# Patient Record
Sex: MALE | Race: WHITE | NOT HISPANIC OR LATINO | Employment: FULL TIME | URBAN - METROPOLITAN AREA
[De-identification: names, ages, dates, MRNs, and addresses within clinical notes are randomized per-mention and may not be internally consistent; named-entity substitution may affect disease eponyms.]

---

## 2017-01-10 ENCOUNTER — TELEPHONE (OUTPATIENT)
Dept: FAMILY MEDICINE | Facility: CLINIC | Age: 52
End: 2017-01-10

## 2017-01-10 DIAGNOSIS — R05.9 COUGH: ICD-10-CM

## 2017-01-10 DIAGNOSIS — R50.9 FEVER, UNSPECIFIED: Primary | ICD-10-CM

## 2017-01-10 DIAGNOSIS — R50.9 FEVER, UNSPECIFIED: ICD-10-CM

## 2017-01-10 DIAGNOSIS — R05.9 COUGH: Primary | ICD-10-CM

## 2017-01-10 LAB
FLUAV+FLUBV AG SPEC QL: ABNORMAL
FLUAV+FLUBV AG SPEC QL: ABNORMAL
SPECIMEN SOURCE: ABNORMAL

## 2017-01-10 PROCEDURE — 87804 INFLUENZA ASSAY W/OPTIC: CPT | Performed by: FAMILY MEDICINE

## 2017-01-25 ENCOUNTER — MYC REFILL (OUTPATIENT)
Dept: FAMILY MEDICINE | Facility: CLINIC | Age: 52
End: 2017-01-25

## 2017-01-25 DIAGNOSIS — E78.5 HYPERLIPIDEMIA LDL GOAL <160: ICD-10-CM

## 2017-01-25 RX ORDER — PRAVASTATIN SODIUM 20 MG
20 TABLET ORAL DAILY
Qty: 90 TABLET | Refills: 3 | Status: SHIPPED | OUTPATIENT
Start: 2017-01-25 | End: 2017-03-28

## 2017-01-25 NOTE — TELEPHONE ENCOUNTER
Prescription approved per [facility] Refill Protocol.      Pravastatin       Last Written Prescription Date: 10/24/16  Last Fill Quantity: 90, # refills: 0    Last Office Visit with [facility], [facility] or [facility] prescribing provider:  11/21/16   Future Office Visit:      CHOLESTEROL   Date Value Ref Range Status   11/18/2016 197 <200 mg/dL Final     HDL CHOLESTEROL   Date Value Ref Range Status   11/18/2016 39* >39 mg/dL Final     LDL CHOLESTEROL CALCULATED   Date Value Ref Range Status   11/18/2016 121* <100 mg/dL Final     Comment:     Above desirable:  100-129 mg/dl   Borderline High:  130-159 mg/dL   High:             160-189 mg/dL   Very high:       >189 mg/dl       TRIGLYCERIDES   Date Value Ref Range Status   11/18/2016 184* <150 mg/dL Final     Comment:     Borderline high:  150-199 mg/dl   High:             200-499 mg/dl   Very high:       >499 mg/dl       CHOLESTEROL/HDL RATIO   Date Value Ref Range Status   09/14/2015 4.9 0.0 - 5.0 Final     ALT   Date Value Ref Range Status   11/18/2016 99* 0 - 70 U/L Final

## 2017-01-25 NOTE — TELEPHONE ENCOUNTER
Message from [employer]:  Original authorizing provider: Ramona Ann Aaseby-Aguilera, PA-C Troy W Hanson would like a refill of the following medications:  pravastatin (PRAVACHOL) 20 MG tablet [Ramona Ann Aaseby-Aguilera, PA-C]    Preferred pharmacy: [facility] - [place], [place] - [address]    Comment:

## 2017-02-03 DIAGNOSIS — J01.00 ACUTE NON-RECURRENT MAXILLARY SINUSITIS: Primary | ICD-10-CM

## 2017-02-03 RX ORDER — PREDNISONE 20 MG/1
60 TABLET ORAL DAILY
Qty: 15 TABLET | Refills: 0 | Status: SHIPPED | OUTPATIENT
Start: 2017-02-03 | End: 2017-02-23

## 2017-02-23 ENCOUNTER — OFFICE VISIT (OUTPATIENT)
Dept: FAMILY MEDICINE | Facility: CLINIC | Age: 52
End: 2017-02-23
Payer: COMMERCIAL

## 2017-02-23 VITALS
SYSTOLIC BLOOD PRESSURE: 130 MMHG | HEART RATE: 74 BPM | BODY MASS INDEX: 35.21 KG/M2 | OXYGEN SATURATION: 96 % | DIASTOLIC BLOOD PRESSURE: 88 MMHG | HEIGHT: 72 IN | WEIGHT: 260 LBS

## 2017-02-23 DIAGNOSIS — E66.3 OVERWEIGHT: ICD-10-CM

## 2017-02-23 DIAGNOSIS — E29.1 HYPOGONADISM MALE: ICD-10-CM

## 2017-02-23 DIAGNOSIS — E34.9 TESTOSTERONE INSUFFICIENCY: Primary | ICD-10-CM

## 2017-02-23 PROCEDURE — 96372 THER/PROPH/DIAG INJ SC/IM: CPT | Performed by: NURSE PRACTITIONER

## 2017-02-23 PROCEDURE — 99214 OFFICE O/P EST MOD 30 MIN: CPT | Mod: 25 | Performed by: NURSE PRACTITIONER

## 2017-02-23 RX ORDER — TESTOSTERONE CYPIONATE 200 MG/ML
200 INJECTION, SOLUTION INTRAMUSCULAR
Qty: 2 ML | Refills: 5 | Status: SHIPPED | OUTPATIENT
Start: 2017-02-23 | End: 2017-07-26

## 2017-02-23 NOTE — MR AVS SNAPSHOT
"              After Visit Summary   2/23/2017    Galileo Vieira    MRN: [id number]           Patient Information     Date Of Birth          1965        Visit Information        Provider Department      2/23/2017 11:40 AM Alexa Cano NP [facility]        Today's Diagnoses     Testosterone insufficiency    -  1       Follow-ups after your visit        Who to contact     If you have questions or need follow up information about today's clinic visit or your schedule please contact [facility] directly at [phone].  Normal or non-critical lab and imaging results will be communicated to you by Pfeffermind Gameshart, letter or phone within 4 business days after the clinic has received the results. If you do not hear from us within 7 days, please contact the clinic through MoneyFarmt or phone. If you have a critical or abnormal lab result, we will notify you by phone as soon as possible.  Submit refill requests through [employer] or call your pharmacy and they will forward the refill request to us. Please allow 3 business days for your refill to be completed.          Additional Information About Your Visit        MyChart Information     [employer] gives you secure access to your electronic health record. If you see a primary care provider, you can also send messages to your care team and make appointments. If you have questions, please call your primary care clinic.  If you do not have a primary care provider, please call [phone] and they will assist you.        Care EveryWhere ID     This is your Care EveryWhere ID. This could be used by other organizations to access your [place] medical records  [id number]        Your Vitals Were     Pulse Height Pulse Oximetry BMI (Body Mass Index)          74 5' 11.75\" (1.822 m) 96% 35.51 kg/m2         Blood Pressure from Last 3 Encounters:   02/23/17 130/88   12/09/16 121/74   11/21/16 128/80    Weight from Last 3 Encounters:   02/23/17 260 lb (117.9 kg) "   12/09/16 260 lb 12.9 oz (118.3 kg)   11/21/16 261 lb (118.4 kg)              We Performed the Following     C TESTOSTERONE CYPIONATE INJECTION, 1 MG     INJECTION INTRAMUSCULAR OR SUB-Q          Today's Medication Changes          These changes are accurate as of: 2/23/17  5:32 PM.  If you have any questions, ask your nurse or doctor.               Start taking these medicines.        Dose/Directions    testosterone cypionate 200 MG/ML injection   Commonly known as:  DEPO-TESTOSTERONE   Used for:  Testosterone insufficiency   Started by:  Alexa Cano NP        Dose:  200 mg   Inject 1 mL (200 mg) into the muscle every 14 days   Quantity:  2 mL   Refills:  5            Where to get your medicines      Call your pharmacy to confirm that your medication is ready for pickup. It may take up to 24 hours for them to receive the prescription. If the prescription is not ready within 3 business days, please contact your clinic or your provider.     We will let you know when these medications are ready. If you don't hear back within 3 business days, please contact us.     testosterone cypionate 200 MG/ML injection                Primary Care Provider Office Phone # Fax #    Ramona Ann Aaseby-Aguilera, PA-C [phone]       [facility] [address] [facility] [address]        Thank you!     Thank you for choosing [facility]  for your care. Our goal is always to provide you with excellent care. Hearing back from our patients is one way we can continue to improve our services. Please take a few minutes to complete the written survey that you may receive in the mail after your visit with us. Thank you!             Your Updated Medication List - Protect others around you: Learn how to safely use, store and throw away your medicines at www.disposemymeds.org.          This list is accurate as of: 2/23/17  5:32 PM.  Always use your most recent med list.                   Brand  Name Dispense Instructions for use    esomeprazole 20 MG CR capsule    nexIUM    90 capsule    Take 1 capsule (20 mg) by mouth every morning (before breakfast) Take 30-60 minutes before eating.       FLUoxetine 20 MG capsule    PROzac    90 capsule    Take 1 capsule (20 mg) by mouth daily       ibuprofen 200 MG capsule     120    1 CAPSULE PRN       omeprazole 40 MG capsule    priLOSEC    90 capsule    Take 1 capsule (40 mg) by mouth daily       order for DME      Equipment ordered: RESMED Auto PAP Mask type: Full face  Settings: 5-15       pravastatin 20 MG tablet    PRAVACHOL    90 tablet    Take 1 tablet (20 mg) by mouth daily       testosterone cypionate 200 MG/ML injection    DEPO-TESTOSTERONE    2 mL    Inject 1 mL (200 mg) into the muscle every 14 days

## 2017-02-23 NOTE — PROGRESS NOTES
"  SUBJECTIVE:                                                    Galileo Vieira is a 51 year old male who presents to clinic today for the following health issues:    Galileo is here to discuss testosterone replacement. History of recurrent low testosterone levels. Feeling fatigued with no motivation to move, gaining weight, and lacks libido. Has been on testosterone cream but did not feel it was effective. Interested in discussing other options. Goal is to improve his energy so he is more motivated to exercise and lose weight. Has discussed use of phentermine with another provider and is interested in starting that medication today as well.          Problem list and histories reviewed & adjusted, as indicated.  Additional history: none    Problem list, Medication list, Allergies, and Medical/Social/Surgical histories reviewed in EPIC and updated as appropriate.    ROS:  Constitutional, HEENT, cardiovascular, pulmonary, gi and gu systems are negative, except as otherwise noted.    OBJECTIVE:                                                    /88 (BP Location: Right arm, Patient Position: Chair, Cuff Size: Adult Large)  Pulse 74  Ht 5' 11.75\" (1.822 m)  Wt 260 lb (117.9 kg)  SpO2 96%  BMI 35.51 kg/m2  Body mass index is 35.51 kg/(m^2).  GENERAL: healthy, alert and no distress  RESP: lungs clear to auscultation - no rales, rhonchi or wheezes  CV: regular rate and rhythm, normal S1 S2, no S3 or S4, no murmur, click or rub, no peripheral edema and peripheral pulses strong  PSYCH: mentation appears normal, affect normal/bright    none      ASSESSMENT/PLAN:                                                            1. Testosterone insufficiency  Reviewed previous two testosterone levels that were below normal. TSH is normal. Will start on testosterone injections every two weeks. Emphasis on healthy diet and regular exercise daily.   - testosterone cypionate (DEPO-TESTOSTERONE) 200 MG/ML injection; Inject 1 mL (200 mg) " into the muscle every 14 days  Dispense: 2 mL; Refill: 5  - C TESTOSTERONE CYPIONATE INJECTION, 1 MG  - INJECTION INTRAMUSCULAR OR SUB-Q    2. Overweight  Will start phentermine in one week after seeing if any difference with testosterone.   Follow up in one month.     Alexa Cano, NP  [facility]

## 2017-02-23 NOTE — NURSING NOTE
"Chief Complaint   Patient presents with     Consult       Initial /88 (BP Location: Right arm, Patient Position: Chair, Cuff Size: Adult Large)  Pulse 74  Ht 5' 11.75\" (1.822 m)  Wt 260 lb (117.9 kg)  SpO2 96%  BMI 35.51 kg/m2 Estimated body mass index is 35.51 kg/(m^2) as calculated from the following:    Height as of this encounter: 5' 11.75\" (1.822 m).    Weight as of this encounter: 260 lb (117.9 kg).  Medication Reconciliation: incomplete   CATHERINE Cruz      "

## 2017-02-24 RX ORDER — PHENTERMINE HYDROCHLORIDE 15 MG/1
15 CAPSULE ORAL EVERY MORNING
Qty: 30 CAPSULE | Refills: 0 | Status: SHIPPED | OUTPATIENT
Start: 2017-02-24 | End: 2017-06-20

## 2017-02-27 ENCOUNTER — TELEPHONE (OUTPATIENT)
Dept: FAMILY MEDICINE | Facility: CLINIC | Age: 52
End: 2017-02-27

## 2017-02-27 NOTE — TELEPHONE ENCOUNTER
PA faxed to In[employer] for Phentermine 15MG    [employer]    Phone@ [phone]  Patient ID# [id number]    Boaz RAMSEY

## 2017-03-08 PROBLEM — E29.1 HYPOGONADISM MALE: Status: ACTIVE | Noted: 2017-03-08

## 2017-03-09 ENCOUNTER — ALLIED HEALTH/NURSE VISIT (OUTPATIENT)
Dept: NURSING | Facility: CLINIC | Age: 52
End: 2017-03-09
Payer: COMMERCIAL

## 2017-03-09 DIAGNOSIS — E34.9 TESTOSTERONE INSUFFICIENCY: Primary | ICD-10-CM

## 2017-03-09 PROCEDURE — 99207 ZZC NO CHARGE NURSE ONLY: CPT

## 2017-03-09 PROCEDURE — 96372 THER/PROPH/DIAG INJ SC/IM: CPT

## 2017-03-14 ENCOUNTER — TELEPHONE (OUTPATIENT)
Dept: FAMILY MEDICINE | Facility: CLINIC | Age: 52
End: 2017-03-14

## 2017-03-14 NOTE — TELEPHONE ENCOUNTER
PA faxed and approved for testosterone cypionate (DEPO-TESTOSTERONE) 200 MG/ML injection. Effective 03/14/2017-03/14/2018.     Boaz BOWMANT

## 2017-03-23 ENCOUNTER — TELEPHONE (OUTPATIENT)
Dept: FAMILY MEDICINE | Facility: CLINIC | Age: 52
End: 2017-03-23

## 2017-03-23 ENCOUNTER — ALLIED HEALTH/NURSE VISIT (OUTPATIENT)
Dept: NURSING | Facility: CLINIC | Age: 52
End: 2017-03-23
Payer: COMMERCIAL

## 2017-03-23 DIAGNOSIS — E66.3 OVERWEIGHT: Primary | ICD-10-CM

## 2017-03-23 DIAGNOSIS — E34.9 TESTOSTERONE DEFICIENCY: Primary | ICD-10-CM

## 2017-03-23 PROCEDURE — 96372 THER/PROPH/DIAG INJ SC/IM: CPT

## 2017-03-23 PROCEDURE — 99207 ZZC NO CHARGE NURSE ONLY: CPT

## 2017-03-23 RX ORDER — PHENTERMINE HYDROCHLORIDE 37.5 MG/1
37.5 CAPSULE ORAL EVERY MORNING
Qty: 30 CAPSULE | Refills: 0 | Status: SHIPPED | OUTPATIENT
Start: 2017-03-23 | End: 2017-06-20

## 2017-03-24 ENCOUNTER — TELEPHONE (OUTPATIENT)
Dept: FAMILY MEDICINE | Facility: CLINIC | Age: 52
End: 2017-03-24

## 2017-03-24 NOTE — TELEPHONE ENCOUNTER
PA faxed for phentermine 37.5MG    [employer]    Phone@ [phone]    Patient ID# [id number]    Boaz RAMSEY

## 2017-03-26 ENCOUNTER — MYC REFILL (OUTPATIENT)
Dept: FAMILY MEDICINE | Facility: CLINIC | Age: 52
End: 2017-03-26

## 2017-03-26 DIAGNOSIS — K21.9 GERD (GASTROESOPHAGEAL REFLUX DISEASE): ICD-10-CM

## 2017-03-26 DIAGNOSIS — E78.5 HYPERLIPIDEMIA LDL GOAL <160: ICD-10-CM

## 2017-03-27 NOTE — TELEPHONE ENCOUNTER
Pending Prescriptions:                       Disp   Refills    omeprazole (PRILOSEC) 40 MG capsule       90 cap*4            Sig: Take 1 capsule (40 mg) by mouth daily          Last Written Prescription Date: 10/24/2016  Last Fill Quantity: 90,  # refills: 4   Last Office Visit with [facility], [facility] or [facility] prescribing provider: 02/23/2017    Millicent Hansen

## 2017-03-27 NOTE — TELEPHONE ENCOUNTER
LMTRC    Need to verify what PPI he is taking as he has refills on both nexium and omeprazole  Danielle Byrd RN, BSN

## 2017-03-27 NOTE — TELEPHONE ENCOUNTER
Message from [employer]:  Original authorizing provider: Ramona Ann Aaseby-Aguilera, PA-C Troy W. Hanson would like a refill of the following medications:  omeprazole (PRILOSEC) 40 MG capsule [Ramona Ann Aaseby-Aguilera, PA-C]    Preferred pharmacy: [facility] - [place], [place] - [facility] [address]    Comment:

## 2017-03-28 RX ORDER — OMEPRAZOLE 40 MG/1
40 CAPSULE, DELAYED RELEASE ORAL DAILY
Qty: 90 CAPSULE | Refills: 4 | Status: SHIPPED | OUTPATIENT
Start: 2017-03-28 | End: 2018-06-18

## 2017-03-28 RX ORDER — PRAVASTATIN SODIUM 20 MG
20 TABLET ORAL DAILY
Qty: 90 TABLET | Refills: 3 | COMMUNITY
Start: 2017-03-28 | End: 2018-05-08

## 2017-04-06 ENCOUNTER — ALLIED HEALTH/NURSE VISIT (OUTPATIENT)
Dept: NURSING | Facility: CLINIC | Age: 52
End: 2017-04-06
Payer: COMMERCIAL

## 2017-04-06 DIAGNOSIS — E34.9 TESTOSTERONE INSUFFICIENCY: Primary | ICD-10-CM

## 2017-04-06 PROCEDURE — 96372 THER/PROPH/DIAG INJ SC/IM: CPT

## 2017-04-06 NOTE — PROGRESS NOTES
MEDICATION: Depo Testosterone  200 mg  ROUTE: IM  SITE: [facility]  DOSE: 200mg  LOT #: [id number]  :  [employer]  EXPIRATION DATE:  6/2019  NDC#: 3691-7174-08  Danielle Byrd RN, BSN

## 2017-04-06 NOTE — MR AVS SNAPSHOT
After Visit Summary   4/6/2017    Galileo Vieira    MRN: [id number]           Patient Information     Date Of Birth          1965        Visit Information        Provider Department      4/6/2017 8:30 AM LV RN [facility]        Today's Diagnoses     Testosterone insufficiency    -  1       Follow-ups after your visit        Who to contact     If you have questions or need follow up information about today's clinic visit or your schedule please contact [facility] directly at [phone].  Normal or non-critical lab and imaging results will be communicated to you by MyChart, letter or phone within 4 business days after the clinic has received the results. If you do not hear from us within 7 days, please contact the clinic through Teranodehart or phone. If you have a critical or abnormal lab result, we will notify you by phone as soon as possible.  Submit refill requests through [employer] or call your pharmacy and they will forward the refill request to us. Please allow 3 business days for your refill to be completed.          Additional Information About Your Visit        MyChart Information     [employer] gives you secure access to your electronic health record. If you see a primary care provider, you can also send messages to your care team and make appointments. If you have questions, please call your primary care clinic.  If you do not have a primary care provider, please call [phone] and they will assist you.        Care EveryWhere ID     This is your Care EveryWhere ID. This could be used by other organizations to access your [place] medical records  [id number]         Blood Pressure from Last 3 Encounters:   02/23/17 130/88   12/09/16 121/74   11/21/16 128/80    Weight from Last 3 Encounters:   02/23/17 260 lb (117.9 kg)   12/09/16 260 lb 12.9 oz (118.3 kg)   11/21/16 261 lb (118.4 kg)              We Performed the Following     C TESTOSTERONE CYPIONATE  INJECTION, 1 MG     INJECTION INTRAMUSCULAR OR SUB-Q        Primary Care Provider Office Phone # Fax #    Ramona Ann Aaseby-Aguilera, PA-C [phone]       [facility] [address]  [facility] [address]        Thank you!     Thank you for choosing [facility]  for your care. Our goal is always to provide you with excellent care. Hearing back from our patients is one way we can continue to improve our services. Please take a few minutes to complete the written survey that you may receive in the mail after your visit with us. Thank you!             Your Updated Medication List - Protect others around you: Learn how to safely use, store and throw away your medicines at www.disposemymeds.org.          This list is accurate as of: 4/6/17  9:05 AM.  Always use your most recent med list.                   Brand Name Dispense Instructions for use    FLUoxetine 20 MG capsule    PROzac    90 capsule    Take 1 capsule (20 mg) by mouth daily       ibuprofen 200 MG capsule     120    1 CAPSULE PRN       omeprazole 40 MG capsule    priLOSEC    90 capsule    Take 1 capsule (40 mg) by mouth daily       order for DME      Equipment ordered: RESMED Auto PAP Mask type: Full face  Settings: 5-15       * phentermine 15 MG capsule     30 capsule    Take 1 capsule (15 mg) by mouth every morning       * phentermine 37.5 MG capsule     30 capsule    Take 1 capsule (37.5 mg) by mouth every morning       PRAVACHOL 20 MG tablet   Generic drug:  pravastatin     90 tablet    Take 1 tablet (20 mg) by mouth daily       testosterone cypionate 200 MG/ML injection    DEPO-TESTOSTERONE    2 mL    Inject 1 mL (200 mg) into the muscle every 14 days       * Notice:  This list has 2 medication(s) that are the same as other medications prescribed for you. Read the directions carefully, and ask your doctor or other care provider to review them with you.

## 2017-04-19 ENCOUNTER — MYC REFILL (OUTPATIENT)
Dept: FAMILY MEDICINE | Facility: CLINIC | Age: 52
End: 2017-04-19

## 2017-04-19 DIAGNOSIS — E78.5 HYPERLIPIDEMIA LDL GOAL <160: ICD-10-CM

## 2017-04-19 RX ORDER — PRAVASTATIN SODIUM 20 MG
20 TABLET ORAL DAILY
Qty: 90 TABLET | Refills: 3 | Status: CANCELLED | OUTPATIENT
Start: 2017-04-19

## 2017-04-20 ENCOUNTER — ALLIED HEALTH/NURSE VISIT (OUTPATIENT)
Dept: NURSING | Facility: CLINIC | Age: 52
End: 2017-04-20
Payer: COMMERCIAL

## 2017-04-20 DIAGNOSIS — E34.9 TESTOSTERONE INSUFFICIENCY: Primary | ICD-10-CM

## 2017-04-20 PROCEDURE — 96372 THER/PROPH/DIAG INJ SC/IM: CPT

## 2017-05-04 ENCOUNTER — ALLIED HEALTH/NURSE VISIT (OUTPATIENT)
Dept: NURSING | Facility: CLINIC | Age: 52
End: 2017-05-04
Payer: COMMERCIAL

## 2017-05-04 DIAGNOSIS — E34.9 TESTOSTERONE INSUFFICIENCY: Primary | ICD-10-CM

## 2017-05-04 PROCEDURE — 96372 THER/PROPH/DIAG INJ SC/IM: CPT

## 2017-05-04 NOTE — MR AVS SNAPSHOT
After Visit Summary   5/4/2017    Galileo Vieira    MRN: [id number]           Patient Information     Date Of Birth          1965        Visit Information        Provider Department      5/4/2017 1:30 PM NASH RN [facility]        Today's Diagnoses     Testosterone insufficiency    -  1       Follow-ups after your visit        Your next 10 appointments already scheduled     May 04, 2017  1:30 PM CDT   Nurse Only with LV RN   [facility] ([facility]    [address] [facility] [address] [phone]              Who to contact     If you have questions or need follow up information about today's clinic visit or your schedule please contact [facility] directly at [phone].  Normal or non-critical lab and imaging results will be communicated to you by Phunwarehart, letter or phone within 4 business days after the clinic has received the results. If you do not hear from us within 7 days, please contact the clinic through Phunwarehart or phone. If you have a critical or abnormal lab result, we will notify you by phone as soon as possible.  Submit refill requests through [employer] or call your pharmacy and they will forward the refill request to us. Please allow 3 business days for your refill to be completed.          Additional Information About Your Visit        MyChart Information     [employer] gives you secure access to your electronic health record. If you see a primary care provider, you can also send messages to your care team and make appointments. If you have questions, please call your primary care clinic.  If you do not have a primary care provider, please call [phone] and they will assist you.        Care EveryWhere ID     This is your Care EveryWhere ID. This could be used by other organizations to access your [place] medical records  [id number]         Blood Pressure from Last 3 Encounters:   02/23/17 130/88   12/09/16  121/74   11/21/16 128/80    Weight from Last 3 Encounters:   02/23/17 260 lb (117.9 kg)   12/09/16 260 lb 12.9 oz (118.3 kg)   11/21/16 261 lb (118.4 kg)              We Performed the Following     C TESTOSTERONE CYPIONATE INJECTION, 1 MG     INJECTION INTRAMUSCULAR OR SUB-Q        Primary Care Provider Office Phone # Fax #    Ramona Ann Aaseby-Aguilera, PA-C [phone]       [facility] [address]  [facility] [address]        Thank you!     Thank you for choosing [facility]  for your care. Our goal is always to provide you with excellent care. Hearing back from our patients is one way we can continue to improve our services. Please take a few minutes to complete the written survey that you may receive in the mail after your visit with us. Thank you!             Your Updated Medication List - Protect others around you: Learn how to safely use, store and throw away your medicines at www.disposemymeds.org.          This list is accurate as of: 5/4/17  8:57 AM.  Always use your most recent med list.                   Brand Name Dispense Instructions for use    FLUoxetine 20 MG capsule    PROzac    90 capsule    Take 1 capsule (20 mg) by mouth daily       ibuprofen 200 MG capsule     120    1 CAPSULE PRN       omeprazole 40 MG capsule    priLOSEC    90 capsule    Take 1 capsule (40 mg) by mouth daily       order for DME      Equipment ordered: RESMED Auto PAP Mask type: Full face  Settings: 5-15       * phentermine 15 MG capsule     30 capsule    Take 1 capsule (15 mg) by mouth every morning       * phentermine 37.5 MG capsule     30 capsule    Take 1 capsule (37.5 mg) by mouth every morning       PRAVACHOL 20 MG tablet   Generic drug:  pravastatin     90 tablet    Take 1 tablet (20 mg) by mouth daily       testosterone cypionate 200 MG/ML injection    DEPO-TESTOSTERONE    2 mL    Inject 1 mL (200 mg) into the muscle every 14 days       * Notice:  This list has 2  medication(s) that are the same as other medications prescribed for you. Read the directions carefully, and ask your doctor or other care provider to review them with you.

## 2017-05-04 NOTE — PROGRESS NOTES
The following medication was given:     MEDICATION: Depo Testosterone  200 mg  ROUTE: IM  SITE: RUQ - ventrogluteal  DOSE: 200mg  LOT #: [id number]  :  [employer] Jim  EXPIRATION DATE:  01/2019  ND: 0009-520-01  Danielle Byrd RN, BSN

## 2017-05-18 ENCOUNTER — ALLIED HEALTH/NURSE VISIT (OUTPATIENT)
Dept: NURSING | Facility: CLINIC | Age: 52
End: 2017-05-18
Payer: COMMERCIAL

## 2017-05-18 DIAGNOSIS — E34.9 TESTOSTERONE INSUFFICIENCY: Primary | ICD-10-CM

## 2017-05-18 PROCEDURE — 96372 THER/PROPH/DIAG INJ SC/IM: CPT

## 2017-05-18 NOTE — PROGRESS NOTES
The following medication was given:     MEDICATION: Depo Testosterone  200 mg  ROUTE: IM  SITE: Ventrogluteal - Left  DOSE: 200mg  LOT #: [id number]  :  [employer] Jim  EXPIRATION DATE:  6/2019  NDC#: 0009-520-01  Next injection due 6/1/17  Danielle Byrd RN, BSN

## 2017-05-18 NOTE — MR AVS SNAPSHOT
After Visit Summary   5/18/2017    Galileo Vieira    MRN: [id number]           Patient Information     Date Of Birth          1965        Visit Information        Provider Department      5/18/2017 8:45 AM NASH RN [facility]        Today's Diagnoses     Testosterone insufficiency    -  1       Follow-ups after your visit        Your next 10 appointments already scheduled     May 18, 2017  8:45 AM CDT   Nurse Only with LV RN   [facility]    [address] [facility] [address] [phone]              Who to contact     If you have questions or need follow up information about today's clinic visit or your schedule please contact [facility] directly at [phone].  Normal or non-critical lab and imaging results will be communicated to you by JLGOVhart, letter or phone within 4 business days after the clinic has received the results. If you do not hear from us within 7 days, please contact the clinic through JLGOVhart or phone. If you have a critical or abnormal lab result, we will notify you by phone as soon as possible.  Submit refill requests through [employer] or call your pharmacy and they will forward the refill request to us. Please allow 3 business days for your refill to be completed.          Additional Information About Your Visit        MyChart Information     [employer] gives you secure access to your electronic health record. If you see a primary care provider, you can also send messages to your care team and make appointments. If you have questions, please call your primary care clinic.  If you do not have a primary care provider, please call [phone] and they will assist you.        Care EveryWhere ID     This is your Care EveryWhere ID. This could be used by other organizations to access your [place] medical records  [id number]         Blood Pressure from Last 3 Encounters:   02/23/17 130/88    12/09/16 121/74   11/21/16 128/80    Weight from Last 3 Encounters:   02/23/17 260 lb (117.9 kg)   12/09/16 260 lb 12.9 oz (118.3 kg)   11/21/16 261 lb (118.4 kg)              We Performed the Following     C TESTOSTERONE CYPIONATE INJECTION, 1 MG     INJECTION INTRAMUSCULAR OR SUB-Q        Primary Care Provider Office Phone # Fax #    Ramona Ann Aaseby-Aguilera, PA-C [phone]       [facility] [address]  [facility] [address]        Thank you!     Thank you for choosing [facility]  for your care. Our goal is always to provide you with excellent care. Hearing back from our patients is one way we can continue to improve our services. Please take a few minutes to complete the written survey that you may receive in the mail after your visit with us. Thank you!             Your Updated Medication List - Protect others around you: Learn how to safely use, store and throw away your medicines at www.disposemymeds.org.          This list is accurate as of: 5/18/17  8:10 AM.  Always use your most recent med list.                   Brand Name Dispense Instructions for use    FLUoxetine 20 MG capsule    PROzac    90 capsule    Take 1 capsule (20 mg) by mouth daily       ibuprofen 200 MG capsule     120    1 CAPSULE PRN       omeprazole 40 MG capsule    priLOSEC    90 capsule    Take 1 capsule (40 mg) by mouth daily       order for DME      Equipment ordered: RESMED Auto PAP Mask type: Full face  Settings: 5-15       * phentermine 15 MG capsule     30 capsule    Take 1 capsule (15 mg) by mouth every morning       * phentermine 37.5 MG capsule     30 capsule    Take 1 capsule (37.5 mg) by mouth every morning       PRAVACHOL 20 MG tablet   Generic drug:  pravastatin     90 tablet    Take 1 tablet (20 mg) by mouth daily       testosterone cypionate 200 MG/ML injection    DEPO-TESTOSTERONE    2 mL    Inject 1 mL (200 mg) into the muscle every 14 days       * Notice:  This list  has 2 medication(s) that are the same as other medications prescribed for you. Read the directions carefully, and ask your doctor or other care provider to review them with you.

## 2017-06-02 ENCOUNTER — ALLIED HEALTH/NURSE VISIT (OUTPATIENT)
Dept: NURSING | Facility: CLINIC | Age: 52
End: 2017-06-02
Payer: COMMERCIAL

## 2017-06-02 DIAGNOSIS — E34.9 TESTOSTERONE INSUFFICIENCY: Primary | ICD-10-CM

## 2017-06-02 PROCEDURE — 96372 THER/PROPH/DIAG INJ SC/IM: CPT

## 2017-06-16 ENCOUNTER — ALLIED HEALTH/NURSE VISIT (OUTPATIENT)
Dept: NURSING | Facility: CLINIC | Age: 52
End: 2017-06-16
Payer: COMMERCIAL

## 2017-06-16 DIAGNOSIS — E34.9 TESTOSTERONE INSUFFICIENCY: Primary | ICD-10-CM

## 2017-06-16 PROCEDURE — 99207 ZZC NO CHARGE NURSE ONLY: CPT

## 2017-06-16 PROCEDURE — 96372 THER/PROPH/DIAG INJ SC/IM: CPT

## 2017-06-16 NOTE — PROGRESS NOTES
The following medication was given:     MEDICATION: Depo Testosterone  200 mg  ROUTE: IM  SITE: Ventrogluteal - Left  DOSE: 200mg  LOT #: [id number]  :  [employer] Jim  EXPIRATION DATE:  01/2019  NDC#: 1399-3759-49  Danielle Byrd RN, BSN

## 2017-06-16 NOTE — MR AVS SNAPSHOT
After Visit Summary   6/16/2017    Galileo Vieira    MRN: [id number]           Patient Information     Date Of Birth          1965        Visit Information        Provider Department      6/16/2017 2:00 PM LV RN [facility]        Today's Diagnoses     Testosterone insufficiency    -  1       Follow-ups after your visit        Who to contact     If you have questions or need follow up information about today's clinic visit or your schedule please contact [facility] directly at [phone].  Normal or non-critical lab and imaging results will be communicated to you by Aibohart, letter or phone within 4 business days after the clinic has received the results. If you do not hear from us within 7 days, please contact the clinic through Aibohart or phone. If you have a critical or abnormal lab result, we will notify you by phone as soon as possible.  Submit refill requests through Lemko or call your pharmacy and they will forward the refill request to us. Please allow 3 business days for your refill to be completed.          Additional Information About Your Visit        MyChart Information     Lemko gives you secure access to your electronic health record. If you see a primary care provider, you can also send messages to your care team and make appointments. If you have questions, please call your primary care clinic.  If you do not have a primary care provider, please call [phone] and they will assist you.        Care EveryWhere ID     This is your Care EveryWhere ID. This could be used by other organizations to access your [place] medical records  [id number]         Blood Pressure from Last 3 Encounters:   02/23/17 130/88   12/09/16 121/74   11/21/16 128/80    Weight from Last 3 Encounters:   02/23/17 260 lb (117.9 kg)   12/09/16 260 lb 12.9 oz (118.3 kg)   11/21/16 261 lb (118.4 kg)              We Performed the Following     C TESTOSTERONE CYPIONATE  INJECTION, 1 MG     INJECTION INTRAMUSCULAR OR SUB-Q        Primary Care Provider Office Phone # Fax #    Ramona Ann Aaseby-Aguilera, PA-C [phone]       [facility] [address]  [facility] [address]        Thank you!     Thank you for choosing [facility]  for your care. Our goal is always to provide you with excellent care. Hearing back from our patients is one way we can continue to improve our services. Please take a few minutes to complete the written survey that you may receive in the mail after your visit with us. Thank you!             Your Updated Medication List - Protect others around you: Learn how to safely use, store and throw away your medicines at www.disposemymeds.org.          This list is accurate as of: 6/16/17  2:29 PM.  Always use your most recent med list.                   Brand Name Dispense Instructions for use    FLUoxetine 20 MG capsule    PROzac    90 capsule    Take 1 capsule (20 mg) by mouth daily       ibuprofen 200 MG capsule     120    1 CAPSULE PRN       omeprazole 40 MG capsule    priLOSEC    90 capsule    Take 1 capsule (40 mg) by mouth daily       order for DME      Equipment ordered: RESMED Auto PAP Mask type: Full face  Settings: 5-15       * phentermine 15 MG capsule     30 capsule    Take 1 capsule (15 mg) by mouth every morning       * phentermine 37.5 MG capsule     30 capsule    Take 1 capsule (37.5 mg) by mouth every morning       PRAVACHOL 20 MG tablet   Generic drug:  pravastatin     90 tablet    Take 1 tablet (20 mg) by mouth daily       testosterone cypionate 200 MG/ML injection    DEPO-TESTOSTERONE    2 mL    Inject 1 mL (200 mg) into the muscle every 14 days       * Notice:  This list has 2 medication(s) that are the same as other medications prescribed for you. Read the directions carefully, and ask your doctor or other care provider to review them with you.

## 2017-06-20 ENCOUNTER — TELEPHONE (OUTPATIENT)
Dept: FAMILY MEDICINE | Facility: CLINIC | Age: 52
End: 2017-06-20

## 2017-06-20 DIAGNOSIS — F33.1 MODERATE EPISODE OF RECURRENT MAJOR DEPRESSIVE DISORDER (H): Primary | ICD-10-CM

## 2017-06-20 DIAGNOSIS — F41.9 ANXIETY: ICD-10-CM

## 2017-06-20 DIAGNOSIS — G47.00 INSOMNIA, UNSPECIFIED TYPE: ICD-10-CM

## 2017-06-20 RX ORDER — TRAZODONE HYDROCHLORIDE 50 MG/1
TABLET, FILM COATED ORAL
Qty: 90 TABLET | Refills: 1 | Status: SHIPPED | OUTPATIENT
Start: 2017-06-20 | End: 2020-10-16

## 2017-06-20 NOTE — TELEPHONE ENCOUNTER
"Pt c/o increased depression and anxiety symptoms.   He had stopped the fluoxetine several months ago as he was feeling much better but resumed it about 2-3 days ago.     Updated PHQ, he did score 1 in the suicide thoughts but denies any plan, in fact is looking forward to birth of grandchild.    He reports he is also working on cutting back on his alcohol consumption.   He has stopped the phentermine as well , \"it really did nothing for me\"     Advised ok to continue with fluoxetine 20 mg, new RX will be sent.     Per PCP ok to try Trazodone  mg at hs.  Pt does need to work on alcohol intake.  He should f/u with PCP in 4-6 weeks if not improving and to update PHQ.     Pt expressed understanding and acceptance of the plan.  Pt had no further questions at this time.  Advised can call back to clinic at any time with concerns.     Message handled by Nurse Triage with [employer] - provider name: Ramona Aaseby Aguilera PA Angela Page, RN     "

## 2017-06-29 ENCOUNTER — ALLIED HEALTH/NURSE VISIT (OUTPATIENT)
Dept: NURSING | Facility: CLINIC | Age: 52
End: 2017-06-29
Payer: COMMERCIAL

## 2017-06-29 DIAGNOSIS — E34.9 TESTOSTERONE INSUFFICIENCY: Primary | ICD-10-CM

## 2017-06-29 PROCEDURE — 96372 THER/PROPH/DIAG INJ SC/IM: CPT

## 2017-06-29 NOTE — PROGRESS NOTES
The following medication was given:     MEDICATION: Depo Testosterone  200 mg  ROUTE: IM  SITE: Ventrogluteal - Right  DOSE: 200mg  LOT #: [id number]  :  Leondra music Jim  EXPIRATION DATE:  3/2019  NDC#: 7742-7363-12  Danielle Byrd RN, BSN

## 2017-06-29 NOTE — MR AVS SNAPSHOT
After Visit Summary   6/29/2017    Galileo Vieira    MRN: [id number]           Patient Information     Date Of Birth          1965        Visit Information        Provider Department      6/29/2017 9:30 AM LV RN [facility]        Today's Diagnoses     Testosterone insufficiency    -  1       Follow-ups after your visit        Who to contact     If you have questions or need follow up information about today's clinic visit or your schedule please contact [facility] directly at [phone].  Normal or non-critical lab and imaging results will be communicated to you by Loglyhart, letter or phone within 4 business days after the clinic has received the results. If you do not hear from us within 7 days, please contact the clinic through Loglyhart or phone. If you have a critical or abnormal lab result, we will notify you by phone as soon as possible.  Submit refill requests through GOODWIN or call your pharmacy and they will forward the refill request to us. Please allow 3 business days for your refill to be completed.          Additional Information About Your Visit        MyChart Information     GOODWIN gives you secure access to your electronic health record. If you see a primary care provider, you can also send messages to your care team and make appointments. If you have questions, please call your primary care clinic.  If you do not have a primary care provider, please call [phone] and they will assist you.        Care EveryWhere ID     This is your Care EveryWhere ID. This could be used by other organizations to access your [place] medical records  [id number]         Blood Pressure from Last 3 Encounters:   02/23/17 130/88   12/09/16 121/74   11/21/16 128/80    Weight from Last 3 Encounters:   02/23/17 260 lb (117.9 kg)   12/09/16 260 lb 12.9 oz (118.3 kg)   11/21/16 261 lb (118.4 kg)              We Performed the Following     C TESTOSTERONE CYPIONATE  INJECTION, 1 MG     INJECTION INTRAMUSCULAR OR SUB-Q        Primary Care Provider Office Phone # Fax #    Ramona Ann Aaseby-Aguilera, PA-C [phone]       [facility] [address]  [facility] [address]        Equal Access to Services     KATIE GOODE : Hadii aad ku hadasho Soomaali, waaxda luqadaha, qaybta kaalmada adeegyada, waxay idiin hayaan adefeliciano khmadanмария lakeyon parra. So [facility] [place] [phone].    ATENCIÓN: Si habla español, tiene a mahmood disposición servicios gratuitos de asistencia lingüística. Agnieszka al [phone].    We comply with applicable federal civil rights laws and [place] laws. We do not discriminate on the basis of race, color, national origin, age, disability sex, sexual orientation or gender identity.            Thank you!     Thank you for choosing [facility]  for your care. Our goal is always to provide you with excellent care. Hearing back from our patients is one way we can continue to improve our services. Please take a few minutes to complete the written survey that you may receive in the mail after your visit with us. Thank you!             Your Updated Medication List - Protect others around you: Learn how to safely use, store and throw away your medicines at www.disposemymeds.org.          This list is accurate as of: 6/29/17  9:49 AM.  Always use your most recent med list.                   Brand Name Dispense Instructions for use Diagnosis    FLUoxetine 20 MG capsule    PROzac    90 capsule    Take 1 capsule (20 mg) by mouth daily    Anxiety, Moderate episode of recurrent major depressive disorder (H)       ibuprofen 200 MG capsule     120    1 CAPSULE PRN        omeprazole 40 MG capsule    priLOSEC    90 capsule    Take 1 capsule (40 mg) by mouth daily    GERD (gastroesophageal reflux disease)       order for DME      Equipment ordered: RESMED Auto PAP Mask type: Full face  Settings: 5-15        PRAVACHOL 20 MG tablet   Generic drug:  pravastatin      90 tablet    Take 1 tablet (20 mg) by mouth daily    Hyperlipidemia LDL goal <160       testosterone cypionate 200 MG/ML injection    DEPO-TESTOSTERONE    2 mL    Inject 1 mL (200 mg) into the muscle every 14 days    Testosterone insufficiency       traZODone 50 MG tablet    DESYREL    90 tablet    Take  mg at hs    Insomnia, unspecified type

## 2017-07-13 ENCOUNTER — OFFICE VISIT (OUTPATIENT)
Dept: NURSING | Facility: CLINIC | Age: 52
End: 2017-07-13
Payer: COMMERCIAL

## 2017-07-13 DIAGNOSIS — E34.9 TESTOSTERONE INSUFFICIENCY: Primary | ICD-10-CM

## 2017-07-13 PROCEDURE — 96372 THER/PROPH/DIAG INJ SC/IM: CPT

## 2017-07-13 NOTE — MR AVS SNAPSHOT
After Visit Summary   7/13/2017    Galileo Vieira    MRN: [id number]           Patient Information     Date Of Birth          1965        Visit Information        Provider Department      7/13/2017 10:00 AM LV RN [facility]        Today's Diagnoses     Testosterone insufficiency    -  1       Follow-ups after your visit        Your next 10 appointments already scheduled     Jul 13, 2017 10:00 AM CDT   SHORT with LV RN   [facility]    [address] [facility] [address] [phone]              Who to contact     If you have questions or need follow up information about today's clinic visit or your schedule please contact [facility] directly at [phone].  Normal or non-critical lab and imaging results will be communicated to you by Global Acquisition Partnershart, letter or phone within 4 business days after the clinic has received the results. If you do not hear from us within 7 days, please contact the clinic through Global Acquisition Partnershart or phone. If you have a critical or abnormal lab result, we will notify you by phone as soon as possible.  Submit refill requests through Sellr[employer] or call your pharmacy and they will forward the refill request to us. Please allow 3 business days for your refill to be completed.          Additional Information About Your Visit        MyChart Information     S[employer] gives you secure access to your electronic health record. If you see a primary care provider, you can also send messages to your care team and make appointments. If you have questions, please call your primary care clinic.  If you do not have a primary care provider, please call [phone] and they will assist you.        Care EveryWhere ID     This is your Care EveryWhere ID. This could be used by other organizations to access your [place] medical records  [id number]         Blood Pressure from Last 3 Encounters:   02/23/17 130/88   12/09/16  121/74   11/21/16 128/80    Weight from Last 3 Encounters:   02/23/17 260 lb (117.9 kg)   12/09/16 260 lb 12.9 oz (118.3 kg)   11/21/16 261 lb (118.4 kg)              We Performed the Following     C TESTOSTERONE CYPIONATE INJECTION, 1 MG     INJECTION INTRAMUSCULAR OR SUB-Q        Primary Care Provider Office Phone # Fax #    Ramona Ann Aaseby-Aguilera, PA-C [phone]       [facility] [address] [address] [facility] [address]        Equal Access to Services     KATIE GOODE : Hadii aad ku hadasho Soomaali, waaxda luqadaha, qaybta kaalmada adeegyapatricia, radu kilgore . So [facility] [phone].    ATENCIÓN: Si habla español, tiene a mahmood disposición servicios gratuitos de asistencia lingüística. Llame al [phone].    We comply with applicable federal civil rights laws and [place] laws. We do not discriminate on the basis of race, color, national origin, age, disability sex, sexual orientation or gender identity.            Thank you!     Thank you for choosing [facility]  for your care. Our goal is always to provide you with excellent care. Hearing back from our patients is one way we can continue to improve our services. Please take a few minutes to complete the written survey that you may receive in the mail after your visit with us. Thank you!             Your Updated Medication List - Protect others around you: Learn how to safely use, store and throw away your medicines at www.disposemymeds.org.          This list is accurate as of: 7/13/17  8:34 AM.  Always use your most recent med list.                   Brand Name Dispense Instructions for use Diagnosis    FLUoxetine 20 MG capsule    PROzac    90 capsule    Take 1 capsule (20 mg) by mouth daily    Anxiety, Moderate episode of recurrent major depressive disorder (H)       ibuprofen 200 MG capsule     120    1 CAPSULE PRN        omeprazole 40 MG capsule    priLOSEC    90 capsule    Take 1  capsule (40 mg) by mouth daily    GERD (gastroesophageal reflux disease)       order for DME      Equipment ordered: RESMED Auto PAP Mask type: Full face  Settings: 5-15        PRAVACHOL 20 MG tablet   Generic drug:  pravastatin     90 tablet    Take 1 tablet (20 mg) by mouth daily    Hyperlipidemia LDL goal <160       testosterone cypionate 200 MG/ML injection    DEPO-TESTOSTERONE    2 mL    Inject 1 mL (200 mg) into the muscle every 14 days    Testosterone insufficiency       traZODone 50 MG tablet    DESYREL    90 tablet    Take  mg at hs    Insomnia, unspecified type

## 2017-07-13 NOTE — PROGRESS NOTES
The following medication was given:     MEDICATION: Depo Testosterone  200 mg  ROUTE: IM  SITE: Ventrogluteal - Left  DOSE: 200mg  LOT #: [id number]  :  Gato Triventus Jim  EXPIRATION DATE:  6/2019  NDC#: 3269-0567-957  Danielle Byrd RN, BSN

## 2017-07-26 ENCOUNTER — TELEPHONE (OUTPATIENT)
Dept: FAMILY MEDICINE | Facility: CLINIC | Age: 52
End: 2017-07-26

## 2017-07-26 DIAGNOSIS — E34.9 TESTOSTERONE INSUFFICIENCY: ICD-10-CM

## 2017-07-26 NOTE — TELEPHONE ENCOUNTER
Pt is interested in doing testosterone injections at home due to cost.  Wife would help with injections.     Please sign RX if appropriate.       Cecilia Nathan RN

## 2017-07-28 ENCOUNTER — OFFICE VISIT (OUTPATIENT)
Dept: NURSING | Facility: CLINIC | Age: 52
End: 2017-07-28
Payer: COMMERCIAL

## 2017-07-28 DIAGNOSIS — E34.9 TESTOSTERONE INSUFFICIENCY: Primary | ICD-10-CM

## 2017-07-28 PROCEDURE — 96372 THER/PROPH/DIAG INJ SC/IM: CPT

## 2017-07-28 RX ORDER — TESTOSTERONE CYPIONATE 200 MG/ML
200 INJECTION, SOLUTION INTRAMUSCULAR
Qty: 10 ML | Refills: 1 | Status: SHIPPED | OUTPATIENT
Start: 2017-07-28 | End: 2017-10-24

## 2017-07-28 NOTE — MR AVS SNAPSHOT
After Visit Summary   7/28/2017    Galileo Vieira    MRN: [id number]           Patient Information     Date Of Birth          1965        Visit Information        Provider Department      7/28/2017 9:15 AM LV RN [facility]        Today's Diagnoses     Testosterone insufficiency    -  1       Follow-ups after your visit        Your next 10 appointments already scheduled     Jul 28, 2017  9:15 AM CDT   SHORT with LV RN   [facility]    [address] [address] [phone]              Who to contact     If you have questions or need follow up information about today's clinic visit or your schedule please contact [facility] directly at [phone].  Normal or non-critical lab and imaging results will be communicated to you by Eloxxhart, letter or phone within 4 business days after the clinic has received the results. If you do not hear from us within 7 days, please contact the clinic through Eloxxhart or phone. If you have a critical or abnormal lab result, we will notify you by phone as soon as possible.  Submit refill requests through [employer] or call your pharmacy and they will forward the refill request to us. Please allow 3 business days for your refill to be completed.          Additional Information About Your Visit        MyChart Information     [employer] gives you secure access to your electronic health record. If you see a primary care provider, you can also send messages to your care team and make appointments. If you have questions, please call your primary care clinic.  If you do not have a primary care provider, please call [phone] and they will assist you.        Care EveryWhere ID     This is your Care EveryWhere ID. This could be used by other organizations to access your [place] medical records  [id number]         Blood Pressure from Last 3 Encounters:   02/23/17 130/88   12/09/16  121/74   11/21/16 128/80    Weight from Last 3 Encounters:   02/23/17 260 lb (117.9 kg)   12/09/16 260 lb 12.9 oz (118.3 kg)   11/21/16 261 lb (118.4 kg)              We Performed the Following     C TESTOSTERONE CYPIONATE INJECTION, 1 MG     INJECTION INTRAMUSCULAR OR SUB-Q        Primary Care Provider Office Phone # Fax #    Ramona Ann Aaseby-Aguilera, PA-C [phone]       [facility] [address] [address] [address]        Equal Access to Services     KATIE GOODE : Hadii aad ku hadasho Soomaali, waaxda luqadaha, qaybta kaalmada adeegyapatricia, radu kilgore . So [facility] [phone].    ATENCIÓN: Si habla español, tiene a mahmood disposición servicios gratuitos de asistencia lingüística. Llame al [phone].    We comply with applicable federal civil rights laws and [place] laws. We do not discriminate on the basis of race, color, national origin, age, disability sex, sexual orientation or gender identity.            Thank you!     Thank you for choosing [facility]  for your care. Our goal is always to provide you with excellent care. Hearing back from our patients is one way we can continue to improve our services. Please take a few minutes to complete the written survey that you may receive in the mail after your visit with us. Thank you!             Your Updated Medication List - Protect others around you: Learn how to safely use, store and throw away your medicines at www.disposemymeds.org.          This list is accurate as of: 7/28/17  7:24 AM.  Always use your most recent med list.                   Brand Name Dispense Instructions for use Diagnosis    FLUoxetine 20 MG capsule    PROzac    90 capsule    Take 1 capsule (20 mg) by mouth daily    Anxiety, Moderate episode of recurrent major depressive disorder (H)       ibuprofen 200 MG capsule     120    1 CAPSULE PRN        omeprazole 40 MG capsule    priLOSEC    90 capsule    Take 1  capsule (40 mg) by mouth daily    GERD (gastroesophageal reflux disease)       order for DME      Equipment ordered: RESMED Auto PAP Mask type: Full face  Settings: 5-15        PRAVACHOL 20 MG tablet   Generic drug:  pravastatin     90 tablet    Take 1 tablet (20 mg) by mouth daily    Hyperlipidemia LDL goal <160       testosterone cypionate 200 MG/ML injection    DEPO-TESTOSTERONE    2 mL    Inject 1 mL (200 mg) into the muscle every 14 days    Testosterone insufficiency       traZODone 50 MG tablet    DESYREL    90 tablet    Take  mg at hs    Insomnia, unspecified type

## 2017-07-28 NOTE — PROGRESS NOTES
The following medication was given:     MEDICATION: Depo Testosterone  200 mg  ROUTE: IM  SITE: Ventrogluteal - Right  DOSE: 200mg  LOT #: [id number]  :  Gato Fernandez  EXPIRATION DATE:  7/2019  NDC#: 4320-2465-96  Danielle Byrd RN, BSN

## 2017-07-28 NOTE — TELEPHONE ENCOUNTER
Rx approved, fax to the [facility], Pharmacy will notified patient when prescription is ready to be picked up. Millicent Hansen

## 2017-08-15 DIAGNOSIS — F41.9 ANXIETY: ICD-10-CM

## 2017-08-15 DIAGNOSIS — F33.1 MODERATE EPISODE OF RECURRENT MAJOR DEPRESSIVE DISORDER (H): ICD-10-CM

## 2017-08-15 ASSESSMENT — ANXIETY QUESTIONNAIRES
2. NOT BEING ABLE TO STOP OR CONTROL WORRYING: SEVERAL DAYS
GAD7 TOTAL SCORE: 7
7. FEELING AFRAID AS IF SOMETHING AWFUL MIGHT HAPPEN: SEVERAL DAYS
IF YOU CHECKED OFF ANY PROBLEMS ON THIS QUESTIONNAIRE, HOW DIFFICULT HAVE THESE PROBLEMS MADE IT FOR YOU TO DO YOUR WORK, TAKE CARE OF THINGS AT HOME, OR GET ALONG WITH OTHER PEOPLE: SOMEWHAT DIFFICULT
3. WORRYING TOO MUCH ABOUT DIFFERENT THINGS: SEVERAL DAYS
6. BECOMING EASILY ANNOYED OR IRRITABLE: SEVERAL DAYS
1. FEELING NERVOUS, ANXIOUS, OR ON EDGE: SEVERAL DAYS
5. BEING SO RESTLESS THAT IT IS HARD TO SIT STILL: SEVERAL DAYS

## 2017-08-15 ASSESSMENT — PATIENT HEALTH QUESTIONNAIRE - PHQ9
SUM OF ALL RESPONSES TO PHQ QUESTIONS 1-9: 4
5. POOR APPETITE OR OVEREATING: SEVERAL DAYS

## 2017-08-15 NOTE — TELEPHONE ENCOUNTER
Pt would like to change rx to [facility] mail order in order to get a 90 day supply.  Remaining refills sent but pt will  a 30 day supply at local pharmacy because he will run out before mail order sends out rx

## 2017-08-16 ASSESSMENT — ANXIETY QUESTIONNAIRES: GAD7 TOTAL SCORE: 7

## 2017-09-06 ENCOUNTER — OFFICE VISIT (OUTPATIENT)
Dept: FAMILY MEDICINE | Facility: CLINIC | Age: 52
End: 2017-09-06
Payer: COMMERCIAL

## 2017-09-06 VITALS
BODY MASS INDEX: 35.51 KG/M2 | TEMPERATURE: 98.4 F | SYSTOLIC BLOOD PRESSURE: 132 MMHG | WEIGHT: 260 LBS | DIASTOLIC BLOOD PRESSURE: 76 MMHG

## 2017-09-06 DIAGNOSIS — M77.11 LATERAL EPICONDYLITIS OF RIGHT ELBOW: Primary | ICD-10-CM

## 2017-09-06 PROCEDURE — 20605 DRAIN/INJ JOINT/BURSA W/O US: CPT | Mod: RT | Performed by: FAMILY MEDICINE

## 2017-09-06 NOTE — MR AVS SNAPSHOT
After Visit Summary   9/6/2017    Galileo Vieira    MRN: [id number]           Patient Information     Date Of Birth          1965        Visit Information        Provider Department      9/6/2017 11:45 AM Garrett Varela MD [facility]        Care Instructions                 Lateral Epicondylitis (Tennis Elbow)   Rehabilitation Exercises   You may do the stretching exercises right away. You may do the strengthening exercises when stretching is nearly painless.   Stretching exercises     Wrist range of motion: Bend your wrist forward and backward as far as you can. Do 3 sets of 10.     Pronation and supination of the forearm: With your elbow bent 90 , turn your palm upward and hold for 5 seconds. Slowly turn your palm downward and hold for 5 seconds. Make sure you keep your elbow at your side and bent 90  throughout this exercise. Do 3 sets of 10.     Elbow range of motion: Gently bring your palm up toward your shoulder and bend your elbow as far as you can. Then straighten your elbow as far as you can 10 times. Do 3 sets of 10.   Strengthening exercises     Wrist flexion exercise: Hold a can or hammer handle in your hand with your palm facing up. Bend your wrist upward. Slowly lower the weight and return to the starting position. Do 3 sets of 10. Gradually increase the weight of the can or weight you are holding.     Wrist extension exercise: Hold a soup can or hammer handle in your hand with your palm facing down. Slowly bend your wrist upward. Slowly lower the weight down into the starting position. Do 3 sets of 10. Gradually increase the weight of the object you are holding.     Wrist radial deviation strengthening: Put your wrist in the sideways position with your thumb up. Hold a can of soup or a hammer handle and gently bend your wrist up, with the thumb reaching toward the ceiling. Slowly lower to the starting position. Do not move your forearm throughout this exercise.  Do 3 sets of 10.     Forearm pronation and supination strengthening: Hold a soup can or hammer handle in your hand and bend your elbow 90 . Slowly rotate your hand with your palm upward and then palm down. Do 3 sets of 10.     Wrist extension (with broom handle): Stand up and hold a broom handle in both hands. With your arms at shoulder level, elbows straight and palms down, roll the broom handle backward in your hand as if you are reeling something in using a broom handle. Do 3 sets of 10.   Written by Jael Lima, MS, PT, for [employer].   Published by [employer].   This content is reviewed periodically and is subject to change as new health information becomes available. The information is intended to inform and educate and is not a replacement for medical evaluation, advice, diagnosis or treatment by a healthcare professional.   Sports Medicine Advisor 2003.1 Index  Sports Medicine Advisor 2003.1 Credits   Copyright   2003 [employer]. All rights reserved.                      Follow-ups after your visit        Your next 10 appointments already scheduled     Nov 28, 2017  7:00 AM CST   PHYSICAL with Ramona Ann Aaseby-Aguilera, [facility]-C   [facility]    [address] [facility] [address] [phone]              Who to contact     If you have questions or need follow up information about today's clinic visit or your schedule please contact [facility] directly at [phone].  Normal or non-critical lab and imaging results will be communicated to you by MyChart, letter or phone within 4 business days after the clinic has received the results. If you do not hear from us within 7 days, please contact the clinic through [employer] or phone. If you have a critical or abnormal lab result, we will notify you by phone as soon as possible.  Submit refill requests through [employer] or call your pharmacy  and they will forward the refill request to us. Please allow 3 business days for your refill to be completed.          Additional Information About Your Visit        [employer] Information     [employer] gives you secure access to your electronic health record. If you see a primary care provider, you can also send messages to your care team and make appointments. If you have questions, please call your primary care clinic.  If you do not have a primary care provider, please call [phone] and they will assist you.        Care EveryWhere ID     This is your Care EveryWhere ID. This could be used by other organizations to access your [place] medical records  [id number]        Your Vitals Were     Temperature BMI (Body Mass Index)                98.4  F (36.9  C) 35.51 kg/m2           Blood Pressure from Last 3 Encounters:   09/06/17 132/76   02/23/17 130/88   12/09/16 121/74    Weight from Last 3 Encounters:   09/06/17 260 lb (117.9 kg)   02/23/17 260 lb (117.9 kg)   12/09/16 260 lb 12.9 oz (118.3 kg)              Today, you had the following     No orders found for display       Primary Care Provider Office Phone # Fax #    Roma Ann Aaseby-Aguilera, PA-C [phone] [address] [facility] [address]        Equal Access to Services     KATIE GOODE : Hadii aad ku hadasho Soomaali, waaxda luqadaha, qaybta kaalmada adeegyada, waxay masha kilgore . So [facility] [phone].    ATENCIÓN: Si habla español, tiene a mahmood disposición servicios gratuitos de asistencia lingüística. Llame al [phone].    We comply with applicable federal civil rights laws and [place] laws. We do not discriminate on the basis of race, color, national origin, age, disability sex, sexual orientation or gender identity.            Thank you!     Thank you for choosing [facility]  for your care. Our goal is always to provide you with excellent care. Hearing back from our patients is one  way we can continue to improve our services. Please take a few minutes to complete the written survey that you may receive in the mail after your visit with us. Thank you!             Your Updated Medication List - Protect others around you: Learn how to safely use, store and throw away your medicines at www.disposemymeds.org.          This list is accurate as of: 9/6/17  1:17 PM.  Always use your most recent med list.                   Brand Name Dispense Instructions for use Diagnosis    FLUoxetine 20 MG capsule    PROzac    90 capsule    Take 1 capsule (20 mg) by mouth daily    Anxiety, Moderate episode of recurrent major depressive disorder (H)       ibuprofen 200 MG capsule     120    1 CAPSULE PRN        omeprazole 40 MG capsule    priLOSEC    90 capsule    Take 1 capsule (40 mg) by mouth daily    GERD (gastroesophageal reflux disease)       order for DME      Equipment ordered: [employer] Auto PAP Mask type: Full face  Settings: 5-15        PRAVACHOL 20 MG tablet   Generic drug:  pravastatin     90 tablet    Take 1 tablet (20 mg) by mouth daily    Hyperlipidemia LDL goal <160       testosterone cypionate 200 MG/ML injection    DEPO-TESTOSTERONE    10 mL    Inject 1 mL (200 mg) into the muscle every 14 days    Testosterone insufficiency       traZODone 50 MG tablet    DESYREL    90 tablet    Take  mg at hs    Insomnia, unspecified type

## 2017-09-06 NOTE — NURSING NOTE
"Chief Complaint   Patient presents with     Musculoskeletal Problem       Initial /76  Temp 98.4  F (36.9  C)  Wt 260 lb (117.9 kg)  BMI 35.51 kg/m2 Estimated body mass index is 35.51 kg/(m^2) as calculated from the following:    Height as of 2/23/17: 5' 11.75\" (1.822 m).    Weight as of this encounter: 260 lb (117.9 kg).  Medication Reconciliation: complete     Ronan MCINTYRE      "

## 2017-09-06 NOTE — PATIENT INSTRUCTIONS
Lateral Epicondylitis (Tennis Elbow)   Rehabilitation Exercises   You may do the stretching exercises right away. You may do the strengthening exercises when stretching is nearly painless.   Stretching exercises     Wrist range of motion: Bend your wrist forward and backward as far as you can. Do 3 sets of 10.     Pronation and supination of the forearm: With your elbow bent 90 , turn your palm upward and hold for 5 seconds. Slowly turn your palm downward and hold for 5 seconds. Make sure you keep your elbow at your side and bent 90  throughout this exercise. Do 3 sets of 10.     Elbow range of motion: Gently bring your palm up toward your shoulder and bend your elbow as far as you can. Then straighten your elbow as far as you can 10 times. Do 3 sets of 10.   Strengthening exercises     Wrist flexion exercise: Hold a can or hammer handle in your hand with your palm facing up. Bend your wrist upward. Slowly lower the weight and return to the starting position. Do 3 sets of 10. Gradually increase the weight of the can or weight you are holding.     Wrist extension exercise: Hold a soup can or hammer handle in your hand with your palm facing down. Slowly bend your wrist upward. Slowly lower the weight down into the starting position. Do 3 sets of 10. Gradually increase the weight of the object you are holding.     Wrist radial deviation strengthening: Put your wrist in the sideways position with your thumb up. Hold a can of soup or a hammer handle and gently bend your wrist up, with the thumb reaching toward the ceiling. Slowly lower to the starting position. Do not move your forearm throughout this exercise. Do 3 sets of 10.     Forearm pronation and supination strengthening: Hold a soup can or hammer handle in your hand and bend your elbow 90 . Slowly rotate your hand with your palm upward and then palm down. Do 3 sets of 10.     Wrist extension (with broom handle): Stand up and hold a broom handle in  both hands. With your arms at shoulder level, elbows straight and palms down, roll the broom handle backward in your hand as if you are reeling something in using a broom handle. Do 3 sets of 10.   Written by Jael Lima MS, PT, for [employer].   Published by [employer].   This content is reviewed periodically and is subject to change as new health information becomes available. The information is intended to inform and educate and is not a replacement for medical evaluation, advice, diagnosis or treatment by a healthcare professional.   Sports Medicine Advisor 2003.1 Index  Sports Medicine Advisor 2003.1 Credits   Copyright   2003 [employer]. All rights reserved.

## 2017-09-13 NOTE — PROGRESS NOTES
SUBJECTIVE:                                                    Galileo Vieira is a 51 year old male who presents to clinic today for the following health issues:    Patient presents with:  Musculoskeletal Problem    Patient here for evaluation of elbow pain. Has had lateral elbow pain of the right arm worsening over the past few months especially with activities such as golf but also with lifting. Some pain at times when at work on a keyboard. No swelling or redness. No trauma. Has had this pain intermittently in the past as well and has done ice, ibuprofen, physical therapy exercises with some improvement.    ROS:  MUSCULOSKELETAL: as above    OBJECTIVE:                                                    /76  Temp 98.4  F (36.9  C)  Wt 260 lb (117.9 kg)  BMI 35.51 kg/m2Body mass index is 35.51 kg/(m^2).  GENERAL APPEARANCE: healthy, alert and no distress  MS: Right elbow tenderness over lateral epicondyle, normal range of motion, normal strength at elbow, no swelling or erythema     PROCEDURE: Injection right lateral epicondyle  Consent discussed including, but not limited to, risk of infection, fat atrophy, and bleeding.  Effected area cleaned with betadine x 3.  1 pecent plain lidocaine 2 mL with Kenalog 40 mg/mL 0.5 mL drawn and injected into above space without difficulty.  Patient tolerated procedure well.  No complications.     ASSESSMENT/PLAN:                                                    1. Lateral epicondylitis of right elbow  Discussed using tennis elbow strap for forearm, NSAIDS as needed, exercises given, avoid exacerbating activities, ice.  Injection done as above. Follow up if not improving.  - order for DME; Tennis elbow strap  Dispense: 1 Units; Refill: 0    Garrett Varela MD  [facility]

## 2017-10-24 ENCOUNTER — TELEPHONE (OUTPATIENT)
Dept: FAMILY MEDICINE | Facility: CLINIC | Age: 52
End: 2017-10-24

## 2017-10-24 DIAGNOSIS — R68.82 LOW LIBIDO: Primary | ICD-10-CM

## 2017-10-24 DIAGNOSIS — E34.9 TESTOSTERONE INSUFFICIENCY: ICD-10-CM

## 2017-10-24 RX ORDER — TESTOSTERONE CYPIONATE 200 MG/ML
200 INJECTION, SOLUTION INTRAMUSCULAR
Qty: 10 ML | Refills: 1 | Status: SHIPPED | OUTPATIENT
Start: 2017-10-24 | End: 2018-01-13

## 2017-11-02 DIAGNOSIS — R68.82 LOW LIBIDO: ICD-10-CM

## 2017-11-02 PROCEDURE — 36415 COLL VENOUS BLD VENIPUNCTURE: CPT | Performed by: NURSE PRACTITIONER

## 2017-11-02 PROCEDURE — 84403 ASSAY OF TOTAL TESTOSTERONE: CPT | Performed by: NURSE PRACTITIONER

## 2017-11-04 LAB — TESTOST SERPL-MCNC: 558 NG/DL (ref 240–950)

## 2017-11-09 DIAGNOSIS — G47.33 MILD OBSTRUCTIVE SLEEP APNEA: Primary | ICD-10-CM

## 2017-12-13 DIAGNOSIS — F41.9 ANXIETY: ICD-10-CM

## 2017-12-13 DIAGNOSIS — F33.1 MODERATE EPISODE OF RECURRENT MAJOR DEPRESSIVE DISORDER (H): ICD-10-CM

## 2017-12-13 ASSESSMENT — ANXIETY QUESTIONNAIRES
IF YOU CHECKED OFF ANY PROBLEMS ON THIS QUESTIONNAIRE, HOW DIFFICULT HAVE THESE PROBLEMS MADE IT FOR YOU TO DO YOUR WORK, TAKE CARE OF THINGS AT HOME, OR GET ALONG WITH OTHER PEOPLE: NOT DIFFICULT AT ALL
5. BEING SO RESTLESS THAT IT IS HARD TO SIT STILL: NOT AT ALL
3. WORRYING TOO MUCH ABOUT DIFFERENT THINGS: NOT AT ALL
6. BECOMING EASILY ANNOYED OR IRRITABLE: NOT AT ALL
7. FEELING AFRAID AS IF SOMETHING AWFUL MIGHT HAPPEN: NOT AT ALL
2. NOT BEING ABLE TO STOP OR CONTROL WORRYING: NOT AT ALL
1. FEELING NERVOUS, ANXIOUS, OR ON EDGE: NOT AT ALL
GAD7 TOTAL SCORE: 0

## 2017-12-13 ASSESSMENT — PATIENT HEALTH QUESTIONNAIRE - PHQ9
SUM OF ALL RESPONSES TO PHQ QUESTIONS 1-9: 0
5. POOR APPETITE OR OVEREATING: NOT AT ALL

## 2017-12-13 NOTE — TELEPHONE ENCOUNTER
PHQ-9 SCORE 10/12/2016 8/15/2017 12/13/2017   Total Score - - -   Total Score 4 4 0     TODD-7 SCORE 11/20/2015 8/15/2017 12/13/2017   Total Score - - -   Total Score 7 7 0       Pt does not want local pharmacy he wants mail order    Danielle Byrd RN, BSN

## 2017-12-14 ASSESSMENT — ANXIETY QUESTIONNAIRES: GAD7 TOTAL SCORE: 0

## 2018-01-13 DIAGNOSIS — E34.9 TESTOSTERONE INSUFFICIENCY: ICD-10-CM

## 2018-01-13 RX ORDER — TESTOSTERONE CYPIONATE 200 MG/ML
200 INJECTION, SOLUTION INTRAMUSCULAR
Qty: 10 ML | Refills: 1 | Status: SHIPPED | OUTPATIENT
Start: 2018-01-13 | End: 2018-04-25

## 2018-01-13 NOTE — TELEPHONE ENCOUNTER
Last Written Prescription Date:  10/24/17  Last Fill Quantity: 10ml,  # refills: 1   Last Office Visit with [facility], [facility] or [facility] prescribing provider:  9/6/17   Future Office Visit:       Controlled Substance Refill Request for testosteron  Problem List Complete:  No     PROVIDER TO CONSIDER COMPLETION OF PROBLEM LIST AND OVERVIEW/CONTROLLED SUBSTANCE AGREEMENT      Controlled substance agreement on file: No.     Processing:  Staff will hand deliver Rx to on-site pharmacy     checked in past 6 months?  Yes 10/24/17

## 2018-01-15 DIAGNOSIS — E78.5 HYPERLIPIDEMIA LDL GOAL <160: ICD-10-CM

## 2018-01-15 RX ORDER — PRAVASTATIN SODIUM 20 MG
TABLET ORAL
Qty: 90 TABLET | Refills: 3 | Status: SHIPPED | OUTPATIENT
Start: 2018-01-15 | End: 2018-09-28

## 2018-01-15 NOTE — TELEPHONE ENCOUNTER
"Requested Prescriptions   Pending Prescriptions Disp Refills     pravastatin (PRAVACHOL) 20 MG tablet [Pharmacy Med Name: PRAVASTATIN SODIUM 20MG TABS] 90 tablet 3    Last Written Prescription Date:  03/28/2017  Last Fill Quantity: 90 tablet,  # refills: 3   Last Office Visit with [facility], [facility] or [facility] prescribing provider:  09/06/2017   Future Office Visit:      Sig: TAKE ONE TABLET BY MOUTH EVERY DAY    Statins Protocol Failed    1/15/2018  9:23 AM       Failed - LDL on file in past 12 months    Recent Labs   Lab Test  11/18/16   0720   LDL  121*            Passed - No abnormal creatine kinase in past 12 months    No lab results found.         Passed - Recent or future visit with authorizing provider    Patient had office visit in the last year or has a visit in the next 30 days with authorizing provider.  See \"Patient Info\" tab in inbasket, or \"Choose Columns\" in Meds & Orders section of the refill encounter.              Passed - Patient is age 18 or older          Boaz RAMSEY  "

## 2018-01-15 NOTE — TELEPHONE ENCOUNTER
Routing refill request to provider for review/approval because:  Labs not current:  Lipids.    Result note states recheck in 1 year but HM is showing not due until 2021    Danielle Byrd RN, BSN

## 2018-04-11 ENCOUNTER — E-VISIT (OUTPATIENT)
Dept: FAMILY MEDICINE | Facility: CLINIC | Age: 53
End: 2018-04-11
Payer: COMMERCIAL

## 2018-04-11 DIAGNOSIS — F33.0 MILD EPISODE OF RECURRENT MAJOR DEPRESSIVE DISORDER (H): Primary | ICD-10-CM

## 2018-04-11 PROCEDURE — 99444 ZZC PHYSICIAN ONLINE EVALUATION & MANAGEMENT SERVICE: CPT | Performed by: NURSE PRACTITIONER

## 2018-04-11 ASSESSMENT — ANXIETY QUESTIONNAIRES
2. NOT BEING ABLE TO STOP OR CONTROL WORRYING: SEVERAL DAYS
1. FEELING NERVOUS, ANXIOUS, OR ON EDGE: MORE THAN HALF THE DAYS
3. WORRYING TOO MUCH ABOUT DIFFERENT THINGS: SEVERAL DAYS
GAD7 TOTAL SCORE: 9
7. FEELING AFRAID AS IF SOMETHING AWFUL MIGHT HAPPEN: SEVERAL DAYS
6. BECOMING EASILY ANNOYED OR IRRITABLE: MORE THAN HALF THE DAYS
GAD7 TOTAL SCORE: 9
GAD7 TOTAL SCORE: 9
5. BEING SO RESTLESS THAT IT IS HARD TO SIT STILL: SEVERAL DAYS
4. TROUBLE RELAXING: SEVERAL DAYS
7. FEELING AFRAID AS IF SOMETHING AWFUL MIGHT HAPPEN: SEVERAL DAYS

## 2018-04-11 ASSESSMENT — PATIENT HEALTH QUESTIONNAIRE - PHQ9
SUM OF ALL RESPONSES TO PHQ QUESTIONS 1-9: 7
SUM OF ALL RESPONSES TO PHQ QUESTIONS 1-9: 7
10. IF YOU CHECKED OFF ANY PROBLEMS, HOW DIFFICULT HAVE THESE PROBLEMS MADE IT FOR YOU TO DO YOUR WORK, TAKE CARE OF THINGS AT HOME, OR GET ALONG WITH OTHER PEOPLE: SOMEWHAT DIFFICULT

## 2018-04-11 NOTE — MR AVS SNAPSHOT
After Visit Summary   4/11/2018    Galileo Vieira    MRN: [id number]           Patient Information     Date Of Birth          1965        Visit Information        Provider Department      4/11/2018 10:50 AM Alexa Cano NP [facility]        Today's Diagnoses     Mild episode of recurrent major depressive disorder (H)    -  1       Follow-ups after your visit        Who to contact     If you have questions or need follow up information about today's clinic visit or your schedule please contact [facility] directly at [phone].  Normal or non-critical lab and imaging results will be communicated to you by indicohart, letter or phone within 4 business days after the clinic has received the results. If you do not hear from us within 7 days, please contact the clinic through [employer] or phone. If you have a critical or abnormal lab result, we will notify you by phone as soon as possible.  Submit refill requests through [employer] or call your pharmacy and they will forward the refill request to us. Please allow 3 business days for your refill to be completed.          Additional Information About Your Visit        MyChart Information     [employer] gives you secure access to your electronic health record. If you see a primary care provider, you can also send messages to your care team and make appointments. If you have questions, please call your primary care clinic.  If you do not have a primary care provider, please call [phone] and they will assist you.        Care EveryWhere ID     This is your Care EveryWhere ID. This could be used by other organizations to access your [place] medical records  [id number]         Blood Pressure from Last 3 Encounters:   09/06/17 132/76   02/23/17 130/88   12/09/16 121/74    Weight from Last 3 Encounters:   09/06/17 260 lb (117.9 kg)   02/23/17 260 lb (117.9 kg)   12/09/16 260 lb 12.9 oz (118.3 kg)              Today, you had the  following     No orders found for display         Today's Medication Changes          These changes are accurate as of 4/11/18  4:05 PM.  If you have any questions, ask your nurse or doctor.               Start taking these medicines.        Dose/Directions    sertraline 50 MG tablet   Commonly known as:  ZOLOFT   Used for:  Mild episode of recurrent major depressive disorder (H)        Dose:  50 mg   Take 1 tablet (50 mg) by mouth daily   Quantity:  30 tablet   Refills:  1            Where to get your medicines      These medications were sent to [place] [address] [place] [facility] [address] [address] [address]    Hours:  Tech issues with their phone system Phone:  [phone]     sertraline 50 MG tablet                Primary Care Provider Office Phone # Fax #    Ramona Ann Aaseby-Aguilera, PA-C [phone] [address]  [facility] [address]        Equal Access to Services     KATIE GOODE : Hadii aad juan manuel hadasho Soomaali, waaxda luqadaha, qaybta kaalmada adeegyada, waxay masha hayalex kilgore . So [facility] [phone].    ATENCIÓN: Si habla español, tiene a mahmood disposición servicios gratuitos de asistencia lingüística. Agnieszka al [phone].    We comply with applicable federal civil rights laws and [place] laws. We do not discriminate on the basis of race, color, national origin, age, disability, sex, sexual orientation, or gender identity.            Thank you!     Thank you for choosing [facility]  for your care. Our goal is always to provide you with excellent care. Hearing back from our patients is one way we can continue to improve our services. Please take a few minutes to complete the written survey that you may receive in the mail after your visit with us. Thank you!             Your Updated Medication List - Protect others around you: Learn how to safely use, store and throw away your medicines at www.disposemymeds.org.           This list is accurate as of 4/11/18  4:05 PM.  Always use your most recent med list.                   Brand Name Dispense Instructions for use Diagnosis    ibuprofen 200 MG capsule     120    1 CAPSULE PRN        omeprazole 40 MG capsule    priLOSEC    90 capsule    Take 1 capsule (40 mg) by mouth daily    GERD (gastroesophageal reflux disease)       order for DME      Equipment ordered: RESMED Auto PAP Mask type: Full face  Settings: 5-15        order for DME     1 Units    Tennis elbow strap    Lateral epicondylitis of right elbow       * PRAVACHOL 20 MG tablet   Generic drug:  pravastatin     90 tablet    Take 1 tablet (20 mg) by mouth daily    Hyperlipidemia LDL goal <160       * pravastatin 20 MG tablet    PRAVACHOL    90 tablet    TAKE ONE TABLET BY MOUTH EVERY DAY    Hyperlipidemia LDL goal <160       sertraline 50 MG tablet    ZOLOFT    30 tablet    Take 1 tablet (50 mg) by mouth daily    Mild episode of recurrent major depressive disorder (H)       testosterone cypionate 200 MG/ML injection    DEPO-TESTOSTERONE    10 mL    Inject 1 mL (200 mg) into the muscle every 14 days    Testosterone insufficiency       traZODone 50 MG tablet    DESYREL    90 tablet    Take  mg at hs    Insomnia, unspecified type       * Notice:  This list has 2 medication(s) that are the same as other medications prescribed for you. Read the directions carefully, and ask your doctor or other care provider to review them with you.

## 2018-04-12 ASSESSMENT — PATIENT HEALTH QUESTIONNAIRE - PHQ9: SUM OF ALL RESPONSES TO PHQ QUESTIONS 1-9: 7

## 2018-04-12 ASSESSMENT — ANXIETY QUESTIONNAIRES: GAD7 TOTAL SCORE: 9

## 2018-04-25 ENCOUNTER — TELEPHONE (OUTPATIENT)
Dept: FAMILY MEDICINE | Facility: CLINIC | Age: 53
End: 2018-04-25

## 2018-04-25 DIAGNOSIS — E34.9 TESTOSTERONE INSUFFICIENCY: ICD-10-CM

## 2018-04-25 RX ORDER — TESTOSTERONE CYPIONATE 200 MG/ML
200 INJECTION, SOLUTION INTRAMUSCULAR
Qty: 10 ML | Refills: 1 | Status: SHIPPED | OUTPATIENT
Start: 2018-04-25 | End: 2018-06-27

## 2018-04-25 NOTE — TELEPHONE ENCOUNTER
Controlled Substance Refill Request for Testsosterone  Problem List Complete:  No     PROVIDER TO CONSIDER COMPLETION OF PROBLEM LIST AND OVERVIEW/CONTROLLED SUBSTANCE AGREEMENT    Last Written Prescription Date:  1/13/18  Last Fill Quantity: 10ml,   # refills: 1    Last Office Visit with [facility] primary care provider: 4/11/18 evisit    Future Office visit:     Controlled substance agreement on file: No.     Processing:  Fax Rx to  [facility]   checked in past 6 months?  Yes 4/25/2018

## 2018-04-25 NOTE — TELEPHONE ENCOUNTER
Pa is needed on Testosterone.  For PA please provide reasoning / documentation and forward to PA team at [phone]  Thanks!      PA NEEDED ON: Testosterone Injection  INS IS: [place]  Bin: [id number]  PHONE # IS: [phone]  ID # IS: [id number]  Pcn: [id number]  PLEASE LET US KNOW WHEN PA IS GRANTED/DENIED.  THANK YOU!  Iris Bermudez, Pharmacy [facility]

## 2018-04-30 NOTE — TELEPHONE ENCOUNTER
Central Prior Authorization Team   Phone: [phone]      PA Initiation    Medication: Testosterone  Insurance Company: [employer] - Phone [phone] Fax [phone]  Pharmacy Filling the Rx: [place], [place] - [address]  Filling Pharmacy Phone: [phone]  Filling Pharmacy Fax: [phone]  Start Date: 4/30/2018

## 2018-05-02 NOTE — TELEPHONE ENCOUNTER
Prior Authorization Approval    Authorization Effective Date: 4/26/2018  Authorization Expiration Date: 4/26/2019  Medication: Testosterone - Approved   Approved Dose/Quantity:    Reference #: [id number]   Insurance Company: [employer] - Phone [phone] Fax [phone]  Expected CoPay:       CoPay Card Available:      Foundation Assistance Needed:    Which Pharmacy is filling the prescription (Not needed for infusion/clinic administered): [facility] [place], [place] - [address] [facility]  Pharmacy Notified: Yes  Patient Notified: Yes      Received approval over the phone.

## 2018-05-08 ENCOUNTER — OFFICE VISIT (OUTPATIENT)
Dept: FAMILY MEDICINE | Facility: CLINIC | Age: 53
End: 2018-05-08
Payer: COMMERCIAL

## 2018-05-08 VITALS
SYSTOLIC BLOOD PRESSURE: 122 MMHG | HEIGHT: 72 IN | HEART RATE: 83 BPM | WEIGHT: 265 LBS | BODY MASS INDEX: 35.89 KG/M2 | DIASTOLIC BLOOD PRESSURE: 80 MMHG

## 2018-05-08 DIAGNOSIS — M25.561 ARTHRALGIA OF RIGHT LOWER LEG: Primary | ICD-10-CM

## 2018-05-08 PROCEDURE — 20610 DRAIN/INJ JOINT/BURSA W/O US: CPT | Mod: RT | Performed by: FAMILY MEDICINE

## 2018-05-08 NOTE — PROGRESS NOTES
SUBJECTIVE:   Galileo Vieira is a 52 year old male who presents to clinic today for the following health issues:      Joint Pain    Onset: 2 weeks on and off     Description:   Location: right knee  Character: stiffness some weakness     Intensity: moderate    Progression of Symptoms: worse, intermittent    Accompanying Signs & Symptoms:  Other symptoms: pressure     History:   Previous similar pain: YES - knee surgery in 9th grade - football injury    Precipitating factors:   Trauma or overuse: no     Alleviating factors:  Improved by: stretching and Ibuprofen    Therapies Tried and outcome: none       Woke with right knee pain, cannot recall any activities or injuries.   Bending and climbing stairs painful.       Problem list and histories reviewed & adjusted, as indicated.  Additional history: none    Patient Active Problem List   Diagnosis     Hyperlipidemia LDL goal <160     GERD (gastroesophageal reflux disease)     Mild major depression (H)     Anxiety     Testosterone insufficiency     Fear of flying     Mild obstructive sleep apnea     Hypogonadism male     Moderate episode of recurrent major depressive disorder (H)     Past Surgical History:   Procedure Laterality Date     C NONSPECIFIC PROCEDURE      Left knee arthroscopy     C NONSPECIFIC PROCEDURE  1997    Bilat inguinal hernia surgery     C NONSPECIFIC PROCEDURE      Vasectomy     CHOLECYSTECTOMY       COLONOSCOPY       COLONOSCOPY  5/12/2014    Procedure: COLONOSCOPY;  Surgeon: Syed Walker MD;  Location:  GI     GI SURGERY  2008    Gall bladder     HERNIA REPAIR         Social History   Substance Use Topics     Smoking status: Former Smoker     Quit date: 10/3/1997     Smokeless tobacco: Never Used      Comment: 1997     Alcohol use 10.0 oz/week     20 Standard drinks or equivalent per week      Comment: 3-4 daily     Family History   Problem Relation Age of Onset     GASTROINTESTINAL DISEASE Father      Crohn's     Cardiovascular Father   "    Depression Father      HEART DISEASE Father 66     triple bipass     Neurologic Disorder Father      seizures     Respiratory Father      sleep apnea     C.A.D. Father      Had CABG     CEREBROVASCULAR DISEASE Father      Lipids Brother      Respiratory Brother      Family History Negative Mother      Lipids Brother      Depression Paternal Grandmother      Depression Other      Cancer - colorectal Other      Maternal Uncle  at 56     DIABETES No family hx of      Cancer - colorectal Maternal Uncle            Reviewed and updated as needed this visit by clinical staff  Tobacco  Allergies  Meds  Med Hx  Surg Hx  Fam Hx  Soc Hx      Reviewed and updated as needed this visit by Provider         ROS:  Constitutional, HEENT, cardiovascular, pulmonary, gi and gu systems are negative, except as otherwise noted.    OBJECTIVE:     /80 (BP Location: Right arm, Patient Position: Chair, Cuff Size: Adult Large)  Pulse 83  Ht 5' 11.75\" (1.822 m)  Wt 265 lb (120.2 kg)  BMI 36.19 kg/m2  Body mass index is 36.19 kg/(m^2).  GENERAL: healthy, alert and no distress  MS: right knee - mildly + Nate's for medial meniscus, medial joint line pain, otherwise all testing negative    Diagnostic Test Results:  none     PROCEDURE:  JOINT INJECTION.         After a discussion of risks, benefits and side effects of procedure, informed patient consent was obtained.       The right knee was prepped and draped in the usual clean fashion (sterile not required for this procedure).  Ethyl chloride was used for local analgesia.    ASPIRATION: Not performed.   INJECTION:  Using 4 cc of 1% lidocaine mixed                           with 40 mg of Kenalog, the right knee was successfully injected                           without complication.  Patient did experience some pain                          relief following injection.      ASSESSMENT/PLAN:     1. Arthralgia of right lower leg - injected today, will monitor - if pain " returns, should consider advanced imaging to rule out meniscus tear  - Large Joint/Bursa injection and/or drainage (Shoulder, Knee)  - Kenalog 40 MG  []    Rut Flanagan MD  [facility]

## 2018-05-08 NOTE — NURSING NOTE
"Chief Complaint   Patient presents with     Knee Pain       Initial /80 (BP Location: Right arm, Patient Position: Chair, Cuff Size: Adult Large)  Pulse 83  Ht 5' 11.75\" (1.822 m)  Wt 265 lb (120.2 kg)  BMI 36.19 kg/m2 Estimated body mass index is 36.19 kg/(m^2) as calculated from the following:    Height as of this encounter: 5' 11.75\" (1.822 m).    Weight as of this encounter: 265 lb (120.2 kg).  Medication Reconciliation: complete      Health Maintenance addressed:  NONE    N/a    CATHERINE Cruz        "

## 2018-05-08 NOTE — MR AVS SNAPSHOT
"              After Visit Summary   5/8/2018    Galileo Vieira    MRN: [id number]           Patient Information     Date Of Birth          1965        Visit Information        Provider Department      5/8/2018 2:40 PM Rut Flanagan MD [facility]        Today's Diagnoses     Arthralgia of right lower leg    -  1       Follow-ups after your visit        Who to contact     If you have questions or need follow up information about today's clinic visit or your schedule please contact [facility] directly at [phone].  Normal or non-critical lab and imaging results will be communicated to you by ProductGramhart, letter or phone within 4 business days after the clinic has received the results. If you do not hear from us within 7 days, please contact the clinic through SeeFuturet or phone. If you have a critical or abnormal lab result, we will notify you by phone as soon as possible.  Submit refill requests through [employer] or call your pharmacy and they will forward the refill request to us. Please allow 3 business days for your refill to be completed.          Additional Information About Your Visit        MyChart Information     [employer] gives you secure access to your electronic health record. If you see a primary care provider, you can also send messages to your care team and make appointments. If you have questions, please call your primary care clinic.  If you do not have a primary care provider, please call [phone] and they will assist you.        Care EveryWhere ID     This is your Care EveryWhere ID. This could be used by other organizations to access your [place] medical records  [id number]        Your Vitals Were     Pulse Height BMI (Body Mass Index)             83 5' 11.75\" (1.822 m) 36.19 kg/m2          Blood Pressure from Last 3 Encounters:   05/08/18 122/80   09/06/17 132/76   02/23/17 130/88    Weight from Last 3 Encounters:   05/08/18 265 lb (120.2 kg)   09/06/17 " 260 lb (117.9 kg)   02/23/17 260 lb (117.9 kg)              We Performed the Following     Kenalog 40 MG  []     Large Joint/Bursa injection and/or drainage (Shoulder, Knee)        Primary Care Provider Office Phone # Fax #    Roma Ann Aaseby-Aguilera, PA-C [phone] [address] [address] [facility] [address]        Equal Access to Services     KATIE GOODE : Hadii aad ku hadasho Soomaali, waaxda luqadaha, qaybta kaalmada adeegyada, waxay idiin hayaan adeeg kharash la'alex . So [facility] [phone].    ATENCIÓN: Si joseph lindsay, tiene a mahmood disposición servicios gratuitos de asistencia lingüística. Llame al [phone].    We comply with applicable federal civil rights laws and [place] laws. We do not discriminate on the basis of race, color, national origin, age, disability, sex, sexual orientation, or gender identity.            Thank you!     Thank you for choosing [facility]  for your care. Our goal is always to provide you with excellent care. Hearing back from our patients is one way we can continue to improve our services. Please take a few minutes to complete the written survey that you may receive in the mail after your visit with us. Thank you!             Your Updated Medication List - Protect others around you: Learn how to safely use, store and throw away your medicines at www.disposemymeds.org.          This list is accurate as of 5/8/18  4:22 PM.  Always use your most recent med list.                   Brand Name Dispense Instructions for use Diagnosis    ibuprofen 200 MG capsule     120    1 CAPSULE PRN        omeprazole 40 MG capsule    priLOSEC    90 capsule    Take 1 capsule (40 mg) by mouth daily    GERD (gastroesophageal reflux disease)       order for DME      Equipment ordered: RESMED Auto PAP Mask type: Full face  Settings: 5-15        order for DME     1 Units    Tennis elbow strap    Lateral epicondylitis of right elbow       pravastatin 20 MG tablet     PRAVACHOL    90 tablet    TAKE ONE TABLET BY MOUTH EVERY DAY    Hyperlipidemia LDL goal <160       sertraline 50 MG tablet    ZOLOFT    30 tablet    Take 1 tablet (50 mg) by mouth daily    Mild episode of recurrent major depressive disorder (H)       testosterone cypionate 200 MG/ML injection    DEPO-TESTOSTERONE    10 mL    Inject 1 mL (200 mg) into the muscle every 14 days    Testosterone insufficiency       traZODone 50 MG tablet    DESYREL    90 tablet    Take  mg at hs    Insomnia, unspecified type

## 2018-06-06 ENCOUNTER — TELEPHONE (OUTPATIENT)
Dept: FAMILY MEDICINE | Facility: CLINIC | Age: 53
End: 2018-06-06

## 2018-06-06 DIAGNOSIS — F33.0 MILD EPISODE OF RECURRENT MAJOR DEPRESSIVE DISORDER (H): ICD-10-CM

## 2018-06-06 NOTE — TELEPHONE ENCOUNTER
Routing refill request to provider for review/approval because:  Labs out of range:  PHQ elevated will send my chart for update    Cecilia Nathan, RN

## 2018-06-06 NOTE — TELEPHONE ENCOUNTER
"Requested Prescriptions   Pending Prescriptions Disp Refills     sertraline (ZOLOFT) 50 MG tablet [Pharmacy Med Name: SERTRALINE HCL 50 MG TABLET] 30 tablet 1    Last Written Prescription Date:  04/11/2018  Last Fill Quantity: 30 tablet,  # refills: 1   Last office visit: 5/8/2018 with prescribing provider:  05/08/2018   Future Office Visit:     Sig: TAKE 1 TABLET BY MOUTH DAILY    SSRIs Protocol Passed    6/6/2018  3:41 AM       Passed - PHQ-9 score less than 5 in past 6 months    Please review last PHQ-9 score.          Passed - Patient is age 18 or older       Passed - Recent (6 mo) or future (30 days) visit within the authorizing provider's specialty    Patient had office visit in the last 6 months or has a visit in the next 30 days with authorizing provider or within the authorizing provider's specialty.  See \"Patient Info\" tab in inbasket, or \"Choose Columns\" in Meds & Orders section of the refill encounter.              Boaz Cruz XRT  "

## 2018-06-13 ENCOUNTER — RADIANT APPOINTMENT (OUTPATIENT)
Dept: GENERAL RADIOLOGY | Facility: CLINIC | Age: 53
End: 2018-06-13
Attending: FAMILY MEDICINE
Payer: COMMERCIAL

## 2018-06-13 ENCOUNTER — OFFICE VISIT (OUTPATIENT)
Dept: FAMILY MEDICINE | Facility: CLINIC | Age: 53
End: 2018-06-13
Payer: COMMERCIAL

## 2018-06-13 VITALS
HEIGHT: 72 IN | DIASTOLIC BLOOD PRESSURE: 78 MMHG | SYSTOLIC BLOOD PRESSURE: 130 MMHG | BODY MASS INDEX: 35.89 KG/M2 | WEIGHT: 265 LBS

## 2018-06-13 DIAGNOSIS — M25.561 RIGHT KNEE PAIN: ICD-10-CM

## 2018-06-13 DIAGNOSIS — S83.411A SPRAIN OF MEDIAL COLLATERAL LIGAMENT OF RIGHT KNEE, INITIAL ENCOUNTER: Primary | ICD-10-CM

## 2018-06-13 PROCEDURE — 73562 X-RAY EXAM OF KNEE 3: CPT | Mod: RT

## 2018-06-13 PROCEDURE — 99214 OFFICE O/P EST MOD 30 MIN: CPT | Performed by: FAMILY MEDICINE

## 2018-06-13 RX ORDER — CELECOXIB 100 MG/1
100 CAPSULE ORAL 2 TIMES DAILY PRN
Qty: 60 CAPSULE | Refills: 1 | Status: SHIPPED | OUTPATIENT
Start: 2018-06-13 | End: 2018-09-28

## 2018-06-13 NOTE — MR AVS SNAPSHOT
After Visit Summary   6/13/2018    Galileo Vieira    MRN: [id number]           Patient Information     Date Of Birth          1965        Visit Information        Provider Department      6/13/2018 11:40 AM Rut Flanagan MD [facility]        Today's Diagnoses     Sprain of medial collateral ligament of right knee, initial encounter    -  1    Right knee pain          Care Instructions      Understanding Medial Collateral Ligament Sprain    The knee is a complex joint where the thighbone (femur) meets the shinbone (tibia). Strong tissues called ligaments connect these bones together. Ligaments also keep the bones aligned, so the knee only bends how it is supposed to. The medial collateral ligament (MCL) runs across the knee joint on the medial side of the leg. Injury to this ligament may be very painful. The knee may also not work the way it should.  Causes of an MCL sprain  An MCL sprain often happens when the knee joint is pushed beyond its normal range of motion. It is most common during a blow to the knee from the outside, pushing the knee inward. It may also happen if the knee is forced into a twist. These movements stretch and tear the MCL. Other parts of the knee may be damaged along with the MCL.  Symptoms of an MCL sprain  These include:    Knee pain    Knee swelling    Locking of the joint    Wobbly or unstable feeling in the joint  Treatment for an MCL sprain  Treatment will depend on the severity of the sprain and whether there is damage to other parts of the knee. Options often include:    Rest. This allows the knee to heal. Activities that stress the knee should be avoided. Crutches, a knee brace, or both may also be recommended for a short time.    Cold packs and elevation of the knee. These help reduce swelling and relieve pain.    Compression. The knee may be wrapped with a bandage to help reduce swelling.    Medicines. These help relieve pain and  swelling.    Exercises. These help improve the knee s stability, strength, and range of motion.  If the injury is severe or several parts of the joint are involved, surgery may be an option. Surgery repairs the MCL and any other damaged structures.     When to call your healthcare provider  Call your healthcare provider right away if you have any of these:    Pain, swelling, or instability that doesn t get better with treatment or gets worse    New symptoms   Date Last Reviewed: 3/10/2016    8472-7151 [employer]. [address] [place] [address]. All rights reserved. This information is not intended as a substitute for professional medical care. Always follow your healthcare professional's instructions.                Follow-ups after your visit        Who to contact     If you have questions or need follow up information about today's clinic visit or your schedule please contact [facility] directly at [phone].  Normal or non-critical lab and imaging results will be communicated to you by Spot Runnerhart, letter or phone within 4 business days after the clinic has received the results. If you do not hear from us within 7 days, please contact the clinic through [employer] Runnerhart or phone. If you have a critical or abnormal lab result, we will notify you by phone as soon as possible.  Submit refill requests through [email or web address] or call your pharmacy and they will forward the refill request to us. Please allow 3 business days for your refill to be completed.          Additional Information About Your Visit        [employer] Information     [email or web address] gives you secure access to your electronic health record. If you see a primary care provider, you can also send messages to your care team and make appointments. If you have questions, please call your primary care clinic.  If you do not have a primary care provider, please call [phone] and they will assist you.        Care EveryWhere ID     This is  "your Care EveryWhere ID. This could be used by other organizations to access your [place] medical records  [id number]        Your Vitals Were     Height BMI (Body Mass Index)                5' 11.75\" (1.822 m) 36.19 kg/m2           Blood Pressure from Last 3 Encounters:   06/13/18 130/78   05/08/18 122/80   09/06/17 132/76    Weight from Last 3 Encounters:   06/13/18 265 lb (120.2 kg)   05/08/18 265 lb (120.2 kg)   09/06/17 260 lb (117.9 kg)              Today, you had the following     No orders found for display         Today's Medication Changes          These changes are accurate as of 6/13/18 12:04 PM.  If you have any questions, ask your nurse or doctor.               Start taking these medicines.        Dose/Directions    celecoxib 100 MG capsule   Commonly known as:  celeBREX   Used for:  Sprain of medial collateral ligament of right knee, initial encounter   Started by:  Rut Flnaagan MD        Dose:  100 mg   Take 1 capsule (100 mg) by mouth 2 times daily as needed for moderate pain   Quantity:  60 capsule   Refills:  1            Where to get your medicines      These medications were sent to [id number]    Hours:  Tech issues with their phone system Phone:  [phone]     celecoxib 100 MG capsule                Primary Care Provider Office Phone # Fax #    Roma Ann Aaseby-Aguilera, PA-C [phone] [address] [address] [facility] [id number]        Equal Access to Services     KATIE GOODE : Hadii aad ku hadasho Soomaali, waaxda luqadaha, qaybta kaalmada adeegyapatricia, waxjacinda idistepan parra. So [facility] [phone].    ATENCIÓN: Si habla español, tiene a mahmood disposición servicios gratuitos de asistencia lingüística. Llame al [phone].    We comply with applicable federal civil rights laws and [place] laws. We do not discriminate on the basis of race, color, national " origin, age, disability, sex, sexual orientation, or gender identity.            Thank you!     Thank you for choosing [facility]  for your care. Our goal is always to provide you with excellent care. Hearing back from our patients is one way we can continue to improve our services. Please take a few minutes to complete the written survey that you may receive in the mail after your visit with us. Thank you!             Your Updated Medication List - Protect others around you: Learn how to safely use, store and throw away your medicines at www.disposemymeds.org.          This list is accurate as of 6/13/18 12:04 PM.  Always use your most recent med list.                   Brand Name Dispense Instructions for use Diagnosis    celecoxib 100 MG capsule    celeBREX    60 capsule    Take 1 capsule (100 mg) by mouth 2 times daily as needed for moderate pain    Sprain of medial collateral ligament of right knee, initial encounter       ibuprofen 200 MG capsule     120    1 CAPSULE PRN        omeprazole 40 MG capsule    priLOSEC    90 capsule    Take 1 capsule (40 mg) by mouth daily    GERD (gastroesophageal reflux disease)       order for DME      Equipment ordered: RESMED Auto PAP Mask type: Full face  Settings: 5-15        order for DME     1 Units    Tennis elbow strap    Lateral epicondylitis of right elbow       pravastatin 20 MG tablet    PRAVACHOL    90 tablet    TAKE ONE TABLET BY MOUTH EVERY DAY    Hyperlipidemia LDL goal <160       sertraline 50 MG tablet    ZOLOFT    30 tablet    TAKE 1 TABLET BY MOUTH DAILY    Mild episode of recurrent major depressive disorder (H)       testosterone cypionate 200 MG/ML injection    DEPO-TESTOSTERONE    10 mL    Inject 1 mL (200 mg) into the muscle every 14 days    Testosterone insufficiency       traZODone 50 MG tablet    DESYREL    90 tablet    Take  mg at hs    Insomnia, unspecified type

## 2018-06-13 NOTE — PATIENT INSTRUCTIONS
Understanding Medial Collateral Ligament Sprain    The knee is a complex joint where the thighbone (femur) meets the shinbone (tibia). Strong tissues called ligaments connect these bones together. Ligaments also keep the bones aligned, so the knee only bends how it is supposed to. The medial collateral ligament (MCL) runs across the knee joint on the medial side of the leg. Injury to this ligament may be very painful. The knee may also not work the way it should.  Causes of an MCL sprain  An MCL sprain often happens when the knee joint is pushed beyond its normal range of motion. It is most common during a blow to the knee from the outside, pushing the knee inward. It may also happen if the knee is forced into a twist. These movements stretch and tear the MCL. Other parts of the knee may be damaged along with the MCL.  Symptoms of an MCL sprain  These include:    Knee pain    Knee swelling    Locking of the joint    Wobbly or unstable feeling in the joint  Treatment for an MCL sprain  Treatment will depend on the severity of the sprain and whether there is damage to other parts of the knee. Options often include:    Rest. This allows the knee to heal. Activities that stress the knee should be avoided. Crutches, a knee brace, or both may also be recommended for a short time.    Cold packs and elevation of the knee. These help reduce swelling and relieve pain.    Compression. The knee may be wrapped with a bandage to help reduce swelling.    Medicines. These help relieve pain and swelling.    Exercises. These help improve the knee s stability, strength, and range of motion.  If the injury is severe or several parts of the joint are involved, surgery may be an option. Surgery repairs the MCL and any other damaged structures.     When to call your healthcare provider  Call your healthcare provider right away if you have any of these:    Pain, swelling, or instability that doesn t get better with treatment or gets  worse    New symptoms   Date Last Reviewed: 3/10/2016    4892-3974 [employer]. [address] [facility], [place] [address]. All rights reserved. This information is not intended as a substitute for professional medical care. Always follow your healthcare professional's instructions.

## 2018-06-13 NOTE — PROGRESS NOTES
SUBJECTIVE:   Galileo Vieira is a 52 year old male who presents to clinic today for the following health issues:      Patient is here for Follow up right knee pain.     Received a steroid injection 1 month ago, had good pain relief for about 2 weeks.     Since then, pain has returned. Pain medial knee. Bending, twisting, stiffness at times.   Ibuprofen is helpful to knock down the pain.       Problem list and histories reviewed & adjusted, as indicated.  Additional history: none    Patient Active Problem List   Diagnosis     Hyperlipidemia LDL goal <160     GERD (gastroesophageal reflux disease)     Mild major depression (H)     Anxiety     Testosterone insufficiency     Fear of flying     Mild obstructive sleep apnea     Hypogonadism male     Moderate episode of recurrent major depressive disorder (H)     Past Surgical History:   Procedure Laterality Date     C NONSPECIFIC PROCEDURE      Left knee arthroscopy     C NONSPECIFIC PROCEDURE  1997    Bilat inguinal hernia surgery     C NONSPECIFIC PROCEDURE      Vasectomy     CHOLECYSTECTOMY       COLONOSCOPY       COLONOSCOPY  5/12/2014    Procedure: COLONOSCOPY;  Surgeon: Syed Walker MD;  Location:  GI     GI SURGERY  2008    Gall bladder     HERNIA REPAIR         Social History   Substance Use Topics     Smoking status: Former Smoker     Quit date: 10/3/1997     Smokeless tobacco: Never Used      Comment: 1997     Alcohol use 10.0 oz/week     20 Standard drinks or equivalent per week      Comment: 3-4 daily     Family History   Problem Relation Age of Onset     GASTROINTESTINAL DISEASE Father      Crohn's     Cardiovascular Father      Depression Father      HEART DISEASE Father 66     triple bipass     Neurologic Disorder Father      seizures     Respiratory Father      sleep apnea     C.A.D. Father      Had CABG     CEREBROVASCULAR DISEASE Father      Lipids Brother      Respiratory Brother      Family History Negative Mother      Lipids Brother       "Depression Paternal Grandmother      Depression Other      Cancer - colorectal Other      Maternal Uncle  at 56     DIABETES No family hx of      Cancer - colorectal Maternal Uncle            Reviewed and updated as needed this visit by clinical staff       Reviewed and updated as needed this visit by Provider         ROS:  Constitutional, HEENT, cardiovascular, pulmonary, gi and gu systems are negative, except as otherwise noted.    OBJECTIVE:     /78 (BP Location: Right arm, Patient Position: Chair, Cuff Size: Adult Regular)  Ht 5' 11.75\" (1.822 m)  Wt 265 lb (120.2 kg)  BMI 36.19 kg/m2  Body mass index is 36.19 kg/(m^2).  GENERAL: healthy, alert and no distress  MS: right knee - + valgus stress, ttp over the femoral medial epicondyle, negative varus, Mcmurrays', lachman, posterior drawer, no ttp over the patellar ligament, patella ballotable    Diagnostic Test Results:  Xray - right knee - no significant DJD    ASSESSMENT/PLAN:     1. Sprain of medial collateral ligament of right knee, initial encounter - suspected based on exam today, negative XR. Discussed time to healing, NSAIDs, rest. Suggested MRI to uncover extent of sprain, but he would like to investigate insurance coverage first.   - celecoxib (CELEBREX) 100 MG capsule; Take 1 capsule (100 mg) by mouth 2 times daily as needed for moderate pain  Dispense: 60 capsule; Refill: 1    2. Right knee pain - as above    Rut Flanagan MD  [facility]    "

## 2018-06-18 DIAGNOSIS — K21.9 GERD (GASTROESOPHAGEAL REFLUX DISEASE): ICD-10-CM

## 2018-06-18 RX ORDER — OMEPRAZOLE 40 MG/1
CAPSULE, DELAYED RELEASE ORAL
Qty: 90 CAPSULE | Refills: 4 | Status: SHIPPED | OUTPATIENT
Start: 2018-06-18 | End: 2018-09-28

## 2018-06-18 NOTE — TELEPHONE ENCOUNTER
Prescription approved per [facility] Refill Protocol.  Danielle Byrd RN, BSN

## 2018-06-18 NOTE — TELEPHONE ENCOUNTER
"Requested Prescriptions   Pending Prescriptions Disp Refills     omeprazole (PRILOSEC) 40 MG capsule [Pharmacy Med Name: OMEPRAZOLE 40MG CPDR] 90 capsule 4    Last Written Prescription Date:  03/28/2017  Last Fill Quantity: 90 capsule,  # refills: 4   Last office visit: 6/13/2018 with prescribing provider:  06/13/2018   Future Office Visit:     Sig: TAKE ONE CAPSULE BY MOUTH EVERY DAY    PPI Protocol Passed    6/18/2018  1:54 PM       Passed - Not on Clopidogrel (unless Pantoprazole ordered)       Passed - No diagnosis of osteoporosis on record       Passed - Recent (12 mo) or future (30 days) visit within the authorizing provider's specialty    Patient had office visit in the last 12 months or has a visit in the next 30 days with authorizing provider or within the authorizing provider's specialty.  See \"Patient Info\" tab in inbasket, or \"Choose Columns\" in Meds & Orders section of the refill encounter.           Passed - Patient is age 18 or older          Boaz BOWMANT  "

## 2018-06-27 DIAGNOSIS — E34.9 TESTOSTERONE INSUFFICIENCY: ICD-10-CM

## 2018-06-27 NOTE — TELEPHONE ENCOUNTER
Controlled Substance Refill Request for Testosterone  Problem List Complete:  No     PROVIDER TO CONSIDER COMPLETION OF PROBLEM LIST AND OVERVIEW/CONTROLLED SUBSTANCE AGREEMENT    Last Written Prescription Date:  4/25/18  Last Fill Quantity: 10ml,   # refills: 1    Last Office Visit with [facility] primary care provider: 6/13/18    Future Office visit:     Controlled substance agreement on file: No.     Processing:  Fax Rx to  Mail order pharmacy   checked in past 3 months?  Yes 06/27/18   Danielle Byrd RN, BSN

## 2018-06-28 RX ORDER — TESTOSTERONE CYPIONATE 200 MG/ML
200 INJECTION, SOLUTION INTRAMUSCULAR
Qty: 10 ML | Refills: 1 | Status: SHIPPED | OUTPATIENT
Start: 2018-06-28 | End: 2019-02-28

## 2018-07-06 ENCOUNTER — TELEPHONE (OUTPATIENT)
Dept: FAMILY MEDICINE | Facility: CLINIC | Age: 53
End: 2018-07-06

## 2018-07-06 DIAGNOSIS — F33.0 MILD EPISODE OF RECURRENT MAJOR DEPRESSIVE DISORDER (H): ICD-10-CM

## 2018-07-06 ASSESSMENT — ANXIETY QUESTIONNAIRES
GAD7 TOTAL SCORE: 0
6. BECOMING EASILY ANNOYED OR IRRITABLE: NOT AT ALL
7. FEELING AFRAID AS IF SOMETHING AWFUL MIGHT HAPPEN: NOT AT ALL
3. WORRYING TOO MUCH ABOUT DIFFERENT THINGS: NOT AT ALL
1. FEELING NERVOUS, ANXIOUS, OR ON EDGE: NOT AT ALL
2. NOT BEING ABLE TO STOP OR CONTROL WORRYING: NOT AT ALL
5. BEING SO RESTLESS THAT IT IS HARD TO SIT STILL: NOT AT ALL
IF YOU CHECKED OFF ANY PROBLEMS ON THIS QUESTIONNAIRE, HOW DIFFICULT HAVE THESE PROBLEMS MADE IT FOR YOU TO DO YOUR WORK, TAKE CARE OF THINGS AT HOME, OR GET ALONG WITH OTHER PEOPLE: NOT DIFFICULT AT ALL

## 2018-07-06 ASSESSMENT — PATIENT HEALTH QUESTIONNAIRE - PHQ9: 5. POOR APPETITE OR OVEREATING: NOT AT ALL

## 2018-07-06 NOTE — TELEPHONE ENCOUNTER
Pt is on sertraline not prozac but needs refills to go to mail order.    PHQ-9 SCORE 12/13/2017 4/11/2018 7/6/2018   Total Score - - -   Total Score MyChart - 7 (Mild depression) -   Total Score 0 7 3     TODD-7 SCORE 12/13/2017 4/11/2018 7/6/2018   Total Score - - -   Total Score - 9 (mild anxiety) -   Total Score 0 9 0       Prescription approved per [facility] Refill Protocol.  Pt will follow up with PCP for med check    Danielle Byrd RN, BSN

## 2018-07-07 ASSESSMENT — ANXIETY QUESTIONNAIRES: GAD7 TOTAL SCORE: 0

## 2018-07-07 ASSESSMENT — PATIENT HEALTH QUESTIONNAIRE - PHQ9: SUM OF ALL RESPONSES TO PHQ QUESTIONS 1-9: 3

## 2018-07-31 ENCOUNTER — VIRTUAL VISIT (OUTPATIENT)
Dept: FAMILY MEDICINE | Facility: CLINIC | Age: 53
End: 2018-07-31
Payer: COMMERCIAL

## 2018-07-31 DIAGNOSIS — M62.830 BACK MUSCLE SPASM: Primary | ICD-10-CM

## 2018-07-31 PROCEDURE — 99441 ZZC PHYSICIAN TELEPHONE EVALUATION 5-10 MIN: CPT | Performed by: FAMILY MEDICINE

## 2018-07-31 RX ORDER — CYCLOBENZAPRINE HCL 5 MG
5-10 TABLET ORAL 3 TIMES DAILY PRN
Qty: 42 TABLET | Refills: 1 | Status: SHIPPED | OUTPATIENT
Start: 2018-07-31 | End: 2018-09-28

## 2018-07-31 NOTE — MR AVS SNAPSHOT
After Visit Summary   7/31/2018    Galileo Vieira    MRN: [id number]           Patient Information     Date Of Birth          1965        Visit Information        Provider Department      7/31/2018 11:20 AM Rut Flanagan MD [facility]        Today's Diagnoses     Back muscle spasm    -  1       Follow-ups after your visit        Follow-up notes from your care team     Return in about 1 year (around 7/31/2019) for Yearly Physical Exam.      Your next 10 appointments already scheduled     Jul 31, 2018 11:20 AM CDT   Telephone Visit with Rut Flanagan MD   [facility] ([facility])    [address] [address] [phone]           Note: this is not an onsite visit; there is no need to come to the facility.              Who to contact     If you have questions or need follow up information about today's clinic visit or your schedule please contact [facility] directly at [phone].  Normal or non-critical lab and imaging results will be communicated to you by JumpInhart, letter or phone within 4 business days after the clinic has received the results. If you do not hear from us within 7 days, please contact the clinic through AVOBt or phone. If you have a critical or abnormal lab result, we will notify you by phone as soon as possible.  Submit refill requests through [employer] or call your pharmacy and they will forward the refill request to us. Please allow 3 business days for your refill to be completed.          Additional Information About Your Visit        JumpInhart Information     [employer] gives you secure access to your electronic health record. If you see a primary care provider, you can also send messages to your care team and make appointments. If you have questions, please call your primary care clinic.  If you do not have a primary care provider, please call [phone] and they will assist  you.        Care EveryWhere ID     This is your Care EveryWhere ID. This could be used by other organizations to access your [facility] medical records  [id number]         Blood Pressure from Last 3 Encounters:   06/13/18 130/78   05/08/18 122/80   09/06/17 132/76    Weight from Last 3 Encounters:   06/13/18 265 lb (120.2 kg)   05/08/18 265 lb (120.2 kg)   09/06/17 260 lb (117.9 kg)              Today, you had the following     No orders found for display         Today's Medication Changes          These changes are accurate as of 7/31/18 11:12 AM.  If you have any questions, ask your nurse or doctor.               Start taking these medicines.        Dose/Directions    cyclobenzaprine 5 MG tablet   Commonly known as:  FLEXERIL   Used for:  Back muscle spasm   Started by:  Rut Flanagan MD        Dose:  5-10 mg   Take 1-2 tablets (5-10 mg) by mouth 3 times daily as needed for muscle spasms   Quantity:  42 tablet   Refills:  1            Where to get your medicines      These medications were sent to [facility] [place] [address]     Phone:  [phone]     cyclobenzaprine 5 MG tablet                Primary Care Provider Office Phone # Fax #    Romaona Ann Aaseby-Aguilera, PA-C [phone] [address] [facility] [address]        Equal Access to Services     MIRA GOODE AH: Rosalia blunto Sozoe, waaxda luqadaha, qaybta kaalmada josé miguelyapatricia, waxjacinda masha parra. So [facility] [phone].    ATENCIÓN: Si habla español, tiene a mahmood disposición servicios gratuitos de asistencia lingüística. Llame al [phone].    We comply with applicable federal civil rights laws and [place] laws. We do not discriminate on the basis of race, color, national origin, age, disability, sex, sexual orientation, or gender identity.            Thank you!     Thank you for choosing [facility]  for your  care. Our goal is always to provide you with excellent care. Hearing back from our patients is one way we can continue to improve our services. Please take a few minutes to complete the written survey that you may receive in the mail after your visit with us. Thank you!             Your Updated Medication List - Protect others around you: Learn how to safely use, store and throw away your medicines at www.disposemymeds.org.          This list is accurate as of 7/31/18 11:12 AM.  Always use your most recent med list.                   Brand Name Dispense Instructions for use Diagnosis    celecoxib 100 MG capsule    celeBREX    60 capsule    Take 1 capsule (100 mg) by mouth 2 times daily as needed for moderate pain    Sprain of medial collateral ligament of right knee, initial encounter       cyclobenzaprine 5 MG tablet    FLEXERIL    42 tablet    Take 1-2 tablets (5-10 mg) by mouth 3 times daily as needed for muscle spasms    Back muscle spasm       ibuprofen 200 MG capsule     120    1 CAPSULE PRN        omeprazole 40 MG capsule    priLOSEC    90 capsule    TAKE ONE CAPSULE BY MOUTH EVERY DAY    GERD (gastroesophageal reflux disease)       order for DME      Equipment ordered: RESMED Auto PAP Mask type: Full face  Settings: 5-15        order for DME     1 Units    Tennis elbow strap    Lateral epicondylitis of right elbow       pravastatin 20 MG tablet    PRAVACHOL    90 tablet    TAKE ONE TABLET BY MOUTH EVERY DAY    Hyperlipidemia LDL goal <160       sertraline 50 MG tablet    ZOLOFT    90 tablet    Take 1 tablet (50 mg) by mouth daily    Mild episode of recurrent major depressive disorder (H)       testosterone cypionate 200 MG/ML injection    DEPO-TESTOSTERONE    10 mL    Inject 1 mL (200 mg) into the muscle every 14 days    Testosterone insufficiency       traZODone 50 MG tablet    DESYREL    90 tablet    Take  mg at hs    Insomnia, unspecified type

## 2018-07-31 NOTE — PROGRESS NOTES
"Galileo Vieira is a 52 year old male who is being evaluated via a telephone visit.      The patient has been notified of following:     \"This telephone visit will be conducted via a call between you and your physician/provider. We have found that certain health care needs can be provided without the need for a physical exam.  This service lets us provide the care you need with a short phone conversation.  If a prescription is necessary we can send it directly to your pharmacy.  If lab work is needed we can place an order for that and you can then stop by our lab to have the test done at a later time.    We will bill your insurance company for this service.  Please check with your medical insurance if this type of visit is covered. You may be responsible for the cost of this type of visit if insurance coverage is denied.  The typical cost is $30 (10min), $59 (11-20min) and $85 (21-30min).  Most often these visits are shorter than 10 minutes.    If during the course of the call the physician/provider feels a telephone visit is not appropriate, you will not be charged for this service.\"       Consent has been obtained for this service by care team member: yes.   See the scanned image in the medical record.    Galileo Vieira complains of  back spasm      I have reviewed and updated the patient's Past Medical History, Social History, Family History and Medication List.    ALLERGIES  Review of patient's allergies indicates no known allergies.    db (MA signature)        SUBJECTIVE:   Galileo Vieira is a 52 year old male who presents to clinic today for the following health issues:      Back spasm started yesterday afternoon    Has been using TENS unit, ibuprofen. TENS seems to take the edge off but would like better relief. Has used mm relaxers in the past with good relief.         Problem list and histories reviewed & adjusted, as indicated.  Additional history: none    Patient Active Problem List   Diagnosis     " Hyperlipidemia LDL goal <160     GERD (gastroesophageal reflux disease)     Mild major depression (H)     Anxiety     Testosterone insufficiency     Fear of flying     Mild obstructive sleep apnea     Hypogonadism male     Moderate episode of recurrent major depressive disorder (H)     Past Surgical History:   Procedure Laterality Date     C NONSPECIFIC PROCEDURE      Left knee arthroscopy     C NONSPECIFIC PROCEDURE      Bilat inguinal hernia surgery     C NONSPECIFIC PROCEDURE      Vasectomy     CHOLECYSTECTOMY       COLONOSCOPY       COLONOSCOPY  2014    Procedure: COLONOSCOPY;  Surgeon: Syed Walker MD;  Location:  GI     GI SURGERY      Gall bladder     HERNIA REPAIR         Social History   Substance Use Topics     Smoking status: Former Smoker     Quit date: 10/3/1997     Smokeless tobacco: Never Used      Comment:      Alcohol use 10.0 oz/week     20 Standard drinks or equivalent per week      Comment: 3-4 daily     Family History   Problem Relation Age of Onset     GASTROINTESTINAL DISEASE Father      Crohn's     Cardiovascular Father      Depression Father      HEART DISEASE Father 66     triple bipass     Neurologic Disorder Father      seizures     Respiratory Father      sleep apnea     C.A.D. Father      Had CABG     Cerebrovascular Disease Father      Lipids Brother      Respiratory Brother      Family History Negative Mother      Lipids Brother      Depression Paternal Grandmother      Depression Other      Cancer - colorectal Other      Maternal Uncle  at 56     Diabetes No family hx of      Cancer - colorectal Maternal Uncle            Reviewed and updated as needed this visit by clinical staff  Tobacco  Allergies  Meds  Med Hx  Surg Hx  Fam Hx  Soc Hx      Reviewed and updated as needed this visit by Provider         ROS:  Constitutional, HEENT, cardiovascular, pulmonary, gi and gu systems are negative, except as otherwise noted.      ASSESSMENT/PLAN:     1. Back  muscle spasm - continue TENS, add mm relaxer and heat. Continue high dose NSAID for next 3 days.   - cyclobenzaprine (FLEXERIL) 5 MG tablet; Take 1-2 tablets (5-10 mg) by mouth 3 times daily as needed for muscle spasms  Dispense: 42 tablet; Refill: 1      Rut Flanagan MD  [facility]      I have reviewed the note as documented above.  This accurately captures the substance of my conversation with the patient    Total time of call between patient and provider was 7 minutes

## 2018-08-02 ENCOUNTER — TELEPHONE (OUTPATIENT)
Dept: FAMILY MEDICINE | Facility: CLINIC | Age: 53
End: 2018-08-02

## 2018-08-02 ENCOUNTER — HOSPITAL ENCOUNTER (EMERGENCY)
Facility: CLINIC | Age: 53
Discharge: HOME | End: 2018-08-02
Attending: EMERGENCY MEDICINE | Admitting: EMERGENCY MEDICINE
Payer: COMMERCIAL

## 2018-08-02 ENCOUNTER — APPOINTMENT (OUTPATIENT)
Dept: GENERAL RADIOLOGY | Facility: CLINIC | Age: 53
End: 2018-08-02
Attending: EMERGENCY MEDICINE
Payer: COMMERCIAL

## 2018-08-02 VITALS
HEART RATE: 84 BPM | RESPIRATION RATE: 20 BRPM | SYSTOLIC BLOOD PRESSURE: 138 MMHG | TEMPERATURE: 98.3 F | DIASTOLIC BLOOD PRESSURE: 78 MMHG | OXYGEN SATURATION: 93 %

## 2018-08-02 DIAGNOSIS — M54.50 ACUTE BILATERAL LOW BACK PAIN WITHOUT SCIATICA: ICD-10-CM

## 2018-08-02 PROCEDURE — 25000132 ZZH RX MED GY IP 250 OP 250 PS 637: Performed by: EMERGENCY MEDICINE

## 2018-08-02 PROCEDURE — 96372 THER/PROPH/DIAG INJ SC/IM: CPT

## 2018-08-02 PROCEDURE — 25000125 ZZHC RX 250: Performed by: EMERGENCY MEDICINE

## 2018-08-02 PROCEDURE — 99285 EMERGENCY DEPT VISIT HI MDM: CPT | Mod: 25

## 2018-08-02 PROCEDURE — 25000128 H RX IP 250 OP 636: Performed by: EMERGENCY MEDICINE

## 2018-08-02 PROCEDURE — 72100 X-RAY EXAM L-S SPINE 2/3 VWS: CPT

## 2018-08-02 RX ORDER — MORPHINE SULFATE 4 MG/ML
4 INJECTION, SOLUTION INTRAMUSCULAR; INTRAVENOUS ONCE
Status: COMPLETED | OUTPATIENT
Start: 2018-08-02 | End: 2018-08-02

## 2018-08-02 RX ORDER — LIDOCAINE 4 G/G
1 PATCH TOPICAL ONCE
Status: DISCONTINUED | OUTPATIENT
Start: 2018-08-02 | End: 2018-08-02 | Stop reason: HOSPADM

## 2018-08-02 RX ORDER — DIAZEPAM 5 MG
5 TABLET ORAL ONCE
Status: COMPLETED | OUTPATIENT
Start: 2018-08-02 | End: 2018-08-02

## 2018-08-02 RX ORDER — HYDROCODONE BITARTRATE AND ACETAMINOPHEN 5; 325 MG/1; MG/1
1-2 TABLET ORAL EVERY 6 HOURS PRN
Qty: 15 TABLET | Refills: 0 | Status: SHIPPED | OUTPATIENT
Start: 2018-08-02 | End: 2018-08-05

## 2018-08-02 RX ORDER — LIDOCAINE 50 MG/G
1 PATCH TOPICAL EVERY 24 HOURS
Qty: 10 PATCH | Refills: 0 | Status: SHIPPED | OUTPATIENT
Start: 2018-08-02 | End: 2018-08-12

## 2018-08-02 RX ORDER — ONDANSETRON 4 MG/1
4 TABLET, ORALLY DISINTEGRATING ORAL ONCE
Status: COMPLETED | OUTPATIENT
Start: 2018-08-02 | End: 2018-08-02

## 2018-08-02 RX ORDER — ONDANSETRON 4 MG/1
4 TABLET, ORALLY DISINTEGRATING ORAL EVERY 8 HOURS PRN
Qty: 10 TABLET | Refills: 0 | Status: SHIPPED | OUTPATIENT
Start: 2018-08-02 | End: 2018-09-28

## 2018-08-02 RX ORDER — DIAZEPAM 5 MG
5 TABLET ORAL EVERY 8 HOURS PRN
Qty: 10 TABLET | Refills: 0 | Status: SHIPPED | OUTPATIENT
Start: 2018-08-02 | End: 2018-08-05

## 2018-08-02 RX ORDER — HYDROCODONE BITARTRATE AND ACETAMINOPHEN 5; 325 MG/1; MG/1
1 TABLET ORAL ONCE
Status: COMPLETED | OUTPATIENT
Start: 2018-08-02 | End: 2018-08-02

## 2018-08-02 RX ADMIN — HYDROCODONE BITARTRATE AND ACETAMINOPHEN 1 TABLET: 5; 325 TABLET ORAL at 09:11

## 2018-08-02 RX ADMIN — LIDOCAINE 1 PATCH: 560 PATCH PERCUTANEOUS; TOPICAL; TRANSDERMAL at 09:08

## 2018-08-02 RX ADMIN — DIAZEPAM 5 MG: 5 TABLET ORAL at 09:11

## 2018-08-02 RX ADMIN — MORPHINE SULFATE 4 MG: 4 INJECTION, SOLUTION INTRAMUSCULAR; INTRAVENOUS at 09:13

## 2018-08-02 RX ADMIN — ONDANSETRON 4 MG: 4 TABLET, ORALLY DISINTEGRATING ORAL at 09:35

## 2018-08-02 ASSESSMENT — ENCOUNTER SYMPTOMS
BACK PAIN: 1
DIFFICULTY URINATING: 0
FEVER: 0
NUMBNESS: 0

## 2018-08-02 NOTE — DISCHARGE INSTRUCTIONS
Take Motrin and use LidoDerm and heating pads for pain. Continue TENS unit.    If your pain is severe, you can also take Flexeril (muscle relaxer) and Norco (narcotic pain pill). These will make you drowsy so you cannot drive or operate heavy machinery after taking. Use with caution as Norco is also addictive. Norco will make you constipated so consider over the counter stool softeners. Because you are not having good improvement with Flexeril, I have provided a prescription for Valium as this works as a muscle relaxer better for some people. Do not take Flexeril and Valium together - pick one or the other.    Zofran as needed for nausea and vomiting. Take medications with food to help prevent.    Follow up with primary care and/or orthopedics. Your pain will likely improve with time, but if not you may require an MRI for further diagnosis and may need other treatments such as physical therapy or steroid injections.    Return to the ER with weakness of the leg(s), new/worsening numbness or tingling, difficulty urinating or urinary incontinence, fever >100.4F, or any other concerns.

## 2018-08-02 NOTE — ED NOTES
Ambulated pt, walked slowly with pain at a 7, steady on feet, reported that pain had decreased since arrival

## 2018-08-02 NOTE — TELEPHONE ENCOUNTER
Spoke with pt and he has done all he should per his telephone visit with Dr Flanagan and nothing has helped.  He is having a hard time walking.  Recommended he go to the ER for evaluation since pain has gotten worse.  Pt agrees.    Danielle Byrd RN, BSN

## 2018-08-02 NOTE — ED AVS SNAPSHOT
[facility]    [address]    [facility] [address]    Phone:  [phone]    Fax:  [phone]                                       Galileo Vieira   MRN: [id number]    Department:  [facility] Emergency Department   Date of Visit:  8/2/2018           After Visit Summary Signature Page     I have received my discharge instructions, and my questions have been answered. I have discussed any challenges I see with this plan with the nurse or doctor.    ..........................................................................................................................................  Patient/Patient Representative Signature      ..........................................................................................................................................  Patient Representative Print Name and Relationship to Patient    ..................................................               ................................................  Date                                            Time    ..........................................................................................................................................  Reviewed by Signature/Title    ...................................................              ..............................................  Date                                                            Time

## 2018-08-02 NOTE — ED AVS SNAPSHOT
[facility] Emergency Department    [address]    [facility] [address]    Phone:  [phone]    Fax:  [phone]                                       Galileo Vieira   MRN: [id number]    Department:  [facility] Emergency Department   Date of Visit:  8/2/2018           Patient Information     Date Of Birth          1965        Your diagnoses for this visit were:     Acute bilateral low back pain without sciatica        You were seen by Anny Dooley MD.      Follow-up Information     Follow up with [facility] In 3 days.    Contact information:    [address], [facility] [place]  [facility] [address]  [phone]          Follow up with Aaseby-Aguilera, Ramona Ann, PA-C In 1 week.    Specialty:  Physician Assistant    Contact information:    [address]  [facility] [address]  [phone]          Follow up with [facility] Emergency Department.    Specialty:  EMERGENCY MEDICINE    Why:  If symptoms worsen    Contact information:    [address] [facility] [id number] [phone]        Discharge Instructions       Take Motrin and use LidoDerm and heating pads for pain. Continue TENS unit.    If your pain is severe, you can also take Flexeril (muscle relaxer) and Norco (narcotic pain pill). These will make you drowsy so you cannot drive or operate heavy machinery after taking. Use with caution as Norco is also addictive. Norco will make you constipated so consider over the counter stool softeners. Because you are not having good improvement with Flexeril, I have provided a prescription for Valium as this works as a muscle relaxer better for some people. Do not take Flexeril and Valium together - pick one or the other.    Zofran as needed for nausea and vomiting. Take medications with food to help prevent.    Follow up with primary care and/or orthopedics. Your pain will likely improve with time, but if not you  may require an MRI for further diagnosis and may need other treatments such as physical therapy or steroid injections.    Return to the ER with weakness of the leg(s), new/worsening numbness or tingling, difficulty urinating or urinary incontinence, fever >100.4F, or any other concerns.       Discharge References/Attachments     BACK PAIN (ACUTE OR CHRONIC) (ENGLISH)    BACK PAIN (LOW) OR LEG PAIN: POSSIBLE CAUSES (ENGLISH)      24 Hour Appointment Hotline       To make an appointment at any [facility] clinic, call [phone] ([phone]). If you don't have a family doctor or clinic, we will help you find one. [facility] clinics are conveniently located to serve the needs of you and your family.             Review of your medicines      START taking        Dose / Directions Last dose taken    diazepam 5 MG tablet   Commonly known as:  VALIUM   Dose:  5 mg   Quantity:  10 tablet        Take 1 tablet (5 mg) by mouth every 8 hours as needed for muscle spasms (MUSCLE SPASM)   Refills:  0        HYDROcodone-acetaminophen 5-325 MG per tablet   Commonly known as:  NORCO   Dose:  1-2 tablet   Quantity:  15 tablet        Take 1-2 tablets by mouth every 6 hours as needed for pain   Refills:  0        lidocaine 5 % Patch   Commonly known as:  LIDODERM   Dose:  1 patch   Quantity:  10 patch        Place 1 patch onto the skin every 24 hours for 10 days   Refills:  0        ondansetron 4 MG ODT tab   Commonly known as:  ZOFRAN ODT   Dose:  4 mg   Quantity:  10 tablet        Take 1 tablet (4 mg) by mouth every 8 hours as needed for nausea   Refills:  0          Our records show that you are taking the medicines listed below. If these are incorrect, please call your family doctor or clinic.        Dose / Directions Last dose taken    celecoxib 100 MG capsule   Commonly known as:  celeBREX   Dose:  100 mg   Quantity:  60 capsule        Take 1 capsule (100 mg) by mouth 2 times daily as needed for moderate pain   Refills:  1         cyclobenzaprine 5 MG tablet   Commonly known as:  FLEXERIL   Dose:  5-10 mg   Quantity:  42 tablet        Take 1-2 tablets (5-10 mg) by mouth 3 times daily as needed for muscle spasms   Refills:  1        ibuprofen 200 MG capsule   Quantity:  120        1 CAPSULE PRN   Refills:  0        omeprazole 40 MG capsule   Commonly known as:  priLOSEC   Quantity:  90 capsule        TAKE ONE CAPSULE BY MOUTH EVERY DAY   Refills:  4        order for DME        Equipment ordered: RESMED Auto PAP Mask type: Full face  Settings: 5-15   Refills:  0        order for DME   Quantity:  1 Units        Tennis elbow strap   Refills:  0        pravastatin 20 MG tablet   Commonly known as:  PRAVACHOL   Quantity:  90 tablet        TAKE ONE TABLET BY MOUTH EVERY DAY   Refills:  3        sertraline 50 MG tablet   Commonly known as:  ZOLOFT   Dose:  50 mg   Quantity:  90 tablet        Take 1 tablet (50 mg) by mouth daily   Refills:  0        testosterone cypionate 200 MG/ML injection   Commonly known as:  DEPO-TESTOSTERONE   Dose:  200 mg   Quantity:  10 mL        Inject 1 mL (200 mg) into the muscle every 14 days   Refills:  1        traZODone 50 MG tablet   Commonly known as:  DESYREL   Quantity:  90 tablet        Take  mg at hs   Refills:  1                Information about OPIOIDS     PRESCRIPTION OPIOIDS: WHAT YOU NEED TO KNOW   We gave you an opioid (narcotic) pain medicine. It is important to manage your pain, but opioids are not always the best choice. You should first try all the other options your care team gave you. Take this medicine for as short a time (and as few doses) as possible.     These medicines have risks:    DO NOT drive when on new or higher doses of pain medicine. These medicines can affect your alertness and reaction times, and you could be arrested for driving under the influence (DUI). If you need to use opioids long-term, talk to your care team about driving.    DO NOT operate heave machinery    DO NOT do  any other dangerous activities while taking these medicines.     DO NOT drink any alcohol while taking these medicines.      If the opioid prescribed includes acetaminophen, DO NOT take with any other medicines that contain acetaminophen. Read all labels carefully. Look for the word  acetaminophen  or  Tylenol.  Ask your pharmacist if you have questions or are unsure.    You can get addicted to pain medicines, especially if you have a history of addiction (chemical, alcohol or substance dependence). Talk to your care team about ways to reduce this risk.    Store your pills in a secure place, locked if possible. We will not replace any lost or stolen medicine. If you don t finish your medicine, please throw away (dispose) as directed by your pharmacist. The [place] Pollution Control Agency has more information about safe disposal: https://www.pca.Count includes the [facility].mn.us/living-green/managing-unwanted-medications.     All opioids tend to cause constipation. Drink plenty of water and eat foods that have a lot of fiber, such as fruits, vegetables, prune juice, apple juice and high-fiber cereal. Take a laxative (Miralax, milk of magnesia, Colace, Senna) if you don t move your bowels at least every other day.         Prescriptions were sent or printed at these locations (4 Prescriptions)                   Other Prescriptions                Printed at Department/Unit printer (4 of 4)         diazepam (VALIUM) 5 MG tablet               HYDROcodone-acetaminophen (NORCO) 5-325 MG per tablet               lidocaine (LIDODERM) 5 % Patch               ondansetron (ZOFRAN ODT) 4 MG ODT tab                Procedures and tests performed during your visit     Lumbar spine XR, 2-3 views      Orders Needing Specimen Collection     None      Pending Results     No orders found from 7/31/2018 to 8/3/2018.            Pending Culture Results     No orders found from 7/31/2018 to 8/3/2018.            Pending Results Instructions     If you had any  lab results that were not finalized at the time of your Discharge, you can call the ED Lab Result RN at [phone]. You will be contacted by this team for any positive Lab results or changes in treatment. The nurses are available 7 days a week from 10A to 6:30P.  You can leave a message 24 hours per day and they will return your call.        Test Results From Your Hospital Stay        8/2/2018  9:36 AM      Narrative     XR LUMBAR SPINE 2-3 VIEWS  8/2/2018 9:28 AM    HISTORY:  Lower back pain.    COMPARISON:  None.        Impression     IMPRESSION:  No evidence for acute process. Chronic pars defect at L5  with disc space narrowing, degenerative changes and grade 1 anterior  listhesis of L5 on S1. Posterior alignment otherwise satisfactory.  Mild anterior osteophyte formation at L1-2.     AUGUSTO ALCANTARA MD                Clinical Quality Measure: Blood Pressure Screening     Your blood pressure was checked while you were in the emergency department today. The last reading we obtained was  BP: 133/79 . Please read the guidelines below about what these numbers mean and what you should do about them.  If your systolic blood pressure (the top number) is less than 120 and your diastolic blood pressure (the bottom number) is less than 80, then your blood pressure is normal. There is nothing more that you need to do about it.  If your systolic blood pressure (the top number) is 120-139 or your diastolic blood pressure (the bottom number) is 80-89, your blood pressure may be higher than it should be. You should have your blood pressure rechecked within a year by a primary care provider.  If your systolic blood pressure (the top number) is 140 or greater or your diastolic blood pressure (the bottom number) is 90 or greater, you may have high blood pressure. High blood pressure is treatable, but if left untreated over time it can put you at risk for heart attack, stroke, or kidney failure. You should have your blood  pressure rechecked by a primary care provider within the next 4 weeks.  If your provider in the emergency department today gave you specific instructions to follow-up with your doctor or provider even sooner than that, you should follow that instruction and not wait for up to 4 weeks for your follow-up visit.        Thank you for choosing [facility]       Thank you for choosing [facility] for your care. Our goal is always to provide you with excellent care. Hearing back from our patients is one way we can continue to improve our services. Please take a few minutes to complete the written survey that you may receive in the mail after you visit with us. Thank you!        [facility] Information     [facility] gives you secure access to your electronic health record. If you see a primary care provider, you can also send messages to your care team and make appointments. If you have questions, please call your primary care clinic.  If you do not have a primary care provider, please call [phone] and they will assist you.        Care EveryWhere ID     This is your Care EveryWhere ID. This could be used by other organizations to access your [facility] medical records  [id number]        Equal Access to Services     KATIE GOODE : Hadii rubén Dobson, waelvira lujen, qaybhilda kaalkarol lentz, radu kilgore . So [facility] [phone].    ATENCIÓN: Si habla español, tiene a mahmood disposición servicios gratuitos de asistencia lingüística. Llame al [phone].    We comply with applicable federal civil rights laws and [place] laws. We do not discriminate on the basis of race, color, national origin, age, disability, sex, sexual orientation, or gender identity.            After Visit Summary       This is your record. Keep this with you and show to your community pharmacist(s) and doctor(s) at your next visit.

## 2018-08-02 NOTE — ED PROVIDER NOTES
"  History     Chief Complaint:  Back Pain    The history is provided by the patient.      Galileo Vieira is a 52 year old male with a self reported history of low back pain who presents for evaluation of low back pain. The patient reports a history of spondylolisthesis 14 years ago with intermittent manageable back pain since then. In the past he has had sciatic pain radiating down his legs, but today he has only low back pain. Otherwise, pain is similar but more severe.  Patient notes that his episode of sharp back pain started last week, \"slowing me down a bit\" before becoming more painful three days ago. He was started on Flexeril and Celebrex 2 days ago via virtual doctor visit. However pain is severe (9-10/10) and not alleviated by these medications or TENS unit, ice, or heat prompting his presentation. Notes last dose of medications was about 3 hours prior to arrival. Patient reports pain is diffuse across the lower back, but somewhat concentrated moreso on the right side. Notes pain is exacerbated by movement, particularly walking. Patient denies recent trauma, history of malignancy, or history of IV drug use. The patient drove himself to the emergency department. Denies numbness or tingling, difficulty urinating, incontinence, fever, or other complaint.     Allergies:  No known drug allergies     Medications:    Celebrex - Started at virtual visit   Flexeril - Started at virtual visit  Omeprazole  Pravachol  Zoloft  Testosterone  Desyrel    Past Medical History:    Moderate recurrent major depressive disorder  Hypogonadism  Mild obstructive sleep apnea  Testosterone insufficiency  Anxiety  GERD  Concussion  Hypercholesterolemia     Past Surgical History:    Left knee arthroscopy  Vasectomy  Bilateral inguinal hernia repair  Cholecystectomy  Colonoscopy  Gall bladder removal    Family History:    Crohn's disease  CVD  Depression  Triple bypass  Seizures  Sleep apnea  CAD  CVD  Lipids  Respiratory  Colorectal " cancer    Social History:  Presents alone   Works in [employer]  Tobacco use: Former smoker (quit 10/3/1997)  Alcohol use: Yes (3-4 drinks daily)  PCP: Ramona Ann Aaseby-Aguilera    Marital Status:       Review of Systems   Constitutional: Negative for fever.   Genitourinary: Negative for difficulty urinating.   Musculoskeletal: Positive for back pain.   Neurological: Negative for numbness.   All other systems reviewed and are negative.    Physical Exam     Patient Vitals for the past 24 hrs:   BP Temp Temp src Pulse Resp SpO2   08/02/18 1045 138/78 - - - - 93 %   08/02/18 1030 126/74 - - - - 92 %   08/02/18 1015 133/79 - - - - 91 %   08/02/18 1000 121/71 - - - - 92 %   08/02/18 0945 133/79 - - - - 93 %   08/02/18 0915 124/85 - - - - 96 %   08/02/18 0900 143/88 - - - - 95 %   08/02/18 0855 (!) 169/112 98.3  F (36.8  C) Oral 84 20 97 %        Physical Exam  General: Well-developed. Obese. Well appearing middle aged  man. Cooperative.  Head:  Atraumatic.  Eyes:  Conjunctivae, lids, and sclerae are normal.  ENT:    Normal nose. Moist mucous membranes.  Neck:  Supple. Normal range of motion.  CV:  Regular rate and rhythm. Normal heart sounds with no murmurs, rubs, or gallops detected. DP 2+ bilaterally.  Resp:  No respiratory distress. Clear to auscultation bilaterally without decreased breath sounds, wheezing, rales, or rhonchi.  GI:  Soft. Non-distended. Non-tender.    MS:  Normal ROM. No bilateral lower extremity edema.  No midline lumbar spine tenderness.  No step-offs or deformities.  No significant bilateral paraspinal muscular tenderness.  Skin:  Warm. Non-diaphoretic. No pallor.  Neuro:  Awake. A&Ox3. Normal strength including 5/5 strength with plantarflexion and dorsiflexion.  Sensation intact light touch throughout including perineum.  Psych: Normal mood and affect. Normal speech.  Vitals reviewed.    Emergency Department Course     Imaging:  Radiographic findings were communicated with  the patient who voiced understanding of the findings.    XR Lumbar spine, 2-3 views:  IMPRESSION:  No evidence for acute process. Chronic pars defect at L5 with disc space narrowing, degenerative changes and grade 1 anterior listhesis of L5 on S1. Posterior alignment otherwise satisfactory. Mild anterior osteophyte formation at L1-2.     Imaging independently reviewed and agree with radiologist interpretation.      Interventions:  0908: Lidoderm 4% patch transdermal   0911: Norco 5/325 1 tablet PO  0911: Valium 5 mg PO  0913: Morphine 4 mg IM  0935: Zofran-ODT 4 mg PO    The patient's symptoms were improved with parenteral narcotics.    Emergency Department Course:  Past medical records, nursing notes, and vitals reviewed.  0900: I performed an exam of the patient and obtained history, as documented above.    Above interventions provided.  The patient was sent for a XR while in the emergency department, findings above.     1000: I rechecked the patient. He states that his pain is 7/10 in severity with movement, improved. Has ambulated in department.    1034: I rechecked the patient.  Findings and plan explained to the patient. Patient discharged home with instructions regarding supportive care, medications, and reasons to return. The importance of close follow-up was reviewed.      Impression & Plan      Medical Decision Making:  Galileo Vieira is a 52 year old male who presents with lower back pain. He states he has struggled with this for quite some time and has typically been able to treat at home. However, the present episode started a couple days ago and he has been unable to control his pain at home using Motrin, Flexeril, TENS unit, ice, and heat. He denies any recent injury. He has no red flag symptoms including fever, history of malignancy, or history of IV drug use. His exam is benign with normal strength and sensation.  No evidence of cauda equina syndrome.  I cannot reliably reproduce patient's pain and  he reports that it is somewhat midline.  Given he has not had imaging of his back for quite some time, I did elect to pursue x-ray of the lumbar spine.  This reveals chronic pars defect at L5 with disc space narrowing as well as degenerative changes and grade 1 anterolisthesis of L5 on S1.  I do not believe that these with findings and no evidence of cauda equina syndrome that advanced imaging such as MRI is indicated.  Patient was given IM morphine in addition to Norco, Valium, and Lidoderm to mitigate his pain.  He had improvement from 10/10 to 7/10 and was able to ambulate in the emergency department without difficulty, though slowly.  I discussed with the patient the importance of following up with orthopedics.  He would likely benefit from MRI and discussion regarding physical therapy or steroid injections.  However, he understands in the interim the goal is pain control with continued use of the Motrin he has at home as well as his TENS unit and heating pads.  I have also provided a prescription for Lidoderm and Norco.  He did not have improvement with Flexeril at home so I will provide him with Valium instead.  I instructed him not to take these together.  He did have nausea when given all of these medications here so I also gave him Zofran here, as well as continued prescription as needed at home.  I provided strict return precautions including those for cauda equina syndrome and answered all the patient's questions.  He verbalized understanding is amenable to discharge with close orthopedic follow-up.    Diagnosis:    ICD-10-CM   1. Acute bilateral low back pain without sciatica M54.5       Disposition:  Discharged home with plan as outlined.     Discharge Medications:  Discharge Medication List as of 8/2/2018 10:55 AM      START taking these medications    Details   diazepam (VALIUM) 5 MG tablet Take 1 tablet (5 mg) by mouth every 8 hours as needed for muscle spasms (MUSCLE SPASM), Disp-10 tablet, R-0,  Local Print      HYDROcodone-acetaminophen (NORCO) 5-325 MG per tablet Take 1-2 tablets by mouth every 6 hours as needed for pain, Disp-15 tablet, R-0, Local Print      lidocaine (LIDODERM) 5 % Patch Place 1 patch onto the skin every 24 hours for 10 daysDisp-10 patch, R-0Local Print      ondansetron (ZOFRAN ODT) 4 MG ODT tab Take 1 tablet (4 mg) by mouth every 8 hours as needed for nausea, Disp-10 tablet, R-0, Local Print               Scribe Disclosure:  TATO, Laz Chavez, am serving as a scribe at 8:52 AM on 8/2/2018 to document services personally performed by Anny Dooley MD based on my observations and the provider's statements to me.   8/2/2018   [facility] EMERGENCY DEPARTMENT     Anny Dooley MD  08/02/18 1952

## 2018-08-02 NOTE — ED TRIAGE NOTES
Low back pain worsening since Monday. He has been using Ibuprofen, TENS unit, ice and heat but the pain persists.  No numbness or tingling, no radiation of pain, no weakness, no bowel or bladder problems.  ABCs intact.  Patient is alert and oriented x3.

## 2018-08-03 ENCOUNTER — OFFICE VISIT (OUTPATIENT)
Dept: ORTHOPEDICS | Facility: CLINIC | Age: 53
End: 2018-08-03
Payer: COMMERCIAL

## 2018-08-03 ENCOUNTER — HOSPITAL ENCOUNTER (OUTPATIENT)
Dept: MRI IMAGING | Facility: CLINIC | Age: 53
Discharge: HOME | End: 2018-08-03
Attending: FAMILY MEDICINE | Admitting: FAMILY MEDICINE
Payer: COMMERCIAL

## 2018-08-03 VITALS
BODY MASS INDEX: 35.89 KG/M2 | WEIGHT: 265 LBS | HEIGHT: 72 IN | DIASTOLIC BLOOD PRESSURE: 80 MMHG | SYSTOLIC BLOOD PRESSURE: 122 MMHG

## 2018-08-03 DIAGNOSIS — M54.50 ACUTE MIDLINE LOW BACK PAIN WITHOUT SCIATICA: ICD-10-CM

## 2018-08-03 DIAGNOSIS — M54.50 ACUTE MIDLINE LOW BACK PAIN WITHOUT SCIATICA: Primary | ICD-10-CM

## 2018-08-03 PROCEDURE — 99204 OFFICE O/P NEW MOD 45 MIN: CPT | Performed by: FAMILY MEDICINE

## 2018-08-03 PROCEDURE — 72148 MRI LUMBAR SPINE W/O DYE: CPT

## 2018-08-03 NOTE — PATIENT INSTRUCTIONS
1. Acute midline low back pain without sciatica      Given degree of pain - concern for disc herniation  MRI scheduled for 415 today at the [facility] center in [address].  If MRI is done today I will call you tomorrow / send results over [facility]    Consulted with Myesha Santana PT about positioning for pain relief/comfort.  Follow-up with Myesha Santana on Monday for formal exam and further recommendations.    Follow up will be determined based on MRI results.

## 2018-08-03 NOTE — LETTER
8/3/2018         RE: Galileo Vieira  [address] [place] [address]        Dear Colleague,    Thank you for referring your patient, Galileo Vieira, to the [facility]. Please see a copy of my visit note below.    ASSESSMENT & PLAN    1. Acute midline low back pain without sciatica      Given degree of pain - concern for disc herniation  MRI scheduled for 415 today at the [facility] in [address].  If MRI is done today I will call you tomorrow / send results over MyChart  Will hold off on oral steroids as this will delay ability to proceed with LEONIDAS    Consulted with Myesha Santana PT about positioning for pain relief/comfort.  Follow-up with Myesha Santana on Monday for formal exam and further recommendations.    Follow up will be determined based on MRI results.     -----    SUBJECTIVE  Galileo Vieira is a/an 52 year old male who is seen in consultation at the request of  Ramona Ann Aaseby-Aguilera PA-C for evaluation of acute low back pain. The patient is seen by themselves.    Onset: 10 day(s) ago. Reports insidious onset without acute precipitating event.  Location of Pain: bilateral lower back pain without radiation  Rating of Pain at worst: 10/10  Rating of Pain Currently: 7/10  Worsened by: getting up from sitting, twisting  Better with: nothing  Treatments tried: ice, heat, ibuprofen and other medications: Hydrocodone/Acetaminophen (Vicodin/Norco), Cyclobenzaprine (Flexeril)  and Valium  Quality: sharp, stabbing  Red flags: Weakness: No, bowel/bladder loss: No, foot drop: No  Associated symptoms: no distal numbness or tingling; denies swelling or warmth  Orthopedic history: acute on chronic lower back pain (this is the first flare up in the past few years)  Relevant surgical history: NO  Patient Social History: works as a medical assistant at [employer]    Patient's past medical, surgical, social, and family histories were reviewed today and no changes are  "noted.    REVIEW OF SYSTEMS:  10 point ROS is negative other than symptoms noted above in HPI, Past Medical History or as stated below  Constitutional: NEGATIVE for fever, chills, change in weight  Skin: NEGATIVE for worrisome rashes, moles or lesions  GI/: NEGATIVE for bowel or bladder changes  Neuro: NEGATIVE for weakness, dizziness or paresthesias    OBJECTIVE:  /80  Ht 5' 11.75\" (1.822 m)  Wt 265 lb (120.2 kg)  BMI 36.19 kg/m2   General: healthy, alert and in moderate distress 2/2 pain  HEENT: no scleral icterus or conjunctival erythema  Skin: no suspicious lesions or rash. No jaundice.  CV: no pedal edema  Resp: normal respiratory effort without conversational dyspnea   Psych: normal mood and affect  Gait: very slow to rise, fair coordination and balance  Neuro: normal light touch sensory exam of the bilateral lower extremities.  DTR's 2+ patella and achilles bilaterally.  MSK:  THORACIC/LUMBAR SPINE  Inspection:    No gross deformity/asymmetry  Palpation:    Tender about the lumbar facet joints. Otherwise remainder of landmarks are nontender.  Range of Motion:     Lumbar flexion limited by pain    Lumbar extension full  Strength:    able to heel walk, able to toe walk, quadriceps 4+/5, hamstrings 5-/5, gastrocsoleus 5/5, tibialis anterior 5/5, extensor hallicus longus 5/5  Special Tests:    Positive: slump test (increased axial back pain)    Independent visualization of the below image:  Recent Results (from the past 744 hour(s))   Lumbar spine XR, 2-3 views    Narrative    XR LUMBAR SPINE 2-3 VIEWS  8/2/2018 9:28 AM    HISTORY:  Lower back pain.    COMPARISON:  None.      Impression    IMPRESSION:  No evidence for acute process. Chronic pars defect at L5  with disc space narrowing, degenerative changes and grade 1 anterior  listhesis of L5 on S1. Posterior alignment otherwise satisfactory.  Mild anterior osteophyte formation at L1-2.     AUGUSTO ALCANTARA MD     Patient's conditions were thoroughly " discussed during today's visit with greater than 50% of the visit spent counseling the patient with total time spent face-to-face with the patient being 20 minutes.    Sweta Pete DO [facility]      Again, thank you for allowing me to participate in the care of your patient.        Sincerely,        Sweta Pete, DO

## 2018-08-03 NOTE — PROGRESS NOTES
ASSESSMENT & PLAN    1. Acute midline low back pain without sciatica      Given degree of pain - concern for disc herniation  MRI scheduled for 415 today at the [facility] center in [address].  If MRI is done today I will call you tomorrow / send results over MyChart  Will hold off on oral steroids as this will delay ability to proceed with LEONIDAS    Consulted with Myesha Santana PT about positioning for pain relief/comfort.  Follow-up with Myesha Santana on Monday for formal exam and further recommendations.    Follow up will be determined based on MRI results.     -----    SUBJECTIVE  Galileo Vieira is a/an 52 year old male who is seen in consultation at the request of  Ramona Ann Aaseby-Aguilera PA-C for evaluation of acute low back pain. The patient is seen by themselves.    Onset: 10 day(s) ago. Reports insidious onset without acute precipitating event.  Location of Pain: bilateral lower back pain without radiation  Rating of Pain at worst: 10/10  Rating of Pain Currently: 7/10  Worsened by: getting up from sitting, twisting  Better with: nothing  Treatments tried: ice, heat, ibuprofen and other medications: Hydrocodone/Acetaminophen (Vicodin/Norco), Cyclobenzaprine (Flexeril)  and Valium  Quality: sharp, stabbing  Red flags: Weakness: No, bowel/bladder loss: No, foot drop: No  Associated symptoms: no distal numbness or tingling; denies swelling or warmth  Orthopedic history: acute on chronic lower back pain (this is the first flare up in the past few years)  Relevant surgical history: NO  Patient Social History: works as a medical assistant at [employer]    Patient's past medical, surgical, social, and family histories were reviewed today and no changes are noted.    REVIEW OF SYSTEMS:  10 point ROS is negative other than symptoms noted above in HPI, Past Medical History or as stated below  Constitutional: NEGATIVE for fever, chills, change in weight  Skin: NEGATIVE for worrisome rashes, moles or lesions  GI/:  "NEGATIVE for bowel or bladder changes  Neuro: NEGATIVE for weakness, dizziness or paresthesias    OBJECTIVE:  /80  Ht 5' 11.75\" (1.822 m)  Wt 265 lb (120.2 kg)  BMI 36.19 kg/m2   General: healthy, alert and in moderate distress 2/2 pain  HEENT: no scleral icterus or conjunctival erythema  Skin: no suspicious lesions or rash. No jaundice.  CV: no pedal edema  Resp: normal respiratory effort without conversational dyspnea   Psych: normal mood and affect  Gait: very slow to rise, fair coordination and balance  Neuro: normal light touch sensory exam of the bilateral lower extremities.  DTR's 2+ patella and achilles bilaterally.  MSK:  THORACIC/LUMBAR SPINE  Inspection:    No gross deformity/asymmetry  Palpation:    Tender about the lumbar facet joints. Otherwise remainder of landmarks are nontender.  Range of Motion:     Lumbar flexion limited by pain    Lumbar extension full  Strength:    able to heel walk, able to toe walk, quadriceps 4+/5, hamstrings 5-/5, gastrocsoleus 5/5, tibialis anterior 5/5, extensor hallicus longus 5/5  Special Tests:    Positive: slump test (increased axial back pain)    Independent visualization of the below image:  Recent Results (from the past 744 hour(s))   Lumbar spine XR, 2-3 views    Narrative    XR LUMBAR SPINE 2-3 VIEWS  8/2/2018 9:28 AM    HISTORY:  Lower back pain.    COMPARISON:  None.      Impression    IMPRESSION:  No evidence for acute process. Chronic pars defect at L5  with disc space narrowing, degenerative changes and grade 1 anterior  listhesis of L5 on S1. Posterior alignment otherwise satisfactory.  Mild anterior osteophyte formation at L1-2.     AUGUSTO ALCANTARA MD     Patient's conditions were thoroughly discussed during today's visit with greater than 50% of the visit spent counseling the patient with total time spent face-to-face with the patient being 20 minutes.    Sweta Pete, DO [facility]    "

## 2018-08-03 NOTE — MR AVS SNAPSHOT
After Visit Summary   8/3/2018    Galileo Vieira    MRN: [id number]           Patient Information     Date Of Birth          1965        Visit Information        Provider Department      8/3/2018 2:00 PM Sweta Pete,  [facility]        Today's Diagnoses     Acute midline low back pain without sciatica    -  1      Care Instructions    1. Acute midline low back pain without sciatica      Given degree of pain - concern for disc herniation  MRI scheduled for 415 today at the [facility] care center in [address].  If MRI is done today I will call you tomorrow / send results over Playdemic    Consulted with Myesha Santana PT about positioning for pain relief/comfort.  Follow-up with Myesha Santana on Monday for formal exam and further recommendations.    Follow up will be determined based on MRI results.           Follow-ups after your visit        Future tests that were ordered for you today     Open Future Orders        Priority Expected Expires Ordered    MR Lumbar Spine w/o Contrast Routine  8/4/2019 8/3/2018            Who to contact     If you have questions or need follow up information about today's clinic visit or your schedule please contact [facility] directly at [phone].  Normal or non-critical lab and imaging results will be communicated to you by ARKeXhart, letter or phone within 4 business days after the clinic has received the results. If you do not hear from us within 7 days, please contact the clinic through Offermaticat or phone. If you have a critical or abnormal lab result, we will notify you by phone as soon as possible.  Submit refill requests through Playdemic or call your pharmacy and they will forward the refill request to us. Please allow 3 business days for your refill to be completed.          Additional Information About Your Visit        MyChart Information     Playdemic gives you secure access to your electronic health record. If you  "see a primary care provider, you can also send messages to your care team and make appointments. If you have questions, please call your primary care clinic.  If you do not have a primary care provider, please call [phone] and they will assist you.        Care EveryWhere ID     This is your Care EveryWhere ID. This could be used by other organizations to access your [facility] medical records  [id number]        Your Vitals Were     Height BMI (Body Mass Index)                5' 11.75\" (1.822 m) 36.19 kg/m2           Blood Pressure from Last 3 Encounters:   08/03/18 122/80   08/02/18 138/78   06/13/18 130/78    Weight from Last 3 Encounters:   08/03/18 265 lb (120.2 kg)   06/13/18 265 lb (120.2 kg)   05/08/18 265 lb (120.2 kg)               Primary Care Provider Office Phone # Fax #    Roma Ann Aaseby-Aguilera, PA-C [phone] [id number] [address] [facility] [address]        Equal Access to Services     [facility]: Hadii aad ku hadasho Soomaali, waaxda luqadaha, qaybta kaalmada adeegyada, radu kilgore . So [place] -[phone].    ATENCIÓN: Si habla español, tiene a mahmood disposición servicios gratuitos de asistencia lingüística. Agnieszka al [phone].    We comply with applicable federal civil rights laws and [place] laws. We do not discriminate on the basis of race, color, national origin, age, disability, sex, sexual orientation, or gender identity.            Thank you!     Thank you for choosing [facility]  for your care. Our goal is always to provide you with excellent care. Hearing back from our patients is one way we can continue to improve our services. Please take a few minutes to complete the written survey that you may receive in the mail after your visit with us. Thank you!             Your Updated Medication List - Protect others around you: Learn how to safely use, store and throw away your medicines at www.disposemymeds.org.        "   This list is accurate as of 8/3/18  3:16 PM.  Always use your most recent med list.                   Brand Name Dispense Instructions for use Diagnosis    celecoxib 100 MG capsule    celeBREX    60 capsule    Take 1 capsule (100 mg) by mouth 2 times daily as needed for moderate pain    Sprain of medial collateral ligament of right knee, initial encounter       cyclobenzaprine 5 MG tablet    FLEXERIL    42 tablet    Take 1-2 tablets (5-10 mg) by mouth 3 times daily as needed for muscle spasms    Back muscle spasm       diazepam 5 MG tablet    VALIUM    10 tablet    Take 1 tablet (5 mg) by mouth every 8 hours as needed for muscle spasms (MUSCLE SPASM)        HYDROcodone-acetaminophen 5-325 MG per tablet    NORCO    15 tablet    Take 1-2 tablets by mouth every 6 hours as needed for pain        ibuprofen 200 MG capsule     120    1 CAPSULE PRN        lidocaine 5 % Patch    LIDODERM    10 patch    Place 1 patch onto the skin every 24 hours for 10 days        omeprazole 40 MG capsule    priLOSEC    90 capsule    TAKE ONE CAPSULE BY MOUTH EVERY DAY    GERD (gastroesophageal reflux disease)       ondansetron 4 MG ODT tab    ZOFRAN ODT    10 tablet    Take 1 tablet (4 mg) by mouth every 8 hours as needed for nausea        order for DME      Equipment ordered: RESMED Auto PAP Mask type: Full face  Settings: 5-15        order for DME     1 Units    Tennis elbow strap    Lateral epicondylitis of right elbow       pravastatin 20 MG tablet    PRAVACHOL    90 tablet    TAKE ONE TABLET BY MOUTH EVERY DAY    Hyperlipidemia LDL goal <160       sertraline 50 MG tablet    ZOLOFT    90 tablet    Take 1 tablet (50 mg) by mouth daily    Mild episode of recurrent major depressive disorder (H)       testosterone cypionate 200 MG/ML injection    DEPO-TESTOSTERONE    10 mL    Inject 1 mL (200 mg) into the muscle every 14 days    Testosterone insufficiency       traZODone 50 MG tablet    DESYREL    90 tablet    Take  mg at hs     Insomnia, unspecified type

## 2018-08-04 ENCOUNTER — TELEPHONE (OUTPATIENT)
Dept: ORTHOPEDICS | Facility: CLINIC | Age: 53
End: 2018-08-04

## 2018-08-04 DIAGNOSIS — M51.369 DEGENERATIVE DISC DISEASE, LUMBAR: Primary | ICD-10-CM

## 2018-08-04 DIAGNOSIS — M43.17 SPONDYLOLISTHESIS AT L5-S1 LEVEL: ICD-10-CM

## 2018-08-04 DIAGNOSIS — M48.061 FORAMINAL STENOSIS OF LUMBAR REGION: ICD-10-CM

## 2018-08-04 NOTE — TELEPHONE ENCOUNTER
Notified of MRI results through my chart.  Recommend LEONIDAS which was ordered through pain management.  Also called and discussed results.    Will ask office to write a letter to be off work next week. Will need to coordinate how best to get letter to him.    Sweta Pete DO, CAQSM  [facility]

## 2018-08-04 NOTE — LETTER
August 6, 2018      RE:Galileo Vieira      To whom it may concern,      Patient is currently under my care.  Please excuse patient for work until August 13, 2018.          Sincerely,     Sweta Pete DO CAZURI/beltran

## 2018-08-06 ENCOUNTER — TELEPHONE (OUTPATIENT)
Dept: PALLIATIVE MEDICINE | Facility: CLINIC | Age: 53
End: 2018-08-06

## 2018-08-06 ENCOUNTER — THERAPY VISIT (OUTPATIENT)
Dept: PHYSICAL THERAPY | Facility: CLINIC | Age: 53
End: 2018-08-06
Payer: COMMERCIAL

## 2018-08-06 DIAGNOSIS — M54.59 MECHANICAL LOW BACK PAIN: ICD-10-CM

## 2018-08-06 PROCEDURE — 97110 THERAPEUTIC EXERCISES: CPT | Mod: GP | Performed by: PHYSICAL THERAPIST

## 2018-08-06 PROCEDURE — 97162 PT EVAL MOD COMPLEX 30 MIN: CPT | Mod: GP | Performed by: PHYSICAL THERAPIST

## 2018-08-06 NOTE — TELEPHONE ENCOUNTER
Called patient to schedule Lumbar LEONIDAS. He stated that he was on his way to a physical therapy appointment and would see what they had to say and then call us back if he chooses to schedule. Advised phone number.      Karen Hicks    [facility]

## 2018-08-06 NOTE — PROGRESS NOTES
"[facility] Initial Evaluation -- Lumbar    Date: August 6, 2018  Galileo Vieira is a 52 year old male with a lumbar condition.   Referral: Dr. 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     Symptoms (improving/unchanging/worsening):  improving.     Symptoms commenced as a result of: insidious onset   Condition occurred in the following environment:   n/a     Symptoms at onset (back/thigh/leg): back, R hip  Constant symptoms (back/thigh/leg): back  Intermittent symptoms (back/thigh/leg):     Symptoms are made worse with the following: Always Bending, Always Rising, Sometimes Walking and Time of day - Always AM   Symptoms are made better with the following: Sometimes Walking, Time of day - Always as the day progresses, Always On the move and Other - ice    Disturbed sleep (yes/no):  Yes, some Sleeping postures (prone/sup/side R/L): Both sides    Previous episodes (0/1-5/6-10/11+): 10+ Year of first episode: 1990    Previous history: history of chronic back pain  Previous treatments: PT, chiro      Specific Questions:  Cough/Sneeze/Strain (pos/neg): neg  Bowel/Bladder (normal/abnormal): normal  Gait (normal/abnormal): abnormal, slow gait  Medications (nil/NSAIDS/analg/steroids/anticoag/other):  NSAIDS, Muscle relaxants and Other - Anti-depressants  Medical allergies:  none  General health (excellent/good/fair/poor):  good  Pertinent medical history:  Depression, Overweight and Sleep disorder/apnea  Imaging (None/Xray/MRI/Other):  Level by level:   L1-L2: There is " no spinal canal or neural foraminal stenosis at this  level.    L2-L3: There is no spinal canal or neural foraminal stenosis at this  level.    L3-L4: There is no spinal canal or neural foraminal stenosis at this  level.    L4-L5: There is mild facet arthropathy bilaterally. There is no spinal  canal or neural foraminal stenosis at this level.    L5-S1: There is circumferential disc bulging and moderate facet  arthropathy bilaterally. These findings result in moderate-severe  right foraminal stenosis and mild left foraminal narrowing but no  spinal canal stenosis.  Recent or major surgery (yes/no):  no  Night pain (yes/no): no  Accidents (yes/no): no  Unexplained weight loss (yes/no): no  Barriers at home: split level home  Other red flags: no    EXAMINATION    Posture:   Sitting (good/fair/poor): fair   Standing (good/fair/poor):fair  Lordosis (red/acc/normal): red  Correction of posture (better/worse/no effect): worse    Lateral Shift (right/left/nil): nil  Relevant (yes/no):  no  Other Observations: none    Neurological:    Motor deficit:    Reflexes:    Sensory deficit:    Dural signs:      Movement Loss:   Nile Mod Min Nil Pain   Flexion  x x  Poor curve reversal, hands to knees   Extension  x x     Side Gliding R   x     Side Gliding L   x  pdm     Test Movements:   During: produces, abolishes, increases, decreases, no effect, centralizing, peripheralizing   After: better, worse, no better, no worse, no effect, centralized, peripheralized    Pretest symptoms standing: not tested   Symptoms During Symptoms After ROM increased ROM decreased No Effect   FIS        Rep FIS        EIS        Rep EIS        Pretest symptoms lying: R LBP    Symptoms During Symptoms After ROM increased ROM decreased No Effect   DEREK Increase    No worse         Rep DEREK Increase    No Worse      x   EIL Increases No Worse         Rep EIL Increase    No Worse      x   If required, pretest symptoms: not tested   Symptoms During Symptoms  After ROM increased ROM decreased No Effect   SGIS - R        Rep SGIS - R        SGIS - L        Rep SGIS - L          Static Tests:  Sitting slouched:    Sitting erect:    Standing slouched   Standing erect:    Lying prone in extension:   Long sitting:      Other Tests: Slouch/overcorrect: NE on concordant symptoms    Provisional Classification:  Inconclusive/Other - Mechanically Inconclusive    Principle of Management:  Education:  Lack of DP, safety of activity/staying active   Equipment provided:  none  Mechanical therapy (Y/N):  ?   Extension principle:    Lateral Principle:    Flexion principle:    Other:  Slouch/overcorrect x 10 /few hours; bridging x 10/2 x day    ASSESSMENT/PLAN:    Patient is a 52 year old male with lumbar complaints.    Patient has the following significant findings with corresponding treatment plan.                Diagnosis 1:  Mechanical low back pain  Pain -  manual therapy, self management, education, directional preference exercise and home program  Decreased ROM/flexibility - manual therapy and therapeutic exercise  Decreased function - therapeutic activities    Therapy Evaluation Codes:   1) History comprised of:   Personal factors that impact the plan of care:      None.    Comorbidity factors that impact the plan of care are:      Depression, Overweight and Sleep disorder/apnea.     Medications impacting care: Anti-depressant, Anti-inflammatory and Muscle relaxant.  2) Examination of Body Systems comprised of:   Body structures and functions that impact the plan of care:      Lumbar spine.   Activity limitations that impact the plan of care are:      Bending, Lifting, Standing and Working.  3) Clinical presentation characteristics are:   Evolving/Changing.  4) Decision-Making    Moderate complexity using standardized patient assessment instrument and/or measureable assessment of functional outcome.  Cumulative Therapy Evaluation is: Moderate complexity.    Previous and current  functional limitations:  (See Goal Flow Sheet for this information)    Short term and Long term goals: (See Goal Flow Sheet for this information)     Communication ability:  Patient appears to be able to clearly communicate and understand verbal and written communication and follow directions correctly.  Treatment Explanation - The following has been discussed with the patient:   RX ordered/plan of care  Anticipated outcomes  Possible risks and side effects  This patient would benefit from PT intervention to resume normal activities.   Rehab potential is good.    Frequency:  1 X week, once daily  Duration:  for 6 weeks  Discharge Plan:  Achieve all LTG.  Independent in home treatment program.  Reach maximal therapeutic benefit.    Please refer to the daily flowsheet for treatment today, total treatment time and time spent performing 1:1 timed codes.

## 2018-08-06 NOTE — TELEPHONE ENCOUNTER
Called and spoke with patient.  Would like letter faxed to work   Attn; Rika Garcia  [phone]    Letter saved to iva Vázquez MS [employer]

## 2018-08-06 NOTE — TELEPHONE ENCOUNTER
Pre-screening Questions for Radiology Injections:    Injection to be done at which interventional clinic site? [facility]    Instruct patient to arrive as directed prior to the scheduled appointment time:    [place] minutes before      [place]: 1 hour before     Procedure ordered by Yanci    Procedure ordered? Lumbar Epidural Steroid Injection    What insurance would patient like us to bill for this procedure? Pref 1      Worker's comp or MVA (motor vehicle accident) -Any injection DO NOT SCHEDULE and route to Claudette Contreras.      [employer] - For SI joint injections, DO NOT SCHEDULE and route Rita Lr. [employer] FREEDOM NO PA REQUIRED EFFECTIVE 2017      [employer]- MBB's must be scheduled at LEAST two weeks apart      [employer] - Any injection besides hip/shoulder/knee joint DO NOT SCHEDULE and route to Rita Lr. She will obtain PA and call pt back to schedule procedure or notify pt of denial.       [employer]-Route to Rita for review    Any chance of pregnancy? Not Applicable   If YES, do NOT schedule and route to RN pool    Is an  needed? No     Patient has a drive home? (mandatory) YES: informed    Is patient taking any blood thinners (plavix, coumadin, jantoven, warfarin, heparin, pradaxa or dabigatran )? No   If hold needed, do NOT schedule, route to RN pool     Is patient taking any aspirin products? No     If more than 325mg/day do NOT schedule; route to RN pool     For CERVICAL procedures, hold all aspirin products for 6 days.      Does the patient have a bleeding or clotting disorder? No     If YES, okay to schedule AND route to RN nurse pool    **For any patients with platelet count <100, must be forwarded to provider**    Is patient diabetic?  No  If YES, have them bring their glucometer.    Does patient have an active infection or treated for one within the past week? No     Is patient currently taking any antibiotics?  No     For patients on  chronic, preventative, or prophylactic antibiotics, procedures may be scheduled.     For patients on antibiotics for active or recent infection:    Katherine Yousif Burton, [place]-antibiotic course must have been completed for 4 days    Is patient currently taking any steroid medications? (i.e. Prednisone, Medrol)  No     For patients on steroid medications:    Katherine Yousif Burton, [place]itzer-steroid course must have been completed for 4 days    Reviewed with patient:  If you are started on any steroids or antibiotics between now and your appointment, you must contact us because it may affect our ability to perform your procedure.  Yes    Is patient actively being treated for cancer or immunocompromised? No  If YES, do NOT schedule and route to RN pool     Are you able to get on and off an exam table with minimal or no assistance? Yes  If NO, do NOT schedule and route to RN pool    Are you able to roll over and lay on your stomach with minimal or no assistance? Yes  If NO, do NOT schedule and route to RN pool     Any allergies to contrast dye, iodine, shellfish, or numbing and steroid medications? No  If YES, route to RN pool AND add allergy information to appointment notes    Allergies: Review of patient's allergies indicates no known allergies.      Has the patient had a flu shot or any other vaccinations within 7 days before or after the procedure.  No     Does patient have an MRI/CT?  YES: MRI 8/3  (SI joint, hip injections, lumbar sympathetic blocks, and stellate ganglion blocks do not require an MRI)    Was the MRI done w/in the last 3 years?  Yes    Was MRI done at [facility]? Yes      If not, where was it done? N/A       If MRI was not done at [facility], [facility] or [facility] do NOT schedule and route to nursing.  If pt has an imaging disc, the injection may be scheduled but pt has to bring disc to appt. If they show up w/out disc the injection cannot be done    Reminders (please tell  patient if applicable):       Instructed pt to arrive 30 minutes early for IV start if this is for a cervical procedure, ALL sympathetic (stellate ganglion, hypogastric, or lumbar sympathetic block) and all sedation procedures (RFA, spinal cord stimulation trials).  Not Applicable   -IVs are not routinely placed for Dr. Dillon cervical cases   -Dr. Cardona: IVs for cervical ESIs and cervical TBDs (not CMBBs/facet inj)      If NPO for sedation, informed patient that it is okay to take medications with sips of water (except if they are to hold blood thinners).  Not Applicable   *DO take blood pressure medication if it is prescribed*      If this is for a cervical LEONIDAS, informed patient that aspirin needs to be held for 6 days.   Not Applicable      For all patients not having spinal cord stimulator (SCS) trials or radiofrequency ablations (RFAs), informed patient:    IV sedation is not provided for this procedure.  If you feel that an oral anti-anxiety medication is needed, you can discuss this further with your referring provider or primary care provider.  The Pain Clinic provider will discuss specifics of what the procedure includes at your appointment.  Most procedures last 10-20 minutes.  We use numbing medications to help with any discomfort during the procedure.  Not Applicable      Do not schedule procedures requiring IV placement in the first appointment of the day or first appointment after lunch. Do NOT schedule at 0745, 0815 or 1245.       For patients 85 or older we recommend having an adult stay w/ them for the remainder of the day.   N/a    Does the patient have any questions?  Not Applicable  Zaida Alejandro  [facility]

## 2018-08-07 ENCOUNTER — MYC MEDICAL ADVICE (OUTPATIENT)
Dept: ORTHOPEDICS | Facility: CLINIC | Age: 53
End: 2018-08-07

## 2018-08-07 NOTE — PROGRESS NOTES
[facility] - Procedure Note    Date of Visit: 8/08/2018    Procedure performed: Lumbar L5-S1 interlaminar epidural steroid injection  Diagnosis: Lumbar spondylosis; Lumbar radiculitis/radiculopathy  : Yolanda Dillon MD & Berkley Armendariz MD (pain fellow)   Anesthesia: none    Indications: Galileo Vieira is a 52 year old male who is seen at the request of Dr. Sweta Pete DO  for a lumbar epidural steroid injection. The patient describes low back pain radiating to buttock. The patient has been exhibiting symptoms consistent with lumbar intraspinal inflammation and radiculopathy. Symptoms have been persistent, disabling, and intermittently severe. The patient reports minimal improvement with conservative treatment, including medication and physical therapy.    Lumbar MRI was done on 8/03/2018 which showed   FINDINGS: There are five lumbar-type vertebrae for the purposes of  this dictation.      There are chronic appearing bilateral L5 pars interarticularis  defects. There is spondylitic grade 1 anterolisthesis of L5 upon S1.  Alignment of the lumbar vertebrae is otherwise normal. Vertebral body  heights of the lumbar spine are normal. Marrow signal throughout the  lumbar vertebrae is normal. There is no evidence for fracture or  pathologic bony lesion of the lumbar spine. There is a Schmorl's node  deformity in the superior endplate of L1.     There is uncovering and circumferential bulging of the L5-S1 disc. The  remaining lumbar intervertebral disks are within normal limits in  height, contour and signal intensity.     The tip of the conus medullaris is at the mid L2 level which is within  normal limits. There is no evidence for intrathecal abnormality.      Level by level:   L1-L2: There is no spinal canal or neural foraminal stenosis at this  level.     L2-L3: There is no spinal canal or neural foraminal stenosis at this  level.     L3-L4: There is no spinal canal or neural foraminal  stenosis at this  level.     L4-L5: There is mild facet arthropathy bilaterally. There is no spinal  canal or neural foraminal stenosis at this level.     L5-S1: There is circumferential disc bulging and moderate facet  arthropathy bilaterally. These findings result in moderate-severe  right foraminal stenosis and mild left foraminal narrowing but no  spinal canal stenosis.         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    Allergies:    No Known Allergies     Vitals:  BP (!) 140/95  Pulse 79  SpO2 95%    Review of Systems: The patient denies recent fever, chills, illness, use of antibiotics or anticoagulants. All other 10-point review of systems negative.     Procedure: The procedure and risks were explained, and informed written consent was obtained from the patient. Risks include but are not limited to: infection, bleeding, increased pain, and damage to soft tissue, nerve, muscle, and vasculature structures. After getting informed consent, patient was brought into the procedure suite and was placed in a prone position on the procedure table. A Pause for the Cause was performed. Patient was prepped and draped in sterile fashion.     The L5-S1 interspace was identified with use of fluoroscopy in AP view. A 25-gauge, 1.5 inch needle was used to anesthetize the skin and subcutaneous tissue entry site with a total of 2 ml of 1% lidocaine. Under fluoroscopic visualization, a 22-gauge, 4.5 inch Tuohy epidural needle was slowly advanced towards the epidural space a few millimeters left-sided of midline. The latter part of the needle advancement was guided with fluoroscopy in the lateral view. The epidural space was identified using loss of resistance technique. After negative aspiration for heme and cerebrospinal fluid, a total of 1 mL of non-ionic contrast was injected to confirm needle placement with 9 mL of contrast wasted. Epidurogram confirmed spread within the  posterior epidural space. 2 ml of 40mg/ml of triamcinolone, 2 ml of 1% lidocaine, and 1 ml of preservative free saline was injected. The needle was removed.  Images were saved to PACS.    The patient tolerated the procedure well, and there was no evidence of procedural complications. No new sensory or motor deficits were noted following the procedure. The patient was stable and able to ambulate on discharge home. Post-procedure instructions were provided.     Pre-procedure pain score: 6/10 in the back, 0/10 in the leg  Post-procedure pain score: 4/10 in the back, 0/10 in the leg    Assessment/Plan: Galileo Vieira is a 52 year old male s/p lumbar interlaminar epidural steroid injection today for lumbar spondylosis and radiculitis/radiculopathy.     1. Following today's procedure, the patient was advised to contact the [facility] for any of the following:   Fever, chills, or night sweats   New onset of pain, numbness, or weakness   Any questions/concerns regarding the procedure  If unable to contact the Pain Center, the patient was instructed to go to a local Emergency Room for any complications.   2. The patient will receive a follow-up call in 1 week.   3. Follow-up with the referring provider in 2 weeks for post-procedure evaluation.      Yolanda Dillon MD   [facility]

## 2018-08-07 NOTE — TELEPHONE ENCOUNTER
Ok for revised letter to be off next week and will fill out paperwork when I return next week.    Routing to the office so they can send the letter.    Sweta Pete DO, CAQSM  [facility]

## 2018-08-08 ENCOUNTER — RADIANT APPOINTMENT (OUTPATIENT)
Dept: GENERAL RADIOLOGY | Facility: CLINIC | Age: 53
End: 2018-08-08
Attending: ANESTHESIOLOGY
Payer: COMMERCIAL

## 2018-08-08 ENCOUNTER — RADIOLOGY INJECTION OFFICE VISIT (OUTPATIENT)
Dept: PALLIATIVE MEDICINE | Facility: CLINIC | Age: 53
End: 2018-08-08
Payer: COMMERCIAL

## 2018-08-08 VITALS — SYSTOLIC BLOOD PRESSURE: 140 MMHG | OXYGEN SATURATION: 95 % | DIASTOLIC BLOOD PRESSURE: 95 MMHG | HEART RATE: 79 BPM

## 2018-08-08 DIAGNOSIS — M54.16 LUMBAR RADICULOPATHY: Primary | ICD-10-CM

## 2018-08-08 DIAGNOSIS — M51.369 DEGENERATIVE DISC DISEASE, LUMBAR: ICD-10-CM

## 2018-08-08 PROCEDURE — 62323 NJX INTERLAMINAR LMBR/SAC: CPT | Performed by: ANESTHESIOLOGY

## 2018-08-08 NOTE — TELEPHONE ENCOUNTER
Created updated letter- saved in chart/ printed and stamped. Will send to Pt via my chart and fax to supervisor Rika Garcia and faxed to   As noted by Pt.

## 2018-08-08 NOTE — MR AVS SNAPSHOT
After Visit Summary   8/8/2018    Galileo Vieira    MRN: [id number]           Patient Information     Date Of Birth          1965        Visit Information        Provider Department      8/8/2018 1:15 PM Yolanda Dillon MD [facility]        Care Instructions    [facility] Procedure Discharge Instructions    Today you saw:   Dr. Yolanda Dillon           Your procedure: Lumbar Epidural steroid injection     Medications used:  Lidocaine (anesthetic)    Kenalog (steroid)  Omnipaque (contrast)             Be cautious when walking as numbness and/or weakness in the legs may occur up to 6-8 hours after the procedure due to effect of the local anesthetic    Do not drive for 6 hours. The effect of the local anesthetic could slow your reflexes.     Avoid strenuous activity for the first 24 hours. You may resume your regular activities after that.     You may shower, however avoid swimming, tub baths or hot tubs for 24 hours following your procedure    You may have a mild to moderate increase in pain for several days following the injection.      You may use ice packs for 10-15 minutes, 3 to 4 times a day at the injection site for comfort    Do not use heat to painful areas for 6 to 8 hours. This will give the local anesthetic time to wear off and prevent you from accidentally burning your skin.    You may use anti-inflammatory medications (such as Ibuprofen/Advil or Aleve) or Tylenol for pain control if necessary    With diabetes, check your blood sugar more frequently than usual as your blood sugar may be higher than normal for 10-14 days following a steroid injection. Contact your doctor who manages your diabetes if your blood sugar is higher than usual    It may take up to 14 days for the steroid medication to start working although you may feel the effect as early as a few days after the procedure.     Follow up with your referring provider in 2-3 weeks      If you experience  any of the following, call the pain center line during work hours at [phone] or on-call physician after hours at [phone]:  -Fever over 100 degree F  -Swelling, bleeding, redness, drainage, warmth at the injection site  -Progressive weakness or numbness in your legs or arms  -Loss of bowel or bladder function  -Unusual headache that is not relieved by Tylenol or your regular headache medication  -Unusual new onset of pain that is not improving    Phone #s:  Nurse triage line for general questions: [phone]          Follow-ups after your visit        Your next 10 appointments already scheduled     Aug 15, 2018 10:40 AM CDT   [facility] with Myesha Santana, PT   [facility] RIZO PT (ANNEMARIE Rizo  )    [address] [facility]  [address] [place] [address]   [phone]              Who to contact     If you have questions or need follow up information about today's clinic visit or your schedule please contact [facility] directly at [phone].  Normal or non-critical lab and imaging results will be communicated to you by [employer], letter or phone within 4 business days after the clinic has received the results. If you do not hear from us within 7 days, please contact the clinic through [employer] or phone. If you have a critical or abnormal lab result, we will notify you by phone as soon as possible.  Submit refill requests through [employer] or call your pharmacy and they will forward the refill request to us. Please allow 3 business days for your refill to be completed.          Additional Information About Your Visit        [employer] Information     [employer] gives you secure access to your electronic health record. If you see a primary care provider, you can also send messages to your care team and make appointments. If you have questions, please call your primary care clinic.  If you do not have a primary care provider, please call [phone] and they will assist you.        Care EveryWhere ID      This is your Care EveryWhere ID. This could be used by other organizations to access your [facility] medical records  [id number]        Your Vitals Were     Pulse Pulse Oximetry                92 97%           Blood Pressure from Last 3 Encounters:   08/08/18 (!) 137/94   08/03/18 122/80   08/02/18 138/78    Weight from Last 3 Encounters:   08/03/18 120.2 kg (265 lb)   06/13/18 120.2 kg (265 lb)   05/08/18 120.2 kg (265 lb)              Today, you had the following     No orders found for display       Primary Care Provider Office Phone # Fax #    Roma Ann Aaseby-Aguilera, PA-C [phone] [address]  [facility] [address]        Equal Access to Services     KATIE GOODE : Hadii aad ku hadasho Sozoe, waaxda luqadaha, qaybta kaalmada adeegyada, radu kilgore . So [facility] [phone].    ATENCIÓN: Si habla español, tiene a mahmood disposición servicios gratuitos de asistencia lingüística. Llame al [phone].    We comply with applicable federal civil rights laws and [place] laws. We do not discriminate on the basis of race, color, national origin, age, disability, sex, sexual orientation, or gender identity.            Thank you!     Thank you for choosing [facility]  for your care. Our goal is always to provide you with excellent care. Hearing back from our patients is one way we can continue to improve our services. Please take a few minutes to complete the written survey that you may receive in the mail after your visit with us. Thank you!             Your Updated Medication List - Protect others around you: Learn how to safely use, store and throw away your medicines at www.disposemymeds.org.          This list is accurate as of 8/8/18  1:38 PM.  Always use your most recent med list.                   Brand Name Dispense Instructions for use Diagnosis    celecoxib 100 MG capsule    celeBREX    60 capsule    Take 1 capsule (100 mg) by mouth 2  times daily as needed for moderate pain    Sprain of medial collateral ligament of right knee, initial encounter       cyclobenzaprine 5 MG tablet    FLEXERIL    42 tablet    Take 1-2 tablets (5-10 mg) by mouth 3 times daily as needed for muscle spasms    Back muscle spasm       ibuprofen 200 MG capsule     120    1 CAPSULE PRN        lidocaine 5 % Patch    LIDODERM    10 patch    Place 1 patch onto the skin every 24 hours for 10 days        omeprazole 40 MG capsule    priLOSEC    90 capsule    TAKE ONE CAPSULE BY MOUTH EVERY DAY    GERD (gastroesophageal reflux disease)       ondansetron 4 MG ODT tab    ZOFRAN ODT    10 tablet    Take 1 tablet (4 mg) by mouth every 8 hours as needed for nausea        order for DME      Equipment ordered: RESMED Auto PAP Mask type: Full face  Settings: 5-15        order for DME     1 Units    Tennis elbow strap    Lateral epicondylitis of right elbow       pravastatin 20 MG tablet    PRAVACHOL    90 tablet    TAKE ONE TABLET BY MOUTH EVERY DAY    Hyperlipidemia LDL goal <160       sertraline 50 MG tablet    ZOLOFT    90 tablet    Take 1 tablet (50 mg) by mouth daily    Mild episode of recurrent major depressive disorder (H)       testosterone cypionate 200 MG/ML injection    DEPO-TESTOSTERONE    10 mL    Inject 1 mL (200 mg) into the muscle every 14 days    Testosterone insufficiency       traZODone 50 MG tablet    DESYREL    90 tablet    Take  mg at hs    Insomnia, unspecified type

## 2018-08-08 NOTE — PATIENT INSTRUCTIONS
[facility] Procedure Discharge Instructions    Today you saw:   Dr. Yolanda Dillon           Your procedure: Lumbar Epidural steroid injection     Medications used:  Lidocaine (anesthetic)    Kenalog (steroid)  Omnipaque (contrast)             Be cautious when walking as numbness and/or weakness in the legs may occur up to 6-8 hours after the procedure due to effect of the local anesthetic    Do not drive for 6 hours. The effect of the local anesthetic could slow your reflexes.     Avoid strenuous activity for the first 24 hours. You may resume your regular activities after that.     You may shower, however avoid swimming, tub baths or hot tubs for 24 hours following your procedure    You may have a mild to moderate increase in pain for several days following the injection.      You may use ice packs for 10-15 minutes, 3 to 4 times a day at the injection site for comfort    Do not use heat to painful areas for 6 to 8 hours. This will give the local anesthetic time to wear off and prevent you from accidentally burning your skin.    You may use anti-inflammatory medications (such as Ibuprofen/Advil or Aleve) or Tylenol for pain control if necessary    With diabetes, check your blood sugar more frequently than usual as your blood sugar may be higher than normal for 10-14 days following a steroid injection. Contact your doctor who manages your diabetes if your blood sugar is higher than usual    It may take up to 14 days for the steroid medication to start working although you may feel the effect as early as a few days after the procedure.     Follow up with your referring provider in 2-3 weeks      If you experience any of the following, call the pain center line during work hours at [phone] or on-call physician after hours at [phone]:  -Fever over 100 degree F  -Swelling, bleeding, redness, drainage, warmth at the injection site  -Progressive weakness or numbness in your legs or arms  -Loss of bowel  or bladder function  -Unusual headache that is not relieved by Tylenol or your regular headache medication  -Unusual new onset of pain that is not improving    Phone #s:  Nurse triage line for general questions: [phone]

## 2018-08-08 NOTE — NURSING NOTE
Pre-procedure Intake    Have you been fasting? No     If yes, for how long?     Are you taking a prescribed blood thinner such as coumadin, Plavix, Xarelto?    No    If yes, when did you take your last dose?     Do you take aspirin?  No    If cervical procedure, have you held aspirin for 6 days?   No     Do you have any allergies to contrast dye, iodine, steroid and/or numbing medications?  NO    Are you currently taking antibiotics or have an active infection?  NO    Have you had a fever/elevated temperature within the past week? NO    Are you currently taking oral steroids? NO    Do you have a ? Yes       Are you pregnant or breastfeeding?  NO    Are the vital signs normal?  Yes

## 2018-08-08 NOTE — NURSING NOTE
Discharge Information    IV Discontiued Time:  NA    Amount of Fluid Infused:  NA    Discharge Criteria = When patient returns to baseline or as per MD order    Consciousness:  Pt is fully awake    Circulation:  BP +/- 20% of pre-procedure level    Respiration:  Patient is able to breathe deeply    O2 Sat:  Patient is able to maintain O2 Sat >92% on room air    Activity:  Moves 4 extremities on command    Ambulation:  Patient is able to stand and walk or stand and pivot into wheelchair    Dressing:  Clean/dry or No Dressing    Notes:   Discharge instructions and AVS given to patient    Patient meets criteria for discharge?  YES    Admitted to PCU?  No    Responsible adult present to accompany patient home?  Yes    Signature/Title:    Luna Del Rio RN Care Coordinator  [facility]

## 2018-08-13 DIAGNOSIS — F33.0 MILD EPISODE OF RECURRENT MAJOR DEPRESSIVE DISORDER (H): ICD-10-CM

## 2018-08-13 NOTE — TELEPHONE ENCOUNTER
Spoke with pt and he did not request this since it was sent to mail order  Danielle Byrd RN, BSN

## 2018-08-13 NOTE — TELEPHONE ENCOUNTER
"Requested Prescriptions   Pending Prescriptions Disp Refills     sertraline (ZOLOFT) 50 MG tablet [Pharmacy Med Name: SERTRALINE HCL 50 MG TABLET] 30 tablet 1    Last Written Prescription Date:  07/06/2018  Last Fill Quantity: 90 tablet,  # refills: 0   Last office visit: 7/31/2018 with prescribing provider:  07/31/2018   Future Office Visit:     Sig: TAKE 1 TABLET BY MOUTH EVERY DAY    SSRIs Protocol Passed    8/13/2018  1:51 AM       Passed - PHQ-9 score less than 5 in past 6 months    Please review last PHQ-9 score.          Passed - Patient is age 18 or older       Passed - Recent (6 mo) or future (30 days) visit within the authorizing provider's specialty    Patient had office visit in the last 6 months or has a visit in the next 30 days with authorizing provider or within the authorizing provider's specialty.  See \"Patient Info\" tab in inbasket, or \"Choose Columns\" in Meds & Orders section of the refill encounter.              Boaz Cruz XRT  "

## 2018-08-14 NOTE — TELEPHONE ENCOUNTER
Reason for Call:  Form, our goal is to have forms completed with 7 days, however, some forms may require a visit or additional information.    Type of letter, form or note: Short-term disability    Who is the form from?:  [employer]    Where did the form come from: form was faxed in    Phone number of person requesting form: [phone]  Can we leave a detailed message on this number:  YES    Desired completion date of form: 7-10 business days    How will form be returned?:  fax to [phone]    Has the patient signed a consent form for release of information (may be included with form)? YES    Additional comments:     Form was started and place in Provider Basket for provider review/ completion at [facility].    Jorge Vieira ATC

## 2018-08-15 ENCOUNTER — TELEPHONE (OUTPATIENT)
Dept: PALLIATIVE MEDICINE | Facility: CLINIC | Age: 53
End: 2018-08-15

## 2018-08-15 ENCOUNTER — THERAPY VISIT (OUTPATIENT)
Dept: PHYSICAL THERAPY | Facility: CLINIC | Age: 53
End: 2018-08-15
Payer: COMMERCIAL

## 2018-08-15 DIAGNOSIS — M54.59 MECHANICAL LOW BACK PAIN: Primary | ICD-10-CM

## 2018-08-15 PROCEDURE — 97112 NEUROMUSCULAR REEDUCATION: CPT | Mod: GP | Performed by: PHYSICAL THERAPIST

## 2018-08-15 PROCEDURE — 97110 THERAPEUTIC EXERCISES: CPT | Mod: GP | Performed by: PHYSICAL THERAPIST

## 2018-08-15 NOTE — TELEPHONE ENCOUNTER
Patient had a Lumbar L5-S1 interlaminar epidural steroid injection on 8/7/18. Called patient for an update.      Left message that we were calling for an update about how he was doing after the injection.  LM that if he has any problems or questions to call the clinic at [phone].

## 2018-08-16 ENCOUNTER — TELEPHONE (OUTPATIENT)
Dept: ORTHOPEDICS | Facility: CLINIC | Age: 53
End: 2018-08-16

## 2018-08-16 NOTE — TELEPHONE ENCOUNTER
Short-term disability paperwork completed and faxed to number listed below.  A copy was sent to lawrence Vieira ATC

## 2018-08-17 ENCOUNTER — THERAPY VISIT (OUTPATIENT)
Dept: PHYSICAL THERAPY | Facility: CLINIC | Age: 53
End: 2018-08-17
Payer: COMMERCIAL

## 2018-08-17 DIAGNOSIS — M54.59 MECHANICAL LOW BACK PAIN: Primary | ICD-10-CM

## 2018-08-17 PROCEDURE — 97110 THERAPEUTIC EXERCISES: CPT | Mod: GP | Performed by: PHYSICAL THERAPIST

## 2018-08-20 ENCOUNTER — OFFICE VISIT (OUTPATIENT)
Dept: ORTHOPEDICS | Facility: CLINIC | Age: 53
End: 2018-08-20
Payer: COMMERCIAL

## 2018-08-20 VITALS
WEIGHT: 265 LBS | SYSTOLIC BLOOD PRESSURE: 134 MMHG | BODY MASS INDEX: 35.89 KG/M2 | HEIGHT: 72 IN | DIASTOLIC BLOOD PRESSURE: 86 MMHG

## 2018-08-20 DIAGNOSIS — M54.50 ACUTE MIDLINE LOW BACK PAIN WITHOUT SCIATICA: ICD-10-CM

## 2018-08-20 DIAGNOSIS — M51.369 DEGENERATIVE DISC DISEASE, LUMBAR: ICD-10-CM

## 2018-08-20 DIAGNOSIS — M48.061 FORAMINAL STENOSIS OF LUMBAR REGION: ICD-10-CM

## 2018-08-20 DIAGNOSIS — M43.17 SPONDYLOLISTHESIS AT L5-S1 LEVEL: Primary | ICD-10-CM

## 2018-08-20 PROCEDURE — 99213 OFFICE O/P EST LOW 20 MIN: CPT | Performed by: FAMILY MEDICINE

## 2018-08-20 NOTE — LETTER
"    8/20/2018         RE: Galileo Vieira  [address] [place] [address] [address] [place] [address]        Dear Colleague,    Thank you for referring your patient, Galileo Vieira, to the [facility]. Please see a copy of my visit note below.    ASSESSMENT & PLAN    1. Spondylolisthesis at L5-S1 level    2. Degenerative disc disease, lumbar    3. Foraminal stenosis of lumbar region    4. Acute midline low back pain without sciatica      Good improvement after injection and therapy  Continue with therapy (8/23 with Myesha) and home exercise thereafter  Letter for work: ok to return to work, recommend no more than 5 hour shifts this week.  During this no lifting greater than 15 pounds or patient transfers. Thereafter full duty without any restrictions.    Follow up as needed.       -----    SUBJECTIVE:  Galileo Vieira is a 52 year old male who is seen in follow-up for acute low back pain.They were last seen 8/3/18.  Since that time patient has completed a lumbar MRI (results communicated over my chart), 3 sessions of physical therapy and lumbar L5-S1 interlaminar epidural steroid injection 8/8/18.     Since their last visit reports 90% improvement. They indicate that their current pain level is 2/10. Notes that he does have some caution with forward bending and getting out of the car. He has been off work since the last visit.  They have tried rest/activity avoidance, ibuprofen, physical therapy and corticosteroid injection.     The patient is seen by themselves.    Patient's past medical, surgical, social, and family histories were reviewed today and no changes are noted.    REVIEW OF SYSTEMS:  Constitutional: NEGATIVE for fever, chills, change in weight  Skin: NEGATIVE for worrisome rashes, moles or lesions  GI/: NEGATIVE for bowel or bladder changes  Neuro: NEGATIVE for weakness, dizziness or paresthesias    OBJECTIVE:  /86  Ht 5' 11.75\" (1.822 m)  Wt 265 lb (120.2 kg)  BMI 36.19 kg/m2   General: " healthy, alert and in no distress  HEENT: no scleral icterus or conjunctival erythema  Skin: no suspicious lesions or rash. No jaundice.  CV: no pedal edema  Resp: normal respiratory effort without conversational dyspnea   Psych: normal mood and affect  Gait: slow to rise but much improved from previous. Able to slowly forward flex at the hips. Appropriate coordination and balance  MSK:  Deferred    Independent visualization of the below image:  MRI OF THE LUMBAR SPINE WITHOUT CONTRAST 8/3/2018 4:33 PM      COMPARISON: Lumbar spine x-ray series 8/2/2018.     HISTORY: Acute/chronic low back, axial pain, pars defect, access for  disc herniation/central stenosis. Acute midline low back pain without  sciatica.     TECHNIQUE: Multiplanar, multisequence MRI images of the lumbar spine  were acquired without IV contrast.     FINDINGS: There are five lumbar-type vertebrae for the purposes of  this dictation.      There are chronic appearing bilateral L5 pars interarticularis  defects. There is spondylitic grade 1 anterolisthesis of L5 upon S1.  Alignment of the lumbar vertebrae is otherwise normal. Vertebral body  heights of the lumbar spine are normal. Marrow signal throughout the  lumbar vertebrae is normal. There is no evidence for fracture or  pathologic bony lesion of the lumbar spine. There is a Schmorl's node  deformity in the superior endplate of L1.     There is uncovering and circumferential bulging of the L5-S1 disc. The  remaining lumbar intervertebral disks are within normal limits in  height, contour and signal intensity.     The tip of the conus medullaris is at the mid L2 level which is within  normal limits. There is no evidence for intrathecal abnormality.      Level by level:   L1-L2: There is no spinal canal or neural foraminal stenosis at this  level.     L2-L3: There is no spinal canal or neural foraminal stenosis at this  level.     L3-L4: There is no spinal canal or neural foraminal stenosis at  this  level.     L4-L5: There is mild facet arthropathy bilaterally. There is no spinal  canal or neural foraminal stenosis at this level.     L5-S1: There is circumferential disc bulging and moderate facet  arthropathy bilaterally. These findings result in moderate-severe  right foraminal stenosis and mild left foraminal narrowing but no  spinal canal stenosis.         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     KALPANA BECK MD    Patient's conditions were thoroughly discussed during today's visit with greater than 50% of the visit spent counseling the patient with total time spent face-to-face with the patient being 20 minutes.    Sweta Pete, DO [facility]        Again, thank you for allowing me to participate in the care of your patient.        Sincerely,        Sweta Pete DO

## 2018-08-20 NOTE — PROGRESS NOTES
"ASSESSMENT & PLAN    1. Spondylolisthesis at L5-S1 level    2. Degenerative disc disease, lumbar    3. Foraminal stenosis of lumbar region    4. Acute midline low back pain without sciatica      Good improvement after injection and therapy  Continue with therapy (8/23 with Myesha) and home exercise thereafter  Letter for work: ok to return to work, recommend no more than 5 hour shifts this week.  During this no lifting greater than 15 pounds or patient transfers. Thereafter full duty without any restrictions.    Follow up as needed.       -----    SUBJECTIVE:  Galileo Vieira is a 52 year old male who is seen in follow-up for acute low back pain.They were last seen 8/3/18.  Since that time patient has completed a lumbar MRI (results communicated over my chart), 3 sessions of physical therapy and lumbar L5-S1 interlaminar epidural steroid injection 8/8/18.     Since their last visit reports 90% improvement. They indicate that their current pain level is 2/10. Notes that he does have some caution with forward bending and getting out of the car. He has been off work since the last visit.  They have tried rest/activity avoidance, ibuprofen, physical therapy and corticosteroid injection.     The patient is seen by themselves.    Patient's past medical, surgical, social, and family histories were reviewed today and no changes are noted.    REVIEW OF SYSTEMS:  Constitutional: NEGATIVE for fever, chills, change in weight  Skin: NEGATIVE for worrisome rashes, moles or lesions  GI/: NEGATIVE for bowel or bladder changes  Neuro: NEGATIVE for weakness, dizziness or paresthesias    OBJECTIVE:  /86  Ht 5' 11.75\" (1.822 m)  Wt 265 lb (120.2 kg)  BMI 36.19 kg/m2   General: healthy, alert and in no distress  HEENT: no scleral icterus or conjunctival erythema  Skin: no suspicious lesions or rash. No jaundice.  CV: no pedal edema  Resp: normal respiratory effort without conversational dyspnea   Psych: normal mood and " affect  Gait: slow to rise but much improved from previous. Able to slowly forward flex at the hips. Appropriate coordination and balance  MSK:  Deferred    Independent visualization of the below image:  MRI OF THE LUMBAR SPINE WITHOUT CONTRAST 8/3/2018 4:33 PM      COMPARISON: Lumbar spine x-ray series 8/2/2018.     HISTORY: Acute/chronic low back, axial pain, pars defect, access for  disc herniation/central stenosis. Acute midline low back pain without  sciatica.     TECHNIQUE: Multiplanar, multisequence MRI images of the lumbar spine  were acquired without IV contrast.     FINDINGS: There are five lumbar-type vertebrae for the purposes of  this dictation.      There are chronic appearing bilateral L5 pars interarticularis  defects. There is spondylitic grade 1 anterolisthesis of L5 upon S1.  Alignment of the lumbar vertebrae is otherwise normal. Vertebral body  heights of the lumbar spine are normal. Marrow signal throughout the  lumbar vertebrae is normal. There is no evidence for fracture or  pathologic bony lesion of the lumbar spine. There is a Schmorl's node  deformity in the superior endplate of L1.     There is uncovering and circumferential bulging of the L5-S1 disc. The  remaining lumbar intervertebral disks are within normal limits in  height, contour and signal intensity.     The tip of the conus medullaris is at the mid L2 level which is within  normal limits. There is no evidence for intrathecal abnormality.      Level by level:   L1-L2: There is no spinal canal or neural foraminal stenosis at this  level.     L2-L3: There is no spinal canal or neural foraminal stenosis at this  level.     L3-L4: There is no spinal canal or neural foraminal stenosis at this  level.     L4-L5: There is mild facet arthropathy bilaterally. There is no spinal  canal or neural foraminal stenosis at this level.     L5-S1: There is circumferential disc bulging and moderate facet  arthropathy bilaterally. These findings  result in moderate-severe  right foraminal stenosis and mild left foraminal narrowing but no  spinal canal stenosis.         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     KALPANA BECK MD    Patient's conditions were thoroughly discussed during today's visit with greater than 50% of the visit spent counseling the patient with total time spent face-to-face with the patient being 20 minutes.    Sweta Pete DO [facility]

## 2018-08-20 NOTE — LETTER
August 20, 2018      Galileo Vieira  [address] [place] [address] [address] [facility] [address]        To Whom It May Concern:    Galileo Vieira was seen in our clinic. He may return to work with the following: limited to light duty -okay to return to work, recommend no more than 5 hour shifts for the remainder of this week.  During this time no lifting greater than 15 pounds and no patient transfers.  Able to resume full work duty starting 8-.    Please contact my office with any questions or concerns      Sincerely,        Sweta Pete, DO

## 2018-08-20 NOTE — MR AVS SNAPSHOT
After Visit Summary   8/20/2018    Galileo Vieira    MRN: [id number]           Patient Information     Date Of Birth          1965        Visit Information        Provider Department      8/20/2018 1:20 PM Sweta Pete,  [facility]        Today's Diagnoses     Spondylolisthesis at L5-S1 level    -  1    Degenerative disc disease, lumbar        Foraminal stenosis of lumbar region        Acute midline low back pain without sciatica          Care Instructions    1. Spondylolisthesis at L5-S1 level    2. Degenerative disc disease, lumbar    3. Foraminal stenosis of lumbar region    4. Acute midline low back pain without sciatica      Good improvement after injection and therapy  Continue with therapy (8/23 with Myesha) and home exercise thereafter  Letter for work: ok to return to work, recommend no more than 5 hour shifts this week.  During this no lifting greater than 15 pounds or patient transfers. Thereafter full duty without any restrictions.    Follow up as needed.       Official Chapter of Walk with a Doc  Come walk with me the first Saturday of every month at 1pm. Meet in the lobby.              Follow-ups after your visit        Your next 10 appointments already scheduled     Aug 21, 2018 10:20 AM CDT   ANNEMARIE Spine with Ascencion RIZO PT ([facility]  )    [address] [facility]  [address] [place] [address]   [phone]            Aug 23, 2018  2:10 PM CDT   ANNEMARIE Spine with DIANE Alicea PT ([facility]  )    [address] [facility]  [address] [place] [address]   [phone]              Who to contact     If you have questions or need follow up information about today's clinic visit or your schedule please contact [facility] directly at [phone].  Normal or non-critical lab and imaging results will be communicated to you by MyChart, letter or phone within 4 business days after the clinic has  "received the results. If you do not hear from us within 7 days, please contact the clinic through [employer] or phone. If you have a critical or abnormal lab result, we will notify you by phone as soon as possible.  Submit refill requests through [employer] or call your pharmacy and they will forward the refill request to us. Please allow 3 business days for your refill to be completed.          Additional Information About Your Visit        [employer] Information     [employer] gives you secure access to your electronic health record. If you see a primary care provider, you can also send messages to your care team and make appointments. If you have questions, please call your primary care clinic.  If you do not have a primary care provider, please call [phone] and they will assist you.        Care EveryWhere ID     This is your Care EveryWhere ID. This could be used by other organizations to access your [place] medical records  [id number]        Your Vitals Were     Height BMI (Body Mass Index)                5' 11.75\" (1.822 m) 36.19 kg/m2           Blood Pressure from Last 3 Encounters:   08/20/18 134/86   08/08/18 (!) 140/95   08/03/18 122/80    Weight from Last 3 Encounters:   08/20/18 265 lb (120.2 kg)   08/03/18 265 lb (120.2 kg)   06/13/18 265 lb (120.2 kg)              Today, you had the following     No orders found for display       Primary Care Provider Office Phone # Fax #    Ramona Ann Aaseby-Aguilera, PA-C [phone] [address]  [facility] [address]        Equal Access to Services     [facility]: Hadii aad ku hadasho Soomaali, waaxda luqadaha, qaybta kaalmada adeegyada, radu kilgore . So [facility] [phone].    ATENCIÓN: Si habla español, tiene a mahmood disposición servicios gratuitos de asistencia lingüística. Llame al [phone].    We comply with applicable federal civil rights laws and [place] laws. We do not discriminate on the basis of race, " color, national origin, age, disability, sex, sexual orientation, or gender identity.            Thank you!     Thank you for choosing [facility]  for your care. Our goal is always to provide you with excellent care. Hearing back from our patients is one way we can continue to improve our services. Please take a few minutes to complete the written survey that you may receive in the mail after your visit with us. Thank you!             Your Updated Medication List - Protect others around you: Learn how to safely use, store and throw away your medicines at www.disposemymeds.org.          This list is accurate as of 8/20/18  1:48 PM.  Always use your most recent med list.                   Brand Name Dispense Instructions for use Diagnosis    celecoxib 100 MG capsule    celeBREX    60 capsule    Take 1 capsule (100 mg) by mouth 2 times daily as needed for moderate pain    Sprain of medial collateral ligament of right knee, initial encounter       cyclobenzaprine 5 MG tablet    FLEXERIL    42 tablet    Take 1-2 tablets (5-10 mg) by mouth 3 times daily as needed for muscle spasms    Back muscle spasm       ibuprofen 200 MG capsule     120    1 CAPSULE PRN        omeprazole 40 MG capsule    priLOSEC    90 capsule    TAKE ONE CAPSULE BY MOUTH EVERY DAY    GERD (gastroesophageal reflux disease)       ondansetron 4 MG ODT tab    ZOFRAN ODT    10 tablet    Take 1 tablet (4 mg) by mouth every 8 hours as needed for nausea        order for DME      Equipment ordered: RESMED Auto PAP Mask type: Full face  Settings: 5-15        order for DME     1 Units    Tennis elbow strap    Lateral epicondylitis of right elbow       pravastatin 20 MG tablet    PRAVACHOL    90 tablet    TAKE ONE TABLET BY MOUTH EVERY DAY    Hyperlipidemia LDL goal <160       sertraline 50 MG tablet    ZOLOFT    90 tablet    Take 1 tablet (50 mg) by mouth daily    Mild episode of recurrent major depressive disorder (H)       testosterone  cypionate 200 MG/ML injection    DEPO-TESTOSTERONE    10 mL    Inject 1 mL (200 mg) into the muscle every 14 days    Testosterone insufficiency       traZODone 50 MG tablet    DESYREL    90 tablet    Take  mg at hs    Insomnia, unspecified type

## 2018-08-20 NOTE — TELEPHONE ENCOUNTER
Received [facility] paperwork.  Forms completed and faxed to [facility] [phone]    Form submitted for scanning    Jorge Vieira ATC

## 2018-08-20 NOTE — PATIENT INSTRUCTIONS
1. Spondylolisthesis at L5-S1 level    2. Degenerative disc disease, lumbar    3. Foraminal stenosis of lumbar region    4. Acute midline low back pain without sciatica      Good improvement after injection and therapy  Continue with therapy (8/23 with Myesha) and home exercise thereafter  Letter for work: ok to return to work, recommend no more than 5 hour shifts this week.  During this no lifting greater than 15 pounds or patient transfers. Thereafter full duty without any restrictions.    Follow up as needed.       Official Chapter of Walk with a Doc  Come walk with me the first Saturday of every month at 1pm. Meet in the lobby.

## 2018-08-23 ENCOUNTER — THERAPY VISIT (OUTPATIENT)
Dept: PHYSICAL THERAPY | Facility: CLINIC | Age: 53
End: 2018-08-23
Payer: COMMERCIAL

## 2018-08-23 DIAGNOSIS — M54.59 MECHANICAL LOW BACK PAIN: ICD-10-CM

## 2018-08-23 PROCEDURE — 97110 THERAPEUTIC EXERCISES: CPT | Mod: GP | Performed by: PHYSICAL THERAPIST

## 2018-08-23 PROCEDURE — 97112 NEUROMUSCULAR REEDUCATION: CPT | Mod: GP | Performed by: PHYSICAL THERAPIST

## 2018-09-04 DIAGNOSIS — F33.0 MILD EPISODE OF RECURRENT MAJOR DEPRESSIVE DISORDER (H): ICD-10-CM

## 2018-09-04 NOTE — TELEPHONE ENCOUNTER
PHQ-9 SCORE 12/13/2017 4/11/2018 7/6/2018   Total Score - - -   Total Score MyChart - 7 (Mild depression) -   Total Score 0 7 3     Sertraline  Last Written Prescription Date:  7/6/18  Last Fill Quantity: 90,  # refills: 0   Last office visit: 7/31/2018 with prescribing provider:  Panchito   Future Office Visit:        Routing refill request to provider for review/approval because:  Drug interaction warning    Danielle Byrd RN, BSN

## 2018-09-28 ENCOUNTER — OFFICE VISIT (OUTPATIENT)
Dept: FAMILY MEDICINE | Facility: CLINIC | Age: 53
End: 2018-09-28
Payer: COMMERCIAL

## 2018-09-28 VITALS
HEART RATE: 89 BPM | SYSTOLIC BLOOD PRESSURE: 138 MMHG | OXYGEN SATURATION: 99 % | BODY MASS INDEX: 35.76 KG/M2 | HEIGHT: 72 IN | TEMPERATURE: 98 F | DIASTOLIC BLOOD PRESSURE: 80 MMHG | WEIGHT: 264 LBS

## 2018-09-28 DIAGNOSIS — H90.8 MIXED CONDUCTIVE AND SENSORINEURAL HEARING LOSS, UNSPECIFIED LATERALITY: ICD-10-CM

## 2018-09-28 DIAGNOSIS — E78.5 HYPERLIPIDEMIA LDL GOAL <160: ICD-10-CM

## 2018-09-28 DIAGNOSIS — R73.09 ELEVATED GLUCOSE: ICD-10-CM

## 2018-09-28 DIAGNOSIS — E34.9 TESTOSTERONE INSUFFICIENCY: Primary | ICD-10-CM

## 2018-09-28 DIAGNOSIS — E66.01 MORBID OBESITY (H): ICD-10-CM

## 2018-09-28 DIAGNOSIS — K21.9 GASTROESOPHAGEAL REFLUX DISEASE WITHOUT ESOPHAGITIS: ICD-10-CM

## 2018-09-28 DIAGNOSIS — F33.0 MILD EPISODE OF RECURRENT MAJOR DEPRESSIVE DISORDER (H): ICD-10-CM

## 2018-09-28 DIAGNOSIS — Z13.0 SCREENING FOR DISORDER OF BLOOD AND BLOOD-FORMING ORGANS: ICD-10-CM

## 2018-09-28 DIAGNOSIS — Z00.00 ROUTINE GENERAL MEDICAL EXAMINATION AT A HEALTH CARE FACILITY: ICD-10-CM

## 2018-09-28 DIAGNOSIS — Z13.29 SCREENING FOR THYROID DISORDER: ICD-10-CM

## 2018-09-28 PROCEDURE — 99396 PREV VISIT EST AGE 40-64: CPT | Performed by: PHYSICIAN ASSISTANT

## 2018-09-28 RX ORDER — PRAVASTATIN SODIUM 20 MG
20 TABLET ORAL DAILY
Qty: 90 TABLET | Refills: 3 | Status: SHIPPED | OUTPATIENT
Start: 2018-09-28 | End: 2019-10-11

## 2018-09-28 RX ORDER — OMEPRAZOLE 40 MG/1
40 CAPSULE, DELAYED RELEASE ORAL DAILY
Qty: 90 CAPSULE | Refills: 4 | Status: SHIPPED | OUTPATIENT
Start: 2018-09-28 | End: 2020-01-13

## 2018-09-28 NOTE — MR AVS SNAPSHOT
After Visit Summary   9/28/2018    Galileo Vieira    MRN: [id number]           Patient Information     Date Of Birth          1965        Visit Information        Provider Department      9/28/2018 2:00 PM Aaseby-Aguilera, Ramona Ann, PA-C [facility]        Today's Diagnoses     Testosterone insufficiency    -  1    Routine general medical examination at a health care facility        Hyperlipidemia LDL goal <160        Mild episode of recurrent major depressive disorder (H)        Morbid obesity (H)        Elevated glucose        Screening for disorder of blood and blood-forming organs        Screening for thyroid disorder        Gastroesophageal reflux disease without esophagitis        Mixed conductive and sensorineural hearing loss, unspecified laterality          Care Instructions      Preventive Health Recommendations  Male Ages 50 - 64    Yearly exam:             See your health care provider every year in order to  o   Review health changes.   o   Discuss preventive care.    o   Review your medicines if your doctor has prescribed any.     Have a cholesterol test every 5 years, or more frequently if you are at risk for high cholesterol/heart disease.     Have a diabetes test (fasting glucose) every three years. If you are at risk for diabetes, you should have this test more often.     Have a colonoscopy at age 50, or have a yearly FIT test (stool test). These exams will check for colon cancer.      Talk with your health care provider about whether or not a prostate cancer screening test (PSA) is right for you.    You should be tested each year for STDs (sexually transmitted diseases), if you re at risk.     Shots: Get a flu shot each year. Get a tetanus shot every 10 years.     Nutrition:    Eat at least 5 servings of fruits and vegetables daily.     Eat whole-grain bread, whole-wheat pasta and brown rice instead of white grains and rice.     Get adequate Calcium and Vitamin  D.     Lifestyle    Exercise for at least 150 minutes a week (30 minutes a day, 5 days a week). This will help you control your weight and prevent disease.     Limit alcohol to one drink per day.     No smoking.     Wear sunscreen to prevent skin cancer.     See your dentist every six months for an exam and cleaning.     See your eye doctor every 1 to 2 years.            Follow-ups after your visit        Additional Services     OTOLARYNGOLOGY REFERRAL       Your provider has referred you to: [facility]: [facility] [phone]   [email or web address]    Please be aware that coverage of these services is subject to the terms and limitations of your health insurance plan.  Call member services at your health plan with any benefit or coverage questions.      Please bring the following with you to your appointment:    (1) Any X-Rays, CTs or MRIs which have been performed.  Contact the facility where they were done to arrange for  prior to your scheduled appointment.   (2) List of current medications  (3) This referral request   (4) Any documents/labs given to you for this referral                  Follow-up notes from your care team     Return in about 6 months (around 3/28/2019) for Routine Visit.      Future tests that were ordered for you today     Open Future Orders        Priority Expected Expires Ordered    CBC with platelets Routine  12/28/2018 9/28/2018    TSH with free T4 reflex Routine  12/28/2018 9/28/2018    Comprehensive metabolic panel Routine  12/28/2018 9/28/2018    Lipid panel reflex to direct LDL Fasting Routine  12/28/2018 9/28/2018    Testosterone Free and Total Routine  12/28/2018 9/28/2018            Who to contact     If you have questions or need follow up information about today's clinic visit or your schedule please contact [facility] directly at [phone].  Normal or non-critical lab and  "imaging results will be communicated to you by MyChart, letter or phone within 4 business days after the clinic has received the results. If you do not hear from us within 7 days, please contact the clinic through [employer] or phone. If you have a critical or abnormal lab result, we will notify you by phone as soon as possible.  Submit refill requests through [employer] or call your pharmacy and they will forward the refill request to us. Please allow 3 business days for your refill to be completed.          Additional Information About Your Visit        [employer] Information     [employer] gives you secure access to your electronic health record. If you see a primary care provider, you can also send messages to your care team and make appointments. If you have questions, please call your primary care clinic.  If you do not have a primary care provider, please call [phone] and they will assist you.        Care EveryWhere ID     This is your Care EveryWhere ID. This could be used by other organizations to access your [place] medical records  [id number]        Your Vitals Were     Pulse Temperature Height Pulse Oximetry BMI (Body Mass Index)       89 98  F (36.7  C) (Oral) 5' 11.5\" (1.816 m) 99% 36.31 kg/m2        Blood Pressure from Last 3 Encounters:   09/28/18 138/80   08/20/18 134/86   08/08/18 (!) 140/95    Weight from Last 3 Encounters:   09/28/18 264 lb (119.7 kg)   08/20/18 265 lb (120.2 kg)   08/03/18 265 lb (120.2 kg)              We Performed the Following     OTOLARYNGOLOGY REFERRAL          Today's Medication Changes          These changes are accurate as of 9/28/18  2:45 PM.  If you have any questions, ask your nurse or doctor.               These medicines have changed or have updated prescriptions.        Dose/Directions    omeprazole 40 MG capsule   Commonly known as:  priLOSEC   This may have changed:  See the new instructions.   Used for:  Gastroesophageal reflux disease without esophagitis   Changed " by:  Aaseby-Aguilera, Ramona Ann, PA-C        Dose:  40 mg   Take 1 capsule (40 mg) by mouth daily   Quantity:  90 capsule   Refills:  4       pravastatin 20 MG tablet   Commonly known as:  PRAVACHOL   This may have changed:  See the new instructions.   Used for:  Hyperlipidemia LDL goal <160   Changed by:  Aaseby-Aguilera, Ramona Ann, PA-C        Dose:  20 mg   Take 1 tablet (20 mg) by mouth daily   Quantity:  90 tablet   Refills:  3         Stop taking these medicines if you haven't already. Please contact your care team if you have questions.     celecoxib 100 MG capsule   Commonly known as:  celeBREX   Stopped by:  Aaseby-Aguilera, Ramona Ann, PA-C           cyclobenzaprine 5 MG tablet   Commonly known as:  FLEXERIL   Stopped by:  Aaseby-Aguilera, Ramona Ann, PA-C           ondansetron 4 MG ODT tab   Commonly known as:  ZOFRAN ODT   Stopped by:  Aaseby-Aguilera, Ramona Ann, PA-C           order for DME   Stopped by:  Aaseby-Aguilera, Ramona Ann, PA-C           order for DME   Stopped by:  Aaseby-Aguilera, Ramona Ann, PA-C                Where to get your medicines      These medications were sent to [facility] [id number] [address] [address]    Hours:  Tech issues with their phone system Phone:  [phone]     omeprazole 40 MG capsule    pravastatin 20 MG tablet    sertraline 50 MG tablet                Primary Care Provider Office Phone # Fax #    Ramona Ann Aaseby-Aguilera, PA-C [phone] [address] [facility] [address]        Equal Access to Services     MIRA GOODE AH: Hadii rubén blunto Sozoe, waaxda luqadaha, qaybta kaalmada adeegyada, radu parra. So [facility] [phone].    ATENCIÓN: Si habla español, tiene a mahmood disposición servicios gratuitos de asistencia lingüística. Llame al [phone].    We comply with applicable federal civil rights laws and [place] laws. We do not discriminate  on the basis of race, color, national origin, age, disability, sex, sexual orientation, or gender identity.            Thank you!     Thank you for choosing [facility]  for your care. Our goal is always to provide you with excellent care. Hearing back from our patients is one way we can continue to improve our services. Please take a few minutes to complete the written survey that you may receive in the mail after your visit with us. Thank you!             Your Updated Medication List - Protect others around you: Learn how to safely use, store and throw away your medicines at www.disposemymeds.org.          This list is accurate as of 9/28/18  2:45 PM.  Always use your most recent med list.                   Brand Name Dispense Instructions for use Diagnosis    ibuprofen 200 MG capsule     120    1 CAPSULE PRN        omeprazole 40 MG capsule    priLOSEC    90 capsule    Take 1 capsule (40 mg) by mouth daily    Gastroesophageal reflux disease without esophagitis       pravastatin 20 MG tablet    PRAVACHOL    90 tablet    Take 1 tablet (20 mg) by mouth daily    Hyperlipidemia LDL goal <160       sertraline 50 MG tablet    ZOLOFT    90 tablet    Take 1 tablet (50 mg) by mouth daily    Mild episode of recurrent major depressive disorder (H)       testosterone cypionate 200 MG/ML injection    DEPO-TESTOSTERONE    10 mL    Inject 1 mL (200 mg) into the muscle every 14 days    Testosterone insufficiency       traZODone 50 MG tablet    DESYREL    90 tablet    Take  mg at hs    Insomnia, unspecified type
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
## 2018-10-29 ENCOUNTER — TRANSFERRED RECORDS (OUTPATIENT)
Dept: HEALTH INFORMATION MANAGEMENT | Facility: CLINIC | Age: 53
End: 2018-10-29

## 2018-12-04 DIAGNOSIS — G47.33 MILD OBSTRUCTIVE SLEEP APNEA: Primary | ICD-10-CM

## 2018-12-26 ENCOUNTER — TELEPHONE (OUTPATIENT)
Dept: FAMILY MEDICINE | Facility: CLINIC | Age: 53
End: 2018-12-26

## 2018-12-26 DIAGNOSIS — K21.9 GASTROESOPHAGEAL REFLUX DISEASE, ESOPHAGITIS PRESENCE NOT SPECIFIED: Primary | ICD-10-CM

## 2018-12-26 NOTE — TELEPHONE ENCOUNTER
Pt states that the omeprazole he has been on for years is not really helping any longer.  He has tried OTC Zantac which does help some but wondering if he can get a script for the zantac 300 mg    Please advise    Danielle Byrd RN, BSN

## 2019-02-28 DIAGNOSIS — E34.9 TESTOSTERONE INSUFFICIENCY: ICD-10-CM

## 2019-02-28 RX ORDER — TESTOSTERONE CYPIONATE 200 MG/ML
200 INJECTION, SOLUTION INTRAMUSCULAR
Qty: 10 ML | Refills: 1 | Status: SHIPPED | OUTPATIENT
Start: 2019-02-28 | End: 2019-12-20

## 2019-02-28 NOTE — TELEPHONE ENCOUNTER
RX [place] ([place]) reviewed:  reviewed- no concerns    [place] profile:  [email or web address]

## 2019-04-26 ENCOUNTER — OFFICE VISIT (OUTPATIENT)
Dept: FAMILY MEDICINE | Facility: CLINIC | Age: 54
End: 2019-04-26
Payer: COMMERCIAL

## 2019-04-26 VITALS
BODY MASS INDEX: 35.89 KG/M2 | HEART RATE: 92 BPM | DIASTOLIC BLOOD PRESSURE: 88 MMHG | SYSTOLIC BLOOD PRESSURE: 138 MMHG | TEMPERATURE: 98.6 F | OXYGEN SATURATION: 97 % | HEIGHT: 72 IN | WEIGHT: 265 LBS

## 2019-04-26 DIAGNOSIS — M25.561 RIGHT KNEE PAIN, UNSPECIFIED CHRONICITY: Primary | ICD-10-CM

## 2019-04-26 PROCEDURE — 20610 DRAIN/INJ JOINT/BURSA W/O US: CPT | Mod: RT | Performed by: FAMILY MEDICINE

## 2019-04-26 RX ORDER — TRIAMCINOLONE ACETONIDE 40 MG/ML
40 INJECTION, SUSPENSION INTRA-ARTICULAR; INTRAMUSCULAR ONCE
Status: DISCONTINUED | OUTPATIENT
Start: 2019-04-26 | End: 2019-11-08

## 2019-04-26 RX ORDER — LIDOCAINE HYDROCHLORIDE 10 MG/ML
4 INJECTION, SOLUTION INFILTRATION; PERINEURAL ONCE
Status: DISCONTINUED | OUTPATIENT
Start: 2019-04-26 | End: 2021-03-08

## 2019-04-26 ASSESSMENT — MIFFLIN-ST. JEOR: SCORE: 2082.09

## 2019-04-26 NOTE — PROGRESS NOTES
PROCEDURE:  JOINT ASPIRATION/INJECTION.         After a discussion of risks, benefits and side effects of procedure, informed patient consent was obtained.       The right knee was prepped and draped in the usual clean fashion (sterile not required for this procedure).  Ethyl chloride was used for local analgesia.    ASPIRATION: Not performed.     INJECTION:  Using 4 cc of 1% lidocaine mixed                           with 40 mg of kenalog, the right knee was successfully injected                           without complication.  Patient did experience some pain                          relief following injection.    Rut Flanagan MD  [facility]

## 2019-04-26 NOTE — PROGRESS NOTES
SUBJECTIVE:   Galileo Vieira is a 53 year old male who presents to clinic today for the following   health issues:      Right knee injection    Would like repeat steroid injection. Last was 11 months ago, worked well.     Would like to hold off on advanced imaging at this point, maybe pursue in the future when he becomes more active.       Additional history: as documented    Reviewed  and updated as needed this visit by clinical staff  Tobacco  Allergies  Meds  Med Hx  Surg Hx  Fam Hx  Soc Hx        Reviewed and updated as needed this visit by Provider         Patient Active Problem List   Diagnosis     Hyperlipidemia LDL goal <160     GERD (gastroesophageal reflux disease)     Mild major depression (H)     Anxiety     Testosterone insufficiency     Fear of flying     Mild obstructive sleep apnea     Hypogonadism male     Moderate episode of recurrent major depressive disorder (H)     Mechanical low back pain     Obesity (BMI 35.0-39.9) with comorbidity (H)     Past Surgical History:   Procedure Laterality Date     C NONSPECIFIC PROCEDURE      Left knee arthroscopy     C NONSPECIFIC PROCEDURE      Bilat inguinal hernia surgery     C NONSPECIFIC PROCEDURE      Vasectomy     CHOLECYSTECTOMY       COLONOSCOPY       COLONOSCOPY  2014    Procedure: COLONOSCOPY;  Surgeon: Syed Walker MD;  Location:  GI     GI SURGERY      Gall bladder     HERNIA REPAIR         Social History     Tobacco Use     Smoking status: Former Smoker     Last attempt to quit: 10/3/1997     Years since quittin.5     Smokeless tobacco: Never Used     Tobacco comment:    Substance Use Topics     Alcohol use: Yes     Alcohol/week: 10.0 oz     Types: 20 Standard drinks or equivalent per week     Comment: 3-4 daily     Family History   Problem Relation Age of Onset     Gastrointestinal Disease Father         Crohn's     Cardiovascular Father      Depression Father      Heart Disease Father 66        triple bipass  "    Neurologic Disorder Father         seizures     Respiratory Father         sleep apnea     C.A.D. Father         Had CABG     Cerebrovascular Disease Father      Lipids Brother      Respiratory Brother      Family History Negative Mother      Lipids Brother      Depression Paternal Grandmother      Depression Other      Cancer - colorectal Other         Maternal Uncle  at 56     Diabetes No family hx of      Cancer - colorectal Maternal Uncle            ROS:  Constitutional, HEENT, cardiovascular, pulmonary, gi and gu systems are negative, except as otherwise noted.    OBJECTIVE:     /88 (BP Location: Right arm, Patient Position: Chair, Cuff Size: Adult Large)   Pulse 92   Temp 98.6  F (37  C) (Oral)   Ht 1.816 m (5' 11.5\")   Wt 120.2 kg (265 lb)   SpO2 97%   BMI 36.44 kg/m    Body mass index is 36.44 kg/m .  GENERAL: healthy, alert and no distress  MS: no gross musculoskeletal defects noted, no edema    Diagnostic Test Results:  none     ASSESSMENT/PLAN:     1. Right knee pain, unspecified chronicity  - Large Joint/Bursa injection and/or drainage (Shoulder, Knee)  - triamcinolone (KENALOG-40) injection 40 mg  - lidocaine 1 % injection 4 mL      Rut Flanagan MD  [facility]        "

## 2019-05-07 ENCOUNTER — TELEPHONE (OUTPATIENT)
Dept: FAMILY MEDICINE | Facility: CLINIC | Age: 54
End: 2019-05-07

## 2019-05-07 DIAGNOSIS — F33.0 MILD EPISODE OF RECURRENT MAJOR DEPRESSIVE DISORDER (H): ICD-10-CM

## 2019-05-07 ASSESSMENT — ANXIETY QUESTIONNAIRES
1. FEELING NERVOUS, ANXIOUS, OR ON EDGE: NEARLY EVERY DAY
IF YOU CHECKED OFF ANY PROBLEMS ON THIS QUESTIONNAIRE, HOW DIFFICULT HAVE THESE PROBLEMS MADE IT FOR YOU TO DO YOUR WORK, TAKE CARE OF THINGS AT HOME, OR GET ALONG WITH OTHER PEOPLE: VERY DIFFICULT
2. NOT BEING ABLE TO STOP OR CONTROL WORRYING: NEARLY EVERY DAY
5. BEING SO RESTLESS THAT IT IS HARD TO SIT STILL: MORE THAN HALF THE DAYS
6. BECOMING EASILY ANNOYED OR IRRITABLE: NEARLY EVERY DAY
7. FEELING AFRAID AS IF SOMETHING AWFUL MIGHT HAPPEN: NEARLY EVERY DAY
GAD7 TOTAL SCORE: 20
3. WORRYING TOO MUCH ABOUT DIFFERENT THINGS: NEARLY EVERY DAY

## 2019-05-07 ASSESSMENT — PATIENT HEALTH QUESTIONNAIRE - PHQ9
SUM OF ALL RESPONSES TO PHQ QUESTIONS 1-9: 13
5. POOR APPETITE OR OVEREATING: NEARLY EVERY DAY

## 2019-05-07 NOTE — TELEPHONE ENCOUNTER
"Pt had stopped sertraline about 1-2 months ago \"I weaned myself off because of weight gain and I was doing well\"     He is now having increased stressors and feeling more anxiety   \"I feel like I could just snap at any moment\"     He would like to resume medication and his PCP is out for the week.       OK to resume sertraline at 50 mg?      PHQ and TODD updated - denies having plan for suicide and states he has good family support     Per provider below ok to resume at previous dose and f/u with PCP in 4-6 weeks - Evisit or phone visit is ok     RX sent     Cecilia Nathan RN    Message handled by Nurse Triage with [facility] - provider name: Garrett Varela MD.  "

## 2019-05-08 ASSESSMENT — ANXIETY QUESTIONNAIRES: GAD7 TOTAL SCORE: 20

## 2019-07-02 ENCOUNTER — TELEPHONE (OUTPATIENT)
Dept: FAMILY MEDICINE | Facility: CLINIC | Age: 54
End: 2019-07-02

## 2019-07-02 NOTE — TELEPHONE ENCOUNTER
Prior Authorization Retail Medication Request    Medication/Dose: Testosterone 200mg/ml   ICD code (if different than what is on RX):    Previously Tried and Failed:    Rationale:      Insurance Name:  [facility]  Insurance ID: [id number]      Pharmacy Information (if different than what is on RX)  Name:  [facility]  Phone:  [phone]

## 2019-07-06 DIAGNOSIS — K21.9 GASTROESOPHAGEAL REFLUX DISEASE, ESOPHAGITIS PRESENCE NOT SPECIFIED: ICD-10-CM

## 2019-07-06 NOTE — TELEPHONE ENCOUNTER
"Requested Prescriptions   Pending Prescriptions Disp Refills     ranitidine (ZANTAC) 300 MG tablet [Pharmacy Med Name: RANITIDINE 300 MG TABLET]  Last Written Prescription Date:  12/27/2018  Last Fill Quantity: 90 tablet,  # refills: 1   Last office visit: 4/26/2019 with prescribing provider:  Panchito   Future Office Visit:     30 tablet 5     Sig: TAKE 1 TABLET BY MOUTH AT BEDTIME       H2 Blockers Protocol Passed - 7/6/2019 12:21 AM        Passed - Patient is age 12 or older        Passed - Recent (12 mo) or future (30 days) visit within the authorizing provider's specialty     Patient had office visit in the last 12 months or has a visit in the next 30 days with authorizing provider or within the authorizing provider's specialty.  See \"Patient Info\" tab in inbasket, or \"Choose Columns\" in Meds & Orders section of the refill encounter.              Passed - Medication is active on med list          "

## 2019-07-08 NOTE — TELEPHONE ENCOUNTER
Prescription approved per [facility] Refill Protocol.  Danielle Byrd RN, BSN

## 2019-07-08 NOTE — TELEPHONE ENCOUNTER
Central Prior Authorization Team  Phone: [phone]    PA Initiation    Medication: Testosterone 200mg/ml   Insurance Company: [employer] - Phone [phone] Fax [phone]  Pharmacy Filling the Rx: [place], [place] - [address]  Filling Pharmacy Phone: [phone]  Filling Pharmacy Fax:    Start Date: 7/8/2019

## 2019-07-12 NOTE — TELEPHONE ENCOUNTER
PRIOR AUTHORIZATION DENIED    Medication: Testosterone 200mg/ml- DENIED    Denial Date: 7/12/2019    Denial Rational: Patient did not meet criteria for approval. Patient must have a follow- up total serum testosterone level that is within or below the normal limits (Lab within the past 12 months).         Appeal Information: If provider would like to appeal, please provide a letter of medical necessity.

## 2019-07-17 DIAGNOSIS — R73.09 ELEVATED GLUCOSE: ICD-10-CM

## 2019-07-17 DIAGNOSIS — E78.5 HYPERLIPIDEMIA LDL GOAL <160: ICD-10-CM

## 2019-07-17 DIAGNOSIS — E34.9 TESTOSTERONE INSUFFICIENCY: ICD-10-CM

## 2019-07-17 DIAGNOSIS — Z13.29 SCREENING FOR THYROID DISORDER: ICD-10-CM

## 2019-07-17 DIAGNOSIS — Z13.0 SCREENING FOR DISORDER OF BLOOD AND BLOOD-FORMING ORGANS: ICD-10-CM

## 2019-07-17 LAB
ALBUMIN SERPL-MCNC: 3.7 G/DL (ref 3.4–5)
ALP SERPL-CCNC: 88 U/L (ref 40–150)
ALT SERPL W P-5'-P-CCNC: 65 U/L (ref 0–70)
ANION GAP SERPL CALCULATED.3IONS-SCNC: 6 MMOL/L (ref 3–14)
AST SERPL W P-5'-P-CCNC: 26 U/L (ref 0–45)
BILIRUB SERPL-MCNC: 0.5 MG/DL (ref 0.2–1.3)
BUN SERPL-MCNC: 15 MG/DL (ref 7–30)
CALCIUM SERPL-MCNC: 8.8 MG/DL (ref 8.5–10.1)
CHLORIDE SERPL-SCNC: 106 MMOL/L (ref 94–109)
CHOLEST SERPL-MCNC: 209 MG/DL
CO2 SERPL-SCNC: 26 MMOL/L (ref 20–32)
CREAT SERPL-MCNC: 1.03 MG/DL (ref 0.66–1.25)
ERYTHROCYTE [DISTWIDTH] IN BLOOD BY AUTOMATED COUNT: 15.4 % (ref 10–15)
GFR SERPL CREATININE-BSD FRML MDRD: 83 ML/MIN/{1.73_M2}
GLUCOSE SERPL-MCNC: 110 MG/DL (ref 70–99)
HCT VFR BLD AUTO: 41.1 % (ref 40–53)
HDLC SERPL-MCNC: 43 MG/DL
HGB BLD-MCNC: 13.6 G/DL (ref 13.3–17.7)
LDLC SERPL CALC-MCNC: 132 MG/DL
MCH RBC QN AUTO: 26.3 PG (ref 26.5–33)
MCHC RBC AUTO-ENTMCNC: 33.1 G/DL (ref 31.5–36.5)
MCV RBC AUTO: 80 FL (ref 78–100)
NONHDLC SERPL-MCNC: 166 MG/DL
PLATELET # BLD AUTO: 240 10E9/L (ref 150–450)
POTASSIUM SERPL-SCNC: 4 MMOL/L (ref 3.4–5.3)
PROT SERPL-MCNC: 7 G/DL (ref 6.8–8.8)
RBC # BLD AUTO: 5.17 10E12/L (ref 4.4–5.9)
SODIUM SERPL-SCNC: 138 MMOL/L (ref 133–144)
TRIGL SERPL-MCNC: 171 MG/DL
TSH SERPL DL<=0.005 MIU/L-ACNC: 3.07 MU/L (ref 0.4–4)
WBC # BLD AUTO: 5.7 10E9/L (ref 4–11)

## 2019-07-17 PROCEDURE — 36415 COLL VENOUS BLD VENIPUNCTURE: CPT | Performed by: PHYSICIAN ASSISTANT

## 2019-07-17 PROCEDURE — 80061 LIPID PANEL: CPT | Performed by: PHYSICIAN ASSISTANT

## 2019-07-17 PROCEDURE — 84443 ASSAY THYROID STIM HORMONE: CPT | Performed by: PHYSICIAN ASSISTANT

## 2019-07-17 PROCEDURE — 84403 ASSAY OF TOTAL TESTOSTERONE: CPT | Performed by: PHYSICIAN ASSISTANT

## 2019-07-17 PROCEDURE — 80053 COMPREHEN METABOLIC PANEL: CPT | Performed by: PHYSICIAN ASSISTANT

## 2019-07-17 PROCEDURE — 85027 COMPLETE CBC AUTOMATED: CPT | Performed by: PHYSICIAN ASSISTANT

## 2019-07-17 PROCEDURE — 84270 ASSAY OF SEX HORMONE GLOBUL: CPT | Performed by: PHYSICIAN ASSISTANT

## 2019-07-19 LAB
SHBG SERPL-SCNC: 15 NMOL/L (ref 11–80)
TESTOST FREE SERPL-MCNC: 1.94 NG/DL (ref 4.7–24.4)
TESTOST SERPL-MCNC: 73 NG/DL (ref 240–950)

## 2019-07-30 NOTE — TELEPHONE ENCOUNTER
Can you resubmit this PA with this information ? Patient did not have a serum testosterone level done within the last 12 months.     Exam Information     Exam Date Exam Time Accession # Results    7/17/19  7:26 AM [id number]    Component Value Flag Ref Range Units Status Collected Lab   Testosterone Total 73  Low   240 - 950 ng/dL Final 07/17/2019  7:26 AM 51   Comment:   This test was developed and its performance characteristics determined by the   [facility],  Special Chemistry Laboratory. It has   not been cleared or approved by the FDA. The laboratory is regulated under   CLIA as qualified to perform high-complexity testing. This test is used for   clinical purposes. It should not be regarded as investigational or for   research.    Sex Hormone Binding Globulin 15   11 - 80 nmol/L Final 07/17/2019  7:26 AM 51   Free Testosterone Calculated 1.94  Low   4.7 - 24.4 ng/dL Final 07/17/2019  7:26 AM 51   Lab and Collection     Testosterone Free and Total (Order: [id number]) - 7/17/2019   Result History     Testosterone Free and Total (Order #[id number]) on 7/19/2019 - Order Result History Report   Testosterone Free and Total [[id number]]     Electronically signed by: Aaseby-Aguilera, Ramona Ann, PA-C on 07/18/19 0752 Status: Completed   Mode: Ordering in Order Correction mode Communicated by: Didi Valencia   Ordering user: Didi Valencia 07/17/19 9045 Ordering provider: Aaseby-Aguilera, Ramona Ann,

## 2019-07-30 NOTE — TELEPHONE ENCOUNTER
Medication Appeal Initiation    We have initiated an appeal for the requested medication:  Medication: Testosterone 200mg/ml APPEAL  Appeal Start Date:  7/30/2019  Insurance Company: [employer] - Phone [phone] Fax [phone]  Comments:  FAXED APPEAL INFO TO [phone]

## 2019-07-31 NOTE — TELEPHONE ENCOUNTER
MEDICATION APPEAL APPROVED    Medication: Testosterone 200mg/ml APPEAL- APPROVED   Authorization Effective Date: 7/31/2019  Authorization Expiration Date: 7/30/2020  Approved Dose/Quantity:  Reference #:     Insurance Company: [employer] - Phone [phone] Fax [phone]  Expected CoPay:       CoPay Card Available:      Foundation Assistance Needed:    Which Pharmacy is filling the prescription (Not needed for infusion/clinic administered): [facility] [place], [place] - [address]

## 2019-09-17 ENCOUNTER — OFFICE VISIT (OUTPATIENT)
Dept: FAMILY MEDICINE | Facility: CLINIC | Age: 54
End: 2019-09-17
Payer: COMMERCIAL

## 2019-09-17 DIAGNOSIS — M25.561 ARTHRALGIA OF RIGHT LOWER LEG: Primary | ICD-10-CM

## 2019-09-17 PROCEDURE — 20610 DRAIN/INJ JOINT/BURSA W/O US: CPT | Mod: RT | Performed by: FAMILY MEDICINE

## 2019-09-17 RX ORDER — LIDOCAINE HYDROCHLORIDE 10 MG/ML
4 INJECTION, SOLUTION INFILTRATION; PERINEURAL ONCE
Status: DISCONTINUED | OUTPATIENT
Start: 2019-09-17 | End: 2019-11-08

## 2019-09-17 RX ORDER — TRIAMCINOLONE ACETONIDE 40 MG/ML
40 INJECTION, SUSPENSION INTRA-ARTICULAR; INTRAMUSCULAR ONCE
Status: DISCONTINUED | OUTPATIENT
Start: 2019-09-17 | End: 2021-03-08

## 2019-09-17 NOTE — PROGRESS NOTES
Over 1 year of right knee pain, worse after heavy physical activity. Has had temporary relief with Kenalog injections previously, his last was April 2019.  X-rays showed some minor arthritis of the right knee.      PROCEDURE:  JOINT ASPIRATION/INJECTION.         After a discussion of risks, benefits and side effects of procedure, informed patient consent was obtained.       The right knee was prepped and draped in the usual clean fashion (sterile not required for this procedure).  Ethyl chloride was used for local analgesia.    ASPIRATION: Not performed.       INJECTION:  Using 4 cc of 1% lidocaine mixed                           with 40 mg of kenlog, the right knee was successfully injected                           without complication.  Patient did experience some pain                          relief following injection.    A/P:  1. Arthralgia of right lower leg - steroid injection today, advised MRI with ongoing pain - ordered today  - triamcinolone (KENALOG-40) injection 40 mg  - lidocaine 1 % injection 4 mL  - MR Knee Right w/o Contrast; Future  - Large Joint/Bursa injection and/or drainage (Shoulder, Knee)    Rut Flanagan MD  [facility]

## 2019-10-11 DIAGNOSIS — E78.5 HYPERLIPIDEMIA LDL GOAL <160: ICD-10-CM

## 2019-10-11 RX ORDER — PRAVASTATIN SODIUM 20 MG
TABLET ORAL
Qty: 90 TABLET | Refills: 3 | Status: SHIPPED | OUTPATIENT
Start: 2019-10-11 | End: 2020-01-13

## 2019-10-11 NOTE — TELEPHONE ENCOUNTER
Prescription approved per [facility] Refill Protocol.  Pt is due for PX    Cecilia Nathan RN

## 2019-10-11 NOTE — TELEPHONE ENCOUNTER
"Requested Prescriptions   Pending Prescriptions Disp Refills     pravastatin (PRAVACHOL) 20 MG tablet [Pharmacy Med Name: PRAVASTATIN SODIUM 20 MG TAB] 30 tablet 11     Sig: TAKE 1 TABLET BY MOUTH EVERY DAY     Last Written Prescription Date:  09/28/2018  Last Fill Quantity: 90 tablet,  # refills: 3   Last office visit: 9/17/2019 with prescribing provider:  09/17/2019   Future Office Visit:          Statins Protocol Passed - 10/11/2019  1:40 AM        Passed - LDL on file in past 12 months     Recent Labs   Lab Test 07/17/19  0726   *             Passed - No abnormal creatine kinase in past 12 months     No lab results found.             Passed - Recent (12 mo) or future (30 days) visit within the authorizing provider's specialty     Patient has had an office visit with the authorizing provider or a provider within the authorizing providers department within the previous 12 mos or has a future within next 30 days. See \"Patient Info\" tab in inbasket, or \"Choose Columns\" in Meds & Orders section of the refill encounter.              Passed - Medication is active on med list        Passed - Patient is age 18 or older          Boaz RAMSEY  "

## 2019-10-12 DIAGNOSIS — F33.0 MILD EPISODE OF RECURRENT MAJOR DEPRESSIVE DISORDER (H): ICD-10-CM

## 2019-10-12 NOTE — TELEPHONE ENCOUNTER
"Requested Prescriptions   Pending Prescriptions Disp Refills     sertraline (ZOLOFT) 50 MG tablet [Pharmacy Med Name: SERTRALINE HCL 50 MG TABLET]  Last Written Prescription Date:  5/7/2019  Last Fill Quantity: 30 tablet,  # refills: 1   Last office visit: 9/17/2019 with prescribing provider:  Panchito   Future Office Visit:     30 tablet 11     Sig: TAKE 1 TABLET BY MOUTH EVERY DAY       SSRIs Protocol Failed - 10/12/2019 12:24 AM  PHQ-9 SCORE 4/11/2018 7/6/2018 5/7/2019   PHQ-9 Total Score - - -   PHQ-9 Total Score MyChart 7 (Mild depression) - -   PHQ-9 Total Score 7 3 13     TODD-7 SCORE 4/11/2018 7/6/2018 5/7/2019   Total Score - - -   Total Score 9 (mild anxiety) - -   Total Score 9 0 20               Failed - PHQ-9 score less than 5 in past 6 months     PHQ-9 SCORE 4/11/2018 7/6/2018 5/7/2019   PHQ-9 Total Score - - -   PHQ-9 Total Score MyChart 7 (Mild depression) - -   PHQ-9 Total Score 7 3 13                Passed - Medication is active on med list        Passed - Patient is age 18 or older        Passed - Recent (6 mo) or future (30 days) visit within the authorizing provider's specialty     Patient had office visit in the last 6 months or has a visit in the next 30 days with authorizing provider or within the authorizing provider's specialty.  See \"Patient Info\" tab in inbasket, or \"Choose Columns\" in Meds & Orders section of the refill encounter.              "

## 2019-10-14 ENCOUNTER — HOSPITAL ENCOUNTER (OUTPATIENT)
Dept: MRI IMAGING | Facility: CLINIC | Age: 54
Discharge: HOME | End: 2019-10-14
Attending: FAMILY MEDICINE | Admitting: FAMILY MEDICINE
Payer: COMMERCIAL

## 2019-10-14 DIAGNOSIS — M25.561 ARTHRALGIA OF RIGHT LOWER LEG: ICD-10-CM

## 2019-10-14 PROCEDURE — 73721 MRI JNT OF LWR EXTRE W/O DYE: CPT | Mod: RT

## 2019-10-15 DIAGNOSIS — S83.219A: Primary | ICD-10-CM

## 2019-10-15 ASSESSMENT — PATIENT HEALTH QUESTIONNAIRE - PHQ9: SUM OF ALL RESPONSES TO PHQ QUESTIONS 1-9: 2

## 2019-10-15 NOTE — TELEPHONE ENCOUNTER
Prescription approved per [facility] Refill Protocol.  As pt updated PHQ and will scheduled OV     Pt wants to [facility] - writer called and LM to cancel RX at the [facility] pharmacy     Cecilia Nathan RN

## 2019-10-21 ENCOUNTER — TRANSFERRED RECORDS (OUTPATIENT)
Dept: HEALTH INFORMATION MANAGEMENT | Facility: CLINIC | Age: 54
End: 2019-10-21

## 2019-11-03 ENCOUNTER — HEALTH MAINTENANCE LETTER (OUTPATIENT)
Age: 54
End: 2019-11-03

## 2019-11-04 ENCOUNTER — OFFICE VISIT (OUTPATIENT)
Dept: FAMILY MEDICINE | Facility: CLINIC | Age: 54
End: 2019-11-04
Payer: COMMERCIAL

## 2019-11-04 VITALS
WEIGHT: 257.2 LBS | HEIGHT: 72 IN | TEMPERATURE: 98.7 F | SYSTOLIC BLOOD PRESSURE: 130 MMHG | OXYGEN SATURATION: 97 % | BODY MASS INDEX: 34.84 KG/M2 | DIASTOLIC BLOOD PRESSURE: 78 MMHG | HEART RATE: 87 BPM

## 2019-11-04 DIAGNOSIS — Z01.818 PRE-OP EXAM: Primary | ICD-10-CM

## 2019-11-04 DIAGNOSIS — M25.561 RIGHT KNEE PAIN, UNSPECIFIED CHRONICITY: ICD-10-CM

## 2019-11-04 LAB
ERYTHROCYTE [DISTWIDTH] IN BLOOD BY AUTOMATED COUNT: 14.9 % (ref 10–15)
HCT VFR BLD AUTO: 43.3 % (ref 40–53)
HGB BLD-MCNC: 14.1 G/DL (ref 13.3–17.7)
MCH RBC QN AUTO: 26.3 PG (ref 26.5–33)
MCHC RBC AUTO-ENTMCNC: 32.6 G/DL (ref 31.5–36.5)
MCV RBC AUTO: 81 FL (ref 78–100)
PLATELET # BLD AUTO: 256 10E9/L (ref 150–450)
RBC # BLD AUTO: 5.36 10E12/L (ref 4.4–5.9)
WBC # BLD AUTO: 7.6 10E9/L (ref 4–11)

## 2019-11-04 PROCEDURE — 99215 OFFICE O/P EST HI 40 MIN: CPT | Performed by: PHYSICIAN ASSISTANT

## 2019-11-04 PROCEDURE — 80048 BASIC METABOLIC PNL TOTAL CA: CPT | Performed by: PHYSICIAN ASSISTANT

## 2019-11-04 PROCEDURE — 36415 COLL VENOUS BLD VENIPUNCTURE: CPT | Performed by: PHYSICIAN ASSISTANT

## 2019-11-04 PROCEDURE — 85027 COMPLETE CBC AUTOMATED: CPT | Performed by: PHYSICIAN ASSISTANT

## 2019-11-04 PROCEDURE — 93000 ELECTROCARDIOGRAM COMPLETE: CPT | Performed by: PHYSICIAN ASSISTANT

## 2019-11-04 ASSESSMENT — MIFFLIN-ST. JEOR: SCORE: 2046.71

## 2019-11-04 NOTE — PROGRESS NOTES
[facility]  [id number] [place] [id number] [phone]  Dept: [phone]    PRE-OP EVALUATION:  Today's date: 2019    Galileo Vieira (: 1965) presents for pre-operative evaluation assessment as requested by Dr. Juan.  He requires evaluation and anesthesia risk assessment prior to undergoing surgery/procedure for treatment of partial right knee replacement .    Fax number for surgical facility: [phone]  Primary Physician: Aaseby-Aguilera, Ramona Ann  Type of Anesthesia Anticipated: General    Patient has a Health Care Directive or Living Will:  YES     Preop Questions 2019   Who is doing your surgery? [facility] dr juan   What are you having done? partial right knee replacement   Date of Surgery/Procedure: 2019   Facility or Hospital where procedure/surgery will be performed: [facility]   1.  Do you have a history of Heart attack, stroke, stent, coronary bypass surgery, or other heart surgery? No   2.  Do you ever have any pain or discomfort in your chest? YES - thinks its related to gerd   3.  Do you have a history of  Heart Failure? No   4.   Are you troubled by shortness of breath when:  walking on a level surface, or up a slight hill, or at night? No   5.  Do you currently have a cold, bronchitis or other respiratory infection? No   6.  Do you have a cough, shortness of breath, or wheezing? No   7.  Do you sometimes get pains in the calves of your legs when you walk? YES - tight muscles    8. Do you or anyone in your family have previous history of blood clots? No   9.  Do you or does anyone in your family have a serious bleeding problem such as prolonged bleeding following surgeries or cuts? No   10. Have you ever had problems with anemia or been told to take iron pills? No   11. Have you had any abnormal blood loss such as black, tarry or bloody stools? No   12. Have you ever had a blood transfusion? No   13. Have you or any of your relatives  ever had problems with anesthesia? YES - nausea   14. Do you have sleep apnea, excessive snoring or daytime drowsiness? YES - mild     15. Do you have any prosthetic heart valves? No   16. Do you have prosthetic joints? No         HPI:     HPI related to upcoming procedure: right knee pain for about a year      SLEEP PROBLEM - Patient has a longstanding history of snoring.. Patient has tried OTC medications with limited success.       MEDICAL HISTORY:     Patient Active Problem List    Diagnosis Date Noted     Obesity (BMI 35.0-39.9) with comorbidity (H) 09/28/2018     Priority: Medium     Mechanical low back pain 08/06/2018     Priority: Medium     Moderate episode of recurrent major depressive disorder (H) 06/20/2017     Priority: Medium     Hypogonadism male 03/08/2017     Priority: Medium     Mild obstructive sleep apnea 09/08/2016     Priority: Medium     Fear of flying 06/05/2014     Priority: Medium     Testosterone insufficiency 03/28/2013     Priority: Medium     Mild major depression (H) 01/05/2012     Priority: Medium     Anxiety 01/05/2012     Priority: Medium     GERD (gastroesophageal reflux disease) 06/10/2009     Priority: Medium     Hyperlipidemia LDL goal <160 11/14/2008     Priority: Medium      Past Medical History:   Diagnosis Date     Concussion, unspecified     Hospitalized overnight as a child     Esophageal reflux      Hypercholesterolemia      Past Surgical History:   Procedure Laterality Date     C NONSPECIFIC PROCEDURE      Left knee arthroscopy     C NONSPECIFIC PROCEDURE  1997    Bilat inguinal hernia surgery     C NONSPECIFIC PROCEDURE      Vasectomy     CHOLECYSTECTOMY       COLONOSCOPY       COLONOSCOPY  5/12/2014    Procedure: COLONOSCOPY;  Surgeon: Syed Walker MD;  Location:  GI     GI SURGERY  2008    Gall bladder     HERNIA REPAIR       Current Outpatient Medications   Medication Sig Dispense Refill     IBUPROFEN 200 MG OR CAPS 1 CAPSULE  0     omeprazole  (PRILOSEC) 40 MG capsule Take 1 capsule (40 mg) by mouth daily 90 capsule 4     pravastatin (PRAVACHOL) 20 MG tablet TAKE 1 TABLET BY MOUTH EVERY DAY 90 tablet 3     ranitidine (ZANTAC) 300 MG tablet TAKE 1 TABLET BY MOUTH AT BEDTIME 90 tablet 2     sertraline (ZOLOFT) 50 MG tablet Take 1 tablet (50 mg) by mouth daily 90 tablet 0     sertraline (ZOLOFT) 50 MG tablet Take 1 tablet (50 mg) by mouth daily 30 tablet 1     testosterone cypionate (DEPO-TESTOSTERONE) 200 MG/ML injection Inject 1 mL (200 mg) into the muscle every 14 days 10 mL 1     traZODone (DESYREL) 50 MG tablet Take  mg at hs 90 tablet 1     OTC products: no recent use of OTC ASA, NSAIDS or Steroids    No Known Allergies   Latex Allergy: NO    Social History     Tobacco Use     Smoking status: Former Smoker     Last attempt to quit: 10/3/1997     Years since quittin.1     Smokeless tobacco: Never Used     Tobacco comment:    Substance Use Topics     Alcohol use: Yes     Alcohol/week: 16.7 standard drinks     Types: 20 Standard drinks or equivalent per week     Comment: 3-4 daily     History   Drug Use No       REVIEW OF SYSTEMS:   Constitutional, neuro, ENT, endocrine, pulmonary, cardiac, gastrointestinal, genitourinary, musculoskeletal, integument and psychiatric systems are negative, except as otherwise noted.    EXAM:   There were no vitals taken for this visit.    GENERAL APPEARANCE: healthy, alert and no distress     EYES: EOMI,  PERRL     HENT: ear canals and TM's normal and nose and mouth without ulcers or lesions     NECK: no adenopathy, no asymmetry, masses, or scars and thyroid normal to palpation     RESP: lungs clear to auscultation - no rales, rhonchi or wheezes     CV: regular rates and rhythm, normal S1 S2, no S3 or S4 and no murmur, click or rub     ABDOMEN:  soft, nontender, no HSM or masses and bowel sounds normal     MS: extremities normal- no gross deformities noted, no evidence of inflammation in joints, FROM in all  extremities.     SKIN: no suspicious lesions or rashes     NEURO: Normal strength and tone, sensory exam grossly normal, mentation intact and speech normal     PSYCH: mentation appears normal. and affect normal/bright     LYMPHATICS: No cervical adenopathy    DIAGNOSTICS:     EKG: appears normal, NSR, normal axis, normal intervals, no acute ST/T changes c/w ischemia, no LVH by voltage criteria, unchanged from previous tracings  Labs Resulted Today:   Results for orders placed or performed in visit on 11/04/19   CBC with platelets     Status: Abnormal   Result Value Ref Range    WBC 7.6 4.0 - 11.0 10e9/L    RBC Count 5.36 4.4 - 5.9 10e12/L    Hemoglobin 14.1 13.3 - 17.7 g/dL    Hematocrit 43.3 40.0 - 53.0 %    MCV 81 78 - 100 fl    MCH 26.3 (L) 26.5 - 33.0 pg    MCHC 32.6 31.5 - 36.5 g/dL    RDW 14.9 10.0 - 15.0 %    Platelet Count 256 150 - 450 10e9/L   Basic metabolic panel     Status: Abnormal   Result Value Ref Range    Sodium 139 133 - 144 mmol/L    Potassium 4.2 3.4 - 5.3 mmol/L    Chloride 108 94 - 109 mmol/L    Carbon Dioxide 24 20 - 32 mmol/L    Anion Gap 7 3 - 14 mmol/L    Glucose 113 (H) 70 - 99 mg/dL    Urea Nitrogen 12 7 - 30 mg/dL    Creatinine 0.99 0.66 - 1.25 mg/dL    GFR Estimate 87 >60 mL/min/[1.73_m2]    GFR Estimate If Black >90 >60 mL/min/[1.73_m2]    Calcium 8.9 8.5 - 10.1 mg/dL       Recent Labs   Lab Test 07/17/19  0726 11/18/16  0720   HGB 13.6 14.5    260    140   POTASSIUM 4.0 4.0   CR 1.03 1.01   A1C  --  5.9        IMPRESSION:   Reason for surgery/procedure: right partial knee replacement   Diagnosis/reason for consult:  preoperative evaluation for assessment of cardiovascular and respiratory disease as well as overall risk assessment and perioperative medical management.      The proposed surgical procedure is considered INTERMEDIATE risk.    REVISED CARDIAC RISK INDEX  The patient has the following serious cardiovascular risks for perioperative complications such as (MI,  PE, VFib and 3  AV Block):  No serious cardiac risks  INTERPRETATION: 1 risks: Class II (low risk - 0.9% complication rate)    The patient has the following additional risks for perioperative complications:  No identified additional risks    (Z01.818) Pre-op exam  (primary encounter diagnosis)  Comment:   Plan: EKG 12-lead complete w/read - Clinics, CBC with        platelets, Basic metabolic panel            (M25.561) Right knee pain, unspecified chronicity  Comment:   Plan:         RECOMMENDATIONS:     --Consult hospital rounder / IM to assist post-op medical management    Obstructive Sleep Apnea (or suspected sleep apnea)  Patient is to bring their home CPAP with them on the day of surgery  Patient is clearly advised to use their home CPAP when released from surgery      --Patient is to take all scheduled medications on the day of surgery EXCEPT for modifications listed below.    APPROVAL GIVEN to proceed with proposed procedure, without further diagnostic evaluation       Signed Electronically by: Ramona Ann Aaseby-Aguilera, PA-C    Copy of this evaluation report is provided to requesting physician.    [facility] Preop Guidelines    Revised Cardiac Risk Index

## 2019-11-05 LAB
ANION GAP SERPL CALCULATED.3IONS-SCNC: 7 MMOL/L (ref 3–14)
BUN SERPL-MCNC: 12 MG/DL (ref 7–30)
CALCIUM SERPL-MCNC: 8.9 MG/DL (ref 8.5–10.1)
CHLORIDE SERPL-SCNC: 108 MMOL/L (ref 94–109)
CO2 SERPL-SCNC: 24 MMOL/L (ref 20–32)
CREAT SERPL-MCNC: 0.99 MG/DL (ref 0.66–1.25)
GFR SERPL CREATININE-BSD FRML MDRD: 87 ML/MIN/{1.73_M2}
GLUCOSE SERPL-MCNC: 113 MG/DL (ref 70–99)
POTASSIUM SERPL-SCNC: 4.2 MMOL/L (ref 3.4–5.3)
SODIUM SERPL-SCNC: 139 MMOL/L (ref 133–144)

## 2019-11-08 ENCOUNTER — ANCILLARY PROCEDURE (OUTPATIENT)
Dept: GENERAL RADIOLOGY | Facility: CLINIC | Age: 54
End: 2019-11-08
Attending: FAMILY MEDICINE
Payer: COMMERCIAL

## 2019-11-08 ENCOUNTER — OFFICE VISIT (OUTPATIENT)
Dept: FAMILY MEDICINE | Facility: CLINIC | Age: 54
End: 2019-11-08
Payer: COMMERCIAL

## 2019-11-08 VITALS
WEIGHT: 257 LBS | HEART RATE: 72 BPM | TEMPERATURE: 98.7 F | DIASTOLIC BLOOD PRESSURE: 86 MMHG | OXYGEN SATURATION: 98 % | HEIGHT: 72 IN | BODY MASS INDEX: 34.81 KG/M2 | RESPIRATION RATE: 18 BRPM | SYSTOLIC BLOOD PRESSURE: 128 MMHG

## 2019-11-08 DIAGNOSIS — M79.671 RIGHT FOOT PAIN: ICD-10-CM

## 2019-11-08 DIAGNOSIS — M76.71 PERONEAL TENDONITIS, RIGHT: Primary | ICD-10-CM

## 2019-11-08 PROCEDURE — 73630 X-RAY EXAM OF FOOT: CPT | Mod: RT

## 2019-11-08 PROCEDURE — 99214 OFFICE O/P EST MOD 30 MIN: CPT | Performed by: FAMILY MEDICINE

## 2019-11-08 RX ORDER — CELECOXIB 200 MG/1
200 CAPSULE ORAL 2 TIMES DAILY PRN
Qty: 60 CAPSULE | Refills: 1 | Status: SHIPPED | OUTPATIENT
Start: 2019-11-08 | End: 2021-03-08

## 2019-11-08 ASSESSMENT — MIFFLIN-ST. JEOR: SCORE: 2045.8

## 2019-11-08 NOTE — PROGRESS NOTES
Subjective     Galileo Vieira is a 53 year old male who presents to clinic today for the following health issues:    HPI   Right Foot Pain    Onset: Ongoing One Week    Description:   Location: Right Foot Pain-Lateral Foot radiating up lateral lower leg  Character: Combination Dull/Sharp    Intensity: 6/10    Progression of Symptoms: worse    Accompanying Signs & Symptoms:  Other symptoms: Numbness and Tingling    History:   Previous similar pain: no       Precipitating factors:   Trauma or overuse: YES- Running on Treadmill    Alleviating factors:  Improved by: See Below    Therapies Tried and outcome: Celebrex did not help      Was walking on the treadmill a week ago and felt lateral foot pain the day following. Has been bothersome for him ever since. Pain when he plants foot down to walk.     Pain radiates up the lateral calf.     Has a plantar fasciitis insert in his tennis shoe always, has an arch. His current pain feels different than his PF pain.     No swelling or bruising.         Patient Active Problem List   Diagnosis     Hyperlipidemia LDL goal <160     GERD (gastroesophageal reflux disease)     Mild major depression (H)     Anxiety     Testosterone insufficiency     Fear of flying     Mild obstructive sleep apnea     Hypogonadism male     Moderate episode of recurrent major depressive disorder (H)     Mechanical low back pain     Obesity (BMI 35.0-39.9) with comorbidity (H)     Past Surgical History:   Procedure Laterality Date     C NONSPECIFIC PROCEDURE      Left knee arthroscopy     C NONSPECIFIC PROCEDURE  1997    Bilat inguinal hernia surgery     C NONSPECIFIC PROCEDURE      Vasectomy     CHOLECYSTECTOMY       COLONOSCOPY       COLONOSCOPY  5/12/2014    Procedure: COLONOSCOPY;  Surgeon: Syed Walker MD;  Location:  GI     GI SURGERY  2008    Gall bladder     HERNIA REPAIR         Social History     Tobacco Use     Smoking status: Former Smoker     Packs/day: 0.00     Last attempt to quit:  "10/3/1997     Years since quittin.1     Smokeless tobacco: Never Used     Tobacco comment:    Substance Use Topics     Alcohol use: Yes     Alcohol/week: 16.7 standard drinks     Types: 20 Standard drinks or equivalent per week     Comment: 3-4 daily     Family History   Problem Relation Age of Onset     Gastrointestinal Disease Father         Crohn's     Cardiovascular Father      Depression Father      Heart Disease Father 66        triple bipass     Neurologic Disorder Father         seizures     Respiratory Father         sleep apnea     C.A.D. Father         Had CABG     Cerebrovascular Disease Father      Family History Negative Mother      Depression Other      Cancer - colorectal Other         Maternal Uncle  at 56     Lipids Brother      Respiratory Brother      Lipids Brother      Depression Paternal Grandmother      Cancer - colorectal Maternal Uncle      Diabetes No family hx of            Reviewed and updated as needed this visit by Provider  Tobacco  Allergies  Meds  Problems  Med Hx  Surg Hx  Fam Hx         Review of Systems   ROS COMP: Constitutional, HEENT, cardiovascular, pulmonary, gi and gu systems are negative, except as otherwise noted.      Objective    /86 (BP Location: Right arm, Patient Position: Chair, Cuff Size: Adult Large)   Pulse 72   Temp 98.7  F (37.1  C) (Oral)   Resp 18   Ht 1.816 m (5' 11.5\")   Wt 116.6 kg (257 lb)   SpO2 98%   BMI 35.34 kg/m    Body mass index is 35.34 kg/m .  Physical Exam   GENERAL: healthy, alert and no distress  MS: ttp over the base of the 5th metatarsal, pain with eversion and inversion, stretching with dorsiflexion, plantarflexion    Diagnostic Test Results:  Right foot XR - negative for fracture        Assessment & Plan     1. Peroneal tendonitis, right - discussed diagnosis based on exam, and negative imaging. Advised walking boot for next 2 weeks, gentle ROM daily, NSAID for 5-7 days. He has right knee replacement " upcoming in 2 weeks. Advised he inform surgeon's team about diagnosis.   - celecoxib (CELEBREX) 200 MG capsule; Take 1 capsule (200 mg) by mouth 2 times daily as needed for pain  Dispense: 60 capsule; Refill: 1  - order for DME; Equipment being ordered: walking boot  Dispense: 1 Units; Refill: 0    2. Right foot pain  - XR Foot Right G/E 3 Views; Future     Return in about 2 weeks (around 11/22/2019) for if symptoms fail to improve or worsen.    Rut Flanagan MD  [facility]

## 2019-11-14 ENCOUNTER — TELEPHONE (OUTPATIENT)
Dept: FAMILY MEDICINE | Facility: CLINIC | Age: 54
End: 2019-11-14

## 2019-11-14 NOTE — LETTER
November 14, 2019      Galileo Vieira  [address] [facility] [address]        To Whom It May Concern,      Please put membership dues on hold between 11/21/2019-01/01/2020 due to medical reasons. If you have any questions please contact my office at [phone].          Sincerely,          Ramona Ann Aaseby-Aguilera, PA-C

## 2019-11-14 NOTE — TELEPHONE ENCOUNTER
Can you write a letter to put his gym membership on hold due to surgery dates 11/21/2019-01/01/2020.  If you agree to this letter. Letter is T up and please review and print.    CATHERINE rCuz

## 2019-12-05 ENCOUNTER — TRANSFERRED RECORDS (OUTPATIENT)
Dept: HEALTH INFORMATION MANAGEMENT | Facility: CLINIC | Age: 54
End: 2019-12-05

## 2019-12-20 DIAGNOSIS — E34.9 TESTOSTERONE INSUFFICIENCY: ICD-10-CM

## 2019-12-20 RX ORDER — TESTOSTERONE CYPIONATE 200 MG/ML
200 INJECTION, SOLUTION INTRAMUSCULAR
Qty: 10 ML | Refills: 1 | Status: SHIPPED | OUTPATIENT
Start: 2019-12-20 | End: 2020-01-13

## 2019-12-20 NOTE — TELEPHONE ENCOUNTER
Routing refill request to provider for review/approval because:  Drug not on the [facility] refill protocol and pt due for labs    Cecilia Nathan RN

## 2019-12-30 ENCOUNTER — TRANSFERRED RECORDS (OUTPATIENT)
Dept: HEALTH INFORMATION MANAGEMENT | Facility: CLINIC | Age: 54
End: 2019-12-30

## 2020-01-10 ASSESSMENT — ENCOUNTER SYMPTOMS
DYSURIA: 0
MYALGIAS: 1
ABDOMINAL PAIN: 0
HEMATURIA: 0
HEARTBURN: 1
HEMATOCHEZIA: 0
NERVOUS/ANXIOUS: 0
EYE PAIN: 0
NAUSEA: 0
COUGH: 0
SHORTNESS OF BREATH: 1
FREQUENCY: 0
PARESTHESIAS: 0
CONSTIPATION: 0
WEAKNESS: 0
PALPITATIONS: 0
ARTHRALGIAS: 1
FEVER: 0
HEADACHES: 0
DIARRHEA: 0
CHILLS: 0
SORE THROAT: 0
DIZZINESS: 0
JOINT SWELLING: 0

## 2020-01-13 ENCOUNTER — OFFICE VISIT (OUTPATIENT)
Dept: FAMILY MEDICINE | Facility: CLINIC | Age: 55
End: 2020-01-13
Payer: COMMERCIAL

## 2020-01-13 VITALS
WEIGHT: 267.7 LBS | OXYGEN SATURATION: 97 % | HEART RATE: 82 BPM | SYSTOLIC BLOOD PRESSURE: 133 MMHG | TEMPERATURE: 98.3 F | HEIGHT: 72 IN | DIASTOLIC BLOOD PRESSURE: 83 MMHG | BODY MASS INDEX: 36.26 KG/M2

## 2020-01-13 DIAGNOSIS — R73.01 ELEVATED FASTING GLUCOSE: ICD-10-CM

## 2020-01-13 DIAGNOSIS — Z00.00 ROUTINE GENERAL MEDICAL EXAMINATION AT A HEALTH CARE FACILITY: Primary | ICD-10-CM

## 2020-01-13 DIAGNOSIS — K21.9 GASTROESOPHAGEAL REFLUX DISEASE WITHOUT ESOPHAGITIS: ICD-10-CM

## 2020-01-13 DIAGNOSIS — E34.9 TESTOSTERONE INSUFFICIENCY: ICD-10-CM

## 2020-01-13 DIAGNOSIS — E78.5 HYPERLIPIDEMIA LDL GOAL <160: ICD-10-CM

## 2020-01-13 DIAGNOSIS — F33.0 MILD EPISODE OF RECURRENT MAJOR DEPRESSIVE DISORDER (H): ICD-10-CM

## 2020-01-13 LAB
ALBUMIN SERPL-MCNC: 3.6 G/DL (ref 3.4–5)
ALP SERPL-CCNC: 86 U/L (ref 40–150)
ALT SERPL W P-5'-P-CCNC: 54 U/L (ref 0–70)
ANION GAP SERPL CALCULATED.3IONS-SCNC: 7 MMOL/L (ref 3–14)
AST SERPL W P-5'-P-CCNC: 18 U/L (ref 0–45)
BILIRUB SERPL-MCNC: 0.5 MG/DL (ref 0.2–1.3)
BUN SERPL-MCNC: 15 MG/DL (ref 7–30)
CALCIUM SERPL-MCNC: 8.7 MG/DL (ref 8.5–10.1)
CHLORIDE SERPL-SCNC: 105 MMOL/L (ref 94–109)
CHOLEST SERPL-MCNC: 190 MG/DL
CO2 SERPL-SCNC: 25 MMOL/L (ref 20–32)
CREAT SERPL-MCNC: 1.01 MG/DL (ref 0.66–1.25)
GFR SERPL CREATININE-BSD FRML MDRD: 84 ML/MIN/{1.73_M2}
GLUCOSE SERPL-MCNC: 104 MG/DL (ref 70–99)
HBA1C MFR BLD: 5.7 % (ref 0–5.6)
HDLC SERPL-MCNC: 41 MG/DL
LDLC SERPL CALC-MCNC: 121 MG/DL
NONHDLC SERPL-MCNC: 149 MG/DL
POTASSIUM SERPL-SCNC: 3.9 MMOL/L (ref 3.4–5.3)
PROT SERPL-MCNC: 7.1 G/DL (ref 6.8–8.8)
SODIUM SERPL-SCNC: 137 MMOL/L (ref 133–144)
TRIGL SERPL-MCNC: 140 MG/DL
TSH SERPL DL<=0.005 MIU/L-ACNC: 2.9 MU/L (ref 0.4–4)

## 2020-01-13 PROCEDURE — 36415 COLL VENOUS BLD VENIPUNCTURE: CPT | Performed by: PHYSICIAN ASSISTANT

## 2020-01-13 PROCEDURE — 84403 ASSAY OF TOTAL TESTOSTERONE: CPT | Performed by: PHYSICIAN ASSISTANT

## 2020-01-13 PROCEDURE — 83036 HEMOGLOBIN GLYCOSYLATED A1C: CPT | Performed by: PHYSICIAN ASSISTANT

## 2020-01-13 PROCEDURE — 80053 COMPREHEN METABOLIC PANEL: CPT | Performed by: PHYSICIAN ASSISTANT

## 2020-01-13 PROCEDURE — 84443 ASSAY THYROID STIM HORMONE: CPT | Performed by: PHYSICIAN ASSISTANT

## 2020-01-13 PROCEDURE — 99396 PREV VISIT EST AGE 40-64: CPT | Performed by: PHYSICIAN ASSISTANT

## 2020-01-13 PROCEDURE — 80061 LIPID PANEL: CPT | Performed by: PHYSICIAN ASSISTANT

## 2020-01-13 PROCEDURE — 84270 ASSAY OF SEX HORMONE GLOBUL: CPT | Performed by: PHYSICIAN ASSISTANT

## 2020-01-13 RX ORDER — TESTOSTERONE CYPIONATE 200 MG/ML
200 INJECTION, SOLUTION INTRAMUSCULAR
Qty: 10 ML | Refills: 1 | Status: SHIPPED | OUTPATIENT
Start: 2020-01-13 | End: 2020-03-20

## 2020-01-13 RX ORDER — OMEPRAZOLE 40 MG/1
40 CAPSULE, DELAYED RELEASE ORAL DAILY
Qty: 90 CAPSULE | Refills: 4 | Status: SHIPPED | OUTPATIENT
Start: 2020-01-13 | End: 2020-02-17

## 2020-01-13 RX ORDER — PRAVASTATIN SODIUM 20 MG
20 TABLET ORAL DAILY
Qty: 90 TABLET | Refills: 3 | Status: SHIPPED | OUTPATIENT
Start: 2020-01-13 | End: 2020-02-17

## 2020-01-13 ASSESSMENT — MIFFLIN-ST. JEOR: SCORE: 2089.34

## 2020-01-13 NOTE — PROGRESS NOTES
SUBJECTIVE:   CC: Galileo Vieira is an 54 year old male who presents for preventative health visit.     Healthy Habits:     Getting at least 3 servings of Calcium per day:  NO    Bi-annual eye exam:  Yes    Dental care twice a year:  Yes    Sleep apnea or symptoms of sleep apnea:  Sleep apnea    Diet:  Regular (no restrictions)    Frequency of exercise:  2-3 days/week    Duration of exercise:  15-30 minutes    Taking medications regularly:  Yes    Medication side effects:  Not applicable    PHQ-2 Total Score: 2    Additional concerns today:  No              Today's PHQ-2 Score:   PHQ-2 (  [employer]) 1/10/2020   Q1: Little interest or pleasure in doing things 1   Q2: Feeling down, depressed or hopeless 1   PHQ-2 Score 2   Q1: Little interest or pleasure in doing things Several days   Q2: Feeling down, depressed or hopeless Several days   PHQ-2 Score 2       Abuse: Current or Past(Physical, Sexual or Emotional)- No  Do you feel safe in your environment? Yes        Social History     Tobacco Use     Smoking status: Former Smoker     Packs/day: 0.00     Last attempt to quit: 10/3/1997     Years since quittin.2     Smokeless tobacco: Never Used     Tobacco comment:    Substance Use Topics     Alcohol use: Yes     Alcohol/week: 16.7 standard drinks     Types: 20 Standard drinks or equivalent per week     Comment: 3-4 daily         Alcohol Use 1/10/2020   Prescreen: >3 drinks/day or >7 drinks/week? Yes   Prescreen: >3 drinks/day or >7 drinks/week? -   AUDIT SCORE  14       Last PSA:   PSA   Date Value Ref Range Status   2016 0.81 0 - 4 ug/L Final     Comment:     Assay Method:  Chemiluminescence using Siemens Vista analyzer       Reviewed orders with patient. Reviewed health maintenance and updated orders accordingly - Yes  BP Readings from Last 3 Encounters:   20 133/83   19 128/86   19 130/78    Wt Readings from Last 3 Encounters:   20 121.4 kg (267 lb 11.2 oz)   19 116.6 kg  (257 lb)   11/04/19 116.7 kg (257 lb 3.2 oz)                  No Known Allergies  Recent Labs   Lab Test 01/13/20  0850 11/04/19  1129 07/17/19  0726 11/18/16  0720 09/14/15  1045   A1C 5.7*  --   --  5.9  --    LDL  --   --  132* 121* 137*   HDL  --   --  43 39* 44   TRIG  --   --  171* 184* 181*   ALT PENDING  --  65 99* 97*   CR PENDING 0.99 1.03 1.01 0.96   GFRESTIMATED PENDING 87 83 78 83   GFRESTBLACK PENDING >90 >90 >90   GFR Calc   >90   GFR Calc     POTASSIUM 3.9 4.2 4.0 4.0 3.9   TSH  --   --  3.07 3.00 2.28        Reviewed and updated as needed this visit by clinical staff         Reviewed and updated as needed this visit by Provider            Review of Systems  CONSTITUTIONAL: NEGATIVE for fever, chills, change in weight  INTEGUMENTARY/SKIN: NEGATIVE for worrisome rashes, moles or lesions  EYES: NEGATIVE for vision changes or irritation  ENT: NEGATIVE for ear, mouth and throat problems  RESP: NEGATIVE for significant cough or SOB  CV: NEGATIVE for chest pain, palpitations or peripheral edema  GI: NEGATIVE for nausea, abdominal pain, heartburn, or change in bowel habits   male: negative for dysuria, hematuria, decreased urinary stream, erectile dysfunction, urethral discharge  MUSCULOSKELETAL: NEGATIVE for significant arthralgias or myalgia  NEURO: NEGATIVE for weakness, dizziness or paresthesias  PSYCHIATRIC: NEGATIVE for changes in mood or affect    OBJECTIVE:   There were no vitals taken for this visit.    Physical Exam  GENERAL: healthy, alert and no distress  EYES: Eyes grossly normal to inspection, PERRL and conjunctivae and sclerae normal  HENT: ear canals and TM's normal, nose and mouth without ulcers or lesions  NECK: no adenopathy, no asymmetry, masses, or scars and thyroid normal to palpation  RESP: lungs clear to auscultation - no rales, rhonchi or wheezes  CV: regular rate and rhythm, normal S1 S2, no S3 or S4, no murmur, click or rub, no peripheral edema  "and peripheral pulses strong  ABDOMEN: soft, nontender, no hepatosplenomegaly, no masses and bowel sounds normal  MS: no gross musculoskeletal defects noted, no edema  SKIN: no suspicious lesions or rashes  NEURO: Normal strength and tone, mentation intact and speech normal  PSYCH: mentation appears normal, affect normal/bright  LYMPH: no cervical, supraclavicular, axillary, or inguinal adenopathy    Diagnostic Test Results:  Labs reviewed in Epic    ASSESSMENT/PLAN:   1. Routine general medical examination at a health care facility      2. Hyperlipidemia LDL goal <160    - Comprehensive metabolic panel  - Lipid panel reflex to direct LDL Fasting  - pravastatin (PRAVACHOL) 20 MG tablet; Take 1 tablet (20 mg) by mouth daily  Dispense: 90 tablet; Refill: 3    3. Testosterone insufficiency    - Testosterone Free and Total  - testosterone cypionate (DEPO-TESTOSTERONE) 200 MG/ML injection; Inject 1 mL (200 mg) into the muscle every 14 days  Dispense: 10 mL; Refill: 1    4. Mild episode of recurrent major depressive disorder (H)    - TSH with free T4 reflex  - sertraline (ZOLOFT) 50 MG tablet; Take 1 tablet (50 mg) by mouth daily  Dispense: 90 tablet; Refill: 3    5. Elevated fasting glucose    - Comprehensive metabolic panel  - Hemoglobin A1c    6. Gastroesophageal reflux disease without esophagitis    - omeprazole (PRILOSEC) 40 MG DR capsule; Take 1 capsule (40 mg) by mouth daily  Dispense: 90 capsule; Refill: 4    COUNSELING:   Reviewed preventive health counseling, as reflected in patient instructions       Regular exercise       Healthy diet/nutrition       Vision screening       Hearing screening       Immunizations                 Colon cancer screening    Estimated body mass index is 35.34 kg/m  as calculated from the following:    Height as of 11/8/19: 1.816 m (5' 11.5\").    Weight as of 11/8/19: 116.6 kg (257 lb).     Weight management plan: Discussed healthy diet and exercise guidelines     reports that he quit " smoking about 22 years ago. He smoked 0.00 packs per day. He has never used smokeless tobacco.      Counseling Resources:  ATP IV Guidelines  Pooled Cohorts Equation Calculator  FRAX Risk Assessment  ICSI Preventive Guidelines  Dietary Guidelines for Americans, 2010  USDA's MyPlate  ASA Prophylaxis  Lung CA Screening    Ramona Ann Aaseby-Aguilera, PA-C  [facility]

## 2020-01-15 LAB
SHBG SERPL-SCNC: 17 NMOL/L (ref 11–80)
TESTOST FREE SERPL-MCNC: 10.04 NG/DL (ref 4.7–24.4)
TESTOST SERPL-MCNC: 362 NG/DL (ref 240–950)

## 2020-02-17 ENCOUNTER — TELEPHONE (OUTPATIENT)
Dept: FAMILY MEDICINE | Facility: CLINIC | Age: 55
End: 2020-02-17

## 2020-02-17 DIAGNOSIS — E78.5 HYPERLIPIDEMIA LDL GOAL <160: ICD-10-CM

## 2020-02-17 DIAGNOSIS — F33.0 MILD EPISODE OF RECURRENT MAJOR DEPRESSIVE DISORDER (H): ICD-10-CM

## 2020-02-17 DIAGNOSIS — K21.9 GASTROESOPHAGEAL REFLUX DISEASE WITHOUT ESOPHAGITIS: ICD-10-CM

## 2020-02-17 DIAGNOSIS — M76.71 PERONEAL TENDONITIS, RIGHT: ICD-10-CM

## 2020-02-17 RX ORDER — OMEPRAZOLE 40 MG/1
40 CAPSULE, DELAYED RELEASE ORAL DAILY
Qty: 90 CAPSULE | Refills: 4 | Status: SHIPPED | OUTPATIENT
Start: 2020-02-17 | End: 2020-09-01

## 2020-02-17 RX ORDER — PRAVASTATIN SODIUM 20 MG
20 TABLET ORAL DAILY
Qty: 90 TABLET | Refills: 3 | Status: SHIPPED | OUTPATIENT
Start: 2020-02-17 | End: 2020-09-01

## 2020-02-17 NOTE — TELEPHONE ENCOUNTER
Pt would like to use [facility] for prescription savings.     Hard copy RX given     Cecilia Nathan RN

## 2020-03-20 DIAGNOSIS — E34.9 TESTOSTERONE INSUFFICIENCY: ICD-10-CM

## 2020-03-26 RX ORDER — TESTOSTERONE CYPIONATE 200 MG/ML
200 INJECTION, SOLUTION INTRAMUSCULAR
Qty: 10 ML | Refills: 1 | Status: SHIPPED | OUTPATIENT
Start: 2020-03-26 | End: 2020-09-01

## 2020-08-12 ENCOUNTER — VIRTUAL VISIT (OUTPATIENT)
Dept: FAMILY MEDICINE | Facility: CLINIC | Age: 55
End: 2020-08-12
Payer: COMMERCIAL

## 2020-08-12 DIAGNOSIS — R06.02 SOB (SHORTNESS OF BREATH): Primary | ICD-10-CM

## 2020-08-12 DIAGNOSIS — R07.89 CHEST DISCOMFORT: ICD-10-CM

## 2020-08-12 DIAGNOSIS — Z82.49 FAMILY HISTORY OF ISCHEMIC HEART DISEASE: ICD-10-CM

## 2020-08-12 PROCEDURE — 99213 OFFICE O/P EST LOW 20 MIN: CPT | Mod: TEL | Performed by: PHYSICIAN ASSISTANT

## 2020-08-12 NOTE — PROGRESS NOTES
"Galileo Vieira is a 54 year old male who is being evaluated via a billable telephone visit.      The patient has been notified of following:     \"This telephone visit will be conducted via a call between you and your physician/provider. We have found that certain health care needs can be provided without the need for a physical exam.  This service lets us provide the care you need with a short phone conversation.  If a prescription is necessary we can send it directly to your pharmacy.  If lab work is needed we can place an order for that and you can then stop by our lab to have the test done at a later time.    Telephone visits are billed at different rates depending on your insurance coverage. During this emergency period, for some insurers they may be billed the same as an in-person visit.  Please reach out to your insurance provider with any questions.    If during the course of the call the physician/provider feels a telephone visit is not appropriate, you will not be charged for this service.\"    Patient has given verbal consent for Telephone visit?  Yes    What phone number would you like to be contacted at? [phone]    How would you like to obtain your AVS? MyChart    Subjective     Galileo Vieira is a 54 year old male who presents via phone visit today for the following health issues:    ANIL Gurrola is complaining about dyspnea on exertion and some left sided chset pain on occasion.  Not necessarily when active. SOB has increased.  Is on a statin and cholesterol is within normal limits.  Doesn't smoke but does drink ETOH daily.     2 brothers have had MI now in the last few years   One older and one younger. Both smoked.  Dad with significant CAD        Recent Labs   Lab Test 01/13/20  0850 11/04/19  1129 07/17/19  0726 11/18/16  0720   A1C 5.7*  --   --  5.9   *  --  132* 121*   HDL 41  --  43 39*   TRIG 140  --  171* 184*   ALT 54  --  65 99*   CR 1.01 0.99 1.03 1.01   GFRESTIMATED 84 87 83 78 " "  GFRESTBLACK >90 >90 >90 >90  African American GFR Calc     POTASSIUM 3.9 4.2 4.0 4.0   TSH 2.90  --  3.07 3.00      BP Readings from Last 3 Encounters:   04/29/20 130/70   01/13/20 133/83   11/08/19 128/86    Wt Readings from Last 3 Encounters:   01/13/20 121.4 kg (267 lb 11.2 oz)   11/08/19 116.6 kg (257 lb)   11/04/19 116.7 kg (257 lb 3.2 oz)                    Reviewed and updated as needed this visit by Provider         Review of Systems   Constitutional, HEENT, cardiovascular, pulmonary, gi and gu systems are negative, except as otherwise noted.       Objective          Vitals:  No vitals were obtained today due to virtual visit.    healthy, alert and no distress  PSYCH: Alert and oriented times 3; coherent speech, normal   rate and volume, able to articulate logical thoughts, able   to abstract reason, no tangential thoughts, no hallucinations   or delusions  His affect is normal  RESP: No cough, no audible wheezing, able to talk in full sentences  Remainder of exam unable to be completed due to telephone visits    Diagnostic Test Results:  Labs reviewed in Epic        Assessment & Plan     Advised to get stress echo.  Will contact patient with results when known and determine plan      1. SOB (shortness of breath)    - Echocardiogram Exercise Stress; Future    2. Chest discomfort    - Echocardiogram Exercise Stress; Future    3. Family history of ischemic heart disease    - Echocardiogram Exercise Stress; Future     BMI:   Estimated body mass index is 36.82 kg/m  as calculated from the following:    Height as of 1/13/20: 1.816 m (5' 11.5\").    Weight as of 1/13/20: 121.4 kg (267 lb 11.2 oz).   Weight management plan: Discussed healthy diet and exercise guidelines        Work on weight loss  Regular exercise      Ramona Ann Aaseby-Aguilera, PA-C  [facility]                  "

## 2020-08-17 DIAGNOSIS — G47.33 OBSTRUCTIVE SLEEP APNEA (ADULT) (PEDIATRIC): Primary | ICD-10-CM

## 2020-08-24 ENCOUNTER — HOSPITAL ENCOUNTER (OUTPATIENT)
Dept: CARDIOLOGY | Facility: CLINIC | Age: 55
Discharge: HOME | End: 2020-08-24
Attending: PHYSICIAN ASSISTANT | Admitting: PHYSICIAN ASSISTANT
Payer: COMMERCIAL

## 2020-08-24 DIAGNOSIS — Z82.49 FAMILY HISTORY OF ISCHEMIC HEART DISEASE: ICD-10-CM

## 2020-08-24 DIAGNOSIS — R07.89 CHEST DISCOMFORT: ICD-10-CM

## 2020-08-24 DIAGNOSIS — R06.02 SOB (SHORTNESS OF BREATH): ICD-10-CM

## 2020-08-24 PROCEDURE — 93325 DOPPLER ECHO COLOR FLOW MAPG: CPT | Mod: 26 | Performed by: INTERNAL MEDICINE

## 2020-08-24 PROCEDURE — 25500064 ZZH RX 255 OP 636: Performed by: PHYSICIAN ASSISTANT

## 2020-08-24 PROCEDURE — 93321 DOPPLER ECHO F-UP/LMTD STD: CPT | Mod: 26 | Performed by: INTERNAL MEDICINE

## 2020-08-24 PROCEDURE — 93350 STRESS TTE ONLY: CPT | Mod: TC

## 2020-08-24 PROCEDURE — 93016 CV STRESS TEST SUPVJ ONLY: CPT | Performed by: INTERNAL MEDICINE

## 2020-08-24 PROCEDURE — 93018 CV STRESS TEST I&R ONLY: CPT | Performed by: INTERNAL MEDICINE

## 2020-08-24 PROCEDURE — 93350 STRESS TTE ONLY: CPT | Mod: 26 | Performed by: INTERNAL MEDICINE

## 2020-08-24 RX ADMIN — HUMAN ALBUMIN MICROSPHERES AND PERFLUTREN 3 ML: 10; .22 INJECTION, SOLUTION INTRAVENOUS at 10:26

## 2020-09-01 DIAGNOSIS — E78.5 HYPERLIPIDEMIA LDL GOAL <160: ICD-10-CM

## 2020-09-01 DIAGNOSIS — K21.9 GASTROESOPHAGEAL REFLUX DISEASE WITHOUT ESOPHAGITIS: ICD-10-CM

## 2020-09-01 DIAGNOSIS — E34.9 TESTOSTERONE INSUFFICIENCY: ICD-10-CM

## 2020-09-01 RX ORDER — PRAVASTATIN SODIUM 20 MG
20 TABLET ORAL DAILY
Qty: 90 TABLET | Refills: 0 | Status: SHIPPED | OUTPATIENT
Start: 2020-09-01 | End: 2020-11-24

## 2020-09-01 RX ORDER — OMEPRAZOLE 40 MG/1
40 CAPSULE, DELAYED RELEASE ORAL DAILY
Qty: 90 CAPSULE | Refills: 0 | Status: SHIPPED | OUTPATIENT
Start: 2020-09-01 | End: 2020-11-24

## 2020-09-01 RX ORDER — TESTOSTERONE CYPIONATE 200 MG/ML
200 INJECTION, SOLUTION INTRAMUSCULAR
Qty: 10 ML | Refills: 1 | Status: SHIPPED | OUTPATIENT
Start: 2020-09-01 | End: 2021-02-12

## 2020-09-01 NOTE — TELEPHONE ENCOUNTER
Pt needs local print scripts for 3 months as he uses [employer]    Script pended    Danielle Byrd RN, BSN

## 2020-10-14 ENCOUNTER — VIRTUAL VISIT (OUTPATIENT)
Dept: FAMILY MEDICINE | Facility: CLINIC | Age: 55
End: 2020-10-14
Payer: COMMERCIAL

## 2020-10-14 VITALS
WEIGHT: 263 LBS | OXYGEN SATURATION: 97 % | HEIGHT: 72 IN | SYSTOLIC BLOOD PRESSURE: 138 MMHG | DIASTOLIC BLOOD PRESSURE: 76 MMHG | HEART RATE: 104 BPM | BODY MASS INDEX: 35.62 KG/M2

## 2020-10-14 DIAGNOSIS — F41.9 ANXIETY: Primary | ICD-10-CM

## 2020-10-14 DIAGNOSIS — F41.0 PANIC ATTACK: ICD-10-CM

## 2020-10-14 DIAGNOSIS — F32.0 MILD MAJOR DEPRESSION (H): ICD-10-CM

## 2020-10-14 PROCEDURE — 96127 BRIEF EMOTIONAL/BEHAV ASSMT: CPT | Mod: TEL | Performed by: PHYSICIAN ASSISTANT

## 2020-10-14 PROCEDURE — 99213 OFFICE O/P EST LOW 20 MIN: CPT | Mod: TEL | Performed by: PHYSICIAN ASSISTANT

## 2020-10-14 ASSESSMENT — ANXIETY QUESTIONNAIRES
IF YOU CHECKED OFF ANY PROBLEMS ON THIS QUESTIONNAIRE, HOW DIFFICULT HAVE THESE PROBLEMS MADE IT FOR YOU TO DO YOUR WORK, TAKE CARE OF THINGS AT HOME, OR GET ALONG WITH OTHER PEOPLE: VERY DIFFICULT
3. WORRYING TOO MUCH ABOUT DIFFERENT THINGS: NEARLY EVERY DAY
5. BEING SO RESTLESS THAT IT IS HARD TO SIT STILL: MORE THAN HALF THE DAYS
2. NOT BEING ABLE TO STOP OR CONTROL WORRYING: NEARLY EVERY DAY
GAD7 TOTAL SCORE: 20
7. FEELING AFRAID AS IF SOMETHING AWFUL MIGHT HAPPEN: NEARLY EVERY DAY
6. BECOMING EASILY ANNOYED OR IRRITABLE: NEARLY EVERY DAY
1. FEELING NERVOUS, ANXIOUS, OR ON EDGE: NEARLY EVERY DAY

## 2020-10-14 ASSESSMENT — PATIENT HEALTH QUESTIONNAIRE - PHQ9
5. POOR APPETITE OR OVEREATING: NEARLY EVERY DAY
SUM OF ALL RESPONSES TO PHQ QUESTIONS 1-9: 13

## 2020-10-14 ASSESSMENT — MIFFLIN-ST. JEOR: SCORE: 2075.96

## 2020-10-14 NOTE — PROGRESS NOTES
"Galileo Vieira is a 54 year old male who is being evaluated via a billable telephone visit.      The patient has been notified of following:     \"This telephone visit will be conducted via a call between you and your physician/provider. We have found that certain health care needs can be provided without the need for a physical exam.  This service lets us provide the care you need with a short phone conversation.  If a prescription is necessary we can send it directly to your pharmacy.  If lab work is needed we can place an order for that and you can then stop by our lab to have the test done at a later time.    Telephone visits are billed at different rates depending on your insurance coverage. During this emergency period, for some insurers they may be billed the same as an in-person visit.  Please reach out to your insurance provider with any questions.    If during the course of the call the physician/provider feels a telephone visit is not appropriate, you will not be charged for this service.\"    Patient has given verbal consent for Telephone visit?  Yes    What phone number would you like to be contacted at? [phone]    How would you like to obtain your AVS? MyChart    Subjective     Galileo Vieira is a 54 year old male who presents via phone visit today for the following health issues:    HPI     Discuss FMLA>  Has had increased stress and anxiety related to marital problems. Feels like panic attacks and gets nauseated and then cant focus at work. Was taking zoloft for a while for depression and anxiety and stopped because he was doing better.  He started back on it and doing slightly better.   He has missed a few times       Review of Systems   Constitutional, HEENT, cardiovascular, pulmonary, gi and gu systems are negative, except as otherwise noted.       Objective          Vitals:  No vitals were obtained today due to virtual visit.    healthy, alert and no distress  PSYCH: Alert and oriented times 3; " coherent speech, normal   rate and volume, able to articulate logical thoughts, able   to abstract reason, no tangential thoughts, no hallucinations   or delusions  His affect is normal  RESP: No cough, no audible wheezing, able to talk in full sentences  Remainder of exam unable to be completed due to telephone visits      Assessment/Plan:    Assessment & Plan     Anxiety  On zoloft 50 mg.  Galileo is reluctant to increase his Zoloft.  Agreed to reevaluate in 1 to 2 months.  [place] if he needs this    Mild major depression (H)      Panic attack          Depression Screening Follow Up    PHQ 10/14/2020   PHQ-9 Total Score 13   Q9: Thoughts of better off dead/self-harm past 2 weeks Several days   F/U: Thoughts of suicide or self-harm -   F/U: Self harm-plan -   F/U: Self-harm action -   F/U: Safety concerns -     Last PHQ-9 10/14/2020   1.  Little interest or pleasure in doing things 1   2.  Feeling down, depressed, or hopeless 2   3.  Trouble falling or staying asleep, or sleeping too much 1   4.  Feeling tired or having little energy 1   5.  Poor appetite or overeating 2   6.  Feeling bad about yourself 1   7.  Trouble concentrating 2   8.  Moving slowly or restless 2   Q9: Thoughts of better off dead/self-harm past 2 weeks 1   PHQ-9 Total Score 13   Difficulty at work, home, or with people Very difficult   In the past two weeks have you had thoughts of suicide or self harm? -   Do you have concerns about your personal safety or the safety of others? -   In the past 2 weeks have you thought about a plan or had intention to harm yourself? -   In the past 2 weeks have you acted on these thoughts in any way? -                Follow Up    Follow Up Actions Taken  Crisis resource information provided in the After Visit Summary    Discussed the following ways the patient can remain in a safe environment:  remove alcohol, dispose of old medications  and be around others        No  follow-ups on file.    Ramona Ann Aaseby-Aguilera, PA-C  [facility]    Phone call duration: 10 minutes

## 2020-10-15 ASSESSMENT — ANXIETY QUESTIONNAIRES: GAD7 TOTAL SCORE: 20

## 2020-11-24 DIAGNOSIS — K21.9 GASTROESOPHAGEAL REFLUX DISEASE WITHOUT ESOPHAGITIS: ICD-10-CM

## 2020-11-24 DIAGNOSIS — E78.5 HYPERLIPIDEMIA LDL GOAL <160: ICD-10-CM

## 2020-11-24 RX ORDER — PRAVASTATIN SODIUM 20 MG
20 TABLET ORAL DAILY
Qty: 90 TABLET | Refills: 0 | Status: SHIPPED | OUTPATIENT
Start: 2020-11-24 | End: 2021-01-15

## 2020-11-24 RX ORDER — OMEPRAZOLE 40 MG/1
40 CAPSULE, DELAYED RELEASE ORAL DAILY
Qty: 90 CAPSULE | Refills: 0 | Status: SHIPPED | OUTPATIENT
Start: 2020-11-24 | End: 2021-01-15

## 2020-11-24 NOTE — TELEPHONE ENCOUNTER
Prescription approved per [facility] Refill Protocol.  Danielle Byrd RN, BSN

## 2020-12-01 NOTE — TELEPHONE ENCOUNTER
No answer. Unable to leave a message.  Please notify patient that order has been placed.     Okay for increased dose. Tolerating well.

## 2021-01-13 ENCOUNTER — HOSPITAL ENCOUNTER (EMERGENCY)
Facility: CLINIC | Age: 56
Discharge: HOME | End: 2021-01-13
Attending: EMERGENCY MEDICINE | Admitting: EMERGENCY MEDICINE
Payer: OTHER MISCELLANEOUS

## 2021-01-13 ENCOUNTER — APPOINTMENT (OUTPATIENT)
Dept: CT IMAGING | Facility: CLINIC | Age: 56
End: 2021-01-13
Attending: EMERGENCY MEDICINE
Payer: OTHER MISCELLANEOUS

## 2021-01-13 ENCOUNTER — APPOINTMENT (OUTPATIENT)
Dept: GENERAL RADIOLOGY | Facility: CLINIC | Age: 56
End: 2021-01-13
Attending: EMERGENCY MEDICINE
Payer: OTHER MISCELLANEOUS

## 2021-01-13 VITALS
OXYGEN SATURATION: 98 % | DIASTOLIC BLOOD PRESSURE: 97 MMHG | RESPIRATION RATE: 20 BRPM | SYSTOLIC BLOOD PRESSURE: 157 MMHG | TEMPERATURE: 98.4 F | HEART RATE: 99 BPM

## 2021-01-13 DIAGNOSIS — R07.89 ATYPICAL CHEST PAIN: ICD-10-CM

## 2021-01-13 DIAGNOSIS — S16.1XXA STRAIN OF NECK MUSCLE, INITIAL ENCOUNTER: ICD-10-CM

## 2021-01-13 DIAGNOSIS — S09.90XA CLOSED HEAD INJURY, INITIAL ENCOUNTER: ICD-10-CM

## 2021-01-13 LAB
ANION GAP SERPL CALCULATED.3IONS-SCNC: 3 MMOL/L (ref 3–14)
BASOPHILS # BLD AUTO: 0.1 10E9/L (ref 0–0.2)
BASOPHILS NFR BLD AUTO: 0.8 %
BUN SERPL-MCNC: 9 MG/DL (ref 7–30)
CALCIUM SERPL-MCNC: 8.9 MG/DL (ref 8.5–10.1)
CHLORIDE SERPL-SCNC: 105 MMOL/L (ref 94–109)
CO2 SERPL-SCNC: 29 MMOL/L (ref 20–32)
CREAT SERPL-MCNC: 1.04 MG/DL (ref 0.66–1.25)
DIFFERENTIAL METHOD BLD: NORMAL
EOSINOPHIL # BLD AUTO: 0.3 10E9/L (ref 0–0.7)
EOSINOPHIL NFR BLD AUTO: 5.1 %
ERYTHROCYTE [DISTWIDTH] IN BLOOD BY AUTOMATED COUNT: 13.7 % (ref 10–15)
GFR SERPL CREATININE-BSD FRML MDRD: 81 ML/MIN/{1.73_M2}
GLUCOSE SERPL-MCNC: 122 MG/DL (ref 70–99)
HCT VFR BLD AUTO: 44.9 % (ref 40–53)
HGB BLD-MCNC: 15.4 G/DL (ref 13.3–17.7)
IMM GRANULOCYTES # BLD: 0.1 10E9/L (ref 0–0.4)
IMM GRANULOCYTES NFR BLD: 0.8 %
INTERPRETATION ECG - MUSE: NORMAL
LYMPHOCYTES # BLD AUTO: 1.9 10E9/L (ref 0.8–5.3)
LYMPHOCYTES NFR BLD AUTO: 30.3 %
MCH RBC QN AUTO: 29.8 PG (ref 26.5–33)
MCHC RBC AUTO-ENTMCNC: 34.3 G/DL (ref 31.5–36.5)
MCV RBC AUTO: 87 FL (ref 78–100)
MONOCYTES # BLD AUTO: 0.4 10E9/L (ref 0–1.3)
MONOCYTES NFR BLD AUTO: 6.5 %
NEUTROPHILS # BLD AUTO: 3.6 10E9/L (ref 1.6–8.3)
NEUTROPHILS NFR BLD AUTO: 56.5 %
NRBC # BLD AUTO: 0 10*3/UL
NRBC BLD AUTO-RTO: 0 /100
PLATELET # BLD AUTO: 220 10E9/L (ref 150–450)
POTASSIUM SERPL-SCNC: 3.6 MMOL/L (ref 3.4–5.3)
RBC # BLD AUTO: 5.17 10E12/L (ref 4.4–5.9)
SODIUM SERPL-SCNC: 137 MMOL/L (ref 133–144)
TROPONIN I SERPL-MCNC: 0.03 UG/L (ref 0–0.04)
TROPONIN I SERPL-MCNC: 0.03 UG/L (ref 0–0.04)
WBC # BLD AUTO: 6.3 10E9/L (ref 4–11)

## 2021-01-13 PROCEDURE — 84484 ASSAY OF TROPONIN QUANT: CPT | Performed by: EMERGENCY MEDICINE

## 2021-01-13 PROCEDURE — 70450 CT HEAD/BRAIN W/O DYE: CPT

## 2021-01-13 PROCEDURE — 250N000011 HC RX IP 250 OP 636: Performed by: EMERGENCY MEDICINE

## 2021-01-13 PROCEDURE — 71045 X-RAY EXAM CHEST 1 VIEW: CPT

## 2021-01-13 PROCEDURE — 93005 ELECTROCARDIOGRAM TRACING: CPT

## 2021-01-13 PROCEDURE — 85025 COMPLETE CBC W/AUTO DIFF WBC: CPT | Performed by: EMERGENCY MEDICINE

## 2021-01-13 PROCEDURE — 99285 EMERGENCY DEPT VISIT HI MDM: CPT | Mod: 25

## 2021-01-13 PROCEDURE — 80048 BASIC METABOLIC PNL TOTAL CA: CPT | Performed by: EMERGENCY MEDICINE

## 2021-01-13 PROCEDURE — 96374 THER/PROPH/DIAG INJ IV PUSH: CPT

## 2021-01-13 PROCEDURE — 72125 CT NECK SPINE W/O DYE: CPT

## 2021-01-13 RX ORDER — KETOROLAC TROMETHAMINE 15 MG/ML
15 INJECTION, SOLUTION INTRAMUSCULAR; INTRAVENOUS ONCE
Status: COMPLETED | OUTPATIENT
Start: 2021-01-13 | End: 2021-01-13

## 2021-01-13 RX ADMIN — KETOROLAC TROMETHAMINE 15 MG: 15 INJECTION, SOLUTION INTRAMUSCULAR; INTRAVENOUS at 07:19

## 2021-01-13 ASSESSMENT — ENCOUNTER SYMPTOMS
NECK PAIN: 1
BRUISES/BLEEDS EASILY: 0
NUMBNESS: 0
ARTHRALGIAS: 0

## 2021-01-13 NOTE — ED TRIAGE NOTES
Pt aox4, ABCs intact. Pt fell while salting the sidewalk. Slipped on the steps and hit his head on the last step. No open area. Pt c/o mild neck pain, dizziness, and nausea. Given 4 ODT zofran via EMS

## 2021-01-13 NOTE — DISCHARGE INSTRUCTIONS
We have done head and neck imaging and these are normal.  We have also done serial test for the heart and they look good.  Please follow-up with a cardiologist due to your 2 brothers having significant coronary artery disease consider CT angiography and further assessment as were indicated.  Return to the emergency room with severe constant chest pain increase shortness of breath or shortness of breath with exertion.  We think some of your dizziness is related to concussion return with double vision or concern for other symptomatology.

## 2021-01-13 NOTE — ED AVS SNAPSHOT
[facility] Emergency [facility]  [address]  [facility] [address]  Phone: [phone]  Fax: [phone]                                    Galileo Vieira   MRN: [id number]    Department: [facility] Emergency [facility]   Date of Visit: 1/13/2021           After Visit Summary Signature Page    I have received my discharge instructions, and my questions have been answered. I have discussed any challenges I see with this plan with the nurse or doctor.    ..........................................................................................................................................  Patient/Patient Representative Signature      ..........................................................................................................................................  Patient Representative Print Name and Relationship to Patient    ..................................................               ................................................  Date                                   Time    ..........................................................................................................................................  Reviewed by Signature/Title    ...................................................              ..............................................  Date                                               Time          22EPIC Rev 08/18

## 2021-01-15 ENCOUNTER — OFFICE VISIT (OUTPATIENT)
Dept: FAMILY MEDICINE | Facility: CLINIC | Age: 56
End: 2021-01-15
Payer: OTHER MISCELLANEOUS

## 2021-01-15 VITALS
WEIGHT: 270 LBS | BODY MASS INDEX: 36.57 KG/M2 | HEART RATE: 99 BPM | DIASTOLIC BLOOD PRESSURE: 90 MMHG | TEMPERATURE: 98.3 F | HEIGHT: 72 IN | SYSTOLIC BLOOD PRESSURE: 150 MMHG | OXYGEN SATURATION: 97 %

## 2021-01-15 DIAGNOSIS — S06.0X1A CONCUSSION WITH LOSS OF CONSCIOUSNESS OF 30 MINUTES OR LESS, INITIAL ENCOUNTER: Primary | ICD-10-CM

## 2021-01-15 DIAGNOSIS — E78.5 HYPERLIPIDEMIA LDL GOAL <160: ICD-10-CM

## 2021-01-15 DIAGNOSIS — R42 VERTIGO: ICD-10-CM

## 2021-01-15 DIAGNOSIS — K21.9 GASTROESOPHAGEAL REFLUX DISEASE WITHOUT ESOPHAGITIS: ICD-10-CM

## 2021-01-15 DIAGNOSIS — M62.838 NECK MUSCLE SPASM: ICD-10-CM

## 2021-01-15 PROCEDURE — 99214 OFFICE O/P EST MOD 30 MIN: CPT | Performed by: FAMILY MEDICINE

## 2021-01-15 RX ORDER — MECLIZINE HYDROCHLORIDE 25 MG/1
25 TABLET ORAL 3 TIMES DAILY PRN
Qty: 90 TABLET | Refills: 0 | Status: SHIPPED | OUTPATIENT
Start: 2021-01-15 | End: 2021-01-25

## 2021-01-15 RX ORDER — METHOCARBAMOL 500 MG/1
500 TABLET, FILM COATED ORAL 3 TIMES DAILY PRN
Qty: 90 TABLET | Refills: 1 | Status: SHIPPED | OUTPATIENT
Start: 2021-01-15 | End: 2021-01-25

## 2021-01-15 RX ORDER — OMEPRAZOLE 40 MG/1
40 CAPSULE, DELAYED RELEASE ORAL DAILY
Qty: 90 CAPSULE | Refills: 0 | Status: SHIPPED | OUTPATIENT
Start: 2021-01-15 | End: 2021-04-30

## 2021-01-15 RX ORDER — PRAVASTATIN SODIUM 20 MG
20 TABLET ORAL DAILY
Qty: 90 TABLET | Refills: 0 | Status: SHIPPED | OUTPATIENT
Start: 2021-01-15 | End: 2021-02-12

## 2021-01-15 ASSESSMENT — MIFFLIN-ST. JEOR: SCORE: 2102.71

## 2021-01-15 NOTE — PROGRESS NOTES
Assessment & Plan     Concussion with loss of consciousness of 30 minutes or less, initial encounter - discussed benefits of comprehensive approach to concussion management. He agrees.   - CONCUSSION  REFERRAL    Neck muscle spasm - will treat with alternating heat/ice, NSAID and mm relaxer for now. Cautioned on potential side effects. Feels like he is sleeping well, so no stronger pain medications indicated.    - methocarbamol (ROBAXIN) 500 MG tablet; Take 1 tablet (500 mg) by mouth 3 times daily as needed for muscle spasms    Vertigo - will trial meclizine to help with symptoms, particularly overnight.   - meclizine (ANTIVERT) 25 MG tablet; Take 1 tablet (25 mg) by mouth 3 times daily as needed for dizziness    Review of external notes as documented above        No follow-ups on file.    Rut Flanagan MD  [facility] is a 55 year old who presents to clinic today for the following health issues     HPI     This is a workmen's compensation visit.     Date of injury 1/13/2021    Mechanism: fell backward down an icy step while attempting to spread ice on the walkway into the office. 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 Believes he fell onto his neck and head primarily. Believe he lost consciousness but unsure how long.     Current symptoms: experiencing ringing in both ears, right > left, blurry vision in the left eye, bilateral jaw pain, bilateral neck pain, numbness on the left side of the face, occipital pain, low back pain, headache, vertigo with head position changes.     He is trying to manage pain symptoms with ibuprofen.   He feels unsteady on his feet at times due to vertigo.   No symptoms have improved since his fall.     Imaging in the ER showed no acute injuries to the neck, chest, or head.       Review of Systems   Constitutional, HEENT, cardiovascular, pulmonary, gi and gu systems are negative, except as otherwise  noted.      Objective    BP (!) 150/90 (BP Location: Right arm, Patient Position: Chair, Cuff Size: Adult Large)   Pulse 99   Temp 98.3  F (36.8  C) (Oral)   Ht 1.829 m (6')   Wt 122.5 kg (270 lb)   SpO2 97%   BMI 36.62 kg/m    Body mass index is 36.62 kg/m .  Physical Exam   GENERAL: healthy, alert and no distress  EYES: Eyes grossly normal to inspection, EOMI, PERRL and conjunctivae and sclerae normal  HENT: facial muscles all responsive, ear canals and TM's normal  NECK: trapezius muscles in spasm bilaterally, right > left  RESP: lungs clear to auscultation - no rales, rhonchi or wheezes  CV: regular rate and rhythm, normal S1 S2, no S3 or S4, no murmur, click or rub  MS: no interscapular muscle spasm  SKIN: 2 inch abrasion on the right posterior occiput

## 2021-01-15 NOTE — LETTER
[facility]  [address] [address]  Phone: [phone]    01/15/21    Em Jamarcus   Fax [phone]    To whom it may concern:     Galileo Vieira saw me in clinic today.     He sustained an injury on 1/13/2021 and is currently unable to work.   His date condition commenced was 1/13/2021.     It has yet to be determined when he can safely return back to work.     This is not COVID-19 related.       Sincerely,      Rut Flanagan MD

## 2021-01-15 NOTE — LETTER
[facility]  [address] [address]  Phone: [phone]    01/15/21    Em Ricketts   Fax [phone]    To whom it may concern:     Galileo Vieira saw me in clinic today. He is currently unable to work due to a health condition. It has yet to be determined when he can safely return back to work.     This is not COVID-19 related.       Sincerely,      Rut Flanagan MD

## 2021-01-15 NOTE — PATIENT INSTRUCTIONS
1. Meclizine - three times daily for the next 2 days, then stop and see how vertigo behaves. If needed, resume for another two days, then try off again.     2. For neck and jaw - try methocarbamol up to three times daily as needed. Can rsume take celebrex twice daily OR ibuprofen 400 mg every 4 hours as needed.     Concussion clinic will contact you.    Check blood pressure maybe twice weekly.

## 2021-01-25 ENCOUNTER — OFFICE VISIT (OUTPATIENT)
Dept: FAMILY MEDICINE | Facility: CLINIC | Age: 56
End: 2021-01-25
Payer: OTHER MISCELLANEOUS

## 2021-01-25 VITALS
SYSTOLIC BLOOD PRESSURE: 154 MMHG | WEIGHT: 270 LBS | DIASTOLIC BLOOD PRESSURE: 90 MMHG | HEIGHT: 72 IN | RESPIRATION RATE: 16 BRPM | HEART RATE: 88 BPM | TEMPERATURE: 99.7 F | BODY MASS INDEX: 36.57 KG/M2 | OXYGEN SATURATION: 97 %

## 2021-01-25 DIAGNOSIS — H93.11 TINNITUS, RIGHT: ICD-10-CM

## 2021-01-25 DIAGNOSIS — F32.0 MILD MAJOR DEPRESSION (H): ICD-10-CM

## 2021-01-25 DIAGNOSIS — R42 VERTIGO: ICD-10-CM

## 2021-01-25 DIAGNOSIS — S46.812D STRAIN OF LEFT TRAPEZIUS MUSCLE, SUBSEQUENT ENCOUNTER: ICD-10-CM

## 2021-01-25 DIAGNOSIS — S06.0X9D CONCUSSION WITH LOSS OF CONSCIOUSNESS, SUBSEQUENT ENCOUNTER: Primary | ICD-10-CM

## 2021-01-25 DIAGNOSIS — R68.84 JAW PAIN: ICD-10-CM

## 2021-01-25 PROCEDURE — 99214 OFFICE O/P EST MOD 30 MIN: CPT | Performed by: FAMILY MEDICINE

## 2021-01-25 ASSESSMENT — MIFFLIN-ST. JEOR: SCORE: 2102.71

## 2021-01-25 ASSESSMENT — ANXIETY QUESTIONNAIRES
6. BECOMING EASILY ANNOYED OR IRRITABLE: MORE THAN HALF THE DAYS
GAD7 TOTAL SCORE: 10
IF YOU CHECKED OFF ANY PROBLEMS ON THIS QUESTIONNAIRE, HOW DIFFICULT HAVE THESE PROBLEMS MADE IT FOR YOU TO DO YOUR WORK, TAKE CARE OF THINGS AT HOME, OR GET ALONG WITH OTHER PEOPLE: SOMEWHAT DIFFICULT
7. FEELING AFRAID AS IF SOMETHING AWFUL MIGHT HAPPEN: MORE THAN HALF THE DAYS
5. BEING SO RESTLESS THAT IT IS HARD TO SIT STILL: SEVERAL DAYS
1. FEELING NERVOUS, ANXIOUS, OR ON EDGE: SEVERAL DAYS
3. WORRYING TOO MUCH ABOUT DIFFERENT THINGS: MORE THAN HALF THE DAYS
2. NOT BEING ABLE TO STOP OR CONTROL WORRYING: SEVERAL DAYS

## 2021-01-25 ASSESSMENT — PATIENT HEALTH QUESTIONNAIRE - PHQ9
5. POOR APPETITE OR OVEREATING: SEVERAL DAYS
SUM OF ALL RESPONSES TO PHQ QUESTIONS 1-9: 12

## 2021-01-25 NOTE — PROGRESS NOTES
Future Appointments   Date Time Provider Department Center   1/25/2021  1:00 PM Rut Flanagan MD [facility]     Appointment Notes for this encounter:   Declined Tablet-f/u w/c jaw and neck issues pain    Health Maintenance Due   Topic Date Due     HEPATITIS C SCREENING  11/23/1984     ZOSTER IMMUNIZATION (1 of 2) 11/23/2016     PREVENTIVE CARE VISIT  01/13/2021     Health Maintenance addressed:  NONE        MyChart Status:  Active and Using       Assessment & Plan     Concussion with loss of consciousness, subsequent encounter- ongoing symptoms. Concussion clinic next week    Vertigo - did not get any significant help from the meclizine, will discontinue. Will continue to steady himself with position changes for now.     Jaw pain - improved by ibuprofen, continue 2-3 times daily.     Tinnitus, right - resolved.     Strain of left trapezius muscle, subsequent encounter - improved by ibuprofen, continue 2-3 times daily, can add heat.    Mild major depression (H) - will continue sertraline at 50 mg daily for now      Return in about 6 months (around 7/25/2021) for Medication Recheck.    Rut Flanagan MD  [facility] TETO Gurrola is a 55 year old who presents to clinic today for the following health issues     HPI     This is a workmen's compensation visit.      Date of injury 1/13/2021     Mechanism: fell backward down an icy step while attempting to spread ice on the walkway into the office. 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 Believes he fell onto his neck and head primarily. Believe he lost consciousness but unsure how long.      Current symptoms: experiencing ringing in both ears, right > left, blurry vision in the left eye, bilateral jaw pain, bilateral neck pain, numbness on the left side of the face, occipital pain, low back pain, headache, vertigo with head position changes.      He is trying to manage pain symptoms with ibuprofen.   He feels  unsteady on his feet at times due to vertigo.   No symptoms have improved since his fall.      Imaging in the ER showed no acute injuries to the neck, chest, or head. This is a workmen's compensation visit.      Date of injury 1/13/2021     Mechanism: fell backward down an icy step while attempting to spread ice on the walkway into the office. 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 Believes he fell onto his neck and head primarily. Believe he lost consciousness but unsure how long.     Imaging in the ER showed no acute injuries to the neck, chest, or head.      Initial symptoms: experiencing ringing in both ears, right > left, blurry vision in the left eye, bilateral jaw pain, bilateral neck pain, numbness on the left side of the face, occipital pain, low back pain, headache, vertigo with head position changes.      Current symptoms: reports frontal headaches persist, left eye blurry vision improved but still present, right sided tinnitus resolved, bilateral jaw pain still present - able to eat and speak without pain. Neck and low back pain improved but still present. Vertigo still present, not improved. No nausea.    Vertigo is his most bothersome symptom. He notes when making head position changes. Symptoms typically last less than one minutes and then resolve.       He takes ibuprofen 800 mg up to twice daily, which helps with his headache, jaw pain, and neck pain.     Notes an increase in depression symptoms since his injury. Has history of depression but hadn't been treating as he felt like he was in a good place. Recently restarted his sertraline 50 mg daily to help with depression symptoms since his injury mid January.     He meets with the concussion clinic next week to determine how they can best help him.       Review of Systems   Constitutional, HEENT, cardiovascular, pulmonary, gi and gu systems are negative, except as otherwise noted.      Objective    BP (!) 154/90 (BP Location: Right  arm, Patient Position: Chair, Cuff Size: Adult Large)   Pulse 88   Temp 99.7  F (37.6  C) (Oral)   Resp 16   Ht 1.829 m (6')   Wt 122.5 kg (270 lb)   SpO2 97%   BMI 36.62 kg/m    Body mass index is 36.62 kg/m .  Physical Exam   GENERAL: healthy, alert and no distress  EYES: Eyes grossly normal to inspection, PERRL and conjunctivae and sclerae normal  HENT: ear canals and TM's normal, nose and mouth without ulcers or lesions  NECK: no adenopathy, no asymmetry, masses, or scars and thyroid normal to palpation  PSYCH: mentation appears normal, affect normal/bright

## 2021-01-26 ASSESSMENT — ANXIETY QUESTIONNAIRES: GAD7 TOTAL SCORE: 10

## 2021-02-03 ENCOUNTER — TRANSFERRED RECORDS (OUTPATIENT)
Dept: HEALTH INFORMATION MANAGEMENT | Facility: CLINIC | Age: 56
End: 2021-02-03

## 2021-02-03 ENCOUNTER — HOSPITAL ENCOUNTER (OUTPATIENT)
Dept: NEUROLOGY | Facility: CLINIC | Age: 56
Setting detail: THERAPIES SERIES
Discharge: STILL ADMITTED OR ADMINISTRATIVE | End: 2021-02-03
Attending: NURSE PRACTITIONER

## 2021-02-03 DIAGNOSIS — F07.81 POST CONCUSSION SYNDROME: ICD-10-CM

## 2021-02-03 DIAGNOSIS — R68.89 SENSITIVITY TO LIGHT: ICD-10-CM

## 2021-02-03 DIAGNOSIS — G44.309 POST-CONCUSSION HEADACHE: ICD-10-CM

## 2021-02-03 ASSESSMENT — MIFFLIN-ST. JEOR: SCORE: 2102.71

## 2021-02-08 ENCOUNTER — ALLIED HEALTH/NURSE VISIT (OUTPATIENT)
Dept: FAMILY MEDICINE | Facility: CLINIC | Age: 56
End: 2021-02-08
Payer: COMMERCIAL

## 2021-02-08 VITALS — DIASTOLIC BLOOD PRESSURE: 78 MMHG | SYSTOLIC BLOOD PRESSURE: 132 MMHG

## 2021-02-08 DIAGNOSIS — S06.0X9D CONCUSSION WITH LOSS OF CONSCIOUSNESS, SUBSEQUENT ENCOUNTER: Primary | ICD-10-CM

## 2021-02-08 PROCEDURE — 99207 PR NO CHARGE NURSE ONLY: CPT

## 2021-02-10 ENCOUNTER — ALLIED HEALTH/NURSE VISIT (OUTPATIENT)
Dept: FAMILY MEDICINE | Facility: CLINIC | Age: 56
End: 2021-02-10
Payer: COMMERCIAL

## 2021-02-10 VITALS — SYSTOLIC BLOOD PRESSURE: 146 MMHG | DIASTOLIC BLOOD PRESSURE: 90 MMHG

## 2021-02-10 DIAGNOSIS — S06.0X9D CONCUSSION WITH LOSS OF CONSCIOUSNESS, SUBSEQUENT ENCOUNTER: Primary | ICD-10-CM

## 2021-02-10 PROCEDURE — 99207 PR NO CHARGE NURSE ONLY: CPT

## 2021-02-11 ENCOUNTER — COMMUNICATION - HEALTHEAST (OUTPATIENT)
Dept: NEUROLOGY | Facility: CLINIC | Age: 56
End: 2021-02-11

## 2021-02-11 DIAGNOSIS — G44.309 POST-CONCUSSION HEADACHE: ICD-10-CM

## 2021-02-11 DIAGNOSIS — F07.81 POST CONCUSSION SYNDROME: ICD-10-CM

## 2021-02-11 DIAGNOSIS — R68.84 JAW PAIN: ICD-10-CM

## 2021-02-11 DIAGNOSIS — M54.2 NECK PAIN: ICD-10-CM

## 2021-02-12 ENCOUNTER — ALLIED HEALTH/NURSE VISIT (OUTPATIENT)
Dept: FAMILY MEDICINE | Facility: CLINIC | Age: 56
End: 2021-02-12
Payer: COMMERCIAL

## 2021-02-12 VITALS — OXYGEN SATURATION: 98 % | SYSTOLIC BLOOD PRESSURE: 138 MMHG | DIASTOLIC BLOOD PRESSURE: 82 MMHG | HEART RATE: 52 BPM

## 2021-02-12 DIAGNOSIS — E34.9 TESTOSTERONE INSUFFICIENCY: ICD-10-CM

## 2021-02-12 DIAGNOSIS — E78.5 HYPERLIPIDEMIA LDL GOAL <160: ICD-10-CM

## 2021-02-12 DIAGNOSIS — Z01.30 BP CHECK: Primary | ICD-10-CM

## 2021-02-12 PROCEDURE — 99207 PR NO CHARGE NURSE ONLY: CPT

## 2021-02-12 RX ORDER — TESTOSTERONE CYPIONATE 200 MG/ML
200 INJECTION, SOLUTION INTRAMUSCULAR
Qty: 10 ML | Refills: 1 | Status: SHIPPED | OUTPATIENT
Start: 2021-02-12 | End: 2021-10-19

## 2021-02-12 RX ORDER — PRAVASTATIN SODIUM 20 MG
20 TABLET ORAL DAILY
Qty: 90 TABLET | Refills: 3 | Status: SHIPPED | OUTPATIENT
Start: 2021-02-12 | End: 2021-08-30

## 2021-02-23 ENCOUNTER — ALLIED HEALTH/NURSE VISIT (OUTPATIENT)
Dept: FAMILY MEDICINE | Facility: CLINIC | Age: 56
End: 2021-02-23
Payer: COMMERCIAL

## 2021-02-23 VITALS — DIASTOLIC BLOOD PRESSURE: 80 MMHG | SYSTOLIC BLOOD PRESSURE: 142 MMHG

## 2021-02-23 DIAGNOSIS — Z01.30 BP CHECK: Primary | ICD-10-CM

## 2021-02-23 PROCEDURE — 99207 PR NO CHARGE NURSE ONLY: CPT

## 2021-02-26 ENCOUNTER — COMMUNICATION - HEALTHEAST (OUTPATIENT)
Dept: NEUROLOGY | Facility: CLINIC | Age: 56
End: 2021-02-26

## 2021-02-26 DIAGNOSIS — M54.9 BACK PAIN: ICD-10-CM

## 2021-02-26 DIAGNOSIS — F07.81 POST CONCUSSION SYNDROME: ICD-10-CM

## 2021-02-26 DIAGNOSIS — M54.2 NECK PAIN: ICD-10-CM

## 2021-02-26 DIAGNOSIS — R68.84 JAW PAIN: ICD-10-CM

## 2021-02-26 DIAGNOSIS — G44.309 POST-CONCUSSION HEADACHE: ICD-10-CM

## 2021-03-03 ENCOUNTER — HOSPITAL ENCOUNTER (OUTPATIENT)
Dept: NEUROLOGY | Facility: CLINIC | Age: 56
Setting detail: THERAPIES SERIES
Discharge: STILL ADMITTED OR ADMINISTRATIVE | End: 2021-03-03
Attending: NURSE PRACTITIONER

## 2021-03-03 DIAGNOSIS — G47.00 INSOMNIA, UNSPECIFIED TYPE: ICD-10-CM

## 2021-03-03 DIAGNOSIS — F06.30 MOOD DISORDER AS LATE EFFECT OF TRAUMATIC BRAIN INJURY (H): ICD-10-CM

## 2021-03-03 DIAGNOSIS — H53.9 VISION ABNORMALITIES: ICD-10-CM

## 2021-03-03 DIAGNOSIS — R41.840 ATTENTION AND CONCENTRATION DEFICIT: ICD-10-CM

## 2021-03-03 DIAGNOSIS — S06.9XAS MOOD DISORDER AS LATE EFFECT OF TRAUMATIC BRAIN INJURY (H): ICD-10-CM

## 2021-03-08 ENCOUNTER — OFFICE VISIT (OUTPATIENT)
Dept: FAMILY MEDICINE | Facility: CLINIC | Age: 56
End: 2021-03-08
Payer: OTHER MISCELLANEOUS

## 2021-03-08 VITALS
DIASTOLIC BLOOD PRESSURE: 90 MMHG | BODY MASS INDEX: 38.74 KG/M2 | WEIGHT: 286 LBS | HEART RATE: 90 BPM | HEIGHT: 72 IN | TEMPERATURE: 98.9 F | SYSTOLIC BLOOD PRESSURE: 144 MMHG

## 2021-03-08 DIAGNOSIS — I10 BENIGN ESSENTIAL HYPERTENSION: Primary | ICD-10-CM

## 2021-03-08 DIAGNOSIS — S06.0X9D CONCUSSION WITH LOSS OF CONSCIOUSNESS, SUBSEQUENT ENCOUNTER: ICD-10-CM

## 2021-03-08 DIAGNOSIS — G44.309 POST-CONCUSSION HEADACHE: ICD-10-CM

## 2021-03-08 PROCEDURE — 99214 OFFICE O/P EST MOD 30 MIN: CPT | Performed by: PHYSICIAN ASSISTANT

## 2021-03-08 RX ORDER — BUPROPION HYDROCHLORIDE 150 MG/1
150 TABLET ORAL
COMMUNITY
Start: 2021-03-03 | End: 2021-07-27

## 2021-03-08 RX ORDER — LOSARTAN POTASSIUM 50 MG/1
50 TABLET ORAL DAILY
Qty: 30 TABLET | Refills: 1 | Status: SHIPPED | OUTPATIENT
Start: 2021-03-08 | End: 2021-03-26

## 2021-03-08 ASSESSMENT — MIFFLIN-ST. JEOR: SCORE: 2175.29

## 2021-03-08 ASSESSMENT — PAIN SCALES - GENERAL: PAINLEVEL: MILD PAIN (3)

## 2021-03-08 NOTE — LETTER
March 8, 2021      Galileo Vieira  [address] [facility] [address]      Fax attn Nelli: [phone]      To Whom It May Concern:          Galileo Vieira was seen in our clinic. He may return to work without restrictions on Wednesday 3/10/21 for 5 hour shift.      Sincerely,        Wendi Renteria PA-C

## 2021-03-08 NOTE — PROGRESS NOTES
Assessment & Plan     Benign essential hypertension  Has had no problems with htn prior to concussion.  Readings were 180 yesterday.  First reading was 166 systolic today.  Will start on moderate dose of losartan.  Discussed with C.  - losartan (COZAAR) 50 MG tablet; Take 1 tablet (50 mg) by mouth daily      Concussion with loss of consciousness, subsequent encounter      Post-concussion headache  Will be starting amitriptyline, just picked up.       BMI:   Estimated body mass index is 38.79 kg/m  as calculated from the following:    Height as of this encounter: 1.829 m (6').    Weight as of this encounter: 129.7 kg (286 lb).         Return in about 2 weeks (around 3/22/2021) for Nurse Only BP check.    Wendi Renteria PA-C  [facility]    Jannie Gurrola is a 55 year old who presents for the following health issues     History of Present Illness       Hypertension: He presents for follow up of hypertension.  He does not check blood pressure  regularly outside of the clinic. Outside blood pressures have been over 140/90. He does not follow a low salt diet.     He eats 0-1 servings of fruits and vegetables daily.He consumes 1 sweetened beverage(s) daily.He exercises with enough effort to increase his heart rate 9 or less minutes per day.  He exercises with enough effort to increase his heart rate 3 or less days per week.   He is taking medications regularly.     Additional complaints:   HPI additional notes: Galileo presents today with   Chief Complaint   Patient presents with     Work Comp     concussion-- ongoing headaches/high bp   Called C, will fax copy of letter for today to Nelli.  May only need to be on this until his concussion symptoms have resolved.       BP Readings from Last 6 Encounters:   03/08/21 (!) 144/90   02/23/21 (!) 142/80   02/12/21 138/82   02/10/21 (!) 146/90   02/08/21 132/78   01/25/21 (!) 154/90     Blood pressure yesterday was 180/120, hr was 108,  blood pressure has been high slightly in the past but has been worse since the concussion.      Review of Systems   Constitutional, HEENT, cardiovascular, pulmonary, gi and gu systems are negative, except as otherwise noted.      Objective    BP (!) 144/90   Pulse 90   Temp 98.9  F (37.2  C) (Oral)   Ht 1.829 m (6')   Wt 129.7 kg (286 lb)   BMI 38.79 kg/m    Body mass index is 38.79 kg/m .  Physical Exam     Physical Exam   GENERAL: healthy, alert, in no acute distress  EYES: Grossly normal to inspection, EOMI, PERRL  HENT: Mucous mebranes moist.  NECK: Non-tender, no adenopathy.  RESP: lungs clear to auscultation - no rales, no rhonchi, no wheezes  CV: regular rate and rhythm, normal S1 S2. No peripheral edema.  SKIN: no suspicious lesions, no rashes  PSYCH: Alert and oriented times 3;  Able to articulate logical thoughts. Affect is normal.

## 2021-03-08 NOTE — LETTER
March 8, 2021      Galileo Vieira  [address] [address] [facility] [address]      Fax attn Nelli: [phone]      To Whom It May Concern:          Galileo Vieira was seen in our clinic. He may return to work with currently placed  restrictions on Wednesday 3/10/21 for 5 hour shift.      Sincerely,        Wendi Renteria PA-C

## 2021-03-10 ENCOUNTER — ALLIED HEALTH/NURSE VISIT (OUTPATIENT)
Dept: FAMILY MEDICINE | Facility: CLINIC | Age: 56
End: 2021-03-10
Payer: OTHER MISCELLANEOUS

## 2021-03-10 ENCOUNTER — COMMUNICATION - HEALTHEAST (OUTPATIENT)
Dept: NEUROLOGY | Facility: CLINIC | Age: 56
End: 2021-03-10

## 2021-03-10 VITALS — SYSTOLIC BLOOD PRESSURE: 126 MMHG | DIASTOLIC BLOOD PRESSURE: 84 MMHG

## 2021-03-10 DIAGNOSIS — I10 BENIGN ESSENTIAL HYPERTENSION: ICD-10-CM

## 2021-03-10 DIAGNOSIS — S06.0X9D CONCUSSION WITH LOSS OF CONSCIOUSNESS, SUBSEQUENT ENCOUNTER: Primary | ICD-10-CM

## 2021-03-10 PROCEDURE — 99207 PR NO CHARGE NURSE ONLY: CPT

## 2021-03-12 ENCOUNTER — ALLIED HEALTH/NURSE VISIT (OUTPATIENT)
Dept: FAMILY MEDICINE | Facility: CLINIC | Age: 56
End: 2021-03-12
Payer: COMMERCIAL

## 2021-03-12 VITALS — HEART RATE: 88 BPM | SYSTOLIC BLOOD PRESSURE: 122 MMHG | DIASTOLIC BLOOD PRESSURE: 86 MMHG

## 2021-03-12 DIAGNOSIS — I10 BENIGN ESSENTIAL HYPERTENSION: Primary | ICD-10-CM

## 2021-03-12 PROCEDURE — 99207 PR NO CHARGE NURSE ONLY: CPT

## 2021-03-16 ENCOUNTER — OFFICE VISIT (OUTPATIENT)
Dept: URGENT CARE | Facility: URGENT CARE | Age: 56
End: 2021-03-16
Payer: COMMERCIAL

## 2021-03-16 VITALS
TEMPERATURE: 97.9 F | SYSTOLIC BLOOD PRESSURE: 163 MMHG | OXYGEN SATURATION: 96 % | DIASTOLIC BLOOD PRESSURE: 95 MMHG | RESPIRATION RATE: 18 BRPM | HEART RATE: 94 BPM

## 2021-03-16 DIAGNOSIS — R43.2 LOSS OF TASTE: Primary | ICD-10-CM

## 2021-03-16 DIAGNOSIS — J00 ACUTE RHINITIS: ICD-10-CM

## 2021-03-16 PROCEDURE — U0003 INFECTIOUS AGENT DETECTION BY NUCLEIC ACID (DNA OR RNA); SEVERE ACUTE RESPIRATORY SYNDROME CORONAVIRUS 2 (SARS-COV-2) (CORONAVIRUS DISEASE [COVID-19]), AMPLIFIED PROBE TECHNIQUE, MAKING USE OF HIGH THROUGHPUT TECHNOLOGIES AS DESCRIBED BY CMS-2020-01-R: HCPCS | Performed by: PHYSICIAN ASSISTANT

## 2021-03-16 PROCEDURE — U0005 INFEC AGEN DETEC AMPLI PROBE: HCPCS | Performed by: PHYSICIAN ASSISTANT

## 2021-03-16 PROCEDURE — 99213 OFFICE O/P EST LOW 20 MIN: CPT | Performed by: PHYSICIAN ASSISTANT

## 2021-03-16 NOTE — LETTER
March 16, 2021      Galileo Vieira  [id number] [address] [facility] [address]        To Whom It May Concern:    Galileo Vieira  was seen on 3/16/2021  Please excuse his absence from work tomorrow 3/17/2021 due to acute medical illness.  Return to work Friday 3/19/2021 if symptoms are much improved.    Sincerely,        Fabiana Bowling PA-C

## 2021-03-17 ENCOUNTER — TELEPHONE (OUTPATIENT)
Dept: URGENT CARE | Facility: URGENT CARE | Age: 56
End: 2021-03-17

## 2021-03-17 LAB
LABORATORY COMMENT REPORT: ABNORMAL
SARS-COV-2 RNA RESP QL NAA+PROBE: NORMAL
SARS-COV-2 RNA RESP QL NAA+PROBE: POSITIVE
SPECIMEN SOURCE: ABNORMAL
SPECIMEN SOURCE: NORMAL

## 2021-03-17 NOTE — PATIENT INSTRUCTIONS
"  Patient Education   After Your COVID-19 (Coronavirus) Test  You have been tested for COVID-19 (coronavirus).   If you'll have surgery in the next few days, we'll let you know ahead of time if you have the virus. Please call your surgeon's office with any questions.  For all other patients: Results are usually available in [employer] within 2 to 3 days.   If you do not have a [employer] account, you'll get a letter in the mail in about 7 to 10 days.   [employer] is often the fastest way to get test results. Please sign up if you do not already have a [employer] account. See the handout Getting COVID-19 Test Results in [employer] for help.  What if my test result is positive?  If your test is positive and you have not viewed your result in [employer], you'll get a phone call with your result. (A positive test means that you have the virus.)     Follow the tips under \"How do I self-isolate?\" below for 10 days (20 days if you have a weak immune system).    You don't need to be retested for COVID-19 before going back to school or work. As long as you're fever-free and feeling better, you can go back to school, work and other activities after waiting the 10 or 20 days.  What if I have questions after I get my results?  If you have questions about your results, please visit our testing website at [email or web address].   After 7 to 10 days, if you have not gotten your results:     Call [phone] ([phone]) and ask to speak with our COVID-19 results team.    If you're being treated at an infusion center: Call your infusion center directly.  What are the symptoms of COVID-19?  Cough, fever and trouble breathing are the most common signs of COVID-19.  Other symptoms can include new headaches, new muscle or body aches, new and unexplained fatigue (feeling very tired), chills, sore throat, congestion (stuffy or runny nose), diarrhea (loose poop), loss of taste or smell, belly pain, and nausea or vomiting " "(feeling sick to your stomach or throwing up).  You may already have symptoms of COVID-19, or they may show up later.  What should I do if I have symptoms?  If you're having surgery: Call your surgeon's office.  For all other patients: Stay home and away from others (self-isolate) until ...    You've had no fever--and no medicine that reduces fever--for 1 full day (24 hours), AND    Other symptoms have gotten better. For example, your cough or breathing has improved, AND    At least 10 days have passed since your symptoms first started.  How do I self-isolate?    Stay in your own room, even for meals. Use your own bathroom if you can.    Stay away from others in your home. No hugging, kissing or shaking hands. No visitors.    Don't go to work, school or anywhere else.    Clean \"high touch\" surfaces often (doorknobs, counters, handles). Use household cleaning spray or wipes. You'll find a full list of  on the EPA website: www.epa.gov/pesticide-registration/list-n-disinfectants-use-against-sars-cov-2.    Cover your mouth and nose with a mask or other face covering to avoid spreading germs.    Wash your hands and face often. Use soap and water.    Caregivers in these groups are at risk for severe illness due to COVID-19:  ? People 65 years and older  ? People who live in a nursing home or long-term care facility  ? People with chronic disease (lung, heart, cancer, diabetes, kidney, liver, immunologic)  ? People who have a weakened immune system, including those who:    Are in cancer treatment    Take medicine that weakens the immune system, such as corticosteroids    Had a bone marrow or organ transplant    Have an immune deficiency    Have poorly controlled HIV or AIDS    Are obese (body mass index of 40 or higher)    Smoke regularly    Caregivers should wear gloves while washing dishes, handling laundry and cleaning bedrooms and bathrooms.    Use caution when washing and drying laundry: Don't shake dirty " laundry and use the warmest water setting that you can.    For more tips on managing your health at home, go to www.cdc.gov/coronavirus/2019-ncov/downloads/10Things.pdf.  How can I take care of myself at home?  1. Get lots of rest. Drink extra fluids (unless a doctor has told you not to).  2. Take Tylenol (acetaminophen) for fever or pain. If you have liver or kidney problems, ask your family doctor if it's OK to take Tylenol.   Adults can take either:  ? 650 mg (two 325 mg pills) every 4 to 6 hours, or   ? 1,000 mg (two 500 mg pills) every 8 hours as needed.  ? Note: Don't take more than 3,000 mg in one day. Acetaminophen is found in many medicines (both prescribed and over-the-counter medicines). Read all labels to be sure you don't take too much.   For children, check the Tylenol bottle for the right dose. The dose is based on the child's age or weight.  3. If you have other health problems (like cancer, heart failure, an organ transplant or severe kidney disease): Call your specialty clinic if you don't feel better in the next 2 days.  4. Know when to call 911. Emergency warning signs include:  ? Trouble breathing or shortness of breath  ? Chest pain or pressure that doesn't go away  ? Feeling confused like you haven't felt before, or not being able to wake up  ? Bluish-colored lips or face  5. If your doctor prescribed a blood thinner medicine: Follow their instructions.  Where can I get more information?    [facility] - About COVID-19:   [email or web address]    CDC - If You're Sick: cdc.gov/coronavirus/2019-ncov/about/steps-when-sick.html    CDC - Ending Home Isolation: www.cdc.gov/coronavirus/2019-ncov/hcp/disposition-in-home-patients.html    CDC - Caring for Someone: www.cdc.gov/coronavirus/2019-ncov/if-you-are-sick/care-for-someone.html    [facility] - Interim Guidance for Hospital Discharge to Home: www.health.[facility].[place].us/diseases/coronavirus/hcp/hospdischarge.pdf    [facility]  clinical trials (COVID-19 research studies): [email or web address]    Below are the COVID-19 hotlines at the [place] Department of Health ([facility]). Interpreters are available.  ? For health questions: Call 463-366-7142 or 1-354.879.7393 (7 a.m. to 7 p.m.)  ? For questions about schools and childcare: Call 321-473-1598 or 1-255.770.6857 (7 a.m. to 7 p.m.)    For informational purposes only. Not to replace the advice of your health care provider. Clinically reviewed by Infection Prevention and the [facility] COVID-19 Clinical Team. Copyright   2020 [employer]. All rights reserved. [employer] 570902 - Rev 11/11/20.       Patient Education     Viral Upper Respiratory Illness (Adult)    You have a viral upper respiratory illness (URI), which is another term for the common cold. This illness is contagious during the first few days. It is spread through the air by coughing and sneezing. It may also be spread by direct contact (touching the sick person and then touching your own eyes, nose, or mouth). Frequent handwashing will decrease risk of spread. Most viral illnesses go away within 7 to 10 days with rest and simple home remedies. Sometimes the illness may last for several weeks. Antibiotics will not kill a virus, and they are generally not prescribed for this condition.  Home care    If symptoms are severe, rest at home for the first 2 to 3 days. When you resume activity, don't let yourself get too tired.    Don't smoke. If you need help stopping, talk with your healthcare provider.    Avoid being exposed to cigarette smoke (yours or others ).    You may use acetaminophen or ibuprofen to control pain and fever, unless another medicine was prescribed. If you have chronic liver or kidney disease, have ever had a stomach ulcer or gastrointestinal bleeding, or are taking blood-thinning medicines, talk with your healthcare provider before using these medicines. Aspirin should never  be given to anyone under 18 years of age who is ill with a viral infection or fever. It may cause severe liver or brain damage.    Your appetite may be poor, so a light diet is fine. Stay well hydrated by drinking 6 to 8 glasses of fluids per day (water, soft drinks, juices, tea, or soup). Extra fluids will help loosen secretions in the nose and lungs.    Over-the-counter cold medicines will not shorten the length of time you re sick, but they may be helpful for the following symptoms: cough, sore throat, and nasal and sinus congestion. If you take prescription medicines, ask your healthcare provider or pharmacist which over-the-counter medicines are safe to use. (Note: Don't use decongestants if you have high blood pressure.)  Follow-up care  Follow up with your healthcare provider, or as advised.  When to seek medical advice  Call your healthcare provider right away if any of these occur:    Cough with lots of colored sputum (mucus)    Severe headache; face, neck, or ear pain    Difficulty swallowing due to throat pain    Fever of 100.4 F (38 C) or higher, or as directed by your healthcare provider  Call 911  Call 911 if any of these occur:    Chest pain, shortness of breath, wheezing, or difficulty breathing    Coughing up blood    Very severe pain with swallowing, especially if it goes along with a muffled voice   Jose A last reviewed this educational content on 6/1/2018 2000-2020 [employer]. All rights reserved. This information is not intended as a substitute for professional medical care. Always follow your healthcare professional's instructions.

## 2021-03-17 NOTE — TELEPHONE ENCOUNTER
"-Coronavirus (COVID-19) Notification    Caller Name (Patient, parent, daughter/son, grandparent, etc)  Patient     Reason for call  Notify of Positive Coronavirus (COVID-19) lab results, assess symptoms,  review  [facility] recommendations    Lab Result    Lab test:  2019-nCoV rRt-PCR or SARS-CoV-2 PCR    Oropharyngeal AND/OR nasopharyngeal swabs is POSITIVE for 2019-nCoV RNA/SARS-COV-2 PCR (COVID-19 virus)    RN Recommendations/Instructions per [facility] Coronavirus COVID-19 recommendations    Brief introduction script  Introduce self then review script:  \"I am calling on behalf of [email or web address].  We were notified that your Coronavirus test (COVID-19) for was POSITIVE for the virus.  I have some information to relay to you but first I wanted to mention that the [place] Dept of Health will be contacting you shortly [it's possible MD already called Patient] to talk to you more about how you are feeling and other people you have had contact with who might now also have the virus.  Also,  [facility] is Partnering with the [facility] for Covid-19 research, you may be contacted directly by research staff.\"    Assessment (Inquire about Patient's current symptoms)   Assessment   Current Symptoms at time of phone call: (if no symptoms, document No symptoms] Nasal stuffiness, slight cough, sweating and loss of taste   Symptoms onset (if applicable) 3 days ago     If at time of call, Patients symptoms hare worsened, the Patient should contact 911 or have someone drive them to Emergency Dept promptly:      If Patient calling 911, inform 911 personal that you have tested positive for the Coronavirus (COVID-19).  Place mask on and await 911 to arrive.    If Emergency Dept, If possible, please have another adult drive you to the Emergency Dept but you need to wear mask when in contact with other people.      Monoclonal Antibody Administration    You may be eligible to receive a new treatment with a " "monoclonal antibody for preventing hospitalization in patients at high risk for complications from COVID-19.   This medication is still experimental and available on a limited basis; it is given through an IV and must be given at an infusion center. Please note that not all people who are eligible will receive the medication since it is in limited supply.     Are you interested in being considered for this medication?  No.   Does the patient fit the criteria: Patient declined    If patient qualifies based on above criteria:  \"We will contact you if you are selected to receive the medication in the next 1-2 days.   This is time sensitive and if you are not selected in the next 1-2 days, you will not receive the medication.  If you do not receive a call to schedule, you have not been selected.\"    Review information with Patient    Your result was positive. This means you have COVID-19 (coronavirus).  We have sent you a letter that reviews the information that I'll be reviewing with you now.    How can I protect others?    If you have symptoms: stay home and away from others (self-isolate) until:    You've had no fever--and no medicine that reduces fever--for 1 full day (24 hours). And       Your other symptoms have gotten better. For example, your cough or breathing has improved. And     At least 10 days have passed since your symptoms started. (If you've been told by a doctor that you have a weak immune system, wait 20 days.)     If you don't have symptoms: Stay home and away from others (self-isolate) until at least 10 days have passed since your first positive COVID-19 test. (Date test collected)    During this time:    Stay in your own room, including for meals. Use your own bathroom if you can.    Stay away from others in your home. No hugging, kissing or shaking hands. No visitors.     Don't go to work, school or anywhere else.     Clean  high touch  surfaces often (doorknobs, counters, handles, etc.). Use a " household cleaning spray or wipes. You'll find a full list on the EPA website at www.epa.gov/pesticide-registration/list-n-disinfectants-use-against-sars-cov-2.     Cover your mouth and nose with a mask, tissue or other face covering to avoid spreading germs.    Wash your hands and face often with soap and water.    Make a list of people you have been in close contact with recently, even if either of you wore a face covering.   ; Start your list from 2 days before you became ill or had a positive test.  ; Include anyone that was within 6 feet of you for a cumulative total of 15 minutes or more in 24 hours. (Example: if you sat next to Apolinar for 5 minutes in the morning and 10 minutes in the afternoon, then you were in close contact for 15 minutes total that day. Apolinar would be added to your list.)    A public health worker will call or text you. It is important that you answer. They will ask you questions about possible exposures to COVID-19, such as people you have been in direct contact with and places you have visited.    Tell the people on your list that you have COVID-19; they should stay away from others for 14 days starting from the last time they were in contact with you (unless you are told something different from a public health worker).     Caregivers in these groups are at risk for severe illness due to COVID-19:  o People 65 years and older  o People who live in a nursing home or long-term care facility  o People with chronic disease (lung, heart, cancer, diabetes, kidney, liver, immunologic)  o People who have a weakened immune system, including those who:  - Are in cancer treatment  - Take medicine that weakens the immune system, such as corticosteroids  - Had a bone marrow or organ transplant  - Have an immune deficiency  - Have poorly controlled HIV or AIDS  - Are obese (body mass index of 40 or higher)  - Smoke regularly    Caregivers should wear gloves while washing dishes, handling laundry and  cleaning bedrooms and bathrooms.    Wash and dry laundry with special caution. Don't shake dirty laundry, and use the warmest water setting you can.    If you have a weakened immune system, ask your doctor about other actions you should take.    For more tips, go to www.cdc.gov/coronavirus/2019-ncov/downloads/10Things.pdf.    You should not go back to work until you meet the guidelines above for ending your home isolation. You don't need to be retested for COVID-19 before going back to work--studies show that you won't spread the virus if it's been at least 10 days since your symptoms started (or 20 days, if you have a weak immune system).    Employers: This document serves as formal notice of your employee's medical guidelines for going back to work. They must meet the above guidelines before going back to work in person.    How can I take care of myself?    1. Get lots of rest. Drink extra fluids (unless a doctor has told you not to).    2. Take Tylenol (acetaminophen) for fever or pain. If you have liver or kidney problems, ask your family doctor if it's okay to take Tylenol.     Take either:     650 mg (two 325 mg pills) every 4 to 6 hours, or     1,000 mg (two 500 mg pills) every 8 hours as needed.     Note: Don't take more than 3,000 mg in one day. Acetaminophen is found in many medicines (both prescribed and over-the-counter medicines). Read all labels to be sure you don't take too much.    For children, check the Tylenol bottle for the right dose (based on their age or weight).    3. If you have other health problems (like cancer, heart failure, an organ transplant or severe kidney disease): Call your specialty clinic if you don't feel better in the next 2 days.    4. Know when to call 911: Emergency warning signs include:    Trouble breathing or shortness of breath    Pain or pressure in the chest that doesn't go away    Feeling confused like you haven't felt before, or not being able to wake  up    Bluish-colored lips or face    5. Sign up for [employer]. We know it's scary to hear that you have COVID-19. We want to track your symptoms to make sure you're okay over the next 2 weeks. Please look for an email from [employer]--this is a free, online program that we'll use to keep in touch. To sign up, follow the link in the email. Learn more at [email or web address].    Where can I get more information?    [facility]: [email or web address]    Coronavirus Basics: www.health.[facility].[place]./diseases/coronavirus/basics.html    What to Do If You're Sick: www.cdc.gov/coronavirus/2019-ncov/about/steps-when-sick.html    Ending Home Isolation: www.cdc.gov/coronavirus/2019-ncov/hcp/disposition-in-home-patients.html     Caring for Someone with COVID-19: www.cdc.gov/coronavirus/2019-ncov/if-you-are-sick/care-for-someone.html     [facility] clinical trials (COVID-19 research studies): [email or web address].[facility]-clinical-trials     A Positive COVID-19 letter will be sent via [employer] or the mail. (Exception, no letters sent to Presurgerical/Preprocedure Patients)    Katherine Leyva LPN

## 2021-03-17 NOTE — PROGRESS NOTES
Assessment & Plan     Loss of taste  Symptomatic Covid PCR swab done in clinic today.  Awaiting COVID-19 PCR result.   If result return positive will need to self quarantine for 10 days or until 24 hrs after symptoms resolve (whichever comes first). Supportive cares measures.  Follow-up if any worsening symptoms.  Patient agrees.    - Symptomatic COVID-19 Virus (Coronavirus) by PCR    Acute rhinitis  Lungs are clear on exam.  He is in no acute distress.  Keep monitoring symptoms.  Supportive care measures advised.  Follow-up if any worsening symptoms.  He agrees.         Return in about 1 week (around 3/23/2021) for Symptoms failing to improve.    Fabiana Bowling PA-C  [facility] TETO Gurrola is a 55 year old male who presents to clinic today for the following health issues:  Chief Complaint   Patient presents with     URI     HPI      URI Adult    Onset of symptoms was a few days ago.  Course of illness is worsening.    Severity moderate  Current and Associated symptoms: loss of taste (today), runny nose, stuffy nose, slight cough and fatigue. Nausea yesterday.  Denies: fever, chills, wheezing, shortness of breath, chest pain headache, body aches, sore throat, vomiting and diarrhea. Denies loss of smell.  Treatment measures tried include Mucinex, nasal saline rinses.  Predisposing factors include None.  No known sick contacts.  Of note, received only one dose of the Pfizer Covid vaccine 12/30/2020.      Review of Systems  Constitutional, HEENT, cardiovascular, pulmonary, gi and gu systems are negative, except as otherwise noted.      Objective    BP (!) 163/95 (BP Location: Right arm, Patient Position: Chair, Cuff Size: Adult Large)   Pulse 94   Temp 97.9  F (36.6  C) (Oral)   Resp 18   SpO2 96%   Physical Exam   GENERAL: healthy, alert and no distress  HENT: ear canals and TM's normal,  mouth without ulcers or lesions  NECK: Submandibular glands are slightly  swollen  RESP: lungs clear to auscultation - no rales, rhonchi or wheezes  CV: regular rate and rhythm, normal S1 S2      No results found for this or any previous visit (from the past 24 hour(s)).

## 2021-03-26 ENCOUNTER — ALLIED HEALTH/NURSE VISIT (OUTPATIENT)
Dept: FAMILY MEDICINE | Facility: CLINIC | Age: 56
End: 2021-03-26
Payer: COMMERCIAL

## 2021-03-26 ENCOUNTER — TELEPHONE (OUTPATIENT)
Dept: FAMILY MEDICINE | Facility: CLINIC | Age: 56
End: 2021-03-26

## 2021-03-26 VITALS
OXYGEN SATURATION: 97 % | RESPIRATION RATE: 16 BRPM | SYSTOLIC BLOOD PRESSURE: 144 MMHG | DIASTOLIC BLOOD PRESSURE: 84 MMHG | HEART RATE: 78 BPM

## 2021-03-26 DIAGNOSIS — I10 BENIGN ESSENTIAL HYPERTENSION: Primary | ICD-10-CM

## 2021-03-26 DIAGNOSIS — Z01.30 BP CHECK: Primary | ICD-10-CM

## 2021-03-26 DIAGNOSIS — I10 BENIGN ESSENTIAL HYPERTENSION: ICD-10-CM

## 2021-03-26 PROCEDURE — 99207 PR NO CHARGE NURSE ONLY: CPT

## 2021-03-26 PROCEDURE — 36415 COLL VENOUS BLD VENIPUNCTURE: CPT | Performed by: PHYSICIAN ASSISTANT

## 2021-03-26 PROCEDURE — 80053 COMPREHEN METABOLIC PANEL: CPT | Performed by: PHYSICIAN ASSISTANT

## 2021-03-26 RX ORDER — LOSARTAN POTASSIUM 50 MG/1
75 TABLET ORAL DAILY
Qty: 45 TABLET | Refills: 1 | Status: SHIPPED | OUTPATIENT
Start: 2021-03-26 | End: 2021-05-27

## 2021-03-26 NOTE — TELEPHONE ENCOUNTER
"Vital Signs 3/8/2021 3/8/2021 3/10/2021 3/12/2021 3/16/2021   Systolic 164 144 126 122 163   Diastolic 88 90 84 86 95   Pulse 90   88 94   Temperature 98.9    97.9   Respirations     18   Weight (LB) 286 lb       Height 6' 0\"       BMI (Calculated) 38.79       Pain        O2     96     Vital Signs 3/26/2021   Systolic 144   Diastolic 84   Pulse 78   Temperature    Respirations 16   Weight (LB)    Height    BMI (Calculated)    Pain    O2 97     Pt sat for  6 minutes, at 350pm  On 3/26/2021.Nico Dumont CMA    "

## 2021-03-27 LAB
ALBUMIN SERPL-MCNC: 3.9 G/DL (ref 3.4–5)
ALP SERPL-CCNC: 111 U/L (ref 40–150)
ALT SERPL W P-5'-P-CCNC: 166 U/L (ref 0–70)
ANION GAP SERPL CALCULATED.3IONS-SCNC: 6 MMOL/L (ref 3–14)
AST SERPL W P-5'-P-CCNC: 57 U/L (ref 0–45)
BILIRUB SERPL-MCNC: 0.4 MG/DL (ref 0.2–1.3)
BUN SERPL-MCNC: 15 MG/DL (ref 7–30)
CALCIUM SERPL-MCNC: 9.5 MG/DL (ref 8.5–10.1)
CHLORIDE SERPL-SCNC: 106 MMOL/L (ref 94–109)
CO2 SERPL-SCNC: 26 MMOL/L (ref 20–32)
CREAT SERPL-MCNC: 0.95 MG/DL (ref 0.66–1.25)
GFR SERPL CREATININE-BSD FRML MDRD: >90 ML/MIN/{1.73_M2}
GLUCOSE SERPL-MCNC: 125 MG/DL (ref 70–99)
POTASSIUM SERPL-SCNC: 3.9 MMOL/L (ref 3.4–5.3)
PROT SERPL-MCNC: 7.5 G/DL (ref 6.8–8.8)
SODIUM SERPL-SCNC: 138 MMOL/L (ref 133–144)

## 2021-04-03 ENCOUNTER — HEALTH MAINTENANCE LETTER (OUTPATIENT)
Age: 56
End: 2021-04-03

## 2021-04-06 ENCOUNTER — HOSPITAL ENCOUNTER (OUTPATIENT)
Dept: NEUROLOGY | Facility: CLINIC | Age: 56
Setting detail: THERAPIES SERIES
Discharge: STILL ADMITTED OR ADMINISTRATIVE | End: 2021-04-06
Attending: NURSE PRACTITIONER

## 2021-04-06 ENCOUNTER — TRANSFERRED RECORDS (OUTPATIENT)
Dept: HEALTH INFORMATION MANAGEMENT | Facility: CLINIC | Age: 56
End: 2021-04-06

## 2021-04-06 DIAGNOSIS — S06.9XAS MOOD DISORDER AS LATE EFFECT OF TRAUMATIC BRAIN INJURY (H): ICD-10-CM

## 2021-04-06 DIAGNOSIS — R41.840 ATTENTION AND CONCENTRATION DEFICIT: ICD-10-CM

## 2021-04-06 DIAGNOSIS — F06.30 MOOD DISORDER AS LATE EFFECT OF TRAUMATIC BRAIN INJURY (H): ICD-10-CM

## 2021-04-19 ENCOUNTER — TELEPHONE (OUTPATIENT)
Dept: FAMILY MEDICINE | Facility: CLINIC | Age: 56
End: 2021-04-19

## 2021-04-19 DIAGNOSIS — F51.04 PSYCHOPHYSIOLOGICAL INSOMNIA: Primary | ICD-10-CM

## 2021-04-19 RX ORDER — ZOLPIDEM TARTRATE 5 MG/1
5 TABLET ORAL
Qty: 30 TABLET | Refills: 5 | Status: SHIPPED | OUTPATIENT
Start: 2021-04-19 | End: 2021-08-30

## 2021-04-19 NOTE — TELEPHONE ENCOUNTER
Pt has not drank any alcohol for 4 weeks and has not been sleeping.   He has used zolpidem in the past with good results.     Can RX be sent for him?    Cecilia Nathan RN

## 2021-04-22 ENCOUNTER — OFFICE VISIT (OUTPATIENT)
Dept: OPTOMETRY | Facility: CLINIC | Age: 56
End: 2021-04-22
Attending: NURSE PRACTITIONER
Payer: OTHER MISCELLANEOUS

## 2021-04-22 DIAGNOSIS — H53.143 PHOTOPHOBIA OF BOTH EYES: ICD-10-CM

## 2021-04-22 DIAGNOSIS — H52.223 REGULAR ASTIGMATISM OF BOTH EYES: ICD-10-CM

## 2021-04-22 DIAGNOSIS — H52.4 ACCOMMODATIVE INSUFFICIENCY: ICD-10-CM

## 2021-04-22 DIAGNOSIS — F07.81 POSTCONCUSSION SYNDROME: Primary | ICD-10-CM

## 2021-04-22 ASSESSMENT — REFRACTION_MANIFEST
OS_CYLINDER: +1.25
OD_CYLINDER: +0.25
OS_AXIS: 160
OS_ADD: +2.25
OD_AXIS: 180
OD_ADD: +2.25
OD_SPHERE: PLANO
OS_SPHERE: +0.50

## 2021-04-22 ASSESSMENT — SLIT LAMP EXAM - LIDS
COMMENTS: NORMAL
COMMENTS: NORMAL

## 2021-04-22 ASSESSMENT — VISUAL ACUITY
METHOD: SNELLEN - LINEAR
OS_CC: 20/25
OD_CC: 20/20
OS_CC+: -1

## 2021-04-22 ASSESSMENT — CUP TO DISC RATIO
OD_RATIO: 0.25
OS_RATIO: 0.25

## 2021-04-22 ASSESSMENT — EXTERNAL EXAM - LEFT EYE: OS_EXAM: NORMAL

## 2021-04-22 ASSESSMENT — EXTERNAL EXAM - RIGHT EYE: OD_EXAM: NORMAL

## 2021-04-30 DIAGNOSIS — K21.9 GASTROESOPHAGEAL REFLUX DISEASE WITHOUT ESOPHAGITIS: ICD-10-CM

## 2021-04-30 RX ORDER — OMEPRAZOLE 40 MG/1
40 CAPSULE, DELAYED RELEASE ORAL DAILY
Qty: 90 CAPSULE | Refills: 3 | Status: SHIPPED | OUTPATIENT
Start: 2021-04-30 | End: 2021-08-30

## 2021-04-30 NOTE — TELEPHONE ENCOUNTER
Prescription approved per [facility] Refill Protocol.  Danielle Byrd RN, BSN

## 2021-05-06 ENCOUNTER — VIRTUAL VISIT (OUTPATIENT)
Dept: SLEEP MEDICINE | Facility: CLINIC | Age: 56
End: 2021-05-06
Payer: COMMERCIAL

## 2021-05-06 VITALS — BODY MASS INDEX: 37.25 KG/M2 | WEIGHT: 275 LBS | HEIGHT: 72 IN

## 2021-05-06 DIAGNOSIS — G47.33 OSA (OBSTRUCTIVE SLEEP APNEA): Primary | ICD-10-CM

## 2021-05-06 DIAGNOSIS — I10 ESSENTIAL HYPERTENSION: ICD-10-CM

## 2021-05-06 DIAGNOSIS — E66.01 MORBID OBESITY (H): ICD-10-CM

## 2021-05-06 DIAGNOSIS — G47.00 INSOMNIA, UNSPECIFIED TYPE: ICD-10-CM

## 2021-05-06 PROCEDURE — 99204 OFFICE O/P NEW MOD 45 MIN: CPT | Mod: 95 | Performed by: PEDIATRICS

## 2021-05-06 ASSESSMENT — MIFFLIN-ST. JEOR: SCORE: 2125.39

## 2021-05-06 NOTE — PATIENT INSTRUCTIONS
Your BMI is Body mass index is 37.3 kg/m .  Weight management is a personal decision.  If you are interested in exploring weight loss strategies, the following discussion covers the approaches that may be successful. Body mass index (BMI) is one way to tell whether you are at a healthy weight, overweight, or obese. It measures your weight in relation to your height.  A BMI of 18.5 to 24.9 is in the healthy range. A person with a BMI of 25 to 29.9 is considered overweight, and someone with a BMI of 30 or greater is considered obese. More than two-thirds of American adults are considered overweight or obese.  Being overweight or obese increases the risk for further weight gain. Excess weight may lead to heart disease and diabetes.  Creating and following plans for healthy eating and physical activity may help you improve your health.  Weight control is part of healthy lifestyle and includes exercise, emotional health, and healthy eating habits. Careful eating habits lifelong are the mainstay of weight control. Though there are significant health benefits from weight loss, long-term weight loss with diet alone may be very difficult to achieve- studies show long-term success with dietary management in less than 10% of people. Attaining a healthy weight may be especially difficult to achieve in those with severe obesity. In some cases, medications, devices and surgical management might be considered.  What can you do?  If you are overweight or obese and are interested in methods for weight loss, you should discuss this with your provider.     Consider reducing daily calorie intake by 500 calories.     Keep a food journal.     Avoiding skipping meals, consider cutting portions instead.    Diet combined with exercise helps maintain muscle while optimizing fat loss. Strength training is particularly important for building and maintaining muscle mass. Exercise helps reduce stress, increase energy, and improves fitness.  Increasing exercise without diet control, however, may not burn enough calories to loose weight.       Start walking three days a week 10-20 minutes at a time    Work towards walking thirty minutes five days a week     Eventually, increase the speed of your walking for 1-2 minutes at time    In addition, we recommend that you review healthy lifestyles and methods for weight loss available through the National Institutes of Health patient information sites:  http://win.niddk.nih.gov/publications/index.htm    And look into health and wellness programs that may be available through your health insurance provider, employer, local community center, or panfilo club.    Weight management plan: Patient was referred to their PCP to discuss a diet and exercise plan.

## 2021-05-06 NOTE — PROGRESS NOTES
Gallieo is a 55 year old who is being evaluated via a billable video visit.      How would you like to obtain your AVS? MyChart  If the video visit is dropped, the invitation should be resent by: Text to cell phone: [phone]   Will anyone else be joining your video visit? No      Italia Dumont CMA on 5/6/2021 at 9:43 AM    Video Start Time: 11:00 AM  Video-Visit Details    Type of service:  Video Visit    Video End Time:11:20 AM    Originating Location (pt. Location): Home    Distant Location (provider location):  [facility]     Platform used for Video Visit: [facility]  Outpatient Sleep Medicine Consultation, New Patient      Name: Galileo Vieira MRN# [id number]   Age: 55 year old YOB: 1965     Date of Consultation: May 6, 2021  Consultation is requested by: No referring provider defined for this encounter.  Primary care provider: Rut Flanagan      Assessment and Plan:     1. GIUSEPPE (obstructive sleep apnea)  Recommended continued use of CPAP therapy nightly and throughout the entire sleep period.  His compliance is excellent and the machine reports a very low number of residual events consistent with his previous downloaded data.  In the absence of clinical symptoms, I would not necessarily recommend any testing at this point.  However, I advised him that if he were developing sleep symptoms in the future I would be happy to see him again.  Today I placed a prescription to renew his consumable CPAP supplies.  Follow-up yearly or sooner with sleep symptoms.    2. Insomnia, unspecified type  Discussed the importance of high-quality sleep in the healing period following concussion.  Recommended that he maintain a regular sleep schedule with consistent bedtime and wake up time.  Dim light in the evening and bright light in the morning help to entrain the circadian rhythm.  Advised him to allow for adequate  amounts of sleep and to engage in a relaxing bedtime routine.  I recommended that he reduce his zolpidem dose to 5 mg nightly for the next 2 weeks and then take it every other night or further reduce the dose to 2.5 mg nightly before discontinuing.  Patient is agreeable and believes he will have no issues with the taper.    3. Essential hypertension  GIUSEPPE is an independent risk factor for hypertension; in this case I do not see any evidence of untreated or undertreated GIUSEPPE despite his recent diagnosis of systemic hypertension.    4. Obesity (BMI 35.0-39.9) with comorbidity (H)  Patient's current weight is 275 pounds, approximately 20 pounds heavier than his sleep study in 2016.  However, no adjustments to CPAP pressure range are necessary given the evidence of therapeutic effect.  Patients with obesity are at higher risk for sleep apnea due to fat deposition in the posterior airway and tongue.  Sleep disruption associated with untreated sleep disorders (including, but not limited to, sleep apnea) can cause hormonal disruption that predisposes individuals to gain weight.  Weight loss can lead to improvement in sleep apnea severity and sleep quality.      History:     Galileo Vieira is a 55 year old male whose visit was conducted via video today.  I reviewed his medical records.  A home sleep apnea test was performed on September 2, 2016.  His weight at that time was 253 pounds.  The test showed mild obstructive sleep apnea with an AHI of 14 events per hour of recording time.  His AHI while supine was notably higher at 55/h, with 21% of the night spent in the supine position.  The oxyhemoglobin saturation shane was 83% and there were 12 minutes with saturations at or below 88%.  CPAP was prescribed for obstructive sleep apnea and sleep associated hypoxemia related to the cumulative effect of episodic desaturations.    Patient is prescribed auto CPAP program to deliver pressures between 6 and 15 cm water.  Compliance is  excellent.  The machine reports a residual AHI of less than 1.  The 95th percentile pressure is 11.9 cm water.  This is consistent with his prior CPAP data downloads.  Patient uses his CPAP well and has no complaints about its effectiveness or the function of the machine.  No snoring observed with CPAP in place.  No daytime sleepiness.  He does not have drowsy driving.  No parasomnias or issues with frequent nighttime awakenings.    He sustained a concussion in January and is still dealing with intermittent headaches and visual changes in his left eye.  He also has newly diagnosed hypertension for which losartan was prescribed.  I noted that his weight has increased to 275 pounds; he would like to lose weight.  BMI is in the range of class II obesity at 37.3 kg/m .  Recent laboratory studies show a CO2 level of 26 mmol/L; patient denies morning headaches and confusion.  Zolpidem was prescribed to promote sleep initiation after his concussion.  He is  generally taking 10 mg per night although there are some nights when he only takes 5 mg because he feels that he does not need it.  He has no difficulty with sleep maintenance.  The CPAP pressure and mask are comfortable for him.  He is due for new supplies.    [employer]   Auto-PAP 6.0 - 15.0 cmH2O 30 day usage data:    96% of days with > 4 hours of use. 0/30 days with no use.   Average use 576 minutes per day.   95%ile Leak 8.16 L/min.   CPAP 95% pressure 11.9 cm.   AHI 0.33 events per hour.       Medications:     Current Outpatient Medications   Medication Sig     buPROPion (WELLBUTRIN XL) 150 MG 24 hr tablet Take 150 mg by mouth     losartan (COZAAR) 50 MG tablet Take 1.5 tablets (75 mg) by mouth daily     omeprazole (PRILOSEC) 40 MG DR capsule TAKE 1 CAPSULE (40 MG) BY MOUTH DAILY     pravastatin (PRAVACHOL) 20 MG tablet Take 1 tablet (20 mg) by mouth daily     testosterone cypionate (DEPO-TESTOSTERONE) 200 MG/ML injection Inject 1 mL (200 mg) into the muscle every 14  days     zolpidem (AMBIEN) 5 MG tablet Take 1 tablet (5 mg) by mouth nightly as needed for sleep     sertraline (ZOLOFT) 50 MG tablet Take 1 tablet (50 mg) by mouth daily     No current facility-administered medications for this visit.         No Known Allergies    Past Medical History:     Past Medical History:   Diagnosis Date     Concussion, unspecified     Hospitalized overnight as a child     Esophageal reflux      Hypercholesterolemia         Past Surgical History:      Past Surgical History:   Procedure Laterality Date     CHOLECYSTECTOMY       COLONOSCOPY       COLONOSCOPY  5/12/2014    Procedure: COLONOSCOPY;  Surgeon: Syed Walker MD;  Location:  GI     GI SURGERY  2008    Gall bladder     HERNIA REPAIR       [facility] NONSPECIFIC PROCEDURE      Left knee arthroscopy     [facility] NONSPECIFIC PROCEDURE  1997    Bilat inguinal hernia surgery     [facility] NONSPECIFIC PROCEDURE      Vasectomy       Physical Examination:   Ht 1.829 m (6')   Wt 124.7 kg (275 lb)   BMI 37.30 kg/m             Data: All pertinent previous laboratory data reviewed     No results found for: PH, PHARTERIAL, PO2, [id number], SAT, PCO2, HCO3, BASEEXCESS, OSMAR, BEB  Lab Results   Component Value Date    TSH 2.90 01/13/2020    TSH 3.07 07/17/2019     Lab Results   Component Value Date     (H) 03/26/2021     (H) 01/13/2021     Lab Results   Component Value Date    HGB 15.4 01/13/2021    HGB 14.1 11/04/2019     Lab Results   Component Value Date    BUN 15 03/26/2021    BUN 9 01/13/2021    CR 0.95 03/26/2021    CR 1.04 01/13/2021     Lab Results   Component Value Date    AST 57 (H) 03/26/2021    AST 18 01/13/2020     (H) 03/26/2021    ALT 54 01/13/2020    ALKPHOS 111 03/26/2021    ALKPHOS 86 01/13/2020    BILITOTAL 0.4 03/26/2021    BILITOTAL 0.5 01/13/2020    BILICONJ 0.0 12/29/2008     No results found for: UAMP, UBARB, BENZODIAZEUR, UCANN, UCOC, OPIT, UPCP    This note has been dictated using voice recognition software. Any  grammatical, typographical, or context distortions are unintentional and inherent to the software.      Cathie Arreguin MD   5/6/2021   [address], [address] [address] [phone]    Copy to: Rut Flanagan

## 2021-05-10 ENCOUNTER — COMMUNICATION - HEALTHEAST (OUTPATIENT)
Dept: NEUROLOGY | Facility: CLINIC | Age: 56
End: 2021-05-10

## 2021-05-10 ENCOUNTER — OFFICE VISIT - HEALTHEAST (OUTPATIENT)
Dept: NEUROLOGY | Facility: CLINIC | Age: 56
End: 2021-05-10

## 2021-05-10 ENCOUNTER — TRANSFERRED RECORDS (OUTPATIENT)
Dept: HEALTH INFORMATION MANAGEMENT | Facility: CLINIC | Age: 56
End: 2021-05-10

## 2021-05-10 DIAGNOSIS — R41.840 ATTENTION AND CONCENTRATION DEFICIT: ICD-10-CM

## 2021-05-26 DIAGNOSIS — I10 BENIGN ESSENTIAL HYPERTENSION: ICD-10-CM

## 2021-05-26 NOTE — TELEPHONE ENCOUNTER
Routing refill request to provider for review/approval because:  BP Readings from Last 3 Encounters:   03/26/21 (!) 144/84   03/16/21 (!) 163/95   03/12/21 122/86     Cecilia Nathan RN

## 2021-05-27 RX ORDER — LOSARTAN POTASSIUM 50 MG/1
TABLET ORAL
Qty: 45 TABLET | Refills: 0 | Status: SHIPPED | OUTPATIENT
Start: 2021-05-27 | End: 2021-06-29

## 2021-06-07 ENCOUNTER — TRANSFERRED RECORDS (OUTPATIENT)
Dept: HEALTH INFORMATION MANAGEMENT | Facility: CLINIC | Age: 56
End: 2021-06-07

## 2021-06-07 ENCOUNTER — COMMUNICATION - HEALTHEAST (OUTPATIENT)
Dept: NEUROLOGY | Facility: CLINIC | Age: 56
End: 2021-06-07

## 2021-06-07 ENCOUNTER — OFFICE VISIT - HEALTHEAST (OUTPATIENT)
Dept: NEUROLOGY | Facility: CLINIC | Age: 56
End: 2021-06-07

## 2021-06-09 ENCOUNTER — OFFICE VISIT (OUTPATIENT)
Dept: FAMILY MEDICINE | Facility: CLINIC | Age: 56
End: 2021-06-09
Payer: COMMERCIAL

## 2021-06-09 ENCOUNTER — ANCILLARY PROCEDURE (OUTPATIENT)
Dept: GENERAL RADIOLOGY | Facility: CLINIC | Age: 56
End: 2021-06-09
Attending: PHYSICIAN ASSISTANT
Payer: COMMERCIAL

## 2021-06-09 VITALS
SYSTOLIC BLOOD PRESSURE: 144 MMHG | HEIGHT: 72 IN | BODY MASS INDEX: 37.79 KG/M2 | RESPIRATION RATE: 16 BRPM | OXYGEN SATURATION: 96 % | DIASTOLIC BLOOD PRESSURE: 74 MMHG | HEART RATE: 95 BPM | TEMPERATURE: 99.5 F | WEIGHT: 279 LBS

## 2021-06-09 DIAGNOSIS — R06.02 SOB (SHORTNESS OF BREATH): Primary | ICD-10-CM

## 2021-06-09 DIAGNOSIS — R06.02 SOB (SHORTNESS OF BREATH): ICD-10-CM

## 2021-06-09 DIAGNOSIS — R05.9 COUGH: ICD-10-CM

## 2021-06-09 DIAGNOSIS — J20.9 ACUTE BRONCHITIS, UNSPECIFIED ORGANISM: ICD-10-CM

## 2021-06-09 PROCEDURE — 71046 X-RAY EXAM CHEST 2 VIEWS: CPT | Performed by: RADIOLOGY

## 2021-06-09 PROCEDURE — 99213 OFFICE O/P EST LOW 20 MIN: CPT | Performed by: PHYSICIAN ASSISTANT

## 2021-06-09 RX ORDER — CODEINE PHOSPHATE AND GUAIFENESIN 10; 100 MG/5ML; MG/5ML
1-2 SOLUTION ORAL EVERY 4 HOURS PRN
Qty: 120 ML | Refills: 0 | Status: SHIPPED | OUTPATIENT
Start: 2021-06-09 | End: 2021-07-02

## 2021-06-09 RX ORDER — AZITHROMYCIN 250 MG/1
TABLET, FILM COATED ORAL
Qty: 6 TABLET | Refills: 0 | Status: SHIPPED | OUTPATIENT
Start: 2021-06-09 | End: 2021-06-14

## 2021-06-09 ASSESSMENT — MIFFLIN-ST. JEOR: SCORE: 2143.54

## 2021-06-09 NOTE — PROGRESS NOTES
Assessment & Plan     SOB (shortness of breath)    - XR Chest 2 Views; Future    Cough    - XR Chest 2 Views; Future    Acute bronchitis, unspecified organism  The patient is advised to push fluids, rest, gargle warm salt water, use vaporizer or mist needed , use acetaminophen, ibuprofen as needed and Return office visit if symptoms persist or worsen.    - azithromycin (ZITHROMAX) 250 MG tablet; Take 2 tablets (500 mg) by mouth daily for 1 day, THEN 1 tablet (250 mg) daily for 4 days.  - guaiFENesin-codeine (ROBITUSSIN AC) 100-10 MG/5ML solution; Take 5-10 mLs by mouth every 4 hours as needed for cough  - guaiFENesin-codeine (ROBITUSSIN AC) 100-10 MG/5ML solution; Take 5-10 mLs by mouth every 4 hours as needed for cough             See Patient Instructions    No follow-ups on file.    Ramona Ann Aaseby-Aguilera, PA-C  [facility]a is a 55 year old who presents for the following health issues     URI    History of Present Illness       He eats 2-3 servings of fruits and vegetables daily.He consumes 1 sweetened beverage(s) daily.He exercises with enough effort to increase his heart rate 20 to 29 minutes per day.  He exercises with enough effort to increase his heart rate 3 or less days per week.   He is taking medications regularly.       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      Objective    BP (!) 144/74 (BP Location: Right arm, Patient Position: Chair, Cuff Size: Adult Large)   Pulse 95   Temp 99.5  F (37.5  C) (Oral)   Resp 16   Ht 1.829 m (6')   Wt 126.6 kg (279 lb)   SpO2 96%   BMI 37.84 kg/m    Body mass index is 37.84 kg/m .  Physical Exam   GENERAL: healthy, alert and no distress  HENT: ear canals and TM's normal, nose and mouth without ulcers or lesions  RESP: lungs clear to auscultation - no rales, rhonchi or wheezes  CV: regular rate and rhythm, normal S1 S2, no S3 or S4, no murmur, click or rub, no peripheral edema and peripheral pulses strong

## 2021-06-09 NOTE — PATIENT INSTRUCTIONS
(R06.02) SOB (shortness of breath)  (primary encounter diagnosis)  Comment:   Plan: XR Chest 2 Views            (R05) Cough  Comment:   Plan: XR Chest 2 Views

## 2021-06-26 NOTE — PROGRESS NOTES
"Video Visit  Galileo Vieira is a 55 y.o. male who is being evaluated via a billable video visit in light of the ongoing global health crisis (COVID-19) that requires us to abide by social distancing mandates in order to reduce the risk of COVID-19 exposure.       The patient has been notified of following:     \"This video visit will be conducted via a video call between you and your physician/provider. We have found that certain health care needs can be provided without the need for a physical exam.  This service lets us provide the care you need with a short phone/video conversation.  If a prescription is necessary we can send it directly to your pharmacy.  If lab work is needed we can place an order for that and you can then stop by our lab to have the test done at a later time.    If during the course of the call the physician/provider feels a telephone visit is not appropriate, you will not be charged for this service.\"     Patient has given verbal consent to a video visit? Yes    Galileo Vieira chief complaint is Post Concussion Syndrome     ALLERGIES  Patient has no known allergies.    Date of accident : 1/13/21  Neuropsychological assessment completed    No   Currently doing PT  No   Completed Yes   Currently doing OT  Yes   Completed No    Currently doing ST   No   Completed No   Psychology  No      Need a note for work accommodations  Yes    Need a note for school accommodations  No      Any new medication (other provider):   Yes ambien  Meds started at last appointment  Yes Ritalin  Is patient still on med:  Yes  Results: going well  Meds increased at last appointment    No     Currently on medication to help with sleep    Yes    Ambien, amitriptyline     Currently on any mental health medications     Yes   Wellbutrin      Currently on medication for attention, ADD/ADHD    Yes   Ritalin    Is patient on a controlled substance   Yes   Last urine test: n/a                                                     "   Workman's Comp   Yes   QRC   Yes   Present: Yes    Start Time: 2:20pm    End Time:  2:25pm    Total time of phone call: 5 minutes    Patient would like the video invitation sent by: [email or web address]   Number/e-mail address:[phone] Please Add QRC to the video call Nelli Mcconnell [phone]    Shannen Faustin CMA     Is patient on a controlled substance prescribed by me?  Yes    checked    Yes   Number of prescribers in last 6 months    3  Urine test preformed today  No   Follow up appointment   Yes     Outpatient Follow up Mild TBI (Concussion)  Evaluation     Pertinent History:   Patient states he slipped on work putting down salt on the icy salt on the sidewalk and hit his head on the concrete sidewalk.    Date of accident :  1/13/21    Subjective:          HPI    The patient returns to the concussion clinic for a follow up visit, He was last seen by me on 5/10/2021, where started the patient on Ritalin. the patient reports that he had 2 weeks of vacation, symptoms greatly improved.  Patient has been back at work reports he still has less frequent headaches.  He reports his headaches are still there, but not as bad.  Overall patient is reporting improvement in most physical and cognitive symptoms, but no change in emotional symptoms.    We discussed some treatment options and have elected to continue with current therapies.  Physical Symptoms:  Headache-Yes       Since last visit  Improved     Nausea-Yes       Since last visit  Improved       Balance problems - No     Dizziness - Yes          Since last visit  Same     Visual problems - Yes    Since last visit  Improved     Fatigue - Yes             Since last visit  Improved     Sensitivity to light - Yes       Since last visit  Improved     Sensitivity to sound - Yes         Since last visit  Improved     Numbness/tingling - No           Cognitive Symptoms  Feeling mentally foggy -No       Feeling slowed down -Yes       Since last visit  Improved     Difficulty  Concentrating- Yes     Since last visit  Improved     Difficulty remembering - Yes        Since last visit  Improved       Emotional Symptoms  Irritability - Yes         Since last visit  Same     Sadness-  Yes      Since last visit  Same     More emotional - Yes      Since last visit  Same     Nervousness/anxiety -Yes       Since last visit  Same       Sleep History:  Drowsiness- Yes    Since last visit  Improved     Sleep less than usual - No  Sleep more than usual - No  Trouble falling asleep - No        Does the patient wake feeling rested - Yes             Migraine Headaches      Patient history of migraines.    No      Exertion:         Do the above stated symptoms worsen with physical activity? Yes       Since last visit  Improved           Do the above stated symptoms worsen with cognitive activity? Yes      Since last visit  Improved            Work/School        Do the above stated symptoms worsen with school/work?        Yes        Have your returned to work/school? Yes          There are no active problems to display for this patient.    Past Medical History:   Diagnosis Date     Concussion      Head injury      Hypertension      Past Surgical History:   Procedure Laterality Date     HERNIA REPAIR       REPLACEMENT TOTAL KNEE       Family History   Problem Relation Age of Onset     Seizures Father      Current Outpatient Medications   Medication Sig Dispense Refill     amitriptyline (ELAVIL) 25 MG tablet Take 1-3 tablets (25-75 mg total) by mouth at bedtime. 90 tablet 2     buPROPion (WELLBUTRIN XL) 150 MG 24 hr tablet Take 2 tablets (300 mg total) by mouth every morning. 60 tablet 3     losartan (COZAAR) 50 MG tablet 75 mg.        methylphenidate HCl (RITALIN) 5 MG tablet Take 1 tablet (5 mg total) by mouth 2 (two) times a day. 60 tablet 0     omeprazole (PRILOSEC) 40 MG capsule Take 40 mg by mouth.       pravastatin (PRAVACHOL) 20 MG tablet Take 20 mg by mouth.       sertraline (ZOLOFT) 50 MG tablet  Take 50 mg by mouth.       testosterone cypionate (DEPOTESTOTERONE CYPIONATE) 200 mg/mL injection INJECT 1ML INTRAMUSCULARLY EVERY 14 DAYS       triamcinolone acetonide 40 mg/mL Kit Inject 40 mg into the joint.       zolpidem (AMBIEN) 5 MG tablet Take 1 tablet (5 mg) by mouth nightly as needed for sleep       No current facility-administered medications for this visit.      Social History     Socioeconomic History     Marital status:      Spouse name: Not on file     Number of children: Not on file     Years of education: Not on file     Highest education level: Not on file   Occupational History     Not on file   Social Needs     Financial resource strain: Not on file     Food insecurity     Worry: Not on file     Inability: Not on file     Transportation needs     Medical: Not on file     Non-medical: Not on file   Tobacco Use     Smoking status: Never Smoker     Smokeless tobacco: Never Used   Substance and Sexual Activity     Alcohol use: Yes     Drug use: Never     Sexual activity: Not on file   Lifestyle     Physical activity     Days per week: Not on file     Minutes per session: Not on file     Stress: Not on file   Relationships     Social connections     Talks on phone: Not on file     Gets together: Not on file     Attends [employer] service: Not on file     Active member of club or organization: Not on file     Attends meetings of clubs or organizations: Not on file     Relationship status: Not on file     Intimate partner violence     Fear of current or ex partner: Not on file     Emotionally abused: Not on file     Physically abused: Not on file     Forced sexual activity: Not on file   Other Topics Concern     Not on file   Social History Narrative     Not on file       The following portions of the patient's history were reviewed and updated as appropriate: allergies, current medications, past family history, past medical history, past social history, past surgical history and problem  list.    Review of Systems  A comprehensive review of systems was negative except for: What is noted above    Objective:       Discussion was held with the patient today regarding concussion in general including types of injury, symptoms that are common, treatment and variability in time to recover. Education about concussion symptoms and length of time it would take the patient to recover was also given to the patient.  I have reassured the patient his symptoms are very common when a concussion is present and will improve with time. We discussed the risks and benefits of possible medication including risk of worsening depression with medication adjustments and even the possibility of emergence of suicidal ideations.       Total time spent with the patient today was 30 minutes with greater than 50% of the time spent in counseling and care coordination. The patient agrees to call before then with any questions, concerns or problems. We will assess for the appropriateness of possible psychotropic medication trials/changes. The patient will seek out appropriate emergency services should that become necessary.    Diagnosis managed and treated at today's visit :  Post concussion syndrome  Post concussion headache  Nausea  Dizziness  Fatigue  Insomnia  Sensitivity to light  Sound sensitivity  Concentration and Attention deficit  Memory difficulties  Anxiety d/t a medical condition  Irritability  Return to work  Encounter related to a worker's comp claim     Plan:  Medication Adjustment:  No medication changes    Other:   Patient will return to clinic in 4 weeks. They agree to call or return sooner with any questions or concerns.  Risks and benefits were discussed.  Continue with individual therapist.     Continue with the support of the clinic, reassurance, and redirection. Staff monitoring and ongoing assessments per team plan. This team will utilize appropriate emergency services if necessary. I will make myself  available if concerns or problems arise.     Mental Status Examination  He is cooperative with questioning. He is fully engaged in conversation today. Speech is normal. Thought processes normal with normal prehension and expression. Thoughts are organized and linear. Content is pertinent to the conversation and without evidence of auditory or visual hallucinations. No delusional ideation. Gen. fund of knowledge, insight and memory are normal       Video Visit Details    Type of service: Video Visit    Video Start Time: 1430    Video End Time:  1450    Total time of video visit: 20 minutes    Originating Location: Patient's home    Distant Location:  [facility] [place]    Mode of Communication: Video Conference via [employer]      10 minutes spent on the date of the encounter doing chart review, review of outside records, documentation and return to work letter     General Information:  Today you had your appointment with Veronica Matta CNP     If lab work was done today as part of your evaluation you will generally be contacted via My Chart, mail, or phone with the results within 1-5 days. If there is an alarming result we will contact you by phone. Lab results come back at varying times, I generally wait until all labs are resulted before making comments on results. Please note labs are automatically released to My Chart once available.     If you need refills please contact your pharmacist. They will send a refill request to me to review. Please allow 3 business days for us to process all refill requests.     Please call or send a medical message through My Chart, with any questions or concerns    If you need any paperwork completed please fax forms to [phone]. Please state if you would like a copy of the completed paperwork, mailed or faxed back to the patient and a fax number to fax the paperwork to. Please allow up to 10 days for paperwork to be completed.    Veronica Matta CNP

## 2021-06-29 DIAGNOSIS — I10 BENIGN ESSENTIAL HYPERTENSION: ICD-10-CM

## 2021-06-29 RX ORDER — LOSARTAN POTASSIUM 50 MG/1
TABLET ORAL
Qty: 135 TABLET | Refills: 0 | Status: SHIPPED | OUTPATIENT
Start: 2021-06-29 | End: 2021-07-02

## 2021-06-29 NOTE — TELEPHONE ENCOUNTER
Suggest taking losartan 75 mg daily for the next 3 days - on Friday, BP recheck. If still high, should increase to 100 mg daily.

## 2021-06-29 NOTE — TELEPHONE ENCOUNTER
Request to refill losartan.     Today's BP in clinic 140/86 with only 1 tab as he was out of medication.   Can check again this week when back on full dose.     BP Readings from Last 3 Encounters:   06/09/21 (!) 144/74   03/26/21 (!) 144/84   03/16/21 (!) 163/95     Cecilia Nathan RN

## 2021-07-02 ENCOUNTER — TELEPHONE (OUTPATIENT)
Dept: FAMILY MEDICINE | Facility: CLINIC | Age: 56
End: 2021-07-02

## 2021-07-02 ENCOUNTER — ALLIED HEALTH/NURSE VISIT (OUTPATIENT)
Dept: FAMILY MEDICINE | Facility: CLINIC | Age: 56
End: 2021-07-02
Payer: COMMERCIAL

## 2021-07-02 VITALS — DIASTOLIC BLOOD PRESSURE: 84 MMHG | SYSTOLIC BLOOD PRESSURE: 130 MMHG

## 2021-07-02 DIAGNOSIS — I10 BENIGN ESSENTIAL HYPERTENSION: ICD-10-CM

## 2021-07-02 DIAGNOSIS — Z01.30 BP CHECK: Primary | ICD-10-CM

## 2021-07-02 PROCEDURE — 99207 PR NO CHARGE NURSE ONLY: CPT

## 2021-07-02 RX ORDER — LOSARTAN POTASSIUM 50 MG/1
TABLET ORAL
Qty: 135 TABLET | Refills: 1 | Status: SHIPPED | OUTPATIENT
Start: 2021-07-02 | End: 2021-08-30

## 2021-07-02 ASSESSMENT — PATIENT HEALTH QUESTIONNAIRE - PHQ9: SUM OF ALL RESPONSES TO PHQ QUESTIONS 1-9: 8

## 2021-07-02 NOTE — TELEPHONE ENCOUNTER
continue current dosing, no need for recheck. Refills sent.     Looks like he is due for refills on meds Feb 2022.     TRACE

## 2021-07-02 NOTE — TELEPHONE ENCOUNTER
Pt with clinic BP check after getting back to taking losartan 50 mg 1.5 tab daily.     BP Readings from Last 3 Encounters:   07/02/21 130/84   06/09/21 (!) 144/74   03/26/21 (!) 144/84     Continue with current dosing?   Do you want a f/u BP check?    Cecilia Nathan RN

## 2021-07-04 NOTE — ADDENDUM NOTE
Addendum Note by Veronica Ellis FNP at 5/10/2021  2:30 PM     Author: Veronica Elils FNP Service: -- Author Type: Nurse Practitioner    Filed: 5/10/2021  9:07 PM Encounter Date: 5/10/2021 Status: Signed    : Veronica Ellis FNP (Nurse Practitioner)    Addended by: VERONICA ELLIS on: 5/10/2021 09:07 PM        Modules accepted: Orders

## 2021-07-04 NOTE — LETTER
Letter by Veronica Matta FNP at      Author: Veronica Matta FNP Service: -- Author Type: --    Filed:  Encounter Date: 6/7/2021 Status: (Other)         June 7, 2021     Patient: Galileo Vieira   YOB: 1965   Date of Visit: 6/7/2021       To Whom It May Concern:    It is my medical opinion that Galileo Vieira may return to work on 6/7/2021.  Employees recovering from concussions often exhibit cognitive symptoms that make attending work and learning difficult. They may not be able to attend work or only partial days. Some symptoms that could affect performance in the work environment  include: sensitivity to light and noise, headache, trouble focusing, concentrating, or remembering, and difficulty looking at a screen. The accommodations below often help reduce the symptoms and allow them to return to work quicker. Compliance with these accommodations allows the brain to recover more quickly even if it appears the employee is symptom free.     Attendance Restrictions  Patient will work 4 days a week (Monday, Tuesday, Thursday and Friday) The patient will work 8 hours a day if able to tolerate (tolerate is no return of severe symptoms). Patient will return in 4 weeks and I will reassess patient's ability to return to work.  Other Restrictions:  1. Please put a screen filter on the patient's computer   2. Allow patient to take 10 minute breaks every 60 minutes. Patient should be allowed to take another break if he starts to have symptoms, If symptoms continue or worsen please allow patient to return home.  3. Allow employee to wear sunglasses and hat to help patient's sensitivity to lights. Remove any overhead lighting and replace with lamps if possible. Move patient to a office if available.  4. If patient wakes with severe headache please allow patient to start work later, it symptoms worsen patient should not attempt to work.        If you have any questions or concerns, please don't hesitate to  call.    Sincerely,        Electronically signed by BRODERICK Curiel

## 2021-07-07 ENCOUNTER — COMMUNICATION - HEALTHEAST (OUTPATIENT)
Dept: NEUROLOGY | Facility: CLINIC | Age: 56
End: 2021-07-07

## 2021-07-07 ENCOUNTER — TRANSFERRED RECORDS (OUTPATIENT)
Dept: HEALTH INFORMATION MANAGEMENT | Facility: CLINIC | Age: 56
End: 2021-07-07

## 2021-07-07 ENCOUNTER — OFFICE VISIT - HEALTHEAST (OUTPATIENT)
Dept: NEUROLOGY | Facility: CLINIC | Age: 56
End: 2021-07-07

## 2021-07-07 ENCOUNTER — HOSPITAL ENCOUNTER (OUTPATIENT)
Dept: PHYSICAL THERAPY | Facility: CLINIC | Age: 56
Setting detail: THERAPIES SERIES
End: 2021-07-07
Attending: NURSE PRACTITIONER
Payer: OTHER MISCELLANEOUS

## 2021-07-07 ENCOUNTER — HOSPITAL ENCOUNTER (OUTPATIENT)
Dept: OCCUPATIONAL THERAPY | Facility: CLINIC | Age: 56
Setting detail: THERAPIES SERIES
End: 2021-07-07
Attending: NURSE PRACTITIONER
Payer: OTHER MISCELLANEOUS

## 2021-07-07 PROCEDURE — 97535 SELF CARE MNGMENT TRAINING: CPT | Mod: GO | Performed by: OCCUPATIONAL THERAPIST

## 2021-07-07 PROCEDURE — 97165 OT EVAL LOW COMPLEX 30 MIN: CPT | Mod: GO | Performed by: OCCUPATIONAL THERAPIST

## 2021-07-07 PROCEDURE — 97162 PT EVAL MOD COMPLEX 30 MIN: CPT | Mod: GP | Performed by: PHYSICAL THERAPIST

## 2021-07-07 ASSESSMENT — ACTIVITIES OF DAILY LIVING (ADL): IADL_QUICK_ADDS: MEAL PLANNING/PREPARATION;HOME/FINANCIAL/MANAGEMENT;COMMUNICATION/COMPUTER USE;COMMUNITY MOBILITY

## 2021-07-07 NOTE — PROGRESS NOTES
07/07/21 1700   Quick Adds   Quick Adds Concussion   Type of Visit Initial Outpatient Occupational Therapy Evaluation   General Information   Start Of Care Date 07/07/21   Referring Physician Veronica Matta APRN CNP   Orders Evaluate and treat as indicated   Other Orders Address post concussion related sensory sensitivity   Orders Date 05/11/21   Medical Diagnosis Post concussion syndrome F07.81   Surgical/Medical History Reviewed Yes   Additional Occupational Profile Info/Pertinent History of Current Problem Galileo Vieira is a 55 y.o. male who is being referred to occupational therapy to address ongoing sensory sensitivities and postconcussion sequelae after suffering concussion with loss of consciousness on 1/13/2021.  He fell backward while salting the icy walks of his workplace.  He has been treated for neck pain low back pain and vestibular issues with [facility] orthopedics February through June, and is now referred to occupational therapy and physical therapy here/[facility] to address these issues further.   Prior medical history includes anxiety, depression, hearing loss (wears hearing aid during meetings and some patient visits) motion sickness and prior history of concussions x3.  Takes medications to assist with sleep including Ambien and amitriptyline and Wellbutrin for his mood.  Has also started Ritalin.  Patient was on vacation for 2 weeks and many of his symptoms improved with this.  He is having less frequent headaches on his days off but on his work days 80% of the time will have a headache by 3 PM and 20% of the time by 11 AM.  It helps to take Advil to prevent that the progression of the headache.  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  The movement between rooms with lighting changes is still bothersome and for this he wears a visored hat.  He is also adjusted the contrast on his computer.   Role/Living  "Environment   Current Community Support Family/friend caregiver  (wife Valery and grown children x 2, 2 grand children)   Patient role/Employment history Employed  ([employer]   Community/Avocational Activities Attends Sikhism, family events.  Walks 3 to 4 days a week for 30 minutes.  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  He is feeling slightly slower with his ability to move between topics but it is not impacting his work performance.  He does have increased symptoms through the workday, and recovers 2-3 hours after his shift.     Patient/family Goals Statement \" I want to get back to whoever was before.\"   Vision Interview   Technology Used PC, tablet, phone   Technology Use Increases Symptoms Yes   Technology Use Increases Symptoms Comments Patient has difficulty with rapid eye shifting movements when scrolling between screens on PC, sensitive to flickering and bright lights   Do Glasses Help? Yes   Do Glasses Help Comments Patient is in the process of having his prism glasses adjusted for a third time with [facility].  He was finding the prisms to be helpful for small print but not distant and then vice versa.   Type of Glasses Progressive   Type of Glasses Comments With prisms   Light Sensitivity/Glare  Indoor;Outdoor   Light Sensitivity/Glare Comments Sensitive to lighting changes between rooms, inside/outside.   Impaired Vision Impaired accommodation  (Can get dizzy when he is walking, decreased VOR)   Impaired Vision Comments Patient states " "his headaches are fairly spontaneous and not always attached to ocular motor/visual sensitivity   Brings Print Close to Read Unable to bring in closer due to convergence insufficiency   Reported Reading Speed Slowed   Reading Endurance/Fatigue   Complaints of Visual Fatigue Comments Tolerates 10 to 15-minute intervals with PC  for patient care.  Tries to alternate tasks during his workday to avoid long periods of entry.   Convergence Abnormal   Convergence Comments First attempt NPC 12 inches and recovery 16 inches second NPC 16 recovers at 20.  Third NPC 18 inches and recovers at 22 inches.   Pursuits Abnormal   Pursuits Comments More effort in the right lower quadrant.  Saccades--horizontal WNL.  Vertical: Increased effort with looking upward, undershooting.   Pupillary Function Abnormal   Additional Comments Patient with history of having difficulty tolerating VOR, NPC and gaze fixation exercises with TCO.  Would have increased symptoms every PT session.  \"  Doing PT is harder than a day and a half of my workload at the clinic.\"   Difficulties with IADL Performance: Increase in Symptoms with the Following   Difficulty Concentrating at School, Work or Home Patient notes it takes more energy to drive together the visual and auditory input while patient's are speaking with him and it is taking longer to make a mental picture of what they are expressing.   Difficulty Multi-Tasking/Planning Multitasking at work is 8/10 effort   Busy/Dynamic Environments Patient tries to avoid unnecessary shopping trips or indoor dining   Pathfinding in Busy Environments No difficulty with pathfinding   Sensory Tolerance Auditory sensitivity--continues to use his right-sided hearing aid for meetings and some visits of patient care.  Reports that he is hearing louder but it is more muffled for lower deeper tones and sharp for higher pitched.  Auditory conversations get lost in background noise with decreased auditory " "filtering. R sided tinnitus.   Startles Easily Sensitive to live music at [employer].     Mood Changes   Is Patient Experiencing Changes in Mood? Anxiety;Depression;Feeling irritable   Patient Mood Comments Currently on Wellbutrin Ambien and amitriptyline,Ritalin   Is Patient Currently Receiving Treatment for Mental Health? No   Vestibular Symptoms   Is Patient Experiencing Vestibular Symptoms? Yes   Triggers for Vestibular Symptoms Include: Can be spontaneous while he is walking, rapid head turns, movements from low to high.  Gets dizzy from watching 10 to 15 minutes of an action movie or hockey on a large screen.  Tolerates watching golf and [employer] for 30 minutes   Fatigue   General Fatigue Comments \"I am exhausted after my work days but I still try to contribute at home.\"   Pain   Patient currently in pain Yes   Pain comments Jaw pain, right > left.  Current headache rates 6/10 from NPC testing   Fall Risk Screen   Fall screen completed by PT   Have you fallen 2 or more times in the past year? No   Have you fallen and had an injury in the past year? Yes   Is patient a fall risk? No   Cognitive Status Examination   Orientation Orientation to person, place and time   Level of Consciousness Alert   Follows Commands and Answers Questions Able to follow multistep instructions;100% of the time   Personal Safety and Judgment Intact   Memory Intact   Memory Comments Patient remarks that he was feeling slower with his reactions and thought processing for January through April, has gotten much better.  Still taking more time to process auditory and visual input from his patients however.   Attention Alternating attention impaired, difficulty shifting between tasks;Divided attention impaired, difficulty with simultaneous tasks   Organization/Problem Solving No deficits were identified   Executive Function No deficits were identified   Cognitive Comment Patient declines offer for cognitive screening at this time, feels he is doing " "pretty well.   Sensation   Sensation Comments \"I get waves of brain static, buzzing in my head\"   Range of Motion (ROM)   ROM Comments Cervical, TMJ and low back issues to be addressed by PT   Strength   Strength No deficits were identified   Hand Strength   Hand Dominance Right   Instrumental Activities of Daily Living Assessment   IADL Quick Adds Meal Planning/Preparation;Home/Financial/Management;Communication/Computer Use;Community Mobility   Home/Financial Management Perfomrs bill paying on line, going well.   Communication/Computer Use Limited per poor visual tolerance to 10-15 min at work, 5 min socially at home.     Community Mobility Drives, can get tunnel vision if he has a headache.     Planned Therapy Interventions   Planned Therapy Interventions ADL training;IADL training;Cognitive skills;Self care/Home management;Neuromuscular re-education;Visual perception   Adult OT Eval Goals   OT Eval Goals (Adult) 1;2;3    OT Goal 1   Goal Identifier Sx Mgmt   Goal Description Patient will identify and independently utilize 3 strategies (computer adaptations, self-awareness and self-management of symptoms, use of adaptive techniques, sensory calming etc.) to decrease her post-concussion related symptoms of light, sound sensitivity and headaches, to support increased independence during ADL/IADLs.   Target Date 10/05/21    OT Goal 2   Goal Identifier Multi- tasking   Goal Description Pt to be able to alternate between 2-3 different tasks ( eg bill paying, word puzzle and making phone calls) x 15 minutes with 90% accuracy on all three tasks, to stimulate return to higher level work, cooking/family care, ability to grocery shop, and maneuver with passengers in the community.   Target Date 10/05/21    OT Goal 3   Goal Identifier Auditory Sensitivity   Goal Description Pt will safely complete Safe and Sound Protocol 5 hour treated sound series using visual scale to rate her symptom threshold and grade/modify " participation with OTR's assist as needed to reduce sound sensitivity and  improve tolerance of grocery shopping/family gathering for 45 min with sound sensitivity sx < 3/10.   Target Date 10/05/21   Clinical Impression   Criteria for Skilled Therapeutic Interventions Met Yes, treatment indicated   OT Diagnosis Decreased ADL/IADL   Influenced by the following impairments visual, auditory hypersensitivity, decreased alternating attention.   Identified Performance Deficits poor tolerance  of computer work, driving, shopping, communty family events.  Limited knowledge of compensatory strategies.     Clinical Decision Making (Complexity) Low complexity   Therapy Frequency 2x/week for 2 weeks then 1 x w/[place] for 4 weeks.   Risks and Benefits of Treatment have been explained. Yes   Patient, Family & other staff in agreement with plan of care Yes   Education Assessment   Barriers To Learning No Barriers   Preferred Learning Style Listening;Reading;Demonstration;Pictures/video   Total Evaluation Time   OT Eval, Low Complexity Minutes (52098) 25       Thank you for this thoughtful referral! If you have any questions, please call me at [phone].  Nice to share in this patient's care with you.    Sincerely,   Kathryn Sim OTR/nimesh

## 2021-07-08 NOTE — PROGRESS NOTES
07/07/21 1028   Quick Adds   Quick Adds Vestibular Eval   Type of Visit Initial OP PT Evaluation   General Information   Start of Care Date 07/07/21   Referring Physician Veronica Matta APRN CNP    Orders Evaluate and Treat as Indicated   Order Date 06/01/21   Medical Diagnosis Post concussion syndrome F07.81    Onset of illness/injury or Date of Surgery 01/13/21   Surgical/Medical history reviewed Yes   Pertinent history of current vestibular problem (include personal factors and/or comorbidities that impact the POC)  Anxiety;Depression;Hearing loss;Motion sickness;Prior concussion(s)  (3rd concussion he knows of)   Hearing Loss Comments at baseline, wears hearing aide in meetings   Pertinent history of current problem (include personal factors and/or comorbidities that impact the POC) Galileo fell backward while salting the icy walks of his workplace on 1/13/21. He thinks there was a LOC. He reports immediate HA, impaired vision in L eye with flashing lights, dizziness, neck and jaw pain. He has a h/o problems with TMJ and LBP which worsened with the fall.  He has been treated for neck pain low back pain and vestibular issues with [facility] orthopedics February through June, and is now referred to physical therapy here/[facility] to address these issues further where he also can have OT services. Patient has been able to return to work but is working on restriction. He is working 4 days per week, 8 hour day, with wednesdays off to assist with his recovery. stress and activity at work, use of his eyes on the computer increase symptoms of HA, dizziness and nausea. pt describes dizziness as a feeling of static. He also feels dizzy watching TV, especially action movies or sports like hockey. He became very dizzy at [employer] recently. When his symptoms are flared up he feels more sensitive to motion. typically driving and riding in the car is ok otherwise. pt did have BPPV at onset and since this has cleared the bad  "dizziness has resolved. He also reports balance improved following resolution of BPPV but he still doesnt feel back to baseline. He still feels unsteady at times when more dizzy, has that feeling of \"static\". Neck pain is still there with lots of stiffness. Has been able to walk 30*45 min at at time, several times per week, without increased symptoms.   Pertinent Visual History  reduce visual acuity L eye since injury, has been prescribed new glasses but placement of progressive bifocal was not correct. This was fixed but he still is not able to see at all out of the left lens with his glasses so he is wearing his old ones.    Prior level of function comment indep in all mobility   Previous/Current Treatment Occupational Therapy;Medication(s)   Current Community Support Family/friend caregiver   Patient role/Employment history Employed  ([employer], working 4, 8 hr days/week currently. Off Wed.)   Home/Community Accessibility Comments able to drive, sometimes will experience brief episodes of tunnel vision. riding is also ok. If symptoms are flared up he is more sensitive to motion   Current Assistive Devices   (none)   ADL Devices   (none)   Patient/Family Goals Statement return to baseline   Fall Risk Screen   Fall screen completed by PT   Have you fallen 2 or more times in the past year? No   Have you fallen and had an injury in the past year? Yes   Is patient a fall risk? No   Abuse Screen (yes response referral indicated)   Feels Unsafe at Home or Work/School no   Feels Threatened by Someone no   Does Anyone Try to Keep You From Having Contact with Others or Doing Things Outside Your Home? no   Physical Signs of Abuse Present no   System Outcome Measures   Outcome Measures Concussion (see Concussion Symptom Assessment)   Pain   Patient currently in pain Yes   Pain location neck pain; base on skull R>L and down into UT    Pain rating 5/10   Additional pain locations? Pain location 2   Pain location 2 Jaw R>L   Pain " rating 2 3/10 and 1/10, respectively   Cognitive Status Examination   Orientation orientation to person, place and time   Level of Consciousness alert   Follows Commands and Answers Questions 100% of the time   Personal Safety and Judgment intact   Cognitive Comment deferred to OT   Palpation   Palpation tight and tender to palpation suboccipitals, cervical paraspinals and UT R>L   Bed Mobility   Bed Mobility Comments indep   Transfer Skills   Transfer Comments indep   Gait Special Tests   Gait Special Tests FUNCTIONAL GAIT ASSESSMENT   Gait Special Tests Functional Gait Assessment Score out of 30   Score out of 30 26   Comments >28/30 is norm. <22/30 indicates increased fall risk   Balance Special Tests   Balance Special Tests Single leg stance right;Single leg stance left;Romberg   Balance Special Tests Single Leg Stance Right,   Right, seconds 60 Seconds   Balance Special Tests Single Leg Stance Left   Left, seconds 30 Seconds   Balance Special Tests Romberg   Seconds 30 Seconds   Comments EO and EC with effective ankle reactions   Coordination   Coordination no deficits were identified   Infrared Goggle Exam or Frenzel Lense Exam   Vestibular Suppressant in Last 24 Hours? No   Exam completed with Infrared Goggles   Spontaneous Nystagmus Negative   Gaze Evoked Nystagmus Negative   Head Shake Horizontal Nystagmus Negative   [place]-Hallpike (right) Negative   [place]-Hallpike (Left) Negative   HSCC Supine Roll Test (Right) Negative   HSCC Supine Roll Test (Left) Negative   Dynamic Visual Acuity (DVA)   Static Acuity (LogMar) 20/20   Horizontal Head Movement at 2 Hz (LogMar) 20/50   DVA Comments abnormal 4 line loss, dizziness, increased nausea and increased HA   Planned Therapy Interventions   Planned Therapy Interventions balance training;gait training;joint mobilization;neuromuscular re-education;ROM;strengthening;stretching;manual therapy;other (see comments)   Planned Therapy Interventions Comment vestibular rehab  "  Clinical Impression   Criteria for Skilled Therapeutic Interventions Met yes, treatment indicated   PT Diagnosis s/s post concusssion syndrome, neck pain with s/s whiplash   Influenced by the following impairments impaired VOR/DVA, reduced postural and gait stability, increased mm tension in neck   Functional limitations due to impairments work tasks, attending gatherings ie Christian, grocery shopping, watching TV   Clinical Presentation Evolving/Changing   Clinical Presentation Rationale symtoms can come on or increase quickly depending on task   Clinical Decision Making (Complexity) Moderate complexity   Therapy Frequency 1 time/week   Predicted Duration of Therapy Intervention (days/wks) 6 weeks   Risk & Benefits of therapy have been explained Yes   Patient, Family & other staff in agreement with plan of care Yes   Education Assessment   Barriers to Learning No barriers   GOALS   PT Eval Goals 1;2;3;4   Goal 1   Goal Identifier FGA   Goal Description Galileo will have normal gait stability with FGA 30/30 for greater safety with community ambulation.   Target Date 08/20/21   Goal 2   Goal Identifier SLS   Goal Description Galileo will be able to  SLS on non-dominate (left) leg for at least 60\" and with addition of cognitive distraction with effective balance reactions and no LOB for greater safety moving about his work environment when multi-tasking throughout the day.   Target Date 08/20/21   Goal 3   Goal Identifier DVA   Goal Description Galileo will have normal DVA, no greater than 2 line loss, with 2 hz head movement to be able to walk and talk in busy visual envirnoment ie grocery store, Christian and work, without dizziness or imbalance.   Target Date 08/20/21   Goal 4   Goal Identifier Neck   Goal Description Galileo will have reduced mm tension and pain, no greater than 1/10, for greater tolerance of work tasks.    Target Date 08/20/21   Total Evaluation Time   PT Eval, Moderate Complexity Minutes (69107) 55 "

## 2021-07-14 ENCOUNTER — HOSPITAL ENCOUNTER (OUTPATIENT)
Dept: PHYSICAL THERAPY | Facility: CLINIC | Age: 56
Setting detail: THERAPIES SERIES
End: 2021-07-14
Attending: NURSE PRACTITIONER
Payer: OTHER MISCELLANEOUS

## 2021-07-14 PROCEDURE — 97110 THERAPEUTIC EXERCISES: CPT | Mod: GP | Performed by: PHYSICAL THERAPIST

## 2021-07-14 PROCEDURE — 97112 NEUROMUSCULAR REEDUCATION: CPT | Mod: GP | Performed by: PHYSICAL THERAPIST

## 2021-07-14 PROCEDURE — 97140 MANUAL THERAPY 1/> REGIONS: CPT | Mod: GP | Performed by: PHYSICAL THERAPIST

## 2021-07-19 DIAGNOSIS — R41.840 ATTENTION AND CONCENTRATION DEFICIT: Primary | ICD-10-CM

## 2021-07-19 RX ORDER — METHYLPHENIDATE HYDROCHLORIDE 18 MG/1
18 TABLET ORAL DAILY
Qty: 30 TABLET | Refills: 0 | Status: SHIPPED | OUTPATIENT
Start: 2021-07-19 | End: 2021-08-24 | Stop reason: DRUGHIGH

## 2021-07-20 VITALS — HEIGHT: 72 IN | WEIGHT: 270 LBS | BODY MASS INDEX: 36.57 KG/M2

## 2021-07-20 NOTE — LETTER
Letter by Veronica Matta FNP at      Author: Veronica Matta FNP Service: -- Author Type: --    Filed:  Date of Service:  Status: (Other)         February 4, 2021     Patient: Galileo Vieira   YOB: 1965   Date of Visit: 2/3/2021       To Whom it May Concern:    Galileo Vieira was seen in my clinic on 2/3/2021 for post concussion syndrome from a concussion he obtained from a fall at work on 1/13/2021. Research has shown that duke/blue colored glasses are beneficial to a patient to help reduce the patient's sensitivity to light due to the concussion. I would like the patient to have these glasses to help decrease his concussion symptoms. The patient should have two pairs of glasses, one light tint for looking at screens and work environment and a second pair medium/dark tint for outside.      If you have any questions or concerns, please don't hesitate to call.    Sincerely,         Electronically signed by BRODERICK Curiel

## 2021-07-20 NOTE — LETTER
Letter by Veronica Matta FNP at      Author: Veronica Matta FNP Service: -- Author Type: --    Filed:  Date of Service:  Status: (Other)         April 7, 2021     Patient: Galileo Vieira   YOB: 1965   Date of Visit: 4/6/2021       To Whom It May Concern:    It is my medical opinion that Galileo Vieira may return to work on 4/6/2021. Employees recovering from concussions often exhibit cognitive symptoms that make attending work and learning difficult. They may not be able to attend work or only partial days. Some symptoms that could affect performance in the work environment  include: sensitivity to light and noise, headache, trouble focusing, concentrating, or remembering, and difficulty looking at a screen. The accommodations below often help reduce the symptoms and allow them to return to work quicker. Compliance with these accommodations allows the brain to recover more quickly even if it appears the employee is symptom free.     Attendance Restrictions  Patient will continue to work 7 hours 3 days a week (Monday, Wednesday and Friday) for rest of this week. Starting the week of 4/12/2021 he will increase to eight hour shifts. If he continues to tolerate (tolerate is no return of severe symptoms) starting the week of 4/19/2021 patient is able to increase to eight hours four days a week (Monday, Tuesday, Thursday and Friday)  Other Restrictions:  1. Please put a screen filter on the patient's computer   2. Allow patient to take 10 minute breaks every 60 minutes. Patient should be allowed to take another break if he starts to have symptoms, If symptoms continue or worsen please allow patient to return home.  3. Allow employee to wear sunglasses and hat to help patient's sensitivity to lights. Remove any overhead lighting and replace with lamps if possible. Move patient to a office if available.  4. If patient wakes with severe headache please allow patient to start work later, it symptoms worsen  patient should not attempt to work.      If you have any questions or concerns, please don't hesitate to call.    Sincerely,        Electronically signed by BRODERICK Curiel

## 2021-07-20 NOTE — PROGRESS NOTES
"Video Visit  Galileo Vieira is a 55 y.o. male who is being evaluated via a billable video visit in light of the ongoing global health crisis (COVID-19) that requires us to abide by social distancing mandates in order to reduce the risk of COVID-19 exposure.       The patient has been notified of following:     \"This video visit will be conducted via a video call between you and your physician/provider. We have found that certain health care needs can be provided without the need for a physical exam.  This service lets us provide the care you need with a short phone/video conversation.  If a prescription is necessary we can send it directly to your pharmacy.  If lab work is needed we can place an order for that and you can then stop by our lab to have the test done at a later time.    If during the course of the call the physician/provider feels a telephone visit is not appropriate, you will not be charged for this service.\"     Patient has given verbal consent to a video visit? Yes    Galileo Vieira chief complaint is Post Concussion Syndrome     ALLERGIES  Patient has no known allergies.    Date of accident : 1/13/2021    Orders from previous visit: Amitriptyline 25 mg, take 1-3 tabs PO every HS Wellbutrin 150 mg, take one tab PO every am  Neuropsychological assessment completed    No   Currently doing PT  Yes   Completed No   Currently doing OT  No   Completed No    Currently doing ST   No   Completed No   Psychology  No      Need a note for work accommodations  Yes    Need a note for school accommodations  No      Any new medication (other provider):   Yes increase in BP medication   Meds started at last appointment  Yes Amitriptyline 25 mg, take 1-3 tabs PO every HS  Wellbutrin 150 mg, take one tab PO every am Is patient still on med:  Yes  Results: Doing well.   Meds increased at last appointment    No     Currently on medication to help with sleep    Yes    Amitriptyline     Currently on any mental health " medications     Yes   Wellbutrin       Currently on medication for attention, ADD/ADHD    No       Is patient on a controlled substance   No     Any concerns would like to be addressed at this appointment?    Pt recently had COVID and had to increase his BP medications.  He has been improving in all symptoms but is still struggling with word finding and memory that normally he wouldn't have to think twice about doing/saying.  He is still in PT which has helped with his balance.                                                       Workman's Comp   Yes   [facility]   Yes   Present: Yes    Start Time: 2:05pm    End Time:  2:15pm    Total time of phone call: 10 minutes    Patient would like the video invitation sent by: [employer]   Number/e-mail address:[phone] Please Add [facility] to the video call Nelli Mcconnell [phone]    Shannen Faustin CMA     Is patient on a controlled substance prescribed by me?  No      Outpatient Follow up Mild TBI (Concussion)  Evaluation     Pertinent History:  Patient states he slipped on work putting down salt on the icy salt on the sidewalk and hit his head on the concrete sidewalk.        Date of accident :  1/13/2021    Subjective:          HPI    The patient returns to the concussion clinic for a follow up visit, He was last seen by me on 3/3/2021, where I.started the patient on Wellbutrin and Amitriptyline. Patient has been doing well on the medication. He did test positive for Covid, and then his son tested positive so he was quarantined for some time. He reports the time away from work helped reduce his symptoms. Overall patient is reporting improvement in physical, emotional and cognitive symptoms    We discussed some treatment options and have elected to increase Wellbutrin to 300 mg. Patient will also reschedule appointment with ophthalmology d/t having to cancel because of his positive covid test                                                    Headaches:  Significant ongoing  headaches Yes  Headaches: Intermittently  Improvement :Yes   Current Headache No   Wake with HA  No     Worse Headache    7/10           How often: once a week.     Average Headache 5/10.    Best Headache 3/10.  Brings on HA:   Every day stress, movement, daily tasks   Makes symptoms worse  Screens   Makes symptoms better. Resting, taking breaks.   Taking  ibuprofen (Advil)        Helpful:  Yes     Physical Symptoms:  Headache-Yes       Since last visit  Improved     Nausea-Yes       Since last visit  Improved       Balance problems - Yes     Since last visit  Improved      Dizziness - No          Visual problems - Yes    Since last visit  Improved     Fatigue - Yes             Since last visit  Improved and Same     Sensitivity to light - Yes       Since last visit  Improved     Sensitivity to sound - Yes         Since last visit  Improved     Numbness/tingling - Yes     Since last visit  Improved         Cognitive Symptoms  Feeling mentally foggy -No          Feeling slowed down -Yes       Since last visit  Improved     Difficulty Concentrating- Yes     Since last visit  Improved     Difficulty remembering - Yes        Since last visit  Improved       Emotional Symptoms  Irritability - Yes         Since last visit  Improved     Sadness-  Yes      Since last visit  Improved     More emotional - Yes      Since last visit  Improved     Nervousness/anxiety -Yes       Since last visit  Improved       Sleep History:  Drowsiness- Yes    Since last visit  Improved     Sleep less than usual - No  Sleep more than usual - Yes  Trouble falling asleep - No         Does the patient wake feeling rested - Yes             Migraine Headaches      Patient history of migraines.    No      Exertion:         Do the above stated symptoms worsen with physical activity? Yes       Since last visit  Improved           Do the above stated symptoms worsen with cognitive activity? Yes      Since last visit  Improved            Work/School         Do the above stated symptoms worsen with school/work?        Yes        Have your returned to work/school? Yes 3 7 hour days a week.          There are no active problems to display for this patient.    Past Medical History:   Diagnosis Date     Concussion      Head injury      Hypertension      Past Surgical History:   Procedure Laterality Date     HERNIA REPAIR       REPLACEMENT TOTAL KNEE       Family History   Problem Relation Age of Onset     Seizures Father      Current Outpatient Medications   Medication Sig Dispense Refill     amitriptyline (ELAVIL) 25 MG tablet Take 1-3 tablets (25-75 mg total) by mouth at bedtime. 90 tablet 2     buPROPion (WELLBUTRIN XL) 150 MG 24 hr tablet Take 1 tablet (150 mg total) by mouth every morning. 30 tablet 2     losartan (COZAAR) 50 MG tablet Take 1 tablet (50 mg) by mouth daily       omeprazole (PRILOSEC) 40 MG capsule Take 40 mg by mouth.       pravastatin (PRAVACHOL) 20 MG tablet Take 20 mg by mouth.       sertraline (ZOLOFT) 50 MG tablet Take 50 mg by mouth.       testosterone cypionate (DEPOTESTOTERONE CYPIONATE) 200 mg/mL injection INJECT 1ML INTRAMUSCULARLY EVERY 14 DAYS       triamcinolone acetonide 40 mg/mL Kit Inject 40 mg into the joint.       No current facility-administered medications for this encounter.      Social History     Socioeconomic History     Marital status:      Spouse name: Not on file     Number of children: Not on file     Years of education: Not on file     Highest education level: Not on file   Occupational History     Not on file   Social Needs     Financial resource strain: Not on file     Food insecurity     Worry: Not on file     Inability: Not on file     Transportation needs     Medical: Not on file     Non-medical: Not on file   Tobacco Use     Smoking status: Never Smoker     Smokeless tobacco: Never Used   Substance and Sexual Activity     Alcohol use: Yes     Drug use: Never     Sexual activity: Not on file   Lifestyle      Physical activity     Days per week: Not on file     Minutes per session: Not on file     Stress: Not on file   Relationships     Social connections     Talks on phone: Not on file     Gets together: Not on file     Attends Rastafarian service: Not on file     Active member of club or organization: Not on file     Attends meetings of clubs or organizations: Not on file     Relationship status: Not on file     Intimate partner violence     Fear of current or ex partner: Not on file     Emotionally abused: Not on file     Physically abused: Not on file     Forced sexual activity: Not on file   Other Topics Concern     Not on file   Social History Narrative     Not on file       The following portions of the patient's history were reviewed and updated as appropriate: allergies, current medications, past family history, past medical history, past social history, past surgical history and problem list.    Review of Systems  A comprehensive review of systems was negative except for: What is noted above    Objective:       Discussion was held with the patient today regarding concussion in general including types of injury, symptoms that are common, treatment and variability in time to recover. Education about concussion symptoms and length of time it would take the patient to recover was also given to the patient.  I have reassured the patient his symptoms are very common when a concussion is present and will improve with time. We discussed the risks and benefits of possible medication including risk of worsening depression with medication adjustments and even the possibility of emergence of suicidal ideations.       Total time spent with the patient today was 40 minutes with greater than 50% of the time spent in counseling and care coordination. The patient agrees to call before then with any questions, concerns or problems. We will assess for the appropriateness of possible psychotropic medication trials/changes. The  patient will seek out appropriate emergency services should that become necessary.    Diagnosis managed and treated at today's visit :  Post concussion syndrome  Post concussion headache  Nausea  Dizziness  Fatigue  Insomnia  Sensitivity to light  Sound sensitivity  Concentration and Attention deficit  Memory difficulties  Anxiety d/t a medical condition  Irritability  Return to work  Encounter related to a worker's comp claim     Plan:  Medication Adjustment:  Increase Wellbutrin to 300 mg    Other:   Patient will return to clinic in 4 weeks. They agree to call or return sooner with any questions or concerns.  Risks and benefits were discussed.  Continue with individual therapist.     Continue with the support of the clinic, reassurance, and redirection. Staff monitoring and ongoing assessments per team plan. This team will utilize appropriate emergency services if necessary. I will make myself available if concerns or problems arise.     Mental Status Examination  He is cooperative with questioning. He is fully engaged in conversation today. Speech is normal. Thought processes normal with normal prehension and expression. Thoughts are organized and linear. Content is pertinent to the conversation and without evidence of auditory or visual hallucinations. No delusional ideation. Gen. fund of knowledge, insight and memory are normal       Video Visit Details    Type of service: Video Visit    Video Start Time: 1430    Video End Time:  1500    Total time of video visit: 30 minutes    Originating Location: Patient's home    Distant Location:  [facility]    Mode of Communication: Video Conference via [email or web address] and [employer]      10 minutes spent on the date of the encounter doing chart review, review of outside records, documentation and return to work letter     General Information:  Today you had your appointment with Veronica Matta CNP     If lab work was done today  as part of your evaluation you will generally be contacted via My Chart, mail, or phone with the results within 1-5 days. If there is an alarming result we will contact you by phone. Lab results come back at varying times, I generally wait until all labs are resulted before making comments on results. Please note labs are automatically released to My Chart once available.     If you need refills please contact your pharmacist. They will send a refill request to me to review. Please allow 3 business days for us to process all refill requests.     Please call or send a medical message through My Chart, with any questions or concerns    If you need any paperwork completed please fax forms to [phone]. Please state if you would like a copy of the completed paperwork, mailed or faxed back to the patient and a fax number to fax the paperwork to. Please allow up to 10 days for paperwork to be completed.    Veronica Matta, CNP

## 2021-07-20 NOTE — LETTER
Letter by Veronica Matta FNP at      Author: Veronica Matta FNP Service: -- Author Type: --    Filed:  Date of Service:  Status: (Other)         February 4, 2021     Patient: Galileo Vieira   YOB: 1965   Date of Visit: 2/3/2021       To Whom It May Concern:    It is my medical opinion that Galileo Vieira may return to work on 2/8/2021. Employees recovering from concussions often exhibit cognitive symptoms that make attending work and learning difficult. They may not be able to attend work or only partial days. Some symptoms that could affect performance in the work environment  include: sensitivity to light and noise, headache, trouble focusing, concentrating, or remembering, and difficulty looking at a screen. The accommodations below often help reduce the symptoms and allow them to return to work quicker. Compliance with these accommodations allows the brain to recover more quickly even if it appears the employee is symptom free.     Attendance Restrictions  Patient will still work only 3 days a week (Monday, Wednesday and Friday). he will continue to work only these days until he reaches 8 hours a day. He will work 4 hour(s) a day, if he is able to tolerate (tolerate is no return of severe symptoms). If patient is able to tolerate 3 consecutive shifts the patient may increase hours a day worked by 1 hour.   Other Restrictions:  1. Please put a screen filter on the patient's computer   2. Allow patient to take 5-10 minute breaks every 60 minutes   3. Allow patient to take a break if he starts to have symptoms, If symptoms continue or worsen please allow patient to return home.  4. Allow employee to wear sunglasses and hat to help patient's sensitivity to lights. Remove any overhead lighting and replace with lamps if possible. Move patient to a office if available.  5.  If patient wakes with severe headache please allow patient to start work later, it symptoms worsen patient should not attempt to  work.      If you have any questions or concerns, please don't hesitate to call.    Sincerely,        Electronically signed by BRODERICK Curiel

## 2021-07-20 NOTE — LETTER
Letter by Veronica Matta FNP at      Author: Veronica Matta FNP Service: -- Author Type: --    Filed:  Encounter Date: 5/10/2021 Status: (Other)         May 10, 2021     Patient: Galileo Vieira   YOB: 1965   Date of Visit: 5/10/2021       To Whom It May Concern:    It is my medical opinion that Galileo Vieira may return to work on 5/10/2021.  Employees recovering from concussions often exhibit cognitive symptoms that make attending work and learning difficult. They may not be able to attend work or only partial days. Some symptoms that could affect performance in the work environment  include: sensitivity to light and noise, headache, trouble focusing, concentrating, or remembering, and difficulty looking at a screen. The accommodations below often help reduce the symptoms and allow them to return to work quicker. Compliance with these accommodations allows the brain to recover more quickly even if it appears the employee is symptom free.     Attendance Restrictions  Patient will work 8 hours 4 days a week (Monday, Tuesday, Thursday and Friday)  If patient is able to tolerate (tolerate is no return of severe symptoms) two weeks of four eight hour days, he may add in 3 hours on Wednesday for the next two weeks. Patient will return in 4 weeks and I will reassess patient's ability to return to work.  Other Restrictions:  1. Please put a screen filter on the patient's computer   2. Allow patient to take 10 minute breaks every 60 minutes. Patient should be allowed to take another break if he starts to have symptoms, If symptoms continue or worsen please allow patient to return home.  3. Allow employee to wear sunglasses and hat to help patient's sensitivity to lights. Remove any overhead lighting and replace with lamps if possible. Move patient to a office if available.  4. If patient wakes with severe headache please allow patient to start work later, it symptoms worsen patient should not attempt to  work.      If you have any questions or concerns, please don't hesitate to call.    Sincerely,        Electronically signed by BRODERICK Curiel

## 2021-07-20 NOTE — TELEPHONE ENCOUNTER
[facility] CALLED STATING THAT PT HAS BEEN HAVING LOWER BACK SORENESS FROM THE INJURY.  SHE IS WONDERING IF LOWER BACK PAIN CAN BE ADDED TO THE PT ORDER SO THAT PT CAN ASSESS THIS SYMPTOM AND WORK ON THIS. I HAVE PUT IN THE ORDER JUST NEED IT APPROVED FROM YOU.

## 2021-07-20 NOTE — LETTER
Letter by Veronica Matta FNP at      Author: Veronica Matta FNP Service: -- Author Type: --    Filed:  Date of Service:  Status: (Other)         March 3, 2021     Patient: Galileo Vieira   YOB: 1965   Date of Visit: 3/3/2021       To Whom It May Concern:    It is my medical opinion that Galileo Vieira may return to work on 3/3/2021. Employees recovering from concussions often exhibit cognitive symptoms that make attending work and learning difficult. They may not be able to attend work or only partial days. Some symptoms that could affect performance in the work environment  include: sensitivity to light and noise, headache, trouble focusing, concentrating, or remembering, and difficulty looking at a screen. The accommodations below often help reduce the symptoms and allow them to return to work quicker. Compliance with these accommodations allows the brain to recover more quickly even if it appears the employee is symptom free.     Attendance Restrictions  Patient will work 3 days a week (Monday, Wednesday and Friday). he will continue to work only these days until he reaches 8 hours a day. He will work five hours a day, if he is able to tolerate (tolerate is no return of severe symptoms). If patient is able to tolerate 2 weeks of work,  the patient may increase hours a day worked by two hours.   Other Restrictions:  1. Please put a screen filter on the patient's computer   2. Allow patient to take 10 minute breaks every 60 minutes. Patient should be allowed to take another break if he starts to have symptoms, If symptoms continue or worsen please allow patient to return home.  3. Allow employee to wear sunglasses and hat to help patient's sensitivity to lights. Remove any overhead lighting and replace with lamps if possible. Move patient to a office if available.  4. If patient wakes with severe headache please allow patient to start work later, it symptoms worsen patient should not attempt to  work.    If you have any questions or concerns, please don't hesitate to call.    Sincerely,        Electronically signed by BRODERICK Curiel

## 2021-07-20 NOTE — PROGRESS NOTES
"Video Visit  Galileo Vieira is a 55 y.o. male who is being evaluated via a billable video visit in light of the ongoing global health crisis (COVID-19) that requires us to abide by social distancing mandates in order to reduce the risk of COVID-19 exposure.       The patient has been notified of following:     \"This video visit will be conducted via a video call between you and your physician/provider. We have found that certain health care needs can be provided without the need for a physical exam.  This service lets us provide the care you need with a short phone/video conversation.  If a prescription is necessary we can send it directly to your pharmacy.  If lab work is needed we can place an order for that and you can then stop by our lab to have the test done at a later time.    If during the course of the call the physician/provider feels a telephone visit is not appropriate, you will not be charged for this service.\"     Patient has given verbal consent to a video visit? Yes    Galileo Vieira chief complaint is Post Concussion Syndrome     ALLERGIES  Patient has no known allergies.    Date of accident : 1/13/2021    Orders from previous visit: Increase wellbutrin to 300 mg schedule appt with Ophthalmology   Neuropsychological assessment completed    No   Currently doing PT  Yes   Completed No   Currently doing OT  No   Completed No    Currently doing ST   No   Completed No   Psychology  No      Need a note for work accommodations  Yes    Need a note for school accommodations  No      Any new medication (other provider):   No   Meds started at last appointment  No  Meds increased at last appointment    Yes Wellbutrin 300 mg Is patient still taking:  Yes  Results: its going okay.     Currently on medication to help with sleep    Yes    Amitriptyline    Currently on any mental health medications     Yes   Wellbutrin       Currently on medication for attention, ADD/ADHD    No       Is patient on a controlled " "substance   No     Any concerns would like to be addressed at this appointment?    Rough time with physical therapy last week.                                                       Workman's Comp   Yes   QRC   Yes   Present: Yes    Start Time: 2:20pm    End Time:  2:25pm    Total time of phone call: 5 minutes    Patient would like the video invitation sent by: Rehan   Number/e-mail address:[phone] Please Add QRC to the video call Nelli Mcconnell [phone]    Shannen Faustin CMA     Is patient on a controlled substance prescribed by me?  No     Outpatient Follow up Mild TBI (Concussion)  Evaluation     Pertinent History:   Patient states he slipped on work putting down salt on the icy salt on the sidewalk and hit his head on the concrete sidewalk.    Date of accident :  1/13/2021    Subjective:          HPI    The patient returns to the concussion clinic for a follow up visit, He was last seen by me on 4/6/2021, where I increased the Wellbutrin to 300 mg. Patient reports that he is doing better with headaches. He had a \"bad day\" at , the therapist did a maneuver which caused him to be nauseous for a couple of days. He reports that headaches are being kept under control with IBU. Overall patient reports that his most physical symptoms have improved, except balance issues have worsened. He reports that emotional and cognitive symptoms have remained the same.    We discussed some treatment options and have elected to start the patient on Ritalin. I will also order OT, vision therapy, so the PT can spend more time helping the patient with back pain. .                                                      Headaches:  Significant ongoing headaches Yes  Headaches: Intermittently  Improvement :Yes   Current Headache No   Wake with HA  No     Worse Headache    7/10        Average Headache 5/10.    Best Headache 3/10.  Brings on HA:   Every day stress, movement, daily tasks   Makes symptoms better. Resting, taking " breaks   Taking  ibuprofen (Advil)        Helpful:  Yes     Physical Symptoms:  Headache-Yes       Since last visit  Improved     Nausea-Yes       Since last visit  Worsen       Balance problems - Yes     Since last visit  Worsen      Dizziness - No              Visual problems - Yes    Since last visit  Same     Fatigue - Yes             Since last visit  Improved     Sensitivity to light - Yes       Since last visit  Improved     Sensitivity to sound - Yes         Since last visit  Improved     Numbness/tingling - No           Cognitive Symptoms  Feeling mentally foggy -No      Feeling slowed down -Yes       Since last visit  Same     Difficulty Concentrating- Yes     Since last visit  Same     Difficulty remembering - Yes        Since last visit  Same       Emotional Symptoms  Irritability - Yes         Since last visit  Same     Sadness-  Yes      Since last visit  Same     More emotional - Yes      Since last visit  Same     Nervousness/anxiety -Yes       Since last visit  Same       Sleep History:  Drowsiness- Yes    Since last visit  Improved     Sleep less than usual - No  Sleep more than usual - Yes  Trouble falling asleep - Yes     Since last visit  Worsen     Does the patient wake feeling rested - Yes             Migraine Headaches      Patient history of migraines.    No      Exertion:         Do the above stated symptoms worsen with physical activity? Yes       Since last visit  Same           Do the above stated symptoms worsen with cognitive activity? Yes      Since last visit  Same            Work/School        Do the above stated symptoms worsen with school/work?        Yes        Have your returned to work/school? Yes          There are no active problems to display for this patient.    Past Medical History:   Diagnosis Date     Concussion      Head injury      Hypertension      Past Surgical History:   Procedure Laterality Date     HERNIA REPAIR       REPLACEMENT TOTAL KNEE       Family History    Problem Relation Age of Onset     Seizures Father      Current Outpatient Medications   Medication Sig Dispense Refill     amitriptyline (ELAVIL) 25 MG tablet Take 1-3 tablets (25-75 mg total) by mouth at bedtime. 90 tablet 2     buPROPion (WELLBUTRIN XL) 150 MG 24 hr tablet Take 2 tablets (300 mg total) by mouth every morning. 60 tablet 3     losartan (COZAAR) 50 MG tablet 75 mg.        omeprazole (PRILOSEC) 40 MG capsule Take 40 mg by mouth.       pravastatin (PRAVACHOL) 20 MG tablet Take 20 mg by mouth.       sertraline (ZOLOFT) 50 MG tablet Take 50 mg by mouth.       testosterone cypionate (DEPOTESTOTERONE CYPIONATE) 200 mg/mL injection INJECT 1ML INTRAMUSCULARLY EVERY 14 DAYS       triamcinolone acetonide 40 mg/mL Kit Inject 40 mg into the joint.       No current facility-administered medications for this visit.      Social History     Socioeconomic History     Marital status:      Spouse name: Not on file     Number of children: Not on file     Years of education: Not on file     Highest education level: Not on file   Occupational History     Not on file   Social Needs     Financial resource strain: Not on file     Food insecurity     Worry: Not on file     Inability: Not on file     Transportation needs     Medical: Not on file     Non-medical: Not on file   Tobacco Use     Smoking status: Never Smoker     Smokeless tobacco: Never Used   Substance and Sexual Activity     Alcohol use: Yes     Drug use: Never     Sexual activity: Not on file   Lifestyle     Physical activity     Days per week: Not on file     Minutes per session: Not on file     Stress: Not on file   Relationships     Social connections     Talks on phone: Not on file     Gets together: Not on file     Attends Taoist service: Not on file     Active member of club or organization: Not on file     Attends meetings of clubs or organizations: Not on file     Relationship status: Not on file     Intimate partner violence     Fear of  current or ex partner: Not on file     Emotionally abused: Not on file     Physically abused: Not on file     Forced sexual activity: Not on file   Other Topics Concern     Not on file   Social History Narrative     Not on file       The following portions of the patient's history were reviewed and updated as appropriate: allergies, current medications, past family history, past medical history, past social history, past surgical history and problem list.    Review of Systems  A comprehensive review of systems was negative except for: What is noted above    Objective:       Discussion was held with the patient today regarding concussion in general including types of injury, symptoms that are common, treatment and variability in time to recover. Education about concussion symptoms and length of time it would take the patient to recover was also given to the patient.  I have reassured the patient his symptoms are very common when a concussion is present and will improve with time. We discussed the risks and benefits of possible medication including risk of worsening depression with medication adjustments and even the possibility of emergence of suicidal ideations.       Total time spent with the patient today was 30 minutes with greater than 50% of the time spent in counseling and care coordination. The patient agrees to call before then with any questions, concerns or problems. We will assess for the appropriateness of possible psychotropic medication trials/changes. The patient will seek out appropriate emergency services should that become necessary.    Diagnosis managed and treated at today's visit :  Post concussion syndrome  Post concussion headache  Nausea  Fatigue  Insomnia  Sensitivity to light  Sound sensitivity  Concentration and Attention deficit  Memory difficulties  Anxiety d/t a medical condition  Irritability  Return to work  Encounter related to a worker's comp claim     Plan:  Medication  Adjustment:  Ritalin 5 mg, take one tab in am and at noon.    Other:   Patient will return to clinic in 4 weeks. They agree to call or return sooner with any questions or concerns.  Risks and benefits were discussed.  Continue with individual therapist.     Continue with the support of the clinic, reassurance, and redirection. Staff monitoring and ongoing assessments per team plan. This team will utilize appropriate emergency services if necessary. I will make myself available if concerns or problems arise.     Mental Status Examination  He is cooperative with questioning. He is fully engaged in conversation today. Speech is normal. Thought processes normal with normal prehension and expression. Thoughts are organized and linear. Content is pertinent to the conversation and without evidence of auditory or visual hallucinations. No delusional ideation. Gen. fund of knowledge, insight and memory are normal       Video Visit Details    Type of service: Video Visit    Video Start Time: 1430    Video End Time:  1450    Total time of video visit: 20 minutes    Originating Location: Patient's home    Distant Location:  [facility]    Mode of Communication: Video Conference via [employer]      10 minutes spent on the date of the encounter doing chart review, review of outside records, documentation and return to work letter     General Information:  Today you had your appointment with Veronica Matta CNP     If lab work was done today as part of your evaluation you will generally be contacted via My Chart, mail, or phone with the results within 1-5 days. If there is an alarming result we will contact you by phone. Lab results come back at varying times, I generally wait until all labs are resulted before making comments on results. Please note labs are automatically released to My Chart once available.     If you need refills please contact your pharmacist. They will send a refill  request to me to review. Please allow 3 business days for us to process all refill requests.     Please call or send a medical message through My Chart, with any questions or concerns    If you need any paperwork completed please fax forms to [phone]. Please state if you would like a copy of the completed paperwork, mailed or faxed back to the patient and a fax number to fax the paperwork to. Please allow up to 10 days for paperwork to be completed.    Veronica Matta, CNP

## 2021-07-20 NOTE — PROGRESS NOTES
"Video Visit  Galileo Vieira is a 55 y.o. male who is being evaluated via a billable video visit in light of the ongoing global health crisis (COVID-19) that requires us to abide by social distancing mandates in order to reduce the risk of COVID-19 exposure.      The patient has been notified of following:     \"This virtual visit will be conducted via a video call between you and your physician/provider. We have found that certain health care needs can be provided without the need for a physical exam.  This service lets us provide the care you need with a short video conversation.  If a prescription is necessary we can send it directly to your pharmacy.  If lab work is needed we can place an order for that and you can then stop by our lab to have the test done at a later time.    If during the course of the call the physician/provider feels a video visit is not appropriate, you will not be charged for this service.\"     Patient has given verbal consent to a Video visit? Yes    Galileo Vieira chief complaint is: Post Concussion Syndrome    ALLERGIES  Patient has no known allergies.    Current PT  No      Current OT  No      Current ST  No       Current Chiropractic  No  Psychiatrist currently No  Past:  No  Psychologist currently No  Past:  No  Primary: Currently Yes                     MRI/CT Completed  Yes     Need a note for work accommodations  Yes     Medications  Currently on medication to help you sleep  No, but uses a C{PAP machine     Mental health dx.- Depression   Currently on medication to help with mental health Yes     Zoloft   Currently on medication for concentration or ADD /ADHD     No         Are you on a controlled substance  No   Who is prescribing     Date of accident: 1/13/21  Workman's Comp   Yes   QRC   Yes    Discussed: Yes   Present: Yes , Nelli Mcconnell [phone]    How concussion happened:  Patient states he slipped on work putting down salt on the icy salt on the sidewalk and hit his head on " the concrete sidewalk.       LOC:  Yes      Did you seek medical attention:  Yes   When :  Right away, Ambulance Came     MRI/CT Completed  Yes       Injury Description:               Was there a forcible blow to the head?:                Yes     Where on head? Back of the head, towards the right of the head.                                             Retrograde Amnesia (loss of memory of events before the injury)?:  No  Anterograde Amnesia (loss of memory of events following injury)?:  No    Number of previous head injuries.        2    Had all previous concussion symptoms resolved   Yes    Work/School  Currently employed     Yes    Are you considered a essential employee?     Yes  Are you working from home or in office In office   Disability  No   Title   Lead Medical Assistant      works at    [employer]     Normal hours per week  (Average before injury) 40 hours/week        Have your returned to work?            No    Full hours:     No    The concussion symptoms are limiting his ability to work.  How? Wooziness, headaches, neck and jaw pain, back pain, fatigue, increased sleepiness, tinnitus, left-sided cheek numbness, getting out of bed is really hard. He gets woozy and has to sit on the bed for a bit til he can get up. The room spins and a squeezing headache this morning. (2/3//2021).     He is currently living with his family. Children living with you? 1  He denies any developmental problems, learning disabilities, or history of ADHD.     Patient History  Patient was referred to the concussion clinic by primary doctor.      Phone Start Time: 2:20pm    Phone End Time:  2:35pm    Total time of phone call 15 minutes    Number patient would like to use: [phone]     Bernardo Samuels CMA     Plan:     Neuropsychological assessment   No    PT to evaluate and treat  Yes  OT to evaluate and treat  No  ST to evaluate and treat  No  Referral to ophthalmology   No  Referral to Neurology         No  Referral to psychology No  Referral to psychiatry  No  Other Referral   No  MRI/CT ordered today : No  Labs ordered today : No  New medication :  No    Work note completed : No  School note completed : No  [facility] list sent : No    Subjective:          HPI         Headaches:  Significant ongoing headaches Yes  Headaches: Daily and Continuously  Improvement :No   Current Headache Yes   Wake with HA  No     Worse Headache  710           How often: few times since injury  Average Headache 5/10.    Best Headache 3/10.  Makes symptoms worse  movement  Makes symptoms better. rest  Taking  ibuprofen (Advil)        Helpful:  Yes       Physical Symptoms:  Headache-Yes      Resolved No           Improved since accident Same     Nausea- Yes      Resolved No        Improved since accident    Improved     Vomiting - No         Balance problems - Yes       Resolved No Improved since accident Improved     Dizziness - Yes      Resolved No          Improved since accident Same   Visual problems - Yes, blurry vision    Resolved No           Improved since accident Same    Fatigue - Yes     Resolved No           Improved since accident Same    Sensitivity to light - Yes     Resolved No         Improved since accident Same    Sensitivity to sound - Yes      Resolved No        Improved since accident Same    Numbness/tingling - Yes, left side of face     Resolved No         Improved since accident Same        Cognitive Symptoms  Feeling mentally foggy - No         Feeling slowed down - Yes         Resolved No         Improved since accident Same    Difficulty Concentrating- Yes        Resolved   No     Improved since accident Same    Difficulty remembering - Yes         Resolved No       Improved since accident Same      Emotional Symptoms  Irritability - Yes        Resolved No         Improved since accident Same    Sadness-   Yes       Resolved No        Improved since accident Same    More emotional - Yes       Resolved No        Improved since accident Same    Nervousness/anxiety - Yes       Resolved No        Improved since accident Same      Psychiatric History:  Anxiety - Yes  Depression - Yes  Other mental health dx:  No    Sleep Disorders - Yes, he is compliant with CPAP  The patient denies being a victim of abuse.   Ever Hospitalized for mental health:             No  Any thought of hurting self or others now?   No  Any history of hurting self or others?            No    Family Psychiatric History:  Mother's side                              No  Father's side                               No  Adopted                                      No    Sleep History:  Drowsiness- Yes         Resolved No        Improved since accident Same    Sleep less than usual - No  Sleep more than usual - Yes  Trouble falling asleep - No        Does the patient wake feeling rested - Yes         Migraine Headaches      Patient history of migraines.    No      Family history of migraines    No    Exertion:         Do the above stated symptoms worsen with physical activity? No        Do the above stated symptoms worsen with cognitive activity? Yes          There are no active problems to display for this patient.    Past Medical History:   Diagnosis Date     Concussion      Head injury      Hypertension      Past Surgical History:   Procedure Laterality Date     HERNIA REPAIR       REPLACEMENT TOTAL KNEE       Family History   Problem Relation Age of Onset     Seizures Father      Current Outpatient Medications   Medication Sig Dispense Refill     omeprazole (PRILOSEC) 40 MG capsule Take 40 mg by mouth.       pravastatin (PRAVACHOL) 20 MG tablet Take 20 mg by mouth.       sertraline (ZOLOFT) 50 MG tablet Take 50 mg by mouth.       testosterone cypionate (DEPOTESTOTERONE CYPIONATE) 200 mg/mL injection INJECT 1ML INTRAMUSCULARLY EVERY 14 DAYS       triamcinolone acetonide 40 mg/mL Kit Inject 40 mg into the joint.       No current facility-administered  medications for this encounter.      Social History     Socioeconomic History     Marital status:      Spouse name: Not on file     Number of children: Not on file     Years of education: Not on file     Highest education level: Not on file   Occupational History     Not on file   Social Needs     Financial resource strain: Not on file     Food insecurity     Worry: Not on file     Inability: Not on file     Transportation needs     Medical: Not on file     Non-medical: Not on file   Tobacco Use     Smoking status: Never Smoker     Smokeless tobacco: Never Used   Substance and Sexual Activity     Alcohol use: Yes     Drug use: Never     Sexual activity: Not on file   Lifestyle     Physical activity     Days per week: Not on file     Minutes per session: Not on file     Stress: Not on file   Relationships     Social connections     Talks on phone: Not on file     Gets together: Not on file     Attends Scientologist service: Not on file     Active member of club or organization: Not on file     Attends meetings of clubs or organizations: Not on file     Relationship status: Not on file     Intimate partner violence     Fear of current or ex partner: Not on file     Emotionally abused: Not on file     Physically abused: Not on file     Forced sexual activity: Not on file   Other Topics Concern     Not on file   Social History Narrative     Not on file       The following portions of the patient's history were reviewed and updated as appropriate: allergies, current medications, past family history, past medical history, past social history, past surgical history and problem list.    Review of Systems  A comprehensive review of systems was negative except for what is noted above.    Objective:       Discussion was held with the patient today regarding concussion in general including types of injury, symptoms that are common, treatment and variability in time to recover. Education about concussion symptoms and length  of time it would take the patient to recover was also given to the patient.  I have reassured the patient his symptoms are very common when a concussion is present and will improve with time. We discussed the risks and benefits of the medication including risk of worsening depression with medication adjustments and even the possibility of emergence of suicidal ideations.       Total time spent with the patient today was 60 minutes with greater than 50% of the time spent in counseling and care coordination. The patient will call before then with any questions, concerns or problems.The patient will seek out appropriate emergency services should that become necessary.    Physical Exam:   Neck:  Full ROM  No with pain or stiffness Yes    Neurologic:   Mental status: Alert, oriented, thought content appropriate.. Recent and remote memory grossly intact.  Yes  Speech is clear and fluent with no obvious word finding or paraphasic errors. Yes    Assessment/Diagnosis managed and treated at today's visit :  Post concussion syndrome  Post concussion headache  Nausea  Dizziness  Fatigue  Insomnia  Sensitivity to light  Sound sensitivity  Concentration and Attention deficit  Memory difficulties  Anxiety d/t a medical condition  Irritability  Return to work     Plan:  Medication Adjustment:  No medication changes    Other:   Patient will return to clinic in 4 weeks. They agree to call or return sooner with any questions or concerns.  Risks and benefits were discussed. Continue with individual therapist if already established.     Continue with the support of the clinic, reassurance, and redirection. Staff monitoring and ongoing assessments per team plan.This team will utilize appropriate emergency services if necessary. I will make myself available if concerns or problems arise.     Mental Status Examination    He is cooperative with questioning. He is fully engaged in conversation today. He is alert and fully oriented. Speech is  "normal. Thought processes normal with normal prehension and expression. Thoughts are organized and linear. Content is pertinent to the conversation and without evidence of auditory or visual hallucinations. No evidence of any psychosis, No delusional ideation. Gen. fund of knowledge, insight and memory are normal     Consent was obtained for this service by one of our care team members    Video Visit Details    Type of service: Video Visit    Video Start Time: 1440    Video End Time:  1530    Total time of video visit: 50 minutes    Originating Location: Patient's home    Distant Location:  [facility]    Mode of Communication: Video Conference via [employer] [facility]    Patient Instructions   It was nice speaking with you today for our office visit held through a virtual visit. The following is a summary of our visit and my recommendations:    How to return to daily activities with concussion:  1. Get lots of rest. Be sure to get enough sleep at night- no late nights. Keep the same bedtime weekdays and weekends.   2. Take daytime naps or rest breaks when you feel tired or fatigued.  3. Limit physical activity as well as activities that require a lot of thinking or concentration. These activities can make symptoms worse and recovery time longer. In some cases, your doctor may prescribe time that you completely eliminate these activities to allow complete \"brain rest.\"  Physical activity includes going to the gym, sports practices, weight-training, running, exercising, heavy lifting, etc.  Thinking and concentration activities (e.g., cell phone texting, computer games, movies, parties, loud music and in severe cases may include limiting your time at work).  4. Drink lots of fluids and eat carbohydrates or protein to main appropriate blood sugar levels.  5. As symptoms decrease, with consent from your doctor, you may begin to gradually return to your daily activities. If symptoms worsen " or return, lessen your activities, then try again to increase your activities gradually.   6. During recovery, it is normal to feel frustrated and sad when you do not feel right and you can't be as active as usual.  7. Repeated evaluation of your symptoms is recommended to help guide recovery. Please follow up as recommended by your doctor to ensure a safe and healthy recovery.    Watch for and go to the Emergency Department if you have any of the following symptoms:  Headaches that significantly worsen  Looks very drowsy or can't be awakened  Can't recognize people or places  Worsening neck pain  Seizures  Repeated vomiting  Increasing confusion or irritability  Unusual behavioral change  Slurred speech  Weakness or numbness in arms/legs  Change in state of consciousness    For more information, please visit on the Internet:  http://www.cdc.gov/concussion/get_help.html   http://www.cdc.gov/concussion/pdf/Facts_about_Concussion_TBI-a.pdf      General Information:  Today you had your appointment with Veronica Matta CNP     If lab work was done today as part of your evaluation you will generally be contacted via My Chart, mail, or phone with the results within 1-5 days. If there is an alarming result we will contact you by phone. Lab results come back at varying times, I generally wait until all labs are resulted before making comments on results. Please note labs are automatically released to My Chart once available.     If you need refills please contact your pharmacist. They will send a refill request to me to review. Please allow 3 business days for us to process all refill requests.     Please call or send a medical message through My Chart, with any questions or concerns    If you need any paperwork completed please fax forms to [phone]. Please state if you would like a copy of the completed paperwork, mailed or faxed back to the patient and a fax number to fax the paperwork to. Please allow up  to 10 days for paperwork to be completed.    Veronica Matta CNP      10 minutes spent on the date of the encounter doing chart review, review of outside records, documentation and Return to work letter

## 2021-07-20 NOTE — PROGRESS NOTES
"Video Visit  Galileo Vieira is a 55 y.o. male who is being evaluated via a billable video visit in light of the ongoing global health crisis (COVID-19) that requires us to abide by social distancing mandates in order to reduce the risk of COVID-19 exposure.       The patient has been notified of following:     \"This video visit will be conducted via a video call between you and your physician/provider. We have found that certain health care needs can be provided without the need for a physical exam.  This service lets us provide the care you need with a short phone/video conversation.  If a prescription is necessary we can send it directly to your pharmacy.  If lab work is needed we can place an order for that and you can then stop by our lab to have the test done at a later time.    If during the course of the call the physician/provider feels a telephone visit is not appropriate, you will not be charged for this service.\"     Patient has given verbal consent to a video visit? Yes    Galileo Vieira chief complaint is Post Concussion Syndrome     ALLERGIES  Patient has no known allergies.    Date of accident : 1/13/2021    Orders from previous visit: PT Eval and Treat   Neuropsychological assessment completed    No   Currently doing PT  Yes   Completed No   Currently doing OT  No   Completed No    Currently doing ST   No   Completed No   Psychology  No      Need a note for work accommodations  Yes    Need a note for school accommodations  No      Any new medication (other provider):   No   Meds started at last appointment  No   Meds increased at last appointment    No    Currently on medication to help with sleep    No        Currently on any mental health medications     Yes   Zoloft       Currently on medication for attention, ADD/ADHD    No       Is patient on a controlled substance   No                                                       Workman's Comp   Yes   QRC   Yes   Present: Yes    Start Time: " 3:00pm    End Time:  3:05pm    Total time of phone call: 5 minutes    Patient would like the video invitation sent by: [employer]   Number/e-mail address:[phone]  eNlli Mcconnell [phone]    Shannen Faustin CMA     Is patient on a controlled substance prescribed by me?  No     Outpatient Follow up Mild TBI (Concussion)  Evaluation     Pertinent History:  Patient states he slipped on work putting down salt on the icy salt on the sidewalk and hit his head on the concrete sidewalk.       Date of accident :  1/13/2021    Subjective:          HPI    The patient returns to the concussion clinic for a follow up visit, He was last seen by me on 2/3/2021, where no medication changes were made. Patient reports he has been working with physical therapy and his dizziness has greatly improved. Patient reports headaches have slightly improved. He is no longer waking with headaches. Overall patient is reporting no change in most physical, emotional, and cognitive symptoms    We discussed some treatment options and have elected to start Amitriptyline and Wellbutrin, referral to ophthalmology..                                                      Headaches:  Significant ongoing headaches Yes  Headaches: Daily  Improvement :No   Current Headache Yes   Wake with HA  No     Worse Headache    7/10           How often: once a week    Average Headache 5/10.    Best Headache 3/10.  Brings on HA:   Movement, daily tasks.   Makes symptoms better. Resting  Taking  ibuprofen (Advil)        Helpful:  Yes     Physical Symptoms:  Headache-Yes       Since last visit  Improved   Nausea-Yes       Since last visit  Same       Balance problems - Yes     Since last visit    Improved    Dizziness - Yes          Since last visit    Improved  Visual problems - Yes    Since last visit  Same     Fatigue - Yes             Since last visit  Same     Sensitivity to light - Yes       Since last visit  Same     Sensitivity to sound - Yes         Since last  visit  Same     Numbness/tingling - Yes     Since last visit  Same         Cognitive Symptoms  Feeling mentally foggy -No            Feeling slowed down -Yes       Since last visit  Same     Difficulty Concentrating- Yes     Since last visit  Same     Difficulty remembering - Yes        Since last visit  Same       Emotional Symptoms  Irritability - Yes         Since last visit  Worsen     Sadness-  Yes      Since last visit  Same     More emotional - Yes      Since last visit  Same     Nervousness/anxiety -Yes       Since last visit  Same       Sleep History:  Drowsiness- Yes    Since last visit  Same     Sleep less than usual - No  Sleep more than usual - Yes  Trouble falling asleep - No    Does the patient wake feeling rested - Yes             Migraine Headaches      Patient history of migraines.    No      Exertion:         Do the above stated symptoms worsen with physical activity? Yes       Since last visit  Worsen           Do the above stated symptoms worsen with cognitive activity? Yes      Since last visit  Worsen            Work/School        Do the above stated symptoms worsen with school/work?        Yes        Have your returned to work/school? Yes          There are no active problems to display for this patient.    Past Medical History:   Diagnosis Date     Concussion      Head injury      Hypertension      Past Surgical History:   Procedure Laterality Date     HERNIA REPAIR       REPLACEMENT TOTAL KNEE       Family History   Problem Relation Age of Onset     Seizures Father      Current Outpatient Medications   Medication Sig Dispense Refill     omeprazole (PRILOSEC) 40 MG capsule Take 40 mg by mouth.       pravastatin (PRAVACHOL) 20 MG tablet Take 20 mg by mouth.       sertraline (ZOLOFT) 50 MG tablet Take 50 mg by mouth.       testosterone cypionate (DEPOTESTOTERONE CYPIONATE) 200 mg/mL injection INJECT 1ML INTRAMUSCULARLY EVERY 14 DAYS       triamcinolone acetonide 40 mg/mL Kit Inject 40 mg into  the joint.       No current facility-administered medications for this encounter.      Social History     Socioeconomic History     Marital status:      Spouse name: Not on file     Number of children: Not on file     Years of education: Not on file     Highest education level: Not on file   Occupational History     Not on file   Social Needs     Financial resource strain: Not on file     Food insecurity     Worry: Not on file     Inability: Not on file     Transportation needs     Medical: Not on file     Non-medical: Not on file   Tobacco Use     Smoking status: Never Smoker     Smokeless tobacco: Never Used   Substance and Sexual Activity     Alcohol use: Yes     Drug use: Never     Sexual activity: Not on file   Lifestyle     Physical activity     Days per week: Not on file     Minutes per session: Not on file     Stress: Not on file   Relationships     Social connections     Talks on phone: Not on file     Gets together: Not on file     Attends [employer] witness service: Not on file     Active member of club or organization: Not on file     Attends meetings of clubs or organizations: Not on file     Relationship status: Not on file     Intimate partner violence     Fear of current or ex partner: Not on file     Emotionally abused: Not on file     Physically abused: Not on file     Forced sexual activity: Not on file   Other Topics Concern     Not on file   Social History Narrative     Not on file       The following portions of the patient's history were reviewed and updated as appropriate: allergies, current medications, past family history, past medical history, past social history, past surgical history and problem list.    Review of Systems  A comprehensive review of systems was negative except for: What is noted above    Objective:       Discussion was held with the patient today regarding concussion in general including types of injury, symptoms that are common, treatment and variability in time to  recover. Education about concussion symptoms and length of time it would take the patient to recover was also given to the patient.  I have reassured the patient his symptoms are very common when a concussion is present and will improve with time. We discussed the risks and benefits of possible medication including risk of worsening depression with medication adjustments and even the possibility of emergence of suicidal ideations.       Total time spent with the patient today was 40 minutes with greater than 50% of the time spent in counseling and care coordination. The patient agrees to call before then with any questions, concerns or problems. We will assess for the appropriateness of possible psychotropic medication trials/changes. The patient will seek out appropriate emergency services should that become necessary.    Diagnosis managed and treated at today's visit :  Post concussion syndrome  Post concussion headache  Nausea  Dizziness  Fatigue  Insomnia  Sensitivity to light  Sound sensitivity  Concentration and Attention deficit  Memory difficulties  Anxiety d/t a medical condition  Irritability  Return to work  Encounter related to a worker's comp claim     Plan:  Medication Adjustment:  Amitriptyline 25 mg, take 1-3 tabs PO every HS  Wellbutrin 150 mg, take one tab PO every am    Other:   Patient will return to clinic in 4 weeks. They agree to call or return sooner with any questions or concerns.  Risks and benefits were discussed.  Continue with individual therapist.     Continue with the support of the clinic, reassurance, and redirection. Staff monitoring and ongoing assessments per team plan. This team will utilize appropriate emergency services if necessary. I will make myself available if concerns or problems arise.     Mental Status Examination  He is cooperative with questioning. He is fully engaged in conversation today. Speech is normal. Thought processes normal with normal prehension and  expression. Thoughts are organized and linear. Content is pertinent to the conversation and without evidence of auditory or visual hallucinations. No delusional ideation. Gen. fund of knowledge, insight and memory are normal       Video Visit Details    Type of service: Video Visit    Video Start Time: 1530    Video End Time:  1600    Total time of video visit: 30 minutes    Originating Location: Patient's home    Distant Location:  [facility]    Mode of Communication: Video Conference via [employer]      10 minutes spent on the date of the encounter doing chart review, review of outside records, documentation and return to work letter     General Information:  Today you had your appointment with Veronica Matta CNP     If lab work was done today as part of your evaluation you will generally be contacted via My Chart, mail, or phone with the results within 1-5 days. If there is an alarming result we will contact you by phone. Lab results come back at varying times, I generally wait until all labs are resulted before making comments on results. Please note labs are automatically released to My Chart once available.     If you need refills please contact your pharmacist. They will send a refill request to me to review. Please allow 3 business days for us to process all refill requests.     Please call or send a medical message through My Chart, with any questions or concerns    If you need any paperwork completed please fax forms to [phone]. Please state if you would like a copy of the completed paperwork, mailed or faxed back to the patient and a fax number to fax the paperwork to. Please allow up to 10 days for paperwork to be completed.    Veronica Matta CNP

## 2021-07-21 ENCOUNTER — HOSPITAL ENCOUNTER (OUTPATIENT)
Dept: PHYSICAL THERAPY | Facility: CLINIC | Age: 56
Setting detail: THERAPIES SERIES
End: 2021-07-21
Attending: NURSE PRACTITIONER
Payer: OTHER MISCELLANEOUS

## 2021-07-21 PROCEDURE — 97140 MANUAL THERAPY 1/> REGIONS: CPT | Mod: GP | Performed by: PHYSICAL THERAPIST

## 2021-07-21 PROCEDURE — 97110 THERAPEUTIC EXERCISES: CPT | Mod: GP | Performed by: PHYSICAL THERAPIST

## 2021-07-21 PROCEDURE — 97112 NEUROMUSCULAR REEDUCATION: CPT | Mod: GP | Performed by: PHYSICAL THERAPIST

## 2021-07-22 NOTE — LETTER
Letter by Veronica Matta FNP at      Author: Veronica Matta FNP Service: -- Author Type: --    Filed:  Encounter Date: 7/7/2021 Status: (Other)         July 7, 2021     Patient: Galileo Vieira   YOB: 1965   Date of Visit: 7/7/2021       To Whom It May Concern:     It is my medical opinion that Galileo Vieira may return to work on 7/8/2021.  Employees recovering from concussions often exhibit cognitive symptoms that make attending work and learning difficult. They may not be able to attend work or only partial days. Some symptoms that could affect performance in the work environment  include: sensitivity to light and noise, headache, trouble focusing, concentrating, or remembering, and difficulty looking at a screen. The accommodations below often help reduce the symptoms and allow them to return to work quicker. Compliance with these accommodations allows the brain to recover more quickly even if it appears the employee is symptom free.      Attendance Restrictions  Patient will work 4 days a week (Monday, Tuesday, Thursday and Friday) The patient will work 8 hours a day if able to tolerate (tolerate is no return of severe symptoms). Patient will return in 6 weeks and I will reassess patient's ability to return to work.  Other Restrictions:  1. Please put a screen filter on the patient's computer   2. Allow patient to take 10 minute breaks every 60 minutes. Patient should be allowed to take another break if he starts to have symptoms, If symptoms continue or worsen please allow patient to return home.  3. Allow employee to wear sunglasses and hat to help patient's sensitivity to lights. Remove any overhead lighting and replace with lamps if possible. Move patient to a office if available.  4. If patient wakes with severe headache please allow patient to start work later, it symptoms worsen patient should not attempt to work.  5. Please allow patient time off for medical appointments.            If  you have any questions or concerns, please don't hesitate to call.     Sincerely,           Electronically signed by BRODERICK Curiel

## 2021-07-26 ENCOUNTER — TELEPHONE (OUTPATIENT)
Dept: FAMILY MEDICINE | Facility: CLINIC | Age: 56
End: 2021-07-26

## 2021-07-26 DIAGNOSIS — E34.9 TESTOSTERONE INSUFFICIENCY: Primary | ICD-10-CM

## 2021-07-26 RX ORDER — SYRINGE W-NEEDLE,DISPOSAB,3 ML 25GX5/8"
1 SYRINGE, EMPTY DISPOSABLE MISCELLANEOUS
Qty: 10 EACH | Refills: 1 | Status: SHIPPED | OUTPATIENT
Start: 2021-07-26 | End: 2022-04-06

## 2021-07-26 NOTE — TELEPHONE ENCOUNTER
Pt needs supplies for testosterone     Prescription approved per [facility] Refill Protocol.    Cecilia Nathan RN

## 2021-07-27 DIAGNOSIS — F41.9 ANXIETY: Primary | ICD-10-CM

## 2021-07-27 RX ORDER — BUPROPION HYDROCHLORIDE 150 MG/1
150 TABLET ORAL EVERY MORNING
Qty: 90 TABLET | Refills: 3 | Status: SHIPPED | OUTPATIENT
Start: 2021-07-27 | End: 2021-08-30

## 2021-07-28 ENCOUNTER — HOSPITAL ENCOUNTER (OUTPATIENT)
Dept: PHYSICAL THERAPY | Facility: CLINIC | Age: 56
Setting detail: THERAPIES SERIES
End: 2021-07-28
Attending: NURSE PRACTITIONER
Payer: OTHER MISCELLANEOUS

## 2021-07-28 PROCEDURE — 97112 NEUROMUSCULAR REEDUCATION: CPT | Mod: GP | Performed by: PHYSICAL THERAPIST

## 2021-07-28 NOTE — PROGRESS NOTES
"   07/28/21 0900   Signing Clinician's Name / Credentials   Signing clinician's name / credentials Kassandra Smith, PT   Session Number   Session Number 4/6 thru 8/27/21   Authorization status WC   Adult Goals   PT Eval Goals 1;2;3;4   Goal 1   Goal Identifier FGA   Goal Description Galileo will have normal gait stability with FGA 30/30 for greater safety with community ambulation.   Goal Progress Goal not met 25/30, difficulty with narrow base walking and walking with eyes closed   Target Date 08/20/21   Goal 2   Goal Identifier SLS   Goal Description Galileo will be able to  SLS on non-dominate (left) leg for at least 60\" and with addition of cognitive distraction with effective balance reactions and no LOB for greater safety moving about his work environment when multi-tasking throughout the day.   Goal Progress Not met, just beginning to focus on more balance training. initial focus on neck and VOR   Target Date 08/20/21   Goal 3   Goal Identifier DVA   Goal Description Galileo will have normal DVA, no greater than 2 line loss, with 2 hz head movement to be able to walk and talk in busy visual envirnoment ie grocery store, [employer] and work, without dizziness or imbalance.   Goal Progress Goal in progress, 3 line loss improved from 4   Target Date 08/20/21   Goal 4   Goal Identifier Neck   Goal Description Galileo will have reduced mm tension and pain, no greater than 1/10, for greater tolerance of work tasks.    Goal Progress Goal im progress, stretches are helpful. pt  rated neck pain as mild to moderate   Target Date 08/20/21   Subjective Report   Subjective Report patient thought he would be ready to go back to work on wednesdays but then had a couple bad days. He feels like since resuming PT with head eye coordination exercises, his symptoms have been a little worse.   Objective Measures   Objective Measures Objective Measure 1;Objective Measure 2   Objective Measure 1   Objective Measure FGA   Details 25/30 " "  Objective Measure 2   Objective Measure DVA   Details static 20/20; 2 hz head movement 20/40. improved at 2 hz from 20/50 but still abnormal with 3 line loss and increased dizziness, nausea and HA   System Outcome Measures   Outcome Measures Concussion (see Concussion Symptom Assessment)   Treatment Interventions   Interventions Neuromuscular Re-education;Therapeutic Procedure/Exercise;Physical Performance Test/Measures   Therapeutic Procedure/exercise   Therapeutic Procedures: strength, endurance, ROM, flexibillity minutes (77324) 4   Skilled Intervention passive stretches   Patient Response stretches are helpful   Treatment Detail passive stretches B UT with scap depression and levator scap 30\" holds x 2    Progress to reduce neck pain, goal 4.   Neuromuscular Re-education   Neuromuscular re-ed of mvmt, balance, coord, kinesthetic sense, posture, proprioception minutes (32372) 36   Skilled Intervention DVA   Patient Response increase dizziness, HA and nausea with VOR.    Treatment Detail VOR x 1 with horiz head turns, far target, blank backgrounds, metranome used to assist in pacing 240 bpm, x 3. pt only able to tolerate maybe 20\" max with dizziness, reaching for wall. reduced speed to 200 bpm with slight improvement. pt educated in use of metranome at home. VOR x 1 with forward/backward walking 4-5 steps x 3. tandem walking 20' x 2. standing balance on floor [place] EO and EC>EC with head turns. standing on foam feet together EC>EC with head turns with challenge so modified to feet apart on foam EC with head turns and head nods.   Progress working toward goal 3 and postural stability   Dynamic Visual Acuity (DVA)   Static Acuity (LogMar) 20/20   Horizontal Head Movement at 2 Hz (LogMar) 20/40   DVA Comments dizziness, nausea, slight increase HA   Physical Performance Test/measures   Physical Performance Test/Measurement, Minutes (80688) 5   Skilled Intervention FGA    Patient Response 25/30   Progress FGA goal " not met, still trouble with narrow ANGEL walking and walking EC, also not comfortable on stairs to climb without railings   Education   Learner Patient   Readiness Acceptance   Method Booklet/handout;Explanation;Demonstration   Response Verbalizes Understanding;Demonstrates Understanding   Education Comments HEP   Plan   Home program added standing on foam EC with head turns/nods   Updates to plan of care Galileo is making steady progress with reduced symptoms severity but he continues to have deficits in vestibular ocular function and balance, and neck pain, that impact his ability to fully return to work and other normal daily tasks. He will benefit from additional skilled PT intervention to safely progress in these areas without over doing it and setting him back. Plan to continue 1x/week x 6 weeks.   Plan for next session progression of VOR adaptation, manual work neck with positional release, balance training   Total Session Time   Timed Code Treatment Minutes 45   Total Treatment Time (sum of timed and untimed services) 45

## 2021-08-03 ENCOUNTER — HOSPITAL ENCOUNTER (OUTPATIENT)
Dept: PHYSICAL THERAPY | Facility: CLINIC | Age: 56
Setting detail: THERAPIES SERIES
End: 2021-08-03
Attending: NURSE PRACTITIONER
Payer: OTHER MISCELLANEOUS

## 2021-08-03 PROCEDURE — 97112 NEUROMUSCULAR REEDUCATION: CPT | Mod: GP | Performed by: PHYSICAL THERAPIST

## 2021-08-03 PROCEDURE — 97110 THERAPEUTIC EXERCISES: CPT | Mod: GP | Performed by: PHYSICAL THERAPIST

## 2021-08-05 ENCOUNTER — HOSPITAL ENCOUNTER (OUTPATIENT)
Dept: OCCUPATIONAL THERAPY | Facility: CLINIC | Age: 56
Setting detail: THERAPIES SERIES
End: 2021-08-05
Attending: NURSE PRACTITIONER
Payer: OTHER MISCELLANEOUS

## 2021-08-05 PROCEDURE — 97535 SELF CARE MNGMENT TRAINING: CPT | Mod: GO | Performed by: OCCUPATIONAL THERAPIST

## 2021-08-09 NOTE — PROGRESS NOTES
"Progress Notes by Veronica Matta FNP at 7/7/2021  3:30 PM     Author: Veronica Matta FNP Service: -- Author Type: Nurse Practitioner    Filed: 8/8/2021 10:15 PM Encounter Date: 7/7/2021 Status: Signed    : Veronica Matta FNP (Nurse Practitioner)       Video Visit  Galileo Vieira is a 55 y.o. male who is being evaluated via a billable video visit in light of the ongoing global health crisis (COVID-19) that requires us to abide by social distancing mandates in order to reduce the risk of COVID-19 exposure.       The patient has been notified of following:     \"This video visit will be conducted via a video call between you and your physician/provider. We have found that certain health care needs can be provided without the need for a physical exam.  This service lets us provide the care you need with a short phone/video conversation.  If a prescription is necessary we can send it directly to your pharmacy.  If lab work is needed we can place an order for that and you can then stop by our lab to have the test done at a later time.    If during the course of the call the physician/provider feels a telephone visit is not appropriate, you will not be charged for this service.\"     Patient has given verbal consent to a video visit? Yes    Galileo Vieira chief complaint is Post Concussion Syndrome     ALLERGIES  Patient has no known allergies.    Date of accident : 1/13/21  Neuropsychological assessment completed    No   Currently doing PT  Yes   Completed No   Currently doing OT  Yes   Completed No    Currently doing ST   No   Completed No   Psychology  No      Need a note for work accommodations  Yes    Need a note for school accommodations  No      Any new medication (other provider):  No  Meds started at last appointment  No   Meds increased at last appointment    No     Currently on medication to help with sleep    Yes    Ambien, amitriptyline     Currently on any mental health medications     Yes   " "Wellbutrin      Currently on medication for attention, ADD/ADHD    Yes   Ritalin    Is patient on a controlled substance   Yes   Last urine test: n/a                                                       Workman's Comp   Yes   QRC   Yes   Present: Yes    Start Time: 3:10pm    End Time:  3:20pm    Total time of phone call: 10 minutes    Patient would like the video invitation sent by: [employer]   Number/e-mail address:[phone] Please Add QRC to the video call Nelli Mcconnell [phone]    Shannen Faustin CMA     Is patient on a controlled substance prescribed by me?  Yes    checked    Yes   Number of prescribers in last 6 months    3  Urine test preformed today  No   Follow up appointment   Yes     Outpatient Follow up Mild TBI (Concussion)  Evaluation     Pertinent History:   Patient states he slipped on work putting down salt on the icy salt on the sidewalk and hit his head on the concrete sidewalk.    Date of accident :  1/13/21    Subjective:          HPI    The patient returns to the concussion clinic for a follow up visit, He was last seen by me on 6/7/2021, where no medication changes were made.  The patient reports that he continues to have a hard time \"slowing down\" at work.  Overall patient is reporting improvement in most physical, cognitive, and emotional symptoms    We discussed some treatment options and have elected to continue with current therapies  .  Physical Symptoms:  Headache-Yes       Since last visit  Improved     Nausea-Yes       Since last visit  Improved       Balance problems - No     Dizziness - Yes          Since last visit  Same     Visual problems - Yes    Since last visit  Improved     Fatigue - Yes             Since last visit  Same     Sensitivity to light - No     Sensitivity to sound - Yes         Since last visit  Same     Numbness/tingling - No           Cognitive Symptoms  Feeling mentally foggy -No       Feeling slowed down -Yes       Since last visit  Improved   "   Difficulty Concentrating- Yes     Since last visit  Improved     Difficulty remembering - Yes        Since last visit  Improved       Emotional Symptoms  Irritability - Yes         Since last visit  Improved     Sadness-  Yes      Since last visit  Improved     More emotional - Yes      Since last visit  Improved     Nervousness/anxiety -Yes       Since last visit  Improved        Sleep History:  Drowsiness- Yes    Since last visit  Improved     Sleep less than usual - No  Sleep more than usual - No  Trouble falling asleep - No        Does the patient wake feeling rested - Yes             Migraine Headaches      Patient history of migraines.    No      Exertion:         Do the above stated symptoms worsen with physical activity? Yes       Since last visit  Improved           Do the above stated symptoms worsen with cognitive activity? Yes      Since last visit  Improved            Work/School        Do the above stated symptoms worsen with school/work?        Yes        Have your returned to work/school? Yes          There are no active problems to display for this patient.    Past Medical History:   Diagnosis Date   ? Concussion    ? Head injury    ? Hypertension      Past Surgical History:   Procedure Laterality Date   ? HERNIA REPAIR     ? REPLACEMENT TOTAL KNEE       Family History   Problem Relation Age of Onset   ? Seizures Father      Current Outpatient Medications   Medication Sig Dispense Refill   ? amitriptyline (ELAVIL) 25 MG tablet Take 1-3 tablets (25-75 mg total) by mouth at bedtime. 90 tablet 2   ? buPROPion (WELLBUTRIN XL) 150 MG 24 hr tablet Take 2 tablets (300 mg total) by mouth every morning. 60 tablet 3   ? losartan (COZAAR) 50 MG tablet 75 mg.      ? methylphenidate HCl (RITALIN) 5 MG tablet Take 1 tablet (5 mg total) by mouth 2 (two) times a day. 60 tablet 0   ? omeprazole (PRILOSEC) 40 MG capsule Take 40 mg by mouth.     ? pravastatin (PRAVACHOL) 20 MG tablet Take 20 mg by mouth.     ?  sertraline (ZOLOFT) 50 MG tablet Take 50 mg by mouth.     ? testosterone cypionate (DEPOTESTOTERONE CYPIONATE) 200 mg/mL injection INJECT 1ML INTRAMUSCULARLY EVERY 14 DAYS     ? triamcinolone acetonide 40 mg/mL Kit Inject 40 mg into the joint.     ? zolpidem (AMBIEN) 5 MG tablet Take 1 tablet (5 mg) by mouth nightly as needed for sleep       No current facility-administered medications for this visit.      Social History     Socioeconomic History   ? Marital status:      Spouse name: Not on file   ? Number of children: Not on file   ? Years of education: Not on file   ? Highest education level: Not on file   Occupational History   ? Not on file   Social Needs   ? Financial resource strain: Not on file   ? Food insecurity     Worry: Not on file     Inability: Not on file   ? Transportation needs     Medical: Not on file     Non-medical: Not on file   Tobacco Use   ? Smoking status: Never Smoker   ? Smokeless tobacco: Never Used   Substance and Sexual Activity   ? Alcohol use: Yes   ? Drug use: Never   ? Sexual activity: Not on file   Lifestyle   ? Physical activity     Days per week: Not on file     Minutes per session: Not on file   ? Stress: Not on file   Relationships   ? Social connections     Talks on phone: Not on file     Gets together: Not on file     Attends Hindu service: Not on file     Active member of club or organization: Not on file     Attends meetings of clubs or organizations: Not on file     Relationship status: Not on file   ? Intimate partner violence     Fear of current or ex partner: Not on file     Emotionally abused: Not on file     Physically abused: Not on file     Forced sexual activity: Not on file   Other Topics Concern   ? Not on file   Social History Narrative   ? Not on file       The following portions of the patient's history were reviewed and updated as appropriate: allergies, current medications, past family history, past medical history, past social history, past  surgical history and problem list.    Review of Systems  A comprehensive review of systems was negative except for: What is noted above    Objective:       Discussion was held with the patient today regarding concussion in general including types of injury, symptoms that are common, treatment and variability in time to recover. Education about concussion symptoms and length of time it would take the patient to recover was also given to the patient.  I have reassured the patient his symptoms are very common when a concussion is present and will improve with time. We discussed the risks and benefits of possible medication including risk of worsening depression with medication adjustments and even the possibility of emergence of suicidal ideations.       Total time spent with the patient today was 40 minutes with greater than 50% of the time spent in counseling and care coordination. The patient agrees to call before then with any questions, concerns or problems. We will assess for the appropriateness of possible psychotropic medication trials/changes. The patient will seek out appropriate emergency services should that become necessary.    Diagnosis managed and treated at today's visit :  Post concussion syndrome  Post concussion headache  Nausea  Dizziness  Fatigue  Insomnia  Sensitivity to light  Sound sensitivity  Concentration and Attention deficit  Memory difficulties  Anxiety d/t a medical condition  Irritability  Return to work  Encounter related to a worker's comp claim     Plan:  Medication Adjustment:  No medication changes    Other:   Patient will return to clinic in 4 weeks. They agree to call or return sooner with any questions or concerns.  Risks and benefits were discussed.  Continue with individual therapist.     Continue with the support of the clinic, reassurance, and redirection. Staff monitoring and ongoing assessments per team plan. This team will utilize appropriate emergency services if  necessary. I will make myself available if concerns or problems arise.     Mental Status Examination  He is cooperative with questioning. He is fully engaged in conversation today. Speech is normal. Thought processes normal with normal prehension and expression. Thoughts are organized and linear. Content is pertinent to the conversation and without evidence of auditory or visual hallucinations. No delusional ideation. Gen. fund of knowledge, insight and memory are normal       Video Visit Details    Type of service: Video Visit    Video Start Time: 1530    Video End Time: 1600    Total time of video visit: 30 minutes    Originating Location: Patient's home    Distant Location:  [facility]    Mode of Communication: Video Conference via [employer]      10 minutes spent on the date of the encounter doing chart review, review of outside records, documentation and return to work letter     General Information:  Today you had your appointment with Veronica Matta CNP     If lab work was done today as part of your evaluation you will generally be contacted via My Chart, mail, or phone with the results within 1-5 days. If there is an alarming result we will contact you by phone. Lab results come back at varying times, I generally wait until all labs are resulted before making comments on results. Please note labs are automatically released to My Chart once available.     If you need refills please contact your pharmacist. They will send a refill request to me to review. Please allow 3 business days for us to process all refill requests.     Please call or send a medical message through My Chart, with any questions or concerns    If you need any paperwork completed please fax forms to [phone]. Please state if you would like a copy of the completed paperwork, mailed or faxed back to the patient and a fax number to fax the paperwork to. Please allow up to 10 days for paperwork to be  completed.    Veronica Matta, CNP

## 2021-08-10 ENCOUNTER — HOSPITAL ENCOUNTER (OUTPATIENT)
Dept: OCCUPATIONAL THERAPY | Facility: CLINIC | Age: 56
Setting detail: THERAPIES SERIES
End: 2021-08-10
Attending: NURSE PRACTITIONER
Payer: OTHER MISCELLANEOUS

## 2021-08-10 ENCOUNTER — VIRTUAL VISIT (OUTPATIENT)
Dept: NEUROLOGY | Facility: CLINIC | Age: 56
End: 2021-08-10
Payer: OTHER MISCELLANEOUS

## 2021-08-10 DIAGNOSIS — G44.309 POST-CONCUSSION HEADACHE: ICD-10-CM

## 2021-08-10 DIAGNOSIS — F07.81 POST CONCUSSION SYNDROME: ICD-10-CM

## 2021-08-10 DIAGNOSIS — R41.840 ATTENTION AND CONCENTRATION DEFICIT: Primary | ICD-10-CM

## 2021-08-10 DIAGNOSIS — F06.30 MOOD DISORDER AS LATE EFFECT OF TRAUMATIC BRAIN INJURY (H): ICD-10-CM

## 2021-08-10 DIAGNOSIS — S06.9XAS MOOD DISORDER AS LATE EFFECT OF TRAUMATIC BRAIN INJURY (H): ICD-10-CM

## 2021-08-10 DIAGNOSIS — G47.00 INSOMNIA, UNSPECIFIED TYPE: ICD-10-CM

## 2021-08-10 PROCEDURE — 97535 SELF CARE MNGMENT TRAINING: CPT | Mod: GO | Performed by: OCCUPATIONAL THERAPIST

## 2021-08-10 PROCEDURE — 99215 OFFICE O/P EST HI 40 MIN: CPT | Mod: GT | Performed by: NURSE PRACTITIONER

## 2021-08-10 NOTE — PROGRESS NOTES
"Video Visit  Galileo Vieira is a 55 year old male who is being evaluated via a billable video visit in light of the ongoing global health crisis (COVID-19) that requires us to abide by social distancing mandates in order to reduce the risk of COVID-19 exposure.       The patient has been notified of following:     \"This video visit will be conducted via a video call between you and your physician/provider. We have found that certain health care needs can be provided without the need for a physical exam.  This service lets us provide the care you need with a short phone/video conversation.  If a prescription is necessary we can send it directly to your pharmacy.  If lab work is needed we can place an order for that and you can then stop by our lab to have the test done at a later time.    If during the course of the call the physician/provider feels a telephone visit is not appropriate, you will not be charged for this service.\"     Patient has given verbal consent to a video visit? Yes    Galileo Vieira chief complaint is Post Concussion Syndrome     ALLERGIES  Patient has no known allergies.    Date of accident : 1/13/21  Neuropsychological assessment completed    No   Currently doing PT  Yes    Completed No   Currently doing OT  Yes    Completed No   Currently doing ST   No    Completed No   Psychology  No      Need a note for work accommodations  Yes   Need a note for school accommodations  No     Any new medication (other provider):   No   Meds started at last appointment  No    Meds increased at last appointment    No      Currently on medication to help with sleep    Yes    Ambien, amitriptyline     Currently on any mental health medications     Yes    Wellbutrin      Currently on medication for attention, ADD/ADHD    Yes   Concerta     Is patient on a controlled substance   Yes   Last urine test: n/a                                                      Workman's Comp   Yes   QRC   Yes  Present: Yes     Start " "Time: 9:55am    End Time:  10:00am    Total time of phone call: 5 minutes    Patient would like the video invitation sent by: [facility]  Number/e-mail address:[phone]  Nelli Mcconnell, [facility] [phone]    Shannen Blum CMA     Is patient on a controlled substance prescribed by me?  Yes    checked    Yes   Number of prescribers in last 6 months    3  Urine test preformed today  No   Follow up appointment   Yes     Outpatient Follow up Mild TBI (Concussion)  Evaluation     Pertinent History:  Patient states he slipped on work putting down salt on the icy salt on the sidewalk and hit his head on the concrete sidewalk.    Date of accident :  1/13/21    Subjective:          HPI    The patient returns to the concussion clinic for a follow up visit, He was last seen by me on 7/7/21, where no medication changes the patient is reporting were made.  That has had improvements in his headaches.  His headaches have reduced in severity and frequency.  He continues to increase his hours.  Patient reports he has a \"hard time thinking\".  Patient also continues to have neck pain    We discussed some treatment options and have elected to increase Concerta to 36 mg.  Patient also do a trial at full days at work   patient also be referred to sleep clinic                                                Physical Symptoms:  Headache-Yes     Since last visit  Improved     Nausea-Yes         Since last visit  Improved       Balance problems - No      Dizziness - Yes      Since last visit  Improved           Visual problems - Yes    Since last visit  Improved     Fatigue - Yes              Since last visit  Same     Sensitivity to light - No          Sensitivity to sound - Yes       Since last visit  Improved     Numbness/tingling - No            Cognitive Symptoms  Feeling mentally foggy -Yes        Since last visit  Improved     Feeling slowed down -Yes        Since last visit  Improved     Difficulty Concentrating- Yes      Since last " visit  Improved     Difficulty remembering - Yes        Since last visit  Improved       Emotional Symptoms  Irritability - Yes        Since last visit  Same     Sadness-  Yes      Since last visit  Same     More emotional - Yes       Since last visit  Same     Nervousness/anxiety -Yes       Since last visit  Same       Sleep History:  Drowsiness- Yes      Since last visit  Worsen     Sleep less than usual - Yes    Sleep more than usual - No    Trouble falling asleep - Yes       Since last visit  Worsen     Does the patient wake feeling rested - No         Since last visit  Worsen        Migraine Headaches      Patient history of migraines.   No        Exertion:         Do the above stated symptoms worsen with physical activity? Yes        Since last visit  Improved           Do the above stated symptoms worsen with cognitive activity? Yes       Since last visit  Improved            Work/School        Do the above stated symptoms worsen with school/work?        Yes          Have your returned to work/school? Yes           Patient Active Problem List    Diagnosis Date Noted     Concussion with loss of consciousness, subsequent encounter 01/25/2021     Priority: Medium     Obesity (BMI 35.0-39.9) with comorbidity (H) 09/28/2018     Priority: Medium     Mechanical low back pain 08/06/2018     Priority: Medium     Moderate episode of recurrent major depressive disorder (H) 06/20/2017     Priority: Medium     Hypogonadism male 03/08/2017     Priority: Medium     Mild obstructive sleep apnea 09/08/2016     Priority: Medium     Fear of flying 06/05/2014     Priority: Medium     Testosterone insufficiency 03/28/2013     Priority: Medium     Anxiety 01/05/2012     Priority: Medium     GERD (gastroesophageal reflux disease) 06/10/2009     Priority: Medium     Hyperlipidemia LDL goal <160 11/14/2008     Priority: Medium     Past Medical History:   Diagnosis Date     Concussion      Concussion, unspecified     Hospitalized  "overnight as a child     Esophageal reflux      Head injury      Hypercholesterolemia      Hypertension      Past Surgical History:   Procedure Laterality Date     CHOLECYSTECTOMY       COLONOSCOPY       COLONOSCOPY  2014    Procedure: COLONOSCOPY;  Surgeon: Syed Walker MD;  Location:  GI     GI SURGERY      Gall bladder     HERNIA REPAIR       [facility], [place] NONSPECIFIC PROCEDURE      Left knee arthroscopy     [facility] [place] NONSPECIFIC PROCEDURE      Bilat inguinal hernia surgery     [facility] [place] NONSPECIFIC PROCEDURE      Vasectomy     Family History   Problem Relation Age of Onset     Gastrointestinal Disease Father         Crohn's     Cardiovascular Father      Depression Father      Heart Disease Father 66        triple bipass     Neurologic Disorder Father         seizures     Respiratory Father         sleep apnea     C.A.D. Father         Had CABG     Cerebrovascular Disease Father      Family History Negative Mother      Depression Other      Cancer - colorectal Other         Maternal Uncle  at 56     Lipids Brother      Diabetes Brother      Coronary Artery Disease Brother      Respiratory Brother      Coronary Artery Disease Brother      Depression Paternal Grandmother      Cancer - colorectal Maternal Uncle      Seizure Disorder Father      Current Outpatient Medications   Medication Sig Dispense Refill     buPROPion (WELLBUTRIN XL) 150 MG 24 hr tablet Take 1 tablet (150 mg) by mouth every morning 90 tablet 3     losartan (COZAAR) 50 MG tablet TAKE 1 AND 1/2 TABLETS BY MOUTH DAILY 135 tablet 1     methylphenidate HCl ER (CONCERTA) 18 MG CR tablet Take 1 tablet (18 mg) by mouth daily 30 tablet 0     needle, disp, 18G X 1\" MISC 1 each every 14 days 10 each 1     omeprazole (PRILOSEC) 40 MG DR capsule TAKE 1 CAPSULE (40 MG) BY MOUTH DAILY 90 capsule 3     pravastatin (PRAVACHOL) 20 MG tablet Take 1 tablet (20 mg) by mouth daily 90 tablet 3     syringe 22G X 1-1/2\" 3 ML MISC 1 each every 14 days 10 each 1     " testosterone cypionate (DEPO-TESTOSTERONE) 200 MG/ML injection Inject 1 mL (200 mg) into the muscle every 14 days 10 mL 1     zolpidem (AMBIEN) 5 MG tablet Take 1 tablet (5 mg) by mouth nightly as needed for sleep 30 tablet 5     Social History     Socioeconomic History     Marital status:      Spouse name: Not on file     Number of children: Not on file     Years of education: Not on file     Highest education level: Not on file   Occupational History     Occupation: Medical assistant     Employer: [employer]   Tobacco Use     Smoking status: Former Smoker     Packs/day: 0.00     Quit date: 10/3/1997     Years since quittin.8     Smokeless tobacco: Never Used     Tobacco comment:    Substance and Sexual Activity     Alcohol use: Yes     Alcohol/week: 16.7 standard drinks     Types: 20 Standard drinks or equivalent per week     Comment: cut way back     Drug use: No     Sexual activity: Yes     Partners: Female     Birth control/protection: Surgical, Male Surgical   Other Topics Concern     Parent/sibling w/ CABG, MI or angioplasty before 65F 55M? No   Social History Narrative     Not on file     Social Determinants of Health     Financial Resource Strain:      Difficulty of Paying Living Expenses:    Food Insecurity:      Worried About Running Out of Food in the Last Year:      Ran Out of Food in the Last Year:    Transportation Needs:      Lack of Transportation (Medical):      Lack of Transportation (Non-Medical):    Physical Activity:      Days of Exercise per Week:      Minutes of Exercise per Session:    Stress:      Feeling of Stress :    Social Connections:      Frequency of Communication with Friends and Family:      Frequency of Social Gatherings with Friends and Family:      Attends Anabaptism Services:      Active Member of Clubs or Organizations:      Attends Club or Organization Meetings:      Marital Status:    Intimate Partner Violence:      Fear of Current or  Ex-Partner:      Emotionally Abused:      Physically Abused:      Sexually Abused:        The following portions of the patient's history were reviewed and updated as appropriate: allergies, current medications, past family history, past medical history, past social history, past surgical history and problem list.    Review of Systems  A comprehensive review of systems was negative except for: What is noted above    Objective:       Discussion was held with the patient today regarding concussion in general including types of injury, symptoms that are common, treatment and variability in time to recover. Education about concussion symptoms and length of time it would take the patient to recover was also given to the patient.  I have reassured the patient his symptoms are very common when a concussion is present and will improve with time. We discussed the risks and benefits of possible medication including risk of worsening depression with medication adjustments and even the possibility of emergence of suicidal ideations.       Total time spent with the patient today was 40 minutes with greater than 50% of the time spent in counseling and care coordination. The patient agrees to call before then with any questions, concerns or problems. We will assess for the appropriateness of possible psychotropic medication trials/changes. The patient will seek out appropriate emergency services should that become necessary.    Diagnosis managed and treated at today's visit :  Post concussion syndrome  Post concussion headache  Nausea  Dizziness  Fatigue  Insomnia  Sensitivity to light  Sound sensitivity  Concentration and Attention deficit  Memory difficulties  Anxiety d/t a medical condition  Irritability  Return to work  Encounter related to a worker's comp claim     Plan:  Medication Adjustment:  Concerta 36 mg, take 1 tablet every a.m.    Other:   Patient will return to clinic in  6 weeks. They agree to call or return sooner  with any questions or concerns.  Risks and benefits were discussed.  Continue with individual therapist.     Continue with the support of the clinic, reassurance, and redirection. Staff monitoring and ongoing assessments per team plan. This team will utilize appropriate emergency services if necessary. I will make myself available if concerns or problems arise.     Mental Status Examination  He is cooperative with questioning. He is fully engaged in conversation today. Speech is normal. Thought processes normal with normal prehension and expression. Thoughts are organized and linear. Content is pertinent to the conversation and without evidence of auditory or visual hallucinations. No delusional ideation. Gen. fund of knowledge, insight and memory are normal       Video Visit Details    Type of service: Video Visit    Video Start Time: 1020    Video End Time: 1050    Total time of video visit: 30 minutes    Originating Location: Patient's home    Distant Location:  [facility]    Mode of Communication: Video Conference via [employer]    10 minutes spent on the date of the encounter doing chart review, review of outside records, documentation and return to work letter    General Information:  Today you had your appointment with Veronica Matta CNP     If lab work was done today as part of your evaluation you will generally be contacted via My Chart, mail, or phone with the results within 1-5 days. If there is an alarming result we will contact you by phone. Lab results come back at varying times, I generally wait until all labs are resulted before making comments on results. Please note labs are automatically released to My Chart once available.     If you need refills please contact your pharmacist. They will send a refill request to me to review. Please allow 3 business days for us to process all refill requests.     Please call or send a medical message through My Chart, with any  questions or concerns    If you need any paperwork completed please fax forms to [phone]. Please state if you would like a copy of the completed paperwork, mailed or faxed back to the patient and a fax number to fax the paperwork to. Please allow up to 10 days for paperwork to be completed.    Veronica Matta, CNP

## 2021-08-10 NOTE — LETTER
"    8/10/2021         RE: Galileo Vieira  [id number] [facility] [address]        Dear Colleague,    Thank you for referring your patient, Galileo Vieira, to the [facility]. Please see a copy of my visit note below.    Video Visit  Galileo Vieira is a 55 year old male who is being evaluated via a billable video visit in light of the ongoing global health crisis (COVID-19) that requires us to abide by social distancing mandates in order to reduce the risk of COVID-19 exposure.       The patient has been notified of following:     \"This video visit will be conducted via a video call between you and your physician/provider. We have found that certain health care needs can be provided without the need for a physical exam.  This service lets us provide the care you need with a short phone/video conversation.  If a prescription is necessary we can send it directly to your pharmacy.  If lab work is needed we can place an order for that and you can then stop by our lab to have the test done at a later time.    If during the course of the call the physician/provider feels a telephone visit is not appropriate, you will not be charged for this service.\"     Patient has given verbal consent to a video visit? Yes    Galileo Vieira chief complaint is Post Concussion Syndrome     ALLERGIES  Patient has no known allergies.    Date of accident : 1/13/21  Neuropsychological assessment completed    No   Currently doing PT  Yes    Completed No   Currently doing OT  Yes    Completed No   Currently doing ST   No    Completed No   Psychology  No      Need a note for work accommodations  Yes   Need a note for school accommodations  No     Any new medication (other provider):   No   Meds started at last appointment  No    Meds increased at last appointment    No      Currently on medication to help with sleep    Yes    Ambien, amitriptyline     Currently on any mental health medications     Yes    " "Wellbutrin      Currently on medication for attention, ADD/ADHD    Yes   Concerta     Is patient on a controlled substance   Yes   Last urine test: n/a                                                      Workman's Comp   Yes   [facility]   Yes  Present: Yes     Start Time: 9:55am    End Time:  10:00am    Total time of phone call: 5 minutes    Patient would like the video invitation sent by: [employer]  Number/e-mail address:[phone]  Nelli Mcconnell, [facility] [phone]    Shannen Blum CMA     Is patient on a controlled substance prescribed by me?  Yes    checked    Yes   Number of prescribers in last 6 months    3  Urine test preformed today  No   Follow up appointment   Yes     Outpatient Follow up Mild TBI (Concussion)  Evaluation     Pertinent History:  Patient states he slipped on work putting down salt on the icy salt on the sidewalk and hit his head on the concrete sidewalk.    Date of accident :  1/13/21    Subjective:          HPI    The patient returns to the concussion clinic for a follow up visit, He was last seen by me on 7/7/21, where no medication changes the patient is reporting were made.  That has had improvements in his headaches.  His headaches have reduced in severity and frequency.  He continues to increase his hours.  Patient reports he has a \"hard time thinking\".  Patient also continues to have neck pain    We discussed some treatment options and have elected to increase Concerta to 36 mg.  Patient also do a trial at full days at work   patient also be referred to sleep clinic                                                Physical Symptoms:  Headache-Yes     Since last visit  Improved     Nausea-Yes         Since last visit  Improved       Balance problems - No      Dizziness - Yes      Since last visit  Improved           Visual problems - Yes    Since last visit  Improved     Fatigue - Yes              Since last visit  Same     Sensitivity to light - No          Sensitivity to sound - Yes      "  Since last visit  Improved     Numbness/tingling - No            Cognitive Symptoms  Feeling mentally foggy -Yes        Since last visit  Improved     Feeling slowed down -Yes        Since last visit  Improved     Difficulty Concentrating- Yes      Since last visit  Improved     Difficulty remembering - Yes        Since last visit  Improved       Emotional Symptoms  Irritability - Yes        Since last visit  Same     Sadness-  Yes      Since last visit  Same     More emotional - Yes       Since last visit  Same     Nervousness/anxiety -Yes       Since last visit  Same       Sleep History:  Drowsiness- Yes      Since last visit  Worsen     Sleep less than usual - Yes    Sleep more than usual - No    Trouble falling asleep - Yes       Since last visit  Worsen     Does the patient wake feeling rested - No         Since last visit  Worsen        Migraine Headaches      Patient history of migraines.   No        Exertion:         Do the above stated symptoms worsen with physical activity? Yes        Since last visit  Improved           Do the above stated symptoms worsen with cognitive activity? Yes       Since last visit  Improved            Work/School        Do the above stated symptoms worsen with school/work?        Yes          Have your returned to work/school? Yes           Patient Active Problem List    Diagnosis Date Noted     Concussion with loss of consciousness, subsequent encounter 01/25/2021     Priority: Medium     Obesity (BMI 35.0-39.9) with comorbidity (H) 09/28/2018     Priority: Medium     Mechanical low back pain 08/06/2018     Priority: Medium     Moderate episode of recurrent major depressive disorder (H) 06/20/2017     Priority: Medium     Hypogonadism male 03/08/2017     Priority: Medium     Mild obstructive sleep apnea 09/08/2016     Priority: Medium     Fear of flying 06/05/2014     Priority: Medium     Testosterone insufficiency 03/28/2013     Priority: Medium     Anxiety 01/05/2012      "Priority: Medium     GERD (gastroesophageal reflux disease) 06/10/2009     Priority: Medium     Hyperlipidemia LDL goal <160 2008     Priority: Medium     Past Medical History:   Diagnosis Date     Concussion      Concussion, unspecified     Hospitalized overnight as a child     Esophageal reflux      Head injury      Hypercholesterolemia      Hypertension      Past Surgical History:   Procedure Laterality Date     CHOLECYSTECTOMY       COLONOSCOPY       COLONOSCOPY  2014    Procedure: COLONOSCOPY;  Surgeon: Syed Walker MD;  Location:  GI     GI SURGERY      Gall bladder     HERNIA REPAIR       Z NONSPECIFIC PROCEDURE      Left knee arthroscopy     Z NONSPECIFIC PROCEDURE      Bilat inguinal hernia surgery     [facility] NONSPECIFIC PROCEDURE      Vasectomy     Family History   Problem Relation Age of Onset     Gastrointestinal Disease Father         Crohn's     Cardiovascular Father      Depression Father      Heart Disease Father 66        triple bipass     Neurologic Disorder Father         seizures     Respiratory Father         sleep apnea     C.A.D. Father         Had CABG     Cerebrovascular Disease Father      Family History Negative Mother      Depression Other      Cancer - colorectal Other         Maternal Uncle  at 56     Lipids Brother      Diabetes Brother      Coronary Artery Disease Brother      Respiratory Brother      Coronary Artery Disease Brother      Depression Paternal Grandmother      Cancer - colorectal Maternal Uncle      Seizure Disorder Father      Current Outpatient Medications   Medication Sig Dispense Refill     buPROPion (WELLBUTRIN XL) 150 MG 24 hr tablet Take 1 tablet (150 mg) by mouth every morning 90 tablet 3     losartan (COZAAR) 50 MG tablet TAKE 1 AND 1/2 TABLETS BY MOUTH DAILY 135 tablet 1     methylphenidate HCl ER (CONCERTA) 18 MG CR tablet Take 1 tablet (18 mg) by mouth daily 30 tablet 0     needle, disp, 18G X 1\" MISC 1 each every 14 days 10 " "each 1     omeprazole (PRILOSEC) 40 MG DR capsule TAKE 1 CAPSULE (40 MG) BY MOUTH DAILY 90 capsule 3     pravastatin (PRAVACHOL) 20 MG tablet Take 1 tablet (20 mg) by mouth daily 90 tablet 3     syringe 22G X 1-2\" 3 ML MISC 1 each every 14 days 10 each 1     testosterone cypionate (DEPO-TESTOSTERONE) 200 MG/ML injection Inject 1 mL (200 mg) into the muscle every 14 days 10 mL 1     zolpidem (AMBIEN) 5 MG tablet Take 1 tablet (5 mg) by mouth nightly as needed for sleep 30 tablet 5     Social History     Socioeconomic History     Marital status:      Spouse name: Not on file     Number of children: Not on file     Years of education: Not on file     Highest education level: Not on file   Occupational History     Occupation: Medical assistant     Employer: [employer]   Tobacco Use     Smoking status: Former Smoker     Packs/day: 0.00     Quit date: 10/3/1997     Years since quittin.8     Smokeless tobacco: Never Used     Tobacco comment:    Substance and Sexual Activity     Alcohol use: Yes     Alcohol/week: 16.7 standard drinks     Types: 20 Standard drinks or equivalent per week     Comment: cut way back     Drug use: No     Sexual activity: Yes     Partners: Female     Birth control/protection: Surgical, Male Surgical   Other Topics Concern     Parent/sibling w/ CABG, MI or angioplasty before 65F 55M? No   Social History Narrative     Not on file     Social Determinants of Health     Financial Resource Strain:      Difficulty of Paying Living Expenses:    Food Insecurity:      Worried About Running Out of Food in the Last Year:      Ran Out of Food in the Last Year:    Transportation Needs:      Lack of Transportation (Medical):      Lack of Transportation (Non-Medical):    Physical Activity:      Days of Exercise per Week:      Minutes of Exercise per Session:    Stress:      Feeling of Stress :    Social Connections:      Frequency of Communication with Friends and Family:      " Frequency of Social Gatherings with Friends and Family:      Attends Yazdanism Services:      Active Member of Clubs or Organizations:      Attends Club or Organization Meetings:      Marital Status:    Intimate Partner Violence:      Fear of Current or Ex-Partner:      Emotionally Abused:      Physically Abused:      Sexually Abused:        The following portions of the patient's history were reviewed and updated as appropriate: allergies, current medications, past family history, past medical history, past social history, past surgical history and problem list.    Review of Systems  A comprehensive review of systems was negative except for: What is noted above    Objective:       Discussion was held with the patient today regarding concussion in general including types of injury, symptoms that are common, treatment and variability in time to recover. Education about concussion symptoms and length of time it would take the patient to recover was also given to the patient.  I have reassured the patient his symptoms are very common when a concussion is present and will improve with time. We discussed the risks and benefits of possible medication including risk of worsening depression with medication adjustments and even the possibility of emergence of suicidal ideations.       Total time spent with the patient today was 40 minutes with greater than 50% of the time spent in counseling and care coordination. The patient agrees to call before then with any questions, concerns or problems. We will assess for the appropriateness of possible psychotropic medication trials/changes. The patient will seek out appropriate emergency services should that become necessary.    Diagnosis managed and treated at today's visit :  Post concussion syndrome  Post concussion headache  Nausea  Dizziness  Fatigue  Insomnia  Sensitivity to light  Sound sensitivity  Concentration and Attention deficit  Memory difficulties  Anxiety d/t a  medical condition  Irritability  Return to work  Encounter related to a worker's comp claim     Plan:  Medication Adjustment:  Concerta 36 mg, take 1 tablet every a.m.    Other:   Patient will return to clinic in  6 weeks. They agree to call or return sooner with any questions or concerns.  Risks and benefits were discussed.  Continue with individual therapist.     Continue with the support of the clinic, reassurance, and redirection. Staff monitoring and ongoing assessments per team plan. This team will utilize appropriate emergency services if necessary. I will make myself available if concerns or problems arise.     Mental Status Examination  He is cooperative with questioning. He is fully engaged in conversation today. Speech is normal. Thought processes normal with normal prehension and expression. Thoughts are organized and linear. Content is pertinent to the conversation and without evidence of auditory or visual hallucinations. No delusional ideation. Gen. fund of knowledge, insight and memory are normal       Video Visit Details    Type of service: Video Visit    Video Start Time: 1020    Video End Time: 1050    Total time of video visit: 30 minutes    Originating Location: Patient's home    Distant Location:  [facility]    Mode of Communication: Video Conference via Vidyo    10 minutes spent on the date of the encounter doing chart review, review of outside records, documentation and return to work letter    General Information:  Today you had your appointment with Veronica Matta CNP     If lab work was done today as part of your evaluation you will generally be contacted via My Chart, mail, or phone with the results within 1-5 days. If there is an alarming result we will contact you by phone. Lab results come back at varying times, I generally wait until all labs are resulted before making comments on results. Please note labs are automatically released to My Chart once  available.     If you need refills please contact your pharmacist. They will send a refill request to me to review. Please allow 3 business days for us to process all refill requests.     Please call or send a medical message through My Chart, with any questions or concerns    If you need any paperwork completed please fax forms to [phone]. Please state if you would like a copy of the completed paperwork, mailed or faxed back to the patient and a fax number to fax the paperwork to. Please allow up to 10 days for paperwork to be completed.    Veronica Matta CNP      Again, thank you for allowing me to participate in the care of your patient.        Sincerely,        WILLOW Curiel CNP

## 2021-08-13 ENCOUNTER — HOSPITAL ENCOUNTER (OUTPATIENT)
Dept: OCCUPATIONAL THERAPY | Facility: CLINIC | Age: 56
Setting detail: THERAPIES SERIES
End: 2021-08-13
Attending: NURSE PRACTITIONER
Payer: OTHER MISCELLANEOUS

## 2021-08-13 ENCOUNTER — LAB REQUISITION (OUTPATIENT)
Dept: LAB | Facility: CLINIC | Age: 56
End: 2021-08-13

## 2021-08-13 ENCOUNTER — APPOINTMENT (OUTPATIENT)
Dept: LAB | Facility: CLINIC | Age: 56
End: 2021-08-13
Payer: COMMERCIAL

## 2021-08-13 DIAGNOSIS — G47.33 OSA (OBSTRUCTIVE SLEEP APNEA): Primary | ICD-10-CM

## 2021-08-13 DIAGNOSIS — H04.123 DRY EYES: Primary | ICD-10-CM

## 2021-08-13 DIAGNOSIS — I10 ESSENTIAL HYPERTENSION: ICD-10-CM

## 2021-08-13 PROCEDURE — 86706 HEP B SURFACE ANTIBODY: CPT | Performed by: INTERNAL MEDICINE

## 2021-08-13 PROCEDURE — 86787 VARICELLA-ZOSTER ANTIBODY: CPT | Performed by: INTERNAL MEDICINE

## 2021-08-13 PROCEDURE — 36415 COLL VENOUS BLD VENIPUNCTURE: CPT | Performed by: INTERNAL MEDICINE

## 2021-08-13 PROCEDURE — 97535 SELF CARE MNGMENT TRAINING: CPT | Mod: GO | Performed by: OCCUPATIONAL THERAPIST

## 2021-08-13 RX ORDER — DEXTRAN 70/HYPROMELLOSE
2 DROPS OPHTHALMIC (EYE) 4 TIMES DAILY PRN
Qty: 15 ML | Refills: 3 | Status: SHIPPED | OUTPATIENT
Start: 2021-08-13 | End: 2023-04-24

## 2021-08-16 LAB
HBV SURFACE AB SERPL IA-ACNC: 3.98 M[IU]/ML
VZV IGG SER QL IA: 656.2 INDEX
VZV IGG SER QL IA: POSITIVE

## 2021-08-20 ENCOUNTER — HOSPITAL ENCOUNTER (OUTPATIENT)
Dept: OCCUPATIONAL THERAPY | Facility: CLINIC | Age: 56
Setting detail: THERAPIES SERIES
End: 2021-08-20
Attending: NURSE PRACTITIONER
Payer: OTHER MISCELLANEOUS

## 2021-08-20 PROCEDURE — 97535 SELF CARE MNGMENT TRAINING: CPT | Mod: GO | Performed by: OCCUPATIONAL THERAPIST

## 2021-08-24 DIAGNOSIS — F90.9 ATTENTION DEFICIT HYPERACTIVITY DISORDER (ADHD), UNSPECIFIED ADHD TYPE: Primary | ICD-10-CM

## 2021-08-24 RX ORDER — METHYLPHENIDATE HYDROCHLORIDE 36 MG/1
36 TABLET ORAL EVERY MORNING
Qty: 30 TABLET | Refills: 0 | Status: SHIPPED | OUTPATIENT
Start: 2021-08-24 | End: 2021-09-20

## 2021-08-26 ENCOUNTER — TELEPHONE (OUTPATIENT)
Dept: SLEEP MEDICINE | Facility: CLINIC | Age: 56
End: 2021-08-26

## 2021-08-26 NOTE — TELEPHONE ENCOUNTER
Left patient a voicemail letting him know due to current CPAP machine shortage he will be placed on a waiting list and will be called to schedule setup appt once a machine is available. Also informed him and he will be eligible for a replacement on 9/26/21 and to call [facility] if he has questions.

## 2021-08-27 ENCOUNTER — LAB (OUTPATIENT)
Dept: LAB | Facility: CLINIC | Age: 56
End: 2021-08-27
Payer: COMMERCIAL

## 2021-08-27 ENCOUNTER — HOSPITAL ENCOUNTER (OUTPATIENT)
Dept: OCCUPATIONAL THERAPY | Facility: CLINIC | Age: 56
Setting detail: THERAPIES SERIES
End: 2021-08-27
Attending: NURSE PRACTITIONER
Payer: OTHER MISCELLANEOUS

## 2021-08-27 DIAGNOSIS — E78.5 HYPERLIPIDEMIA LDL GOAL <160: ICD-10-CM

## 2021-08-27 DIAGNOSIS — Z12.5 SCREENING FOR PROSTATE CANCER: ICD-10-CM

## 2021-08-27 DIAGNOSIS — E34.9 TESTOSTERONE INSUFFICIENCY: ICD-10-CM

## 2021-08-27 DIAGNOSIS — Z00.00 ROUTINE GENERAL MEDICAL EXAMINATION AT A HEALTH CARE FACILITY: ICD-10-CM

## 2021-08-27 DIAGNOSIS — Z00.00 ROUTINE GENERAL MEDICAL EXAMINATION AT A HEALTH CARE FACILITY: Primary | ICD-10-CM

## 2021-08-27 LAB
ALBUMIN SERPL-MCNC: 3.6 G/DL (ref 3.4–5)
ALP SERPL-CCNC: 90 U/L (ref 40–150)
ALT SERPL W P-5'-P-CCNC: 67 U/L (ref 0–70)
ANION GAP SERPL CALCULATED.3IONS-SCNC: 8 MMOL/L (ref 3–14)
AST SERPL W P-5'-P-CCNC: 23 U/L (ref 0–45)
BILIRUB SERPL-MCNC: 0.6 MG/DL (ref 0.2–1.3)
BUN SERPL-MCNC: 13 MG/DL (ref 7–30)
CALCIUM SERPL-MCNC: 9.2 MG/DL (ref 8.5–10.1)
CHLORIDE BLD-SCNC: 106 MMOL/L (ref 94–109)
CHOLEST SERPL-MCNC: 228 MG/DL
CO2 SERPL-SCNC: 26 MMOL/L (ref 20–32)
CREAT SERPL-MCNC: 1.02 MG/DL (ref 0.66–1.25)
ERYTHROCYTE [DISTWIDTH] IN BLOOD BY AUTOMATED COUNT: 13.8 % (ref 10–15)
FASTING STATUS PATIENT QL REPORTED: YES
GFR SERPL CREATININE-BSD FRML MDRD: 83 ML/MIN/1.73M2
GLUCOSE BLD-MCNC: 112 MG/DL (ref 70–99)
HBA1C MFR BLD: 5.8 % (ref 0–5.6)
HCT VFR BLD AUTO: 43.9 % (ref 40–53)
HDLC SERPL-MCNC: 46 MG/DL
HGB BLD-MCNC: 15.1 G/DL (ref 13.3–17.7)
LDLC SERPL CALC-MCNC: 144 MG/DL
MCH RBC QN AUTO: 29.5 PG (ref 26.5–33)
MCHC RBC AUTO-ENTMCNC: 34.4 G/DL (ref 31.5–36.5)
MCV RBC AUTO: 86 FL (ref 78–100)
NONHDLC SERPL-MCNC: 182 MG/DL
PLATELET # BLD AUTO: 228 10E3/UL (ref 150–450)
POTASSIUM BLD-SCNC: 3.9 MMOL/L (ref 3.4–5.3)
PROT SERPL-MCNC: 7.1 G/DL (ref 6.8–8.8)
RBC # BLD AUTO: 5.11 10E6/UL (ref 4.4–5.9)
SODIUM SERPL-SCNC: 140 MMOL/L (ref 133–144)
TRIGL SERPL-MCNC: 188 MG/DL
TSH SERPL DL<=0.005 MIU/L-ACNC: 3.91 MU/L (ref 0.4–4)
WBC # BLD AUTO: 7.1 10E3/UL (ref 4–11)

## 2021-08-27 PROCEDURE — 80061 LIPID PANEL: CPT

## 2021-08-27 PROCEDURE — 97535 SELF CARE MNGMENT TRAINING: CPT | Mod: GO | Performed by: OCCUPATIONAL THERAPIST

## 2021-08-27 PROCEDURE — 36415 COLL VENOUS BLD VENIPUNCTURE: CPT

## 2021-08-27 PROCEDURE — 80053 COMPREHEN METABOLIC PANEL: CPT

## 2021-08-27 PROCEDURE — G0103 PSA SCREENING: HCPCS

## 2021-08-27 PROCEDURE — 83036 HEMOGLOBIN GLYCOSYLATED A1C: CPT

## 2021-08-27 PROCEDURE — 84443 ASSAY THYROID STIM HORMONE: CPT

## 2021-08-27 PROCEDURE — 84403 ASSAY OF TOTAL TESTOSTERONE: CPT

## 2021-08-27 PROCEDURE — 85027 COMPLETE CBC AUTOMATED: CPT

## 2021-08-30 ENCOUNTER — OFFICE VISIT (OUTPATIENT)
Dept: FAMILY MEDICINE | Facility: CLINIC | Age: 56
End: 2021-08-30
Payer: COMMERCIAL

## 2021-08-30 VITALS
RESPIRATION RATE: 16 BRPM | SYSTOLIC BLOOD PRESSURE: 128 MMHG | BODY MASS INDEX: 38.78 KG/M2 | HEIGHT: 71 IN | TEMPERATURE: 98.4 F | HEART RATE: 92 BPM | OXYGEN SATURATION: 97 % | DIASTOLIC BLOOD PRESSURE: 74 MMHG | WEIGHT: 277 LBS

## 2021-08-30 DIAGNOSIS — E66.01 MORBID OBESITY (H): ICD-10-CM

## 2021-08-30 DIAGNOSIS — Z00.00 ROUTINE GENERAL MEDICAL EXAMINATION AT A HEALTH CARE FACILITY: Primary | ICD-10-CM

## 2021-08-30 DIAGNOSIS — F41.9 ANXIETY: ICD-10-CM

## 2021-08-30 DIAGNOSIS — R73.03 PREDIABETES: ICD-10-CM

## 2021-08-30 DIAGNOSIS — E78.5 HYPERLIPIDEMIA LDL GOAL <160: ICD-10-CM

## 2021-08-30 DIAGNOSIS — K21.9 GASTROESOPHAGEAL REFLUX DISEASE WITHOUT ESOPHAGITIS: ICD-10-CM

## 2021-08-30 DIAGNOSIS — Z11.59 NEED FOR HEPATITIS C SCREENING TEST: ICD-10-CM

## 2021-08-30 DIAGNOSIS — I10 BENIGN ESSENTIAL HYPERTENSION: ICD-10-CM

## 2021-08-30 DIAGNOSIS — M54.6 MIDLINE THORACIC BACK PAIN, UNSPECIFIED CHRONICITY: ICD-10-CM

## 2021-08-30 DIAGNOSIS — Z12.5 SCREENING FOR PROSTATE CANCER: ICD-10-CM

## 2021-08-30 PROBLEM — F33.1 MODERATE EPISODE OF RECURRENT MAJOR DEPRESSIVE DISORDER (H): Status: RESOLVED | Noted: 2017-06-20 | Resolved: 2021-08-30

## 2021-08-30 PROBLEM — M54.59 MECHANICAL LOW BACK PAIN: Status: RESOLVED | Noted: 2018-08-06 | Resolved: 2021-08-30

## 2021-08-30 PROBLEM — E29.1 HYPOGONADISM MALE: Status: RESOLVED | Noted: 2017-03-08 | Resolved: 2021-08-30

## 2021-08-30 LAB
ALBUMIN UR-MCNC: NEGATIVE MG/DL
APPEARANCE UR: CLEAR
BILIRUB UR QL STRIP: NEGATIVE
COLOR UR AUTO: YELLOW
GLUCOSE UR STRIP-MCNC: NEGATIVE MG/DL
HGB UR QL STRIP: NEGATIVE
KETONES UR STRIP-MCNC: NEGATIVE MG/DL
LEUKOCYTE ESTERASE UR QL STRIP: NEGATIVE
NITRATE UR QL: NEGATIVE
PH UR STRIP: 5.5 [PH] (ref 5–7)
PSA SERPL-MCNC: 1.67 UG/L (ref 0–4)
SP GR UR STRIP: 1.02 (ref 1–1.03)
UROBILINOGEN UR STRIP-ACNC: 0.2 E.U./DL

## 2021-08-30 PROCEDURE — 81003 URINALYSIS AUTO W/O SCOPE: CPT | Performed by: FAMILY MEDICINE

## 2021-08-30 PROCEDURE — 99396 PREV VISIT EST AGE 40-64: CPT | Performed by: FAMILY MEDICINE

## 2021-08-30 RX ORDER — BUPROPION HYDROCHLORIDE 150 MG/1
150 TABLET ORAL EVERY MORNING
Qty: 90 TABLET | Refills: 3 | Status: SHIPPED | OUTPATIENT
Start: 2021-08-30 | End: 2021-09-13

## 2021-08-30 RX ORDER — BUPROPION HYDROCHLORIDE 150 MG/1
TABLET ORAL
Qty: 60 TABLET | Refills: 0 | Status: SHIPPED | OUTPATIENT
Start: 2021-08-30 | End: 2021-09-13

## 2021-08-30 RX ORDER — LOSARTAN POTASSIUM 50 MG/1
TABLET ORAL
Qty: 135 TABLET | Refills: 3 | Status: SHIPPED | OUTPATIENT
Start: 2021-08-30 | End: 2021-12-02

## 2021-08-30 RX ORDER — ATORVASTATIN CALCIUM 40 MG/1
40 TABLET, FILM COATED ORAL DAILY
Qty: 90 TABLET | Refills: 3 | Status: SHIPPED | OUTPATIENT
Start: 2021-08-30 | End: 2022-06-28

## 2021-08-30 RX ORDER — OMEPRAZOLE 40 MG/1
40 CAPSULE, DELAYED RELEASE ORAL DAILY
Qty: 90 CAPSULE | Refills: 3 | Status: SHIPPED | OUTPATIENT
Start: 2021-08-30 | End: 2022-03-02

## 2021-08-30 ASSESSMENT — ENCOUNTER SYMPTOMS
DYSURIA: 0
HEADACHES: 0
NAUSEA: 0
PALPITATIONS: 0
CONSTIPATION: 0
HEMATURIA: 0
MYALGIAS: 0
NERVOUS/ANXIOUS: 0
DIZZINESS: 0
COUGH: 0
SORE THROAT: 0
ABDOMINAL PAIN: 0
DIARRHEA: 0
PARESTHESIAS: 0
HEMATOCHEZIA: 0
JOINT SWELLING: 0
ARTHRALGIAS: 0
HEARTBURN: 0
WEAKNESS: 0
FREQUENCY: 0
CHILLS: 0
EYE PAIN: 0
SHORTNESS OF BREATH: 0

## 2021-08-30 ASSESSMENT — MIFFLIN-ST. JEOR: SCORE: 2118.59

## 2021-08-30 NOTE — PROGRESS NOTES
SUBJECTIVE:   CC: Galileo Vieira is an 55 year old male who presents for preventative health visit.       Patient has been advised of split billing requirements and indicates understanding: Yes     Healthy Habits:     Getting at least 3 servings of Calcium per day:  Yes    Bi-annual eye exam:  Yes    Dental care twice a year:  Yes    Sleep apnea or symptoms of sleep apnea:  Sleep apnea    Diet:  Regular (no restrictions)    Frequency of exercise:  2-3 days/week    Duration of exercise:  15-30 minutes    Taking medications regularly:  Yes    Medication side effects:  None    PHQ-2 Total Score: 1    Additional concerns today:  Yes (right mid backl pain)    Back Pain       Duration: a few months        Specific cause: none    Description:   Location of pain: middle of back right  Character of pain: dull ache and intermittent  Pain radiation:none  New numbness or weakness in legs, not attributed to pain:  no     Intensity: Currently 3/10, mild    History:   Pain interferes with job: No  History of back problems: recurrent self limited episodes of low back pain in the past  Any previous MRI or X-rays: None  Sees a specialist for back pain:  No  Therapies tried without relief: none    Alleviating factors:   Improved by: none      Precipitating factors:  Worsened by: Nothing    Pain improved with stretching.   No pain with urination, no blood in urine.       Today's PHQ-2 Score:   PHQ-2 ( 1999 [employer]) 8/30/2021   Q1: Little interest or pleasure in doing things 0   Q2: Feeling down, depressed or hopeless 1   PHQ-2 Score 1   Q1: Little interest or pleasure in doing things Not at all   Q2: Feeling down, depressed or hopeless Several days   PHQ-2 Score 1       Abuse: Current or Past(Physical, Sexual or Emotional)- No  Do you feel safe in your environment? Yes    Have you ever done Advance Care Planning? (For example, a Health Directive, POLST, or a discussion with a medical provider or your loved ones about your wishes): No,  advance care planning information given to patient to review.  Patient plans to discuss their wishes with loved ones or provider.      Social History     Tobacco Use     Smoking status: Former Smoker     Packs/day: 0.00     Quit date: 10/3/1997     Years since quittin.9     Smokeless tobacco: Never Used     Tobacco comment:    Substance Use Topics     Alcohol use: Yes     Alcohol/week: 16.7 standard drinks     Types: 20 Standard drinks or equivalent per week     Comment: a few a day     If you drink alcohol do you typically have >3 drinks per day or >7 drinks per week? No    Alcohol Use 2021   Prescreen: >3 drinks/day or >7 drinks/week? No   Prescreen: >3 drinks/day or >7 drinks/week? -   AUDIT SCORE  -       Last PSA:   PSA   Date Value Ref Range Status   2016 0.81 0 - 4 ug/L Final     Comment:     Assay Method:  Chemiluminescence using Siemens Vista analyzer       Reviewed orders with patient. Reviewed health maintenance and updated orders accordingly - Yes  Patient Active Problem List   Diagnosis     Hyperlipidemia LDL goal <160     GERD (gastroesophageal reflux disease)     Anxiety     Testosterone insufficiency     Fear of flying     Mild obstructive sleep apnea     Hypogonadism male     Moderate episode of recurrent major depressive disorder (H)     Mechanical low back pain     Obesity (BMI 35.0-39.9) with comorbidity (H)     Concussion with loss of consciousness, subsequent encounter     Attention deficit hyperactivity disorder (ADHD), unspecified ADHD type     Past Surgical History:   Procedure Laterality Date     CHOLECYSTECTOMY       COLONOSCOPY       COLONOSCOPY  2014    Procedure: COLONOSCOPY;  Surgeon: Seyd Walker MD;  Location:  GI     GI SURGERY      Gall bladder     HERNIA REPAIR       [facility] NONSPECIFIC PROCEDURE      Left knee arthroscopy     [facility] NONSPECIFIC PROCEDURE      Bilat inguinal hernia surgery     [facility] NONSPECIFIC PROCEDURE      Vasectomy       Social  History     Tobacco Use     Smoking status: Former Smoker     Packs/day: 0.00     Quit date: 10/3/1997     Years since quittin.9     Smokeless tobacco: Never Used     Tobacco comment:    Substance Use Topics     Alcohol use: Yes     Alcohol/week: 16.7 standard drinks     Types: 20 Standard drinks or equivalent per week     Comment: a few a day     Family History   Problem Relation Age of Onset     Gastrointestinal Disease Father         Crohn's     Cardiovascular Father      Depression Father      Heart Disease Father 66        triple bipass     Neurologic Disorder Father         seizures     Respiratory Father         sleep apnea     C.A.D. Father         Had CABG     Cerebrovascular Disease Father      Family History Negative Mother      Depression Other      Cancer - colorectal Other         Maternal Uncle  at 56     Lipids Brother      Diabetes Brother      Coronary Artery Disease Brother      Respiratory Brother      Coronary Artery Disease Brother      Depression Paternal Grandmother      Cancer - colorectal Maternal Uncle      Seizure Disorder Father            Reviewed and updated as needed this visit by clinical staff  Tobacco  Allergies  Meds      Soc Hx        Reviewed and updated as needed this visit by Provider    Meds               Review of Systems   Constitutional: Negative for chills.   HENT: Positive for hearing loss. Negative for congestion, ear pain and sore throat.    Eyes: Negative for pain and visual disturbance.   Respiratory: Negative for cough and shortness of breath.    Cardiovascular: Negative for chest pain, palpitations and peripheral edema.   Gastrointestinal: Negative for abdominal pain, constipation, diarrhea, heartburn, hematochezia and nausea.   Genitourinary: Negative for dysuria, frequency, genital sores, hematuria and urgency.   Musculoskeletal: Negative for arthralgias, joint swelling and myalgias.   Skin: Negative for rash.   Neurological: Negative for  "dizziness, weakness, headaches and paresthesias.   Psychiatric/Behavioral: Positive for mood changes. The patient is not nervous/anxious.        OBJECTIVE:   /74 (BP Location: Right arm, Patient Position: Sitting, Cuff Size: Adult Large)   Pulse 92   Temp 98.4  F (36.9  C) (Oral)   Resp 16   Ht 1.803 m (5' 11\")   Wt 125.6 kg (277 lb)   SpO2 97%   BMI 38.63 kg/m      Physical Exam  GENERAL: healthy, alert and no distress  EYES: Eyes grossly normal to inspection, PERRL and conjunctivae and sclerae normal  HENT: ear canals and TM's normal, nose and mouth without ulcers or lesions  NECK: no adenopathy, no asymmetry, masses, or scars and thyroid normal to palpation  RESP: lungs clear to auscultation - no rales, rhonchi or wheezes  CV: regular rate and rhythm, normal S1 S2, no S3 or S4, no murmur, click or rub, no peripheral edema and peripheral pulses strong  ABDOMEN: soft, nontender, no hepatosplenomegaly, no masses and bowel sounds normal  MS: no gross musculoskeletal defects noted, no edema  SKIN: no suspicious lesions or rashes  NEURO: Normal strength and tone, mentation intact and speech normal  PSYCH: mentation appears normal, affect normal/bright    Diagnostic Test Results:  Results for orders placed or performed in visit on 08/30/21   UA Macro with Reflex to Micro and Culture - lab collect     Status: Normal    Specimen: Urine, Clean Catch   Result Value Ref Range    Color Urine Yellow Colorless, Straw, Light Yellow, Yellow    Appearance Urine Clear Clear    Glucose Urine Negative Negative mg/dL    Bilirubin Urine Negative Negative    Ketones Urine Negative Negative mg/dL    Specific Gravity Urine 1.020 1.003 - 1.035    Blood Urine Negative Negative    pH Urine 5.5 5.0 - 7.0    Protein Albumin Urine Negative Negative mg/dL    Urobilinogen Urine 0.2 0.2, 1.0 E.U./dL    Nitrite Urine Negative Negative    Leukocyte Esterase Urine Negative Negative    Narrative    Microscopic not indicated " "      ASSESSMENT/PLAN:     1. Routine general medical examination at a health care facility    2. Need for hepatitis C screening test  - Hepatitis C Screen Reflex to HCV RNA Quant and Genotype; Future    3. Screening for prostate cancer  - PSA, screen; Future    4. Benign essential hypertension - controlled, continue current, recent labs normal.   - losartan (COZAAR) 50 MG tablet; TAKE 1 AND 1/2 TABLETS BY MOUTH DAILY  Dispense: 135 tablet; Refill: 3    5. Anxiety - stable. Continue current.   - buPROPion (WELLBUTRIN XL) 150 MG 24 hr tablet; Take 1 tablet (150 mg) by mouth every morning  Dispense: 90 tablet; Refill: 3    6. Gastroesophageal reflux disease without esophagitis - stable, continue current  - omeprazole (PRILOSEC) 40 MG DR capsule; Take 1 capsule (40 mg) by mouth daily  Dispense: 90 capsule; Refill: 3    7. Hyperlipidemia LDL goal <160 - discussed elevated lipid panel, suggested stronger potency statin, he agrees. Surveillance labs 2-3 months.   - atorvastatin (LIPITOR) 40 MG tablet; Take 1 tablet (40 mg) by mouth daily  Dispense: 90 tablet; Refill: 3  - Lipid panel reflex to direct LDL Fasting; Future  - Comprehensive metabolic panel (BMP + Alb, Alk Phos, ALT, AST, Total. Bili, TP); Future    8. Midline thoracic back pain, unspecified chronicity - discussed most likely MSK but will evaluate for presence of hematuria.   - UA Macro with Reflex to Micro and Culture - lab collect    10. Obesity (BMI 35.0-39.9) with comorbidity (H) - encouraged weight loss to help with chronic health conditions.       Patient has been advised of split billing requirements and indicates understanding: Yes  COUNSELING:   Reviewed preventive health counseling, as reflected in patient instructions    Estimated body mass index is 38.63 kg/m  as calculated from the following:    Height as of this encounter: 1.803 m (5' 11\").    Weight as of this encounter: 125.6 kg (277 lb).       He reports that he quit smoking about 23 years " ago. He smoked 0.00 packs per day. He has never used smokeless tobacco.      Rut Flanagan MD  [facility]

## 2021-08-30 NOTE — LETTER
[facility]  [address] [address]  Phone: [phone]    08/30/21    Galileo Vieira  [address] [address] [address]      To whom it may concern:     Galileo Vieira is my patient.     He struggles with hearing loss and should consistently wear his hearing aid.   For this reason, he is unable to use his stethoscope.   I have encouraged him to use the electronic blood pressure cuff going forward.     Sincerely,      Rut Flanagan MD

## 2021-08-30 NOTE — PATIENT INSTRUCTIONS
"Goals:     1. Improve cholesterol - see below  2. Reduce blood sugar (starches, sugar, alcohol)  3. Blood work in 2-3 months        What lifestyle changes can I make to help improve my cholesterol levels?     Exercise regularly.   Exercise can raise HDL cholesterol levels and reduce levels of LDL cholesterol and triglycerides. If you haven't been exercising, try to work up to 30 minutes, 4 to 6 times a week.     Lose weight if you are overweight.   Being overweight can raise your cholesterol levels. Losing weight, even just 5 or 10 pounds, can lower your total cholesterol, LDL cholesterol and triglyceride levels.     Eat a heart-healthy diet.   Eat plenty of fresh fruits and vegetables. Fruits and vegetables are naturally low in fat. Not only do they add flavor and variety to your diet, but they are also the best source of fiber, vitamins and minerals for your body. Aim for 5 cups of fruits and vegetables every day, not counting potatoes, corn and rice. Potatoes, corn and rice count as carbohydrates.     Pick \"good\" fats over \"bad\" fats. Fat is part of a healthy diet, but you need to know the difference between \"bad\" fats and \"good\" fats. \"Bad\" fats, such as saturated and trans fats, are found in foods such as butter; coconut and palm oil; saturated or partially hydrogenated vegetable fats such as shortening and margarine; animal fats in meats; and fats in whole milk dairy products. Limit the amount of saturated fat in your diet, and avoid trans fat completely. Unsaturated fat is the \"good\" fat. Most fats in fish, vegetables, grains and tree nuts are unsaturated. Try to eat unsaturated fat in place of saturated fat. For example, you can use olive oil or canola oil in cooking instead of butter.     Use healthier cooking methods. Baking, broiling and roasting are the healthiest ways to prepare meat, poultry and other foods. Trim any outside fat or skin before cooking. Lean cuts can be pan-broiled or stir-fried. Use " "either a nonstick pan or nonstick cooking spray instead of adding fats such as butter or margarine. When eating out, ask how food is prepared. You can request that your food be baked, broiled or roasted, rather than fried.   Look for other sources of protein. Fish, dry beans, tree nuts, peas and lentils offer protein, nutrients and fiber without the cholesterol and saturated fats that meats have. Consider eating one \"meatless\" meal each week. Try substituting beans for meat in a favorite recipe, such as lasagna or chili. Snack on a handful of almonds or pecans. Soy is also an excellent source of protein. Good examples of soy include soy milk, edamame (green soy beans), tofu and soy protein shakes.     Get more fiber in your diet. Add good sources of fiber to your meals. Examples include fruits, vegetables, whole grains (such as oat bran, whole and rolled oats and barley), legumes (such as beans and peas) and nuts and seeds (such as ground flax seed). In addition to fiber, whole grains supply B-vitamins and important nutrients not found in foods made with white flour.     Eat more fish. Fish are an excellent source of omega-3 fatty acids. Wild-caught oily fish, such as salmon, tuna, mackerel and sardines, are the best sources of omega-3s, but all fish contain some amount of this beneficial fatty acid. Aim for 2 6-oz servings each week.     Preventive Health Recommendations  Male Ages 50 - 64    Yearly exam:             See your health care provider every year in order to  o   Review health changes.   o   Discuss preventive care.    o   Review your medicines if your doctor has prescribed any.     Have a cholesterol test every 5 years, or more frequently if you are at risk for high cholesterol/heart disease.     Have a diabetes test (fasting glucose) every three years. If you are at risk for diabetes, you should have this test more often.     Have a colonoscopy at age 50, or have a yearly FIT test (stool test). These " exams will check for colon cancer.      Talk with your health care provider about whether or not a prostate cancer screening test (PSA) is right for you.    You should be tested each year for STDs (sexually transmitted diseases), if you re at risk.     Shots: Get a flu shot each year. Get a tetanus shot every 10 years.     Nutrition:    Eat at least 5 servings of fruits and vegetables daily.     Eat whole-grain bread, whole-wheat pasta and brown rice instead of white grains and rice.     Get adequate Calcium and Vitamin D.     Lifestyle    Exercise for at least 150 minutes a week (30 minutes a day, 5 days a week). This will help you control your weight and prevent disease.     Limit alcohol to one drink per day.     No smoking.     Wear sunscreen to prevent skin cancer.     See your dentist every six months for an exam and cleaning.     See your eye doctor every 1 to 2 years.

## 2021-09-01 LAB — TESTOST SERPL-MCNC: 597 NG/DL (ref 240–950)

## 2021-09-13 DIAGNOSIS — F41.9 ANXIETY: Primary | ICD-10-CM

## 2021-09-13 RX ORDER — BUPROPION HYDROCHLORIDE 300 MG/1
300 TABLET ORAL EVERY MORNING
Qty: 90 TABLET | Refills: 3 | Status: SHIPPED | OUTPATIENT
Start: 2021-09-13 | End: 2021-09-20 | Stop reason: DRUGHIGH

## 2021-09-15 ENCOUNTER — HOSPITAL ENCOUNTER (OUTPATIENT)
Dept: PHYSICAL THERAPY | Facility: CLINIC | Age: 56
Setting detail: THERAPIES SERIES
End: 2021-09-15
Attending: NURSE PRACTITIONER
Payer: OTHER MISCELLANEOUS

## 2021-09-15 PROCEDURE — 97140 MANUAL THERAPY 1/> REGIONS: CPT | Mod: GP | Performed by: PHYSICAL THERAPIST

## 2021-09-15 PROCEDURE — 97750 PHYSICAL PERFORMANCE TEST: CPT | Mod: GP | Performed by: PHYSICAL THERAPIST

## 2021-09-15 PROCEDURE — 97112 NEUROMUSCULAR REEDUCATION: CPT | Mod: GP | Performed by: PHYSICAL THERAPIST

## 2021-09-15 NOTE — PROGRESS NOTES
09/15/21 1300   Signing Clinician's Name / Credentials   Signing clinician's name / credentials Kassandra Smith PT   Functional Gait Assessment (CUCA Rhodes., MAXIMILIANO Joiner, et al. (2004))   1. GAIT LEVEL SURFACE 2  (slower speed, reduced arm swing, hands high guard)   2. CHANGE IN GAIT SPEED 3   3. GAIT WITH HORIZONTAL HEAD TURNS 2   4. GAIT WITH VERTICAL HEAD TURNS 3   5. GAIT AND PIVOT TURN 3   6. STEP OVER OBSTACLE 3   7. GAIT WITH NARROW BASE OF SUPPORT 3   8. GAIT WITH EYES CLOSED 1   9. AMBULATING BACKWARDS 3   10. STEPS 2   Total Functional Gait Assessment Score   TOTAL SCORE: (MAXIMUM SCORE 30) 25   Functional Gait Assessment (FGA): The FGA assesses postural stability during various walking tasks.   Gait assistive device used: None    Patient Score: 25/30  Scores of <22/30 have been correlated with predicting falls in community-dwelling older adults according to Carmelo & Skip 2010.   Scores of <18/30 have been correlated with increased risk for falls in patients with Parkinsons Disease according to Jarod Jean-Baptiste Zhou et al 2014.  Minimal Detectable Change for patients with acute/chronic stroke = 4.2 according to Ivone & Michael 2009  Minimal Detectable Change for patients with vestibular disorder = 8 according to Carmelo & Skip 2010    Assessment (rationale for performing, application to patient s function & care plan): Reassessment to determine progress and assist in care planning after being away from therapy for 5-6 weeks. Patients score is unchanged since last check 7/28/21. He has made improvements in some areas ie narrow based walking but is struggling still walking with head turns and with eyes closed. He will benefit from additional skilled intervention for safe and effective progress towards stability goals and reduced fall risk.     Minutes billed as physical performance test:10

## 2021-09-15 NOTE — PROGRESS NOTES
"   09/15/21 1300 PROGRESS NOTE   Signing Clinician's Name / Credentials   Signing clinician's name / credentials Kassandra Luis, PT   Session Number   Session Number 6/6    Authorization status    Adult Goals   PT Eval Goals 1;2;3;4   Goal 1   Goal Identifier FGA   Goal Description Galileo will have normal gait stability with FGA 30/30 for greater safety with community ambulation.   Goal Progress Goal not met 25/30. pt is quite guarded with gait, moving enbloc with minimal arm swing and hands in high guard position. This is seen throughout testing. Stability is further challenged with eyes closed and head turns implicating the vestibular system. Goal is still appropriate, target date extended.   Target Date 10/29/21   Goal 2   Goal Identifier SLS   Goal Description Galileo will be able to  SLS on non-dominate (left) leg for at least 60\" and with addition of cognitive distraction with effective balance reactions and no LOB for greater safety moving about his work environment when multi-tasking throughout the day.   Goal Progress Goal not met. Able to stand 5-8\" on L foot with hip drop, 14\" on R foot. Goal still appropriate, target date extended   Target Date 10/29/21   Goal 3   Goal Identifier DVA   Goal Description Galileo will have normal DVA, no greater than 2 line loss, with 2 hz head movement to be able to walk and talk in busy visual envirnoment ie grocery store, [employer] and work, without dizziness or imbalance.   Goal Progress Goal in progress, 3 line loss improved from 4 at eval but no change in past 5-6 weeks without havng therapy to monitor or progress VOR program. Goal still approp, target date extended.   Target Date 10/29/21   Goal 4   Goal Identifier Neck   Goal Description Galileo will have reduced mm tension and pain, no greater than 1/10, for greater tolerance of work tasks.    Goal Progress Increase in neck pain recently. Rated 5/10 today. Goal still approp, target date extended.   Target Date 10/29/21 "   Subjective Report   Subjective Report Galileo reports overall he feels he is improving. He still is fearful of falling and recognizes that he guards his movements, moves slowly. Today is first week back to his fulll time schedule.    Objective Measure 1   Objective Measure FGA   Details 25/30   Objective Measure 2   Objective Measure DVA   Details static 20/20; 2 hz head movement 20/40. improved at 2 hz from 20/50 at eval but still abnormal with brief dizziness and nausea   System Outcome Measures   Outcome Measures Concussion (see Concussion Symptom Assessment)   Treatment Interventions   Interventions Neuromuscular Re-education;Manual Therapy;Physical Performance Test/Measures   Therapeutic Procedure/exercise   Therapeutic Procedures: strength, endurance, ROM, flexibillity minutes (69102) 5   Skilled Intervention HEP   Patient Response pt reports extension feels good, reduced neck pain (h/o cervical disc derangement)   Treatment Detail R UT stretch. REIL x 10   Progress to reduce neck pain    Neuromuscular Re-education   Neuromuscular re-ed of mvmt, balance, coord, kinesthetic sense, posture, proprioception minutes (32234) 22   Skilled Intervention DVA   Patient Response reduced stability, fear of falling, hands in high guard with all activities   Treatment Detail standing balance on floor in rhomberg EO and EC. Rhomberg on foam EO an EC. SLS several B with cues for glute activation for greater stability   Progress goals not met, no change in status since last visit in August   Dynamic Visual Acuity (DVA)   Static Acuity (LogMar) 20/20   Horizontal Head Movement at 2 Hz (LogMar) 20/40   DVA Comments abnormal, dizziness and nausea are brief   Manual Therapy   Manual Therapy: Mobilization, MFR, MLD, friction massage minutes (54860) 8   Skilled Intervention manual work   Patient Response tender even to light touch R side of neck today   Treatment Detail positional release R suboccipital x 1 and R mid cervical  "paraspinals x 1. STM R UT. manual cervical traction 30\"   Progress working on goal 4, reducing neck pain and HA   Physical Performance Test/measures   Physical Performance Test/Measurement, Minutes (02818) 10   Skilled Intervention FGA and pt education on findings   Patient Response 25/30   Progress greatest difficulty with head turns and walking eyes closed. also guarded with arms in high guard throughout   Education   Learner Patient   Readiness Acceptance   Method Explanation;Demonstration   Response Verbalizes Understanding;Demonstrates Understanding   Education Comments HEP   Plan   Home program REIL   Updates to plan of care Galileo had been making slow but steady progress in PT prior to disruption in care with a couple missed visits and then scheduling conflicts. He has not been able to make significant progress in vesitbular and balance s/s or neck pain on his own despite continued use of his HEP. He will benefit from resuming regular skilled PT weekly to help get him back on track towards recovery.    Plan for next session followup and progression of VOR adaptation program, balance and  gait training. manual therapy and extension based exercise for c-spine   Total Session Time   Timed Code Treatment Minutes 45   Total Treatment Time (sum of timed and untimed services) 45     Addendum: Patient was unable to continue with PT beyond this visit on 9/15/21 due to insurance issues. He is discharged. See above note for last known information.  "

## 2021-09-18 ENCOUNTER — HEALTH MAINTENANCE LETTER (OUTPATIENT)
Age: 56
End: 2021-09-18

## 2021-09-20 ENCOUNTER — VIRTUAL VISIT (OUTPATIENT)
Dept: NEUROLOGY | Facility: CLINIC | Age: 56
End: 2021-09-20
Payer: OTHER MISCELLANEOUS

## 2021-09-20 DIAGNOSIS — F06.30 MOOD DISORDER AS LATE EFFECT OF TRAUMATIC BRAIN INJURY (H): ICD-10-CM

## 2021-09-20 DIAGNOSIS — F90.9 ATTENTION DEFICIT HYPERACTIVITY DISORDER (ADHD), UNSPECIFIED ADHD TYPE: ICD-10-CM

## 2021-09-20 DIAGNOSIS — S06.9XAS MOOD DISORDER AS LATE EFFECT OF TRAUMATIC BRAIN INJURY (H): ICD-10-CM

## 2021-09-20 DIAGNOSIS — R41.840 ATTENTION AND CONCENTRATION DEFICIT: ICD-10-CM

## 2021-09-20 DIAGNOSIS — F07.81 POST CONCUSSION SYNDROME: Primary | ICD-10-CM

## 2021-09-20 PROCEDURE — 99215 OFFICE O/P EST HI 40 MIN: CPT | Mod: 95 | Performed by: NURSE PRACTITIONER

## 2021-09-20 RX ORDER — BUPROPION HYDROCHLORIDE 150 MG/1
150 TABLET ORAL EVERY MORNING
Qty: 60 TABLET | Refills: 4 | Status: SHIPPED | OUTPATIENT
Start: 2021-09-20 | End: 2021-12-02

## 2021-09-20 RX ORDER — METHYLPHENIDATE HYDROCHLORIDE 5 MG/1
5 TABLET ORAL 2 TIMES DAILY
Qty: 60 TABLET | Refills: 0 | Status: SHIPPED | OUTPATIENT
Start: 2021-09-20 | End: 2022-01-28

## 2021-09-20 RX ORDER — METHYLPHENIDATE HYDROCHLORIDE 36 MG/1
36 TABLET ORAL EVERY MORNING
Qty: 30 TABLET | Refills: 0 | Status: SHIPPED | OUTPATIENT
Start: 2021-09-20 | End: 2021-12-02

## 2021-09-20 NOTE — PROGRESS NOTES
"Video Visit  Galileo Vieira is a 55 year old male who is being evaluated via a billable video visit in light of the ongoing global health crisis (COVID-19) that requires us to abide by social distancing mandates in order to reduce the risk of COVID-19 exposure.       The patient has been notified of following:     \"This video visit will be conducted via a video call between you and your physician/provider. We have found that certain health care needs can be provided without the need for a physical exam.  This service lets us provide the care you need with a short phone/video conversation.  If a prescription is necessary we can send it directly to your pharmacy.  If lab work is needed we can place an order for that and you can then stop by our lab to have the test done at a later time.    If during the course of the call the physician/provider feels a telephone visit is not appropriate, you will not be charged for this service.\"     Patient has given verbal consent to a video visit? Yes    Galileo Vieira chief complaint is Post Concussion Syndrome     ALLERGIES  Patient has no known allergies.    Date of accident : 1/13/21  Neuropsychological assessment completed    No   Currently doing PT  Yes    Completed No   Currently doing OT  Yes    Completed No   Currently doing ST   No    Completed No   Psychology  No      Need a note for work accommodations  Yes   Need a note for school accommodations  No     Any new medication (other provider):   No   Meds started at last appointment  No    Meds increased at last appointment   Yes Concerta 36 mg, take 1 tablet every a.m.   Still taking medication? Yes    Results? Helping with attention & focus       Currently on medication to help with sleep    Yes    Ambien, amitriptyline     Currently on any mental health medications     Yes    Wellbutrin (out of refills)      Currently on medication for attention, ADD/ADHD    Yes   Concerta     Is patient on a controlled substance   Yes "   Last urine test: n/a                                                      Workman's Comp   Yes   [facility]   Yes  Present: Yes     Start Time: 2:20pm    End Time:  2:25pm    Total time of phone call: 5 minutes    Patient would like the video invitation sent by: Rehan  Number/e-mail address:[phone]  Nelli Mcconnell, [facility] [phone]    Shannen Blum CMA     Is patient on a controlled substance prescribed by me?  Yes    checked    Yes   Number of prescribers in last 6 months   3  Urine test preformed today  No   Follow up appointment   Yes     Outpatient Follow up Mild TBI (Concussion)  Evaluation     Pertinent History:  Patient states he slipped on work putting down salt on the icy salt on the sidewalk and hit his head on the concrete sidewalk.    Date of accident :  1/13/21    Subjective:          HPI    The patient returns to the concussion clinic for a follow up visit, He was last seen by me on 8/13/21, where I prescribed Concerta 36 mg. Patient reports he was doing well, but about 3 week ago he ran out of Wellbutrin and had a problem getting approval for refills. Since stopping the Wellbutrin his headaches have worsened. Overall patient is reporting either no change or worsening in physical symptoms, improvement in cognitive symptoms and worsening In emotional symptoms d/t return of headaches    We discussed some treatment options and have elected to restart Wellbutrin.                                                Physical Symptoms:  Headache-Yes     Since last visit  Worsen   Nausea-Yes         Since last visit  Same      Balance problems - No      Dizziness - Yes      Since last visit Worsen            Visual problems - Yes    Since last visit  Improved     Fatigue - Yes              Since last visit  Same     Sensitivity to light - No          Sensitivity to sound - Yes       Since last visit  Improved   Numbness/tingling - No            Cognitive Symptoms  Feeling mentally foggy -Yes        Since last  visit  Improved     Feeling slowed down -Yes        Since last visit  Improved     Difficulty Concentrating- Yes      Since last visit  Improved     Difficulty remembering - Yes        Since last visit  Improved       Emotional Symptoms  Irritability - Yes        Since last visit  Worsen    Sadness-  Yes      Since last visit  Worsen    More emotional - Yes       Since last visit  Worsen    Nervousness/anxiety -Yes       Since last visit  Worsen       Sleep History:  Drowsiness- Yes      Since last visit  Improved     Sleep less than usual - Yes    Sleep more than usual - No    Trouble falling asleep - Yes       Since last visit  Improved   Does the patient wake feeling rested - No         Since last visit  Improved       Migraine Headaches      Patient history of migraines.   No        Exertion:         Do the above stated symptoms worsen with physical activity? Yes        Since last visit  Improved           Do the above stated symptoms worsen with cognitive activity? Yes       Since last visit  Improved            Work/School        Do the above stated symptoms worsen with school/work?        Yes          Have your returned to work/school? Yes           Patient Active Problem List    Diagnosis Date Noted     Prediabetes 08/30/2021     Priority: Medium     Benign essential hypertension 08/30/2021     Priority: Medium     Attention deficit hyperactivity disorder (ADHD), unspecified ADHD type 08/24/2021     Priority: Medium     Concussion with loss of consciousness, subsequent encounter 01/25/2021     Priority: Medium     Obesity (BMI 35.0-39.9) with comorbidity (H) 09/28/2018     Priority: Medium     Mild obstructive sleep apnea 09/08/2016     Priority: Medium     Fear of flying 06/05/2014     Priority: Medium     Testosterone insufficiency 03/28/2013     Priority: Medium     Anxiety 01/05/2012     Priority: Medium     GERD (gastroesophageal reflux disease) 06/10/2009     Priority: Medium     Hyperlipidemia LDL  goal <160 2008     Priority: Medium     Past Medical History:   Diagnosis Date     Concussion      Concussion, unspecified     Hospitalized overnight as a child     Esophageal reflux      Head injury      Hypercholesterolemia      Hypertension      Past Surgical History:   Procedure Laterality Date     CHOLECYSTECTOMY       COLONOSCOPY       COLONOSCOPY  2014    Procedure: COLONOSCOPY;  Surgeon: Syed Walker MD;  Location:  GI     GI SURGERY      Gall bladder     HERNIA REPAIR       [facility] NONSPECIFIC PROCEDURE      Left knee arthroscopy     Z NONSPECIFIC PROCEDURE      Bilat inguinal hernia surgery     [facility] NONSPECIFIC PROCEDURE      Vasectomy     Family History   Problem Relation Age of Onset     Gastrointestinal Disease Father         Crohn's     Cardiovascular Father      Depression Father      Heart Disease Father 66        triple bipass     Neurologic Disorder Father         seizures     Respiratory Father         sleep apnea     C.A.D. Father         Had CABG     Cerebrovascular Disease Father      Family History Negative Mother      Depression Other      Cancer - colorectal Other         Maternal Uncle  at 56     Lipids Brother      Diabetes Brother      Coronary Artery Disease Brother      Respiratory Brother      Coronary Artery Disease Brother      Depression Paternal Grandmother      Cancer - colorectal Maternal Uncle      Seizure Disorder Father      Current Outpatient Medications   Medication Sig Dispense Refill     Artificial Tear Solution (JUST TEARS EYE DROPS) SOLN Place 2 drops into both eyes 4 times daily as needed (Dry eyes) 15 mL 3     atorvastatin (LIPITOR) 40 MG tablet Take 1 tablet (40 mg) by mouth daily 90 tablet 3     buPROPion (WELLBUTRIN XL) 300 MG 24 hr tablet Take 1 tablet (300 mg) by mouth every morning 90 tablet 3     losartan (COZAAR) 50 MG tablet TAKE 1 AND 1/2 TABLETS BY MOUTH DAILY 135 tablet 3     methylphenidate (CONCERTA) 36 MG CR tablet Take 1  "tablet (36 mg) by mouth every morning 30 tablet 0     needle, disp, 18G X 1\" MISC 1 each every 14 days 10 each 1     omeprazole (PRILOSEC) 40 MG DR capsule Take 1 capsule (40 mg) by mouth daily 90 capsule 3     syringe 22G X 1-1/2\" 3 ML MISC 1 each every 14 days 10 each 1     testosterone cypionate (DEPO-TESTOSTERONE) 200 MG/ML injection Inject 1 mL (200 mg) into the muscle every 14 days 10 mL 1     Social History     Socioeconomic History     Marital status:      Spouse name: Not on file     Number of children: Not on file     Years of education: Not on file     Highest education level: Not on file   Occupational History     Occupation: Medical assistant     Employer: [employer]   Tobacco Use     Smoking status: Former Smoker     Packs/day: 0.00     Quit date: 10/3/1997     Years since quittin.9     Smokeless tobacco: Never Used     Tobacco comment:    Vaping Use     Vaping Use: Never used   Substance and Sexual Activity     Alcohol use: Yes     Alcohol/week: 16.7 standard drinks     Types: 20 Standard drinks or equivalent per week     Comment: a few a day     Drug use: No     Sexual activity: Yes     Partners: Female     Birth control/protection: Surgical, Male Surgical   Other Topics Concern     Parent/sibling w/ CABG, MI or angioplasty before 65F 55M? No   Social History Narrative     Not on file     Social Determinants of Health     Financial Resource Strain:      Difficulty of Paying Living Expenses:    Food Insecurity:      Worried About Running Out of Food in the Last Year:      Ran Out of Food in the Last Year:    Transportation Needs:      Lack of Transportation (Medical):      Lack of Transportation (Non-Medical):    Physical Activity:      Days of Exercise per Week:      Minutes of Exercise per Session:    Stress:      Feeling of Stress :    Social Connections:      Frequency of Communication with Friends and Family:      Frequency of Social Gatherings with Friends and " Family:      Attends Mu-ism Services:      Active Member of Clubs or Organizations:      Attends Club or Organization Meetings:      Marital Status:    Intimate Partner Violence:      Fear of Current or Ex-Partner:      Emotionally Abused:      Physically Abused:      Sexually Abused:        The following portions of the patient's history were reviewed and updated as appropriate: allergies, current medications, past family history, past medical history, past social history, past surgical history and problem list.    Review of Systems  A comprehensive review of systems was negative except for: What is noted above    Objective:       Discussion was held with the patient today regarding concussion in general including types of injury, symptoms that are common, treatment and variability in time to recover. Education about concussion symptoms and length of time it would take the patient to recover was also given to the patient.  I have reassured the patient his symptoms are very common when a concussion is present and will improve with time. We discussed the risks and benefits of possible medication including risk of worsening depression with medication adjustments and even the possibility of emergence of suicidal ideations.       Total time spent with the patient today was 40 minutes with greater than 50% of the time spent in counseling and care coordination. The patient agrees to call before then with any questions, concerns or problems. We will assess for the appropriateness of possible psychotropic medication trials/changes. The patient will seek out appropriate emergency services should that become necessary.    Diagnosis managed and treated at today's visit :  Post concussion syndrome  Post concussion headache  Nausea  Dizziness  Fatigue  Insomnia  Sensitivity to light  Sound sensitivity  Concentration and Attention deficit  Memory difficulties  Anxiety d/t a medical condition  Irritability  Return to  work  Encounter related to a worker's comp claim     Plan:  Medication Adjustment:  Wellbutrin 150 mg, take one tab for three days and two tabs thereafter    Other:   Patient will return to clinic in  4 weeks. They agree to call or return sooner with any questions or concerns.  Risks and benefits were discussed.  Continue with individual therapist.     Continue with the support of the clinic, reassurance, and redirection. Staff monitoring and ongoing assessments per team plan. This team will utilize appropriate emergency services if necessary. I will make myself available if concerns or problems arise.     Mental Status Examination  He is cooperative with questioning. He is fully engaged in conversation today. Speech is normal. Thought processes normal with normal prehension and expression. Thoughts are organized and linear. Content is pertinent to the conversation and without evidence of auditory or visual hallucinations. No delusional ideation. Gen. fund of knowledge, insight and memory are normal       Video Visit Details    Type of service: Video Visit    Video Start Time: 1430    Video End Time: 1500    Total time of video visit: 30 minutes    Originating Location: Patient's home    Distant Location:  [facility]    Mode of Communication: Video Conference via [employer]    10 minutes spent on the date of the encounter doing chart review, review of outside records, documentation and return to work letter    General Information:  Today you had your appointment with Veronica Matta CNP     If lab work was done today as part of your evaluation you will generally be contacted via My Chart, mail, or phone with the results within 1-5 days. If there is an alarming result we will contact you by phone. Lab results come back at varying times, I generally wait until all labs are resulted before making comments on results. Please note labs are automatically released to My Chart once available.      If you need refills please contact your pharmacist. They will send a refill request to me to review. Please allow 3 business days for us to process all refill requests.     Please call or send a medical message through My Chart, with any questions or concerns    If you need any paperwork completed please fax forms to [phone]. Please state if you would like a copy of the completed paperwork, mailed or faxed back to the patient and a fax number to fax the paperwork to. Please allow up to 10 days for paperwork to be completed.    Veronica Matta, CNP

## 2021-09-20 NOTE — LETTER
"    9/20/2021         RE: Galileo Vieira  [address] [facility] [address]        Dear Colleague,    Thank you for referring your patient, Galileo Vieira, to the [facility]. Please see a copy of my visit note below.    Video Visit  Galileo Vieira is a 55 year old male who is being evaluated via a billable video visit in light of the ongoing global health crisis (COVID-19) that requires us to abide by social distancing mandates in order to reduce the risk of COVID-19 exposure.       The patient has been notified of following:     \"This video visit will be conducted via a video call between you and your physician/provider. We have found that certain health care needs can be provided without the need for a physical exam.  This service lets us provide the care you need with a short phone/video conversation.  If a prescription is necessary we can send it directly to your pharmacy.  If lab work is needed we can place an order for that and you can then stop by our lab to have the test done at a later time.    If during the course of the call the physician/provider feels a telephone visit is not appropriate, you will not be charged for this service.\"     Patient has given verbal consent to a video visit? Yes    Galileo Vieira chief complaint is Post Concussion Syndrome     ALLERGIES  Patient has no known allergies.    Date of accident : 1/13/21  Neuropsychological assessment completed    No   Currently doing PT  Yes    Completed No   Currently doing OT  Yes    Completed No   Currently doing ST   No    Completed No   Psychology  No      Need a note for work accommodations  Yes   Need a note for school accommodations  No     Any new medication (other provider):   No   Meds started at last appointment  No    Meds increased at last appointment   Yes Concerta 36 mg, take 1 tablet every a.m.   Still taking medication? Yes    Results? Helping with attention & focus       Currently on medication " to help with sleep    Yes    Ambien, amitriptyline     Currently on any mental health medications     Yes    Wellbutrin (out of refills)      Currently on medication for attention, ADD/ADHD    Yes   Concerta     Is patient on a controlled substance   Yes   Last urine test: n/a                                                      Workman's Comp   Yes   QR   Yes  Present: Yes     Start Time: 2:20pm    End Time:  2:25pm    Total time of phone call: 5 minutes    Patient would like the video invitation sent by: [employer]  Number/e-mail address:[phone]  Nelli Odeller, [facility] [phone]    Shannen Blum CMA     Is patient on a controlled substance prescribed by me?  Yes    checked    Yes   Number of prescribers in last 6 months   3  Urine test preformed today  No   Follow up appointment   Yes     Outpatient Follow up Mild TBI (Concussion)  Evaluation     Pertinent History:  Patient states he slipped on work putting down salt on the icy salt on the sidewalk and hit his head on the concrete sidewalk.    Date of accident :  1/13/21    Subjective:          HPI    The patient returns to the concussion clinic for a follow up visit, He was last seen by me on 8/13/21, where I prescribed Concerta 36 mg. Patient reports he was doing well, but about 3 week ago he ran out of Wellbutrin and had a problem getting approval for refills. Since stopping the Wellbutrin his headaches have worsened. Overall patient is reporting either no change or worsening in physical symptoms, improvement in cognitive symptoms and worsening In emotional symptoms d/t return of headaches    We discussed some treatment options and have elected to restart Wellbutrin.                                                Physical Symptoms:  Headache-Yes     Since last visit  Worsen   Nausea-Yes         Since last visit  Same      Balance problems - No      Dizziness - Yes      Since last visit Worsen            Visual problems - Yes    Since last visit  Improved      Fatigue - Yes              Since last visit  Same     Sensitivity to light - No          Sensitivity to sound - Yes       Since last visit  Improved   Numbness/tingling - No            Cognitive Symptoms  Feeling mentally foggy -Yes        Since last visit  Improved     Feeling slowed down -Yes        Since last visit  Improved     Difficulty Concentrating- Yes      Since last visit  Improved     Difficulty remembering - Yes        Since last visit  Improved       Emotional Symptoms  Irritability - Yes        Since last visit  Worsen    Sadness-  Yes      Since last visit  Worsen    More emotional - Yes       Since last visit  Worsen    Nervousness/anxiety -Yes       Since last visit  Worsen       Sleep History:  Drowsiness- Yes      Since last visit  Improved     Sleep less than usual - Yes    Sleep more than usual - No    Trouble falling asleep - Yes       Since last visit  Improved   Does the patient wake feeling rested - No         Since last visit  Improved       Migraine Headaches      Patient history of migraines.   No        Exertion:         Do the above stated symptoms worsen with physical activity? Yes        Since last visit  Improved           Do the above stated symptoms worsen with cognitive activity? Yes       Since last visit  Improved            Work/School        Do the above stated symptoms worsen with school/work?        Yes          Have your returned to work/school? Yes           Patient Active Problem List    Diagnosis Date Noted     Prediabetes 08/30/2021     Priority: Medium     Benign essential hypertension 08/30/2021     Priority: Medium     Attention deficit hyperactivity disorder (ADHD), unspecified ADHD type 08/24/2021     Priority: Medium     Concussion with loss of consciousness, subsequent encounter 01/25/2021     Priority: Medium     Obesity (BMI 35.0-39.9) with comorbidity (H) 09/28/2018     Priority: Medium     Mild obstructive sleep apnea 09/08/2016     Priority: Medium      Fear of flying 2014     Priority: Medium     Testosterone insufficiency 2013     Priority: Medium     Anxiety 2012     Priority: Medium     GERD (gastroesophageal reflux disease) 06/10/2009     Priority: Medium     Hyperlipidemia LDL goal <160 2008     Priority: Medium     Past Medical History:   Diagnosis Date     Concussion      Concussion, unspecified     Hospitalized overnight as a child     Esophageal reflux      Head injury      Hypercholesterolemia      Hypertension      Past Surgical History:   Procedure Laterality Date     CHOLECYSTECTOMY       COLONOSCOPY       COLONOSCOPY  2014    Procedure: COLONOSCOPY;  Surgeon: Syed Walker MD;  Location:  GI     GI SURGERY      Gall bladder     HERNIA REPAIR       [facility] NONSPECIFIC PROCEDURE      Left knee arthroscopy     Z NONSPECIFIC PROCEDURE      Bilat inguinal hernia surgery     [facility] NONSPECIFIC PROCEDURE      Vasectomy     Family History   Problem Relation Age of Onset     Gastrointestinal Disease Father         Crohn's     Cardiovascular Father      Depression Father      Heart Disease Father 66        triple bipass     Neurologic Disorder Father         seizures     Respiratory Father         sleep apnea     C.A.D. Father         Had CABG     Cerebrovascular Disease Father      Family History Negative Mother      Depression Other      Cancer - colorectal Other         Maternal Uncle  at 56     Lipids Brother      Diabetes Brother      Coronary Artery Disease Brother      Respiratory Brother      Coronary Artery Disease Brother      Depression Paternal Grandmother      Cancer - colorectal Maternal Uncle      Seizure Disorder Father      Current Outpatient Medications   Medication Sig Dispense Refill     Artificial Tear Solution (JUST TEARS EYE DROPS) SOLN Place 2 drops into both eyes 4 times daily as needed (Dry eyes) 15 mL 3     atorvastatin (LIPITOR) 40 MG tablet Take 1 tablet (40 mg) by mouth daily 90 tablet 3  "    buPROPion (WELLBUTRIN XL) 300 MG 24 hr tablet Take 1 tablet (300 mg) by mouth every morning 90 tablet 3     losartan (COZAAR) 50 MG tablet TAKE 1 AND 1/2 TABLETS BY MOUTH DAILY 135 tablet 3     methylphenidate (CONCERTA) 36 MG CR tablet Take 1 tablet (36 mg) by mouth every morning 30 tablet 0     needle, disp, 18G X 1\" MISC 1 each every 14 days 10 each 1     omeprazole (PRILOSEC) 40 MG DR capsule Take 1 capsule (40 mg) by mouth daily 90 capsule 3     syringe 22G X 1-1/2\" 3 ML MISC 1 each every 14 days 10 each 1     testosterone cypionate (DEPO-TESTOSTERONE) 200 MG/ML injection Inject 1 mL (200 mg) into the muscle every 14 days 10 mL 1     Social History     Socioeconomic History     Marital status:      Spouse name: Not on file     Number of children: Not on file     Years of education: Not on file     Highest education level: Not on file   Occupational History     Occupation: Medical assistant     Employer: [employer]   Tobacco Use     Smoking status: Former Smoker     Packs/day: 0.00     Quit date: 10/3/1997     Years since quittin.9     Smokeless tobacco: Never Used     Tobacco comment:    Vaping Use     Vaping Use: Never used   Substance and Sexual Activity     Alcohol use: Yes     Alcohol/week: 16.7 standard drinks     Types: 20 Standard drinks or equivalent per week     Comment: a few a day     Drug use: No     Sexual activity: Yes     Partners: Female     Birth control/protection: Surgical, Male Surgical   Other Topics Concern     Parent/sibling w/ CABG, MI or angioplasty before 65F 55M? No   Social History Narrative     Not on file     Social Determinants of Health     Financial Resource Strain:      Difficulty of Paying Living Expenses:    Food Insecurity:      Worried About Running Out of Food in the Last Year:      Ran Out of Food in the Last Year:    Transportation Needs:      Lack of Transportation (Medical):      Lack of Transportation (Non-Medical):    Physical " Activity:      Days of Exercise per Week:      Minutes of Exercise per Session:    Stress:      Feeling of Stress :    Social Connections:      Frequency of Communication with Friends and Family:      Frequency of Social Gatherings with Friends and Family:      Attends [employer] Services:      Active Member of Clubs or Organizations:      Attends Club or Organization Meetings:      Marital Status:    Intimate Partner Violence:      Fear of Current or Ex-Partner:      Emotionally Abused:      Physically Abused:      Sexually Abused:        The following portions of the patient's history were reviewed and updated as appropriate: allergies, current medications, past family history, past medical history, past social history, past surgical history and problem list.    Review of Systems  A comprehensive review of systems was negative except for: What is noted above    Objective:       Discussion was held with the patient today regarding concussion in general including types of injury, symptoms that are common, treatment and variability in time to recover. Education about concussion symptoms and length of time it would take the patient to recover was also given to the patient.  I have reassured the patient his symptoms are very common when a concussion is present and will improve with time. We discussed the risks and benefits of possible medication including risk of worsening depression with medication adjustments and even the possibility of emergence of suicidal ideations.       Total time spent with the patient today was 40 minutes with greater than 50% of the time spent in counseling and care coordination. The patient agrees to call before then with any questions, concerns or problems. We will assess for the appropriateness of possible psychotropic medication trials/changes. The patient will seek out appropriate emergency services should that become necessary.    Diagnosis managed and treated at today's visit :  Post  concussion syndrome  Post concussion headache  Nausea  Dizziness  Fatigue  Insomnia  Sensitivity to light  Sound sensitivity  Concentration and Attention deficit  Memory difficulties  Anxiety d/t a medical condition  Irritability  Return to work  Encounter related to a worker's comp claim     Plan:  Medication Adjustment:  Wellbutrin 150 mg, take one tab for three days and two tabs thereafter    Other:   Patient will return to clinic in  4 weeks. They agree to call or return sooner with any questions or concerns.  Risks and benefits were discussed.  Continue with individual therapist.     Continue with the support of the clinic, reassurance, and redirection. Staff monitoring and ongoing assessments per team plan. This team will utilize appropriate emergency services if necessary. I will make myself available if concerns or problems arise.     Mental Status Examination  He is cooperative with questioning. He is fully engaged in conversation today. Speech is normal. Thought processes normal with normal prehension and expression. Thoughts are organized and linear. Content is pertinent to the conversation and without evidence of auditory or visual hallucinations. No delusional ideation. Gen. fund of knowledge, insight and memory are normal       Video Visit Details    Type of service: Video Visit    Video Start Time: 1430    Video End Time: 1500    Total time of video visit: 30 minutes    Originating Location: Patient's home    Distant Location:  [facility]    Mode of Communication: Video Conference via [place] well    10 minutes spent on the date of the encounter doing chart review, review of outside records, documentation and return to work letter    General Information:  Today you had your appointment with Veronica Matta CNP     If lab work was done today as part of your evaluation you will generally be contacted via My Chart, mail, or phone with the results within 1-5 days. If there is  an alarming result we will contact you by phone. Lab results come back at varying times, I generally wait until all labs are resulted before making comments on results. Please note labs are automatically released to My Chart once available.     If you need refills please contact your pharmacist. They will send a refill request to me to review. Please allow 3 business days for us to process all refill requests.     Please call or send a medical message through My Chart, with any questions or concerns    If you need any paperwork completed please fax forms to [phone]. Please state if you would like a copy of the completed paperwork, mailed or faxed back to the patient and a fax number to fax the paperwork to. Please allow up to 10 days for paperwork to be completed.    Veronica Matta CNP      Again, thank you for allowing me to participate in the care of your patient.        Sincerely,        WILLOW Curiel CNP

## 2021-10-15 ENCOUNTER — MYC MEDICAL ADVICE (OUTPATIENT)
Dept: FAMILY MEDICINE | Facility: CLINIC | Age: 56
End: 2021-10-15

## 2021-10-15 DIAGNOSIS — F33.0 MILD EPISODE OF RECURRENT MAJOR DEPRESSIVE DISORDER (H): ICD-10-CM

## 2021-10-15 NOTE — TELEPHONE ENCOUNTER
Routing refill request to provider for review/approval because:  Drug not active on patient's medication list  Labs out of range:  PHQ-9    PHQ 10/14/2020 1/25/2021 7/2/2021   PHQ-9 Total Score 13 12 8   Q9: Thoughts of better off dead/self-harm past 2 weeks Several days Several days Several days   F/U: Thoughts of suicide or self-harm - - -   F/U: Self harm-plan - - -   F/U: Self-harm action - - -   F/U: Safety concerns - - -     Stan JEFFERSON RN

## 2021-10-15 NOTE — TELEPHONE ENCOUNTER
My impression was he was just taking wellbutrin to help with his anxiety.     I sent sertraline but can you please check him to make sure he doesn't also need the sertraline please?    If he does not want sertraline, please call pharmacy to cancel. TRACE

## 2021-10-18 ENCOUNTER — VIRTUAL VISIT (OUTPATIENT)
Dept: NEUROLOGY | Facility: CLINIC | Age: 56
End: 2021-10-18
Payer: OTHER MISCELLANEOUS

## 2021-10-18 DIAGNOSIS — F06.30 MOOD DISORDER AS LATE EFFECT OF TRAUMATIC BRAIN INJURY (H): ICD-10-CM

## 2021-10-18 DIAGNOSIS — R41.840 ATTENTION AND CONCENTRATION DEFICIT: ICD-10-CM

## 2021-10-18 DIAGNOSIS — G44.309 POST-CONCUSSION HEADACHE: ICD-10-CM

## 2021-10-18 DIAGNOSIS — F07.81 POST CONCUSSION SYNDROME: Primary | ICD-10-CM

## 2021-10-18 DIAGNOSIS — S06.9XAS MOOD DISORDER AS LATE EFFECT OF TRAUMATIC BRAIN INJURY (H): ICD-10-CM

## 2021-10-18 PROCEDURE — 99214 OFFICE O/P EST MOD 30 MIN: CPT | Mod: 95 | Performed by: NURSE PRACTITIONER

## 2021-10-18 NOTE — PROGRESS NOTES
"Video Visit  Galileo Vieira is a 55 year old male who is being evaluated via a billable video visit in light of the ongoing global health crisis (COVID-19) that requires us to abide by social distancing mandates in order to reduce the risk of COVID-19 exposure.       The patient has been notified of following:     \"This video visit will be conducted via a video call between you and your physician/provider. We have found that certain health care needs can be provided without the need for a physical exam.  This service lets us provide the care you need with a short phone/video conversation.  If a prescription is necessary we can send it directly to your pharmacy.  If lab work is needed we can place an order for that and you can then stop by our lab to have the test done at a later time.    If during the course of the call the physician/provider feels a telephone visit is not appropriate, you will not be charged for this service.\"     Patient has given verbal consent to a video visit? Yes    Galileo Vieira chief complaint is Post Concussion Syndrome     ALLERGIES  Patient has no known allergies.    Date of accident : 1/13/21  Neuropsychological assessment completed    No   Currently doing PT  No    Completed No - WC didn't approve additional visits   Currently doing OT  No    Completed No - WC didn't approve additional Visits  Currently doing ST   No    Completed No   Psychology  No      Need a note for work accommodations  No   Need a note for school accommodations  No     Any new medication (other provider):   No   Meds started at last appointment  No    Meds increased at last appointment   Yes Methylphenidate HCL, 2x daily   Still taking medication? Yes    Results? Helping with attention & focus       Currently on medication to help with sleep    No     Currently on any mental health medications     Yes    Wellbutrin       Currently on medication for attention, ADD/ADHD    Yes   Concerta     Is patient on a controlled " substance   Yes   Last urine test: n/a                                                      Workman's Comp   Yes   QR   Yes  Present: Yes     Start Time: 2:10pm    End Time:  2:25pm    Total time of phone call: 15 minutes    Patient would like the video invitation sent by: [employer]  Number/e-mail address:[phone]  Nelli Mcconnell, [facility] [phone]    Ceasar Zazueta     Is patient on a controlled substance prescribed by me?  Yes    checked    yes   Number of prescribers in last 6 months   3  Follow up appointment   Yes     Outpatient Follow up Mild TBI (Concussion)  Evaluation     Pertinent History:  Patient states he slipped on work putting down salt on the icy salt on the sidewalk and hit his head on the concrete sidewalk.    Date of accident :  1/13/21    Subjective:          HPI    The patient returns to the concussion clinic for a follow up visit, He was last seen by me on 9/20/21, where I had the patient restart his Wellbutrin. Patient reports he is doing much better with Wellbutrin. The patient did have to take 10/11/21 off d/t severe neck pain, WC stopped all therapies until [facility] report. Overall patient is reporting improvement in most of his physical, emotional, and cognitive symptoms.    We discussed some treatment options and have elected to have patient return to PT                                                Physical Symptoms:  Headache-Yes     Since last visit  Improved   Nausea-No        Since last visit  Resolved      Balance problems - No      Dizziness - Yes      Since last visit Improved            Visual problems - Yes    Since last visit  Improved     Fatigue - Yes              Since last visit  Improved    Sensitivity to light - No          Sensitivity to sound - Yes       Since last visit  Improved   Numbness/tingling - No            Cognitive Symptoms  Feeling mentally foggy -Yes        Since last visit  Improved     Feeling slowed down -Yes        Since last visit  Same     Difficulty  Concentrating- Yes      Since last visit  Improved     Difficulty remembering - Yes        Since last visit  Improved       Emotional Symptoms  Irritability - Yes        Since last visit  Improved - With Wellbutrin  Sadness-  Yes      Since last visit  Improved - With Wellbutrin    More emotional - Yes       Since last visit  Improved - With Wellbutrin    Nervousness/anxiety -Yes       Since last visit  Improved - With Wellbutrin       Sleep History:  Drowsiness- Yes      Since last visit  Improved     Sleep less than usual - No    Sleep more than usual - No    Trouble falling asleep - No       Since last visit  Resolved   Does the patient wake feeling rested    Yes    Migraine Headaches      Patient history of migraines.   No        Exertion:         Do the above stated symptoms worsen with physical activity? Yes        Since last visit  Improved           Do the above stated symptoms worsen with cognitive activity? Yes       Since last visit  Improved            Work/School        Do the above stated symptoms worsen with school/work?        Yes          Have your returned to work/school? Yes           Patient Active Problem List    Diagnosis Date Noted     Prediabetes 08/30/2021     Priority: Medium     Benign essential hypertension 08/30/2021     Priority: Medium     Attention deficit hyperactivity disorder (ADHD), unspecified ADHD type 08/24/2021     Priority: Medium     Concussion with loss of consciousness, subsequent encounter 01/25/2021     Priority: Medium     Obesity (BMI 35.0-39.9) with comorbidity (H) 09/28/2018     Priority: Medium     Mild obstructive sleep apnea 09/08/2016     Priority: Medium     Fear of flying 06/05/2014     Priority: Medium     Testosterone insufficiency 03/28/2013     Priority: Medium     Anxiety 01/05/2012     Priority: Medium     GERD (gastroesophageal reflux disease) 06/10/2009     Priority: Medium     Hyperlipidemia LDL goal <160 11/14/2008     Priority: Medium     Past  Medical History:   Diagnosis Date     Concussion      Concussion, unspecified     Hospitalized overnight as a child     Esophageal reflux      Head injury      Hypercholesterolemia      Hypertension      Past Surgical History:   Procedure Laterality Date     CHOLECYSTECTOMY       COLONOSCOPY       COLONOSCOPY  2014    Procedure: COLONOSCOPY;  Surgeon: Syed Walker MD;  Location:  GI     GI SURGERY      Gall bladder     HERNIA REPAIR       [facility] NONSPECIFIC PROCEDURE      Left knee arthroscopy     Z NONSPECIFIC PROCEDURE      Bilat inguinal hernia surgery     [facility] NONSPECIFIC PROCEDURE      Vasectomy     Family History   Problem Relation Age of Onset     Gastrointestinal Disease Father         Crohn's     Cardiovascular Father      Depression Father      Heart Disease Father 66        triple bipass     Neurologic Disorder Father         seizures     Respiratory Father         sleep apnea     C.A.D. Father         Had CABG     Cerebrovascular Disease Father      Family History Negative Mother      Depression Other      Cancer - colorectal Other         Maternal Uncle  at 56     Lipids Brother      Diabetes Brother      Coronary Artery Disease Brother      Respiratory Brother      Coronary Artery Disease Brother      Depression Paternal Grandmother      Cancer - colorectal Maternal Uncle      Seizure Disorder Father      Current Outpatient Medications   Medication Sig Dispense Refill     Artificial Tear Solution (JUST TEARS EYE DROPS) SOLN Place 2 drops into both eyes 4 times daily as needed (Dry eyes) 15 mL 3     atorvastatin (LIPITOR) 40 MG tablet Take 1 tablet (40 mg) by mouth daily 90 tablet 3     buPROPion (WELLBUTRIN XL) 150 MG 24 hr tablet Take 1 tablet (150 mg) by mouth every morning 60 tablet 4     losartan (COZAAR) 50 MG tablet TAKE 1 AND 1/2 TABLETS BY MOUTH DAILY 135 tablet 3     methylphenidate (CONCERTA) 36 MG CR tablet Take 1 tablet (36 mg) by mouth every morning 30 tablet 0      "methylphenidate (RITALIN) 5 MG tablet Take 1 tablet (5 mg) by mouth 2 times daily 60 tablet 0     needle, disp, 18G X 1\" MISC 1 each every 14 days 10 each 1     omeprazole (PRILOSEC) 40 MG DR capsule Take 1 capsule (40 mg) by mouth daily 90 capsule 3     sertraline (ZOLOFT) 50 MG tablet Take 1 tablet (50 mg) by mouth daily 90 tablet 3     syringe 22G X 1-1/2\" 3 ML MISC 1 each every 14 days 10 each 1     testosterone cypionate (DEPO-TESTOSTERONE) 200 MG/ML injection Inject 1 mL (200 mg) into the muscle every 14 days 10 mL 1     Social History     Socioeconomic History     Marital status:      Spouse name: Not on file     Number of children: Not on file     Years of education: Not on file     Highest education level: Not on file   Occupational History     Occupation: Medical assistant     Employer: [employer]   Tobacco Use     Smoking status: Former Smoker     Packs/day: 0.00     Quit date: 10/3/1997     Years since quittin.0     Smokeless tobacco: Never Used     Tobacco comment:    Vaping Use     Vaping Use: Never used   Substance and Sexual Activity     Alcohol use: Yes     Alcohol/week: 16.7 standard drinks     Types: 20 Standard drinks or equivalent per week     Comment: a few a day     Drug use: No     Sexual activity: Yes     Partners: Female     Birth control/protection: Surgical, Male Surgical   Other Topics Concern     Parent/sibling w/ CABG, MI or angioplasty before 65F 55M? No   Social History Narrative     Not on file     Social Determinants of Health     Financial Resource Strain:      Difficulty of Paying Living Expenses:    Food Insecurity:      Worried About Running Out of Food in the Last Year:      Ran Out of Food in the Last Year:    Transportation Needs:      Lack of Transportation (Medical):      Lack of Transportation (Non-Medical):    Physical Activity:      Days of Exercise per Week:      Minutes of Exercise per Session:    Stress:      Feeling of Stress :  "   Social Connections:      Frequency of Communication with Friends and Family:      Frequency of Social Gatherings with Friends and Family:      Attends [employer] Services:      Active Member of Clubs or Organizations:      Attends Club or Organization Meetings:      Marital Status:    Intimate Partner Violence:      Fear of Current or Ex-Partner:      Emotionally Abused:      Physically Abused:      Sexually Abused:        The following portions of the patient's history were reviewed and updated as appropriate: allergies, current medications, past family history, past medical history, past social history, past surgical history and problem list.    Review of Systems  A comprehensive review of systems was negative except for: What is noted above    Objective:       Discussion was held with the patient today regarding concussion in general including types of injury, symptoms that are common, treatment and variability in time to recover. Education about concussion symptoms and length of time it would take the patient to recover was also given to the patient.  I have reassured the patient his symptoms are very common when a concussion is present and will improve with time. We discussed the risks and benefits of possible medication including risk of worsening depression with medication adjustments and even the possibility of emergence of suicidal ideations.       Total time spent with the patient today was 30 minutes with greater than 50% of the time spent in counseling and care coordination. The patient agrees to call before then with any questions, concerns or problems. We will assess for the appropriateness of possible psychotropic medication trials/changes. The patient will seek out appropriate emergency services should that become necessary.    Diagnosis managed and treated at today's visit :  Post concussion syndrome  Post concussion headache  Nausea  Dizziness  Fatigue  Insomnia  Sensitivity to light  Sound  sensitivity  Concentration and Attention deficit  Memory difficulties  Anxiety d/t a medical condition  Irritability  Return to work  Encounter related to a worker's comp claim     Plan:  Medication Adjustment:  No medication changes    Other:   Patient will return to clinic in  4 weeks. They agree to call or return sooner with any questions or concerns.  Risks and benefits were discussed.  Continue with individual therapist.     Continue with the support of the clinic, reassurance, and redirection. Staff monitoring and ongoing assessments per team plan. This team will utilize appropriate emergency services if necessary. I will make myself available if concerns or problems arise.     Mental Status Examination  He is cooperative with questioning. He is fully engaged in conversation today. Speech is normal. Thought processes normal with normal prehension and expression. Thoughts are organized and linear. Content is pertinent to the conversation and without evidence of auditory or visual hallucinations. No delusional ideation. Gen. fund of knowledge, insight and memory are normal       Video Visit Details    Type of service: Video Visit    Video Start Time: 1430    Video End Time: 1450    Total time of video visit: 20 minutes    Originating Location: Patient's home    Distant Location:  [facility]    Mode of Communication: Video Conference via Vocation    10 minutes spent on the date of the encounter doing chart review, review of outside records, documentation and return to work letter    General Information:  Today you had your appointment with Veronica Matta CNP     If lab work was done today as part of your evaluation you will generally be contacted via My Chart, mail, or phone with the results within 1-5 days. If there is an alarming result we will contact you by phone. Lab results come back at varying times, I generally wait until all labs are resulted before making comments on  results. Please note labs are automatically released to My Chart once available.     If you need refills please contact your pharmacist. They will send a refill request to me to review. Please allow 3 business days for us to process all refill requests.     Please call or send a medical message through My Chart, with any questions or concerns    If you need any paperwork completed please fax forms to [phone]. Please state if you would like a copy of the completed paperwork, mailed or faxed back to the patient and a fax number to fax the paperwork to. Please allow up to 10 days for paperwork to be completed.    Veronica Matta, CNP

## 2021-10-18 NOTE — LETTER
"    10/18/2021         RE: Galileo Vieira  [address] [address] [address]        Dear Colleague,    Thank you for referring your patient, Galileo Vieira, to the [facility]. Please see a copy of my visit note below.    Video Visit  Galileo Vieira is a 55 year old male who is being evaluated via a billable video visit in light of the ongoing global health crisis (COVID-19) that requires us to abide by social distancing mandates in order to reduce the risk of COVID-19 exposure.       The patient has been notified of following:     \"This video visit will be conducted via a video call between you and your physician/provider. We have found that certain health care needs can be provided without the need for a physical exam.  This service lets us provide the care you need with a short phone/video conversation.  If a prescription is necessary we can send it directly to your pharmacy.  If lab work is needed we can place an order for that and you can then stop by our lab to have the test done at a later time.    If during the course of the call the physician/provider feels a telephone visit is not appropriate, you will not be charged for this service.\"     Patient has given verbal consent to a video visit? Yes    Galileo Vieira chief complaint is Post Concussion Syndrome     ALLERGIES  Patient has no known allergies.    Date of accident : 1/13/21  Neuropsychological assessment completed    No   Currently doing PT  No    Completed No - WC didn't approve additional visits   Currently doing OT  No    Completed No - WC didn't approve additional Visits  Currently doing ST   No    Completed No   Psychology  No      Need a note for work accommodations  No   Need a note for school accommodations  No     Any new medication (other provider):   No   Meds started at last appointment  No    Meds increased at last appointment   Yes Methylphenidate HCL, 2x daily   Still taking medication? Yes    " Results? Helping with attention & focus       Currently on medication to help with sleep    No     Currently on any mental health medications     Yes    Wellbutrin       Currently on medication for attention, ADD/ADHD    Yes   Concerta     Is patient on a controlled substance   Yes   Last urine test: n/a                                                      Workman's Comp   Yes   [facility]   Yes  Present: Yes     Start Time: 2:10pm    End Time:  2:25pm    Total time of phone call: 15 minutes    Patient would like the video invitation sent by: Armut  Number/e-mail address:[phone]  Nelli Mcconnell, [facility] [phone]    Ceasar Zazueta     Is patient on a controlled substance prescribed by me?  Yes    checked    yes   Number of prescribers in last 6 months   3  Follow up appointment   Yes     Outpatient Follow up Mild TBI (Concussion)  Evaluation     Pertinent History:  Patient states he slipped on work putting down salt on the icy salt on the sidewalk and hit his head on the concrete sidewalk.    Date of accident :  1/13/21    Subjective:          HPI    The patient returns to the concussion clinic for a follow up visit, He was last seen by me on 9/20/21, where I had the patient restart his Wellbutrin. Patient reports he is doing much better with Wellbutrin. The patient did have to take 10/11/21 off d/t severe neck pain, WC stopped all therapies until NELL report. Overall patient is reporting improvement in most of his physical, emotional, and cognitive symptoms.    We discussed some treatment options and have elected to have patient return to PT                                                Physical Symptoms:  Headache-Yes     Since last visit  Improved   Nausea-No        Since last visit  Resolved      Balance problems - No      Dizziness - Yes      Since last visit Improved            Visual problems - Yes    Since last visit  Improved     Fatigue - Yes              Since last visit  Improved    Sensitivity to light - No           Sensitivity to sound - Yes       Since last visit  Improved   Numbness/tingling - No            Cognitive Symptoms  Feeling mentally foggy -Yes        Since last visit  Improved     Feeling slowed down -Yes        Since last visit  Same     Difficulty Concentrating- Yes      Since last visit  Improved     Difficulty remembering - Yes        Since last visit  Improved       Emotional Symptoms  Irritability - Yes        Since last visit  Improved - With Wellbutrin  Sadness-  Yes      Since last visit  Improved - With Wellbutrin    More emotional - Yes       Since last visit  Improved - With Wellbutrin    Nervousness/anxiety -Yes       Since last visit  Improved - With Wellbutrin       Sleep History:  Drowsiness- Yes      Since last visit  Improved     Sleep less than usual - No    Sleep more than usual - No    Trouble falling asleep - No       Since last visit  Resolved   Does the patient wake feeling rested    Yes    Migraine Headaches      Patient history of migraines.   No        Exertion:         Do the above stated symptoms worsen with physical activity? Yes        Since last visit  Improved           Do the above stated symptoms worsen with cognitive activity? Yes       Since last visit  Improved            Work/School        Do the above stated symptoms worsen with school/work?        Yes          Have your returned to work/school? Yes           Patient Active Problem List    Diagnosis Date Noted     Prediabetes 08/30/2021     Priority: Medium     Benign essential hypertension 08/30/2021     Priority: Medium     Attention deficit hyperactivity disorder (ADHD), unspecified ADHD type 08/24/2021     Priority: Medium     Concussion with loss of consciousness, subsequent encounter 01/25/2021     Priority: Medium     Obesity (BMI 35.0-39.9) with comorbidity (H) 09/28/2018     Priority: Medium     Mild obstructive sleep apnea 09/08/2016     Priority: Medium     Fear of flying 06/05/2014     Priority: Medium      Testosterone insufficiency 2013     Priority: Medium     Anxiety 2012     Priority: Medium     GERD (gastroesophageal reflux disease) 06/10/2009     Priority: Medium     Hyperlipidemia LDL goal <160 2008     Priority: Medium     Past Medical History:   Diagnosis Date     Concussion      Concussion, unspecified     Hospitalized overnight as a child     Esophageal reflux      Head injury      Hypercholesterolemia      Hypertension      Past Surgical History:   Procedure Laterality Date     CHOLECYSTECTOMY       COLONOSCOPY       COLONOSCOPY  2014    Procedure: COLONOSCOPY;  Surgeon: Syed Walker MD;  Location:  GI     GI SURGERY      Gall bladder     HERNIA REPAIR       [facility] NONSPECIFIC PROCEDURE      Left knee arthroscopy     Z NONSPECIFIC PROCEDURE      Bilat inguinal hernia surgery     [facility] NONSPECIFIC PROCEDURE      Vasectomy     Family History   Problem Relation Age of Onset     Gastrointestinal Disease Father         Crohn's     Cardiovascular Father      Depression Father      Heart Disease Father 66        triple bipass     Neurologic Disorder Father         seizures     Respiratory Father         sleep apnea     C.A.D. Father         Had CABG     Cerebrovascular Disease Father      Family History Negative Mother      Depression Other      Cancer - colorectal Other         Maternal Uncle  at 56     Lipids Brother      Diabetes Brother      Coronary Artery Disease Brother      Respiratory Brother      Coronary Artery Disease Brother      Depression Paternal Grandmother      Cancer - colorectal Maternal Uncle      Seizure Disorder Father      Current Outpatient Medications   Medication Sig Dispense Refill     Artificial Tear Solution (JUST TEARS EYE DROPS) SOLN Place 2 drops into both eyes 4 times daily as needed (Dry eyes) 15 mL 3     atorvastatin (LIPITOR) 40 MG tablet Take 1 tablet (40 mg) by mouth daily 90 tablet 3     buPROPion (WELLBUTRIN XL) 150 MG 24 hr  "tablet Take 1 tablet (150 mg) by mouth every morning 60 tablet 4     losartan (COZAAR) 50 MG tablet TAKE 1 AND 1/2 TABLETS BY MOUTH DAILY 135 tablet 3     methylphenidate (CONCERTA) 36 MG CR tablet Take 1 tablet (36 mg) by mouth every morning 30 tablet 0     methylphenidate (RITALIN) 5 MG tablet Take 1 tablet (5 mg) by mouth 2 times daily 60 tablet 0     needle, disp, 18G X 1\" MISC 1 each every 14 days 10 each 1     omeprazole (PRILOSEC) 40 MG DR capsule Take 1 capsule (40 mg) by mouth daily 90 capsule 3     sertraline (ZOLOFT) 50 MG tablet Take 1 tablet (50 mg) by mouth daily 90 tablet 3     syringe 22G X 1-1/2\" 3 ML MISC 1 each every 14 days 10 each 1     testosterone cypionate (DEPO-TESTOSTERONE) 200 MG/ML injection Inject 1 mL (200 mg) into the muscle every 14 days 10 mL 1     Social History     Socioeconomic History     Marital status:      Spouse name: Not on file     Number of children: Not on file     Years of education: Not on file     Highest education level: Not on file   Occupational History     Occupation: Medical assistant     Employer: [employer]   Tobacco Use     Smoking status: Former Smoker     Packs/day: 0.00     Quit date: 10/3/1997     Years since quittin.0     Smokeless tobacco: Never Used     Tobacco comment:    Vaping Use     Vaping Use: Never used   Substance and Sexual Activity     Alcohol use: Yes     Alcohol/week: 16.7 standard drinks     Types: 20 Standard drinks or equivalent per week     Comment: a few a day     Drug use: No     Sexual activity: Yes     Partners: Female     Birth control/protection: Surgical, Male Surgical   Other Topics Concern     Parent/sibling w/ CABG, MI or angioplasty before 65F 55M? No   Social History Narrative     Not on file     Social Determinants of Health     Financial Resource Strain:      Difficulty of Paying Living Expenses:    Food Insecurity:      Worried About Running Out of Food in the Last Year:      Ran Out of Food " in the Last Year:    Transportation Needs:      Lack of Transportation (Medical):      Lack of Transportation (Non-Medical):    Physical Activity:      Days of Exercise per Week:      Minutes of Exercise per Session:    Stress:      Feeling of Stress :    Social Connections:      Frequency of Communication with Friends and Family:      Frequency of Social Gatherings with Friends and Family:      Attends Anabaptist Services:      Active Member of Clubs or Organizations:      Attends Club or Organization Meetings:      Marital Status:    Intimate Partner Violence:      Fear of Current or Ex-Partner:      Emotionally Abused:      Physically Abused:      Sexually Abused:        The following portions of the patient's history were reviewed and updated as appropriate: allergies, current medications, past family history, past medical history, past social history, past surgical history and problem list.    Review of Systems  A comprehensive review of systems was negative except for: What is noted above    Objective:       Discussion was held with the patient today regarding concussion in general including types of injury, symptoms that are common, treatment and variability in time to recover. Education about concussion symptoms and length of time it would take the patient to recover was also given to the patient.  I have reassured the patient his symptoms are very common when a concussion is present and will improve with time. We discussed the risks and benefits of possible medication including risk of worsening depression with medication adjustments and even the possibility of emergence of suicidal ideations.       Total time spent with the patient today was 30 minutes with greater than 50% of the time spent in counseling and care coordination. The patient agrees to call before then with any questions, concerns or problems. We will assess for the appropriateness of possible psychotropic medication trials/changes. The  patient will seek out appropriate emergency services should that become necessary.    Diagnosis managed and treated at today's visit :  Post concussion syndrome  Post concussion headache  Nausea  Dizziness  Fatigue  Insomnia  Sensitivity to light  Sound sensitivity  Concentration and Attention deficit  Memory difficulties  Anxiety d/t a medical condition  Irritability  Return to work  Encounter related to a worker's comp claim     Plan:  Medication Adjustment:  No medication changes    Other:   Patient will return to clinic in  4 weeks. They agree to call or return sooner with any questions or concerns.  Risks and benefits were discussed.  Continue with individual therapist.     Continue with the support of the clinic, reassurance, and redirection. Staff monitoring and ongoing assessments per team plan. This team will utilize appropriate emergency services if necessary. I will make myself available if concerns or problems arise.     Mental Status Examination  He is cooperative with questioning. He is fully engaged in conversation today. Speech is normal. Thought processes normal with normal prehension and expression. Thoughts are organized and linear. Content is pertinent to the conversation and without evidence of auditory or visual hallucinations. No delusional ideation. Gen. fund of knowledge, insight and memory are normal       Video Visit Details    Type of service: Video Visit    Video Start Time: 1430    Video End Time: 1450    Total time of video visit: 20 minutes    Originating Location: Patient's home    Distant Location:  [facility]    Mode of Communication: Video Conference via Ukrainian well    10 minutes spent on the date of the encounter doing chart review, review of outside records, documentation and return to work letter    General Information:  Today you had your appointment with Veronica Matta CNP     If lab work was done today as part of your evaluation you will  generally be contacted via My Chart, mail, or phone with the results within 1-5 days. If there is an alarming result we will contact you by phone. Lab results come back at varying times, I generally wait until all labs are resulted before making comments on results. Please note labs are automatically released to My Chart once available.     If you need refills please contact your pharmacist. They will send a refill request to me to review. Please allow 3 business days for us to process all refill requests.     Please call or send a medical message through My Chart, with any questions or concerns    If you need any paperwork completed please fax forms to [phone]. Please state if you would like a copy of the completed paperwork, mailed or faxed back to the patient and a fax number to fax the paperwork to. Please allow up to 10 days for paperwork to be completed.    Veronica Matta CNP      Again, thank you for allowing me to participate in the care of your patient.        Sincerely,        WILLOW Curiel CNP

## 2021-10-19 DIAGNOSIS — E34.9 TESTOSTERONE INSUFFICIENCY: ICD-10-CM

## 2021-10-19 RX ORDER — TESTOSTERONE CYPIONATE 200 MG/ML
INJECTION, SOLUTION INTRAMUSCULAR
Qty: 10 ML | Refills: 1 | Status: SHIPPED | OUTPATIENT
Start: 2021-10-19 | End: 2022-04-04

## 2021-10-19 NOTE — TELEPHONE ENCOUNTER
Routing refill request to provider for review/approval because:  Drug not on the [facility] refill protocol     Cecilia Nathan RN

## 2021-10-20 NOTE — TELEPHONE ENCOUNTER
Please see patient MyChart message as update. RN called pharmacy to cancel sertraline rx.     Stan JEFFERSON RN

## 2021-11-13 ENCOUNTER — HEALTH MAINTENANCE LETTER (OUTPATIENT)
Age: 56
End: 2021-11-13

## 2021-12-02 ENCOUNTER — OFFICE VISIT (OUTPATIENT)
Dept: FAMILY MEDICINE | Facility: CLINIC | Age: 56
End: 2021-12-02
Payer: COMMERCIAL

## 2021-12-02 VITALS
HEART RATE: 76 BPM | BODY MASS INDEX: 37.38 KG/M2 | WEIGHT: 276 LBS | SYSTOLIC BLOOD PRESSURE: 138 MMHG | DIASTOLIC BLOOD PRESSURE: 78 MMHG | OXYGEN SATURATION: 99 % | HEIGHT: 72 IN | TEMPERATURE: 98.4 F | RESPIRATION RATE: 16 BRPM

## 2021-12-02 DIAGNOSIS — S06.9XAS MOOD DISORDER AS LATE EFFECT OF TRAUMATIC BRAIN INJURY (H): ICD-10-CM

## 2021-12-02 DIAGNOSIS — S06.0X9D CONCUSSION WITH LOSS OF CONSCIOUSNESS, SUBSEQUENT ENCOUNTER: Primary | ICD-10-CM

## 2021-12-02 DIAGNOSIS — I10 BENIGN ESSENTIAL HYPERTENSION: ICD-10-CM

## 2021-12-02 DIAGNOSIS — F06.30 MOOD DISORDER AS LATE EFFECT OF TRAUMATIC BRAIN INJURY (H): ICD-10-CM

## 2021-12-02 DIAGNOSIS — F90.9 ATTENTION DEFICIT HYPERACTIVITY DISORDER (ADHD), UNSPECIFIED ADHD TYPE: ICD-10-CM

## 2021-12-02 PROCEDURE — 99214 OFFICE O/P EST MOD 30 MIN: CPT | Performed by: FAMILY MEDICINE

## 2021-12-02 RX ORDER — METHYLPHENIDATE HYDROCHLORIDE 36 MG/1
36 TABLET ORAL DAILY
Qty: 30 TABLET | Refills: 0 | Status: SHIPPED | OUTPATIENT
Start: 2021-12-02 | End: 2022-01-01

## 2021-12-02 RX ORDER — METHYLPHENIDATE HYDROCHLORIDE 36 MG/1
36 TABLET ORAL DAILY
Qty: 30 TABLET | Refills: 0 | Status: SHIPPED | OUTPATIENT
Start: 2022-02-02 | End: 2022-01-28

## 2021-12-02 RX ORDER — METHYLPHENIDATE HYDROCHLORIDE 36 MG/1
36 TABLET ORAL DAILY
Qty: 30 TABLET | Refills: 0 | Status: SHIPPED | OUTPATIENT
Start: 2022-01-02 | End: 2022-01-28

## 2021-12-02 RX ORDER — BUPROPION HYDROCHLORIDE 300 MG/1
300 TABLET ORAL EVERY MORNING
Qty: 90 TABLET | Refills: 3 | Status: SHIPPED | OUTPATIENT
Start: 2021-12-02 | End: 2022-10-28

## 2021-12-02 RX ORDER — LOSARTAN POTASSIUM 50 MG/1
100 TABLET ORAL DAILY
Qty: 135 TABLET | Refills: 3 | COMMUNITY
Start: 2021-12-02 | End: 2022-01-28

## 2021-12-02 ASSESSMENT — MIFFLIN-ST. JEOR: SCORE: 2120.96

## 2021-12-02 NOTE — PATIENT INSTRUCTIONS
For blood pressure - -- try 2 weeks of 100 mg losartan to see if this lowers BP. If not, let's switch meds    Consider topamax to help with headaches - but for now, keep working with chiro

## 2021-12-02 NOTE — PROGRESS NOTES
Assessment & Plan     Concussion with loss of consciousness, subsequent encounter - he will continue work with chiropractic on neck and back pain. Can also consider topamax for chronic headaches since his concussion.     Benign essential hypertension - today controlled. Advised he maximize the losartan. If BP not improving, will switch medications.   - losartan (COZAAR) 50 MG tablet; Take 2 tablets (100 mg) by mouth daily    Attention deficit hyperactivity disorder (ADHD), unspecified ADHD type - noting some benefit from concerta and no side effects. Will continue for now.   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 Needs updated script.   - buPROPion (WELLBUTRIN XL) 300 MG 24 hr tablet; Take 1 tablet (300 mg) by mouth every morning    Return in about 2 months (around 2/2/2022) for Recheck symptoms.    Rut Flanagan MD  [facility]a is a 56 year old who presents for the following health issues     HPI     Galileo sustained a concussion at work 1/13/2021, fell backward and hit his head on the cement after slipping on ice.     He has now been dismissed from work comp - says he saw an independent doctor that deemed him healed to full capacity. Despite this, he continues to follow with neurology and chiropractic for ongoing issues.     He is on concerta to help him concentrate better.   He is on wellbutrin to help with emotional lability relating to stressors from work and workmen's comp.   He continues to have work restrictions and would like to continue these.     His BPs have been occasionally high since starting losartan as well. He was not on blood pressure  "medications prior to his concussion. He is currently taking 75 mg daily.       Review of Systems   Constitutional, HEENT, cardiovascular, pulmonary, gi and gu systems are negative, except as otherwise noted.      Objective    /78   Pulse 76   Temp 98.4  F (36.9  C)   Resp 16   Ht 1.822 m (5' 11.75\")   Wt 125.2 kg (276 lb)   SpO2 99%   BMI 37.69 kg/m    Body mass index is 37.69 kg/m .  Physical Exam   GENERAL: healthy, alert and no distress  PSYCH: mentation appears normal, affect normal/bright            "

## 2021-12-02 NOTE — PROGRESS NOTES
{PROVIDER CHARTING PREFERENCE:251609}    Jannie Gurrola is a 56 year old who presents for the following health issues {ACCOMPANIED BY STATEMENT (Optional):411817}    HPI     {SUPERLIST (Optional):110712}  {additonal problems for provider to add (Optional):438473}    Review of Systems   {ROS COMP (Optional):223029}      Objective    There were no vitals taken for this visit.  There is no height or weight on file to calculate BMI.  Physical Exam   {Exam List (Optional):526379}    {Diagnostic Test Results (Optional):748140}    {AMBULATORY ATTESTATION (Optional):405699}

## 2021-12-02 NOTE — LETTER
[facility]  [address] [address]  Phone: [phone]    2021        Galileo Vieira  [address] [address] [facility] [address]          To whom it may concern:    RE: Galileo Vieira    Patient was seen and treated today at our clinic.    Patient may return to work 2021 with the followin. Continue work 5 days of the week, 8 hour days - can work overtime as tolerated  2. 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   3. Continue to wear a baseball cap   4. 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       Please contact me for questions or concerns.      Sincerely,        Rut Flanagan MD

## 2022-01-06 NOTE — PROGRESS NOTES
"[facility]    Outpatient Occupational Therapy Discharge Note  Patient: Galileo Vieira  : 1965    Beginning/End Dates of Reporting Period:  21 to 21    Referring Provider: Veronica Matta APRN CNP    Therapy Diagnosis: Decreased ADL/IADL    Client Self Report: Pt is working hard to stay in his darker office between pt visits, avoiding large group pod for now.  Last session was harder, called into work/left early by 1/2 day,went straight home, laid low and went to bed early. Better by the next day.  \"It's on me, I didn't take breaks soon enough. The doctors I work with tell me to slow down, too\"    Objective Measurements:     Objective Measure: Concussion Sx   Details: 15 sx and 26 severity, increased this week by 11 points.  ( up by 2 pts for neck pain, mood changes, 1 pt each for head, upset stomach, balance, fog focus, vision changes).        Goals:     Goal Identifier Sx Mgmt   Goal Description Patient will identify and independently utilize 3 strategies (computer adaptations, self-awareness and self-management of symptoms, use of adaptive techniques, sensory calming etc.) to decrease her post-concussion related symptoms of light, sound sensitivity and headaches, to support increased independence during ADL/IADLs.   Target Date 10/05/21   Date Met      Progress (detail required for progress note):  Goal progressing, not met. Pt educated in burner analogy for managing symptoms, compensatory strategies for managing visual symptoms, and educated in strategies to promote awareness/regulation with increased symptoms during activity. Pt would continue to benefit from additional strategies to manage symptoms for participation in ADLs/IADLs.     Goal Identifier Multi- tasking   Goal Description Pt to be able to alternate between 2-3 different tasks ( eg bill paying, word puzzle and making phone " calls) x 15 minutes with 90% accuracy on all three tasks, to stimulate return to higher level work, cooking/family care, ability to grocery shop, and maneuver with passengers in the community.   Target Date 10/05/21   Date Met      Progress (detail required for progress note):  Goal progressing, not met. Pt switches between tasks during Safe and Sound protocol but not required to multi-task and has assistance with determining when to switch tasks under OTR's direction. Would benefit from continued progress in this goal area to promote return to higher level work, meal preparation, and community mobility tasks.     Goal Identifier Auditory Sensitivity   Goal Description Pt will safely complete Safe and Sound Protocol 5 hour treated sound series using visual scale to rate her symptom threshold and grade/modify participation with OTR's assist as needed to reduce sound sensitivity and  improve tolerance of grocery shopping/family gathering for 45 min with sound sensitivity sx < 3/10.   Target Date 10/05/21   Date Met      Progress (detail required for progress note):  Goal partially met. Completed 5 days of Safe and Sound Protocol sound series but continues to rate sound sensitivity at 5.5/10 on final day. Would benefit from additional strategies to address, and compensate for, sound sensitivity.       Plan:  Discharge from therapy.    Discharge:    Reason for Discharge: Patient has failed to schedule further appointments. He was seen for a total of 6 OP OT appointments during which time the primary focus was on management of concussion symptoms and compensatory strategies to promote participation in ADLs/IADLs. Pt tolerating 5 days of Safe and Sound Protocol but continues to rate sound sensitivity >3/10. Educated in several techniques for compensating for concussion symptoms (see notes in goals above) but would benefit from continued education and training with application to ADLs/IADLs.    Equipment Issued:  n/a    Discharge Plan: Patient to continue home program. Pt is welcome to return to OP OT in the future, prn, with new order from his healthcare provider.    Candelaria Ovalles, OTR/L

## 2022-01-28 ENCOUNTER — OFFICE VISIT (OUTPATIENT)
Dept: FAMILY MEDICINE | Facility: CLINIC | Age: 57
End: 2022-01-28
Payer: COMMERCIAL

## 2022-01-28 VITALS
HEART RATE: 78 BPM | WEIGHT: 280 LBS | SYSTOLIC BLOOD PRESSURE: 118 MMHG | BODY MASS INDEX: 37.93 KG/M2 | HEIGHT: 72 IN | RESPIRATION RATE: 16 BRPM | OXYGEN SATURATION: 100 % | TEMPERATURE: 98.7 F | DIASTOLIC BLOOD PRESSURE: 74 MMHG

## 2022-01-28 DIAGNOSIS — E78.5 HYPERLIPIDEMIA LDL GOAL <160: ICD-10-CM

## 2022-01-28 DIAGNOSIS — R73.03 PREDIABETES: ICD-10-CM

## 2022-01-28 DIAGNOSIS — F41.9 ANXIETY: ICD-10-CM

## 2022-01-28 DIAGNOSIS — E66.01 MORBID OBESITY (H): ICD-10-CM

## 2022-01-28 DIAGNOSIS — F90.9 ATTENTION DEFICIT HYPERACTIVITY DISORDER (ADHD), UNSPECIFIED ADHD TYPE: ICD-10-CM

## 2022-01-28 DIAGNOSIS — Z11.59 NEED FOR HEPATITIS C SCREENING TEST: ICD-10-CM

## 2022-01-28 DIAGNOSIS — S06.0X9D CONCUSSION WITH LOSS OF CONSCIOUSNESS, SUBSEQUENT ENCOUNTER: Primary | ICD-10-CM

## 2022-01-28 DIAGNOSIS — I10 BENIGN ESSENTIAL HYPERTENSION: ICD-10-CM

## 2022-01-28 PROCEDURE — 99213 OFFICE O/P EST LOW 20 MIN: CPT | Performed by: FAMILY MEDICINE

## 2022-01-28 RX ORDER — METHYLPHENIDATE HYDROCHLORIDE 36 MG/1
36 TABLET ORAL DAILY
Qty: 30 TABLET | Refills: 0 | Status: SHIPPED | OUTPATIENT
Start: 2022-03-31 | End: 2022-01-28

## 2022-01-28 RX ORDER — METHYLPHENIDATE HYDROCHLORIDE 36 MG/1
36 TABLET ORAL DAILY
Qty: 30 TABLET | Refills: 0 | Status: SHIPPED | OUTPATIENT
Start: 2022-02-28 | End: 2022-01-28

## 2022-01-28 RX ORDER — METHYLPHENIDATE HYDROCHLORIDE 36 MG/1
36 TABLET ORAL DAILY
Qty: 30 TABLET | Refills: 0 | Status: SHIPPED | OUTPATIENT
Start: 2022-01-28 | End: 2022-10-28

## 2022-01-28 RX ORDER — LOSARTAN POTASSIUM 100 MG/1
100 TABLET ORAL DAILY
Qty: 90 TABLET | Refills: 3 | Status: SHIPPED | OUTPATIENT
Start: 2022-01-28 | End: 2022-10-28

## 2022-01-28 RX ORDER — LOSARTAN POTASSIUM 100 MG/1
100 TABLET ORAL DAILY
Qty: 90 TABLET | Refills: 3 | Status: SHIPPED | OUTPATIENT
Start: 2022-01-28 | End: 2022-01-28

## 2022-01-28 RX ORDER — METHYLPHENIDATE HYDROCHLORIDE 36 MG/1
36 TABLET ORAL DAILY
Qty: 30 TABLET | Refills: 0 | Status: SHIPPED | OUTPATIENT
Start: 2022-03-31 | End: 2022-10-28

## 2022-01-28 RX ORDER — METHYLPHENIDATE HYDROCHLORIDE 36 MG/1
36 TABLET ORAL DAILY
Qty: 30 TABLET | Refills: 0 | Status: SHIPPED | OUTPATIENT
Start: 2022-02-28 | End: 2022-10-28

## 2022-01-28 RX ORDER — METHYLPHENIDATE HYDROCHLORIDE 36 MG/1
36 TABLET ORAL DAILY
Qty: 30 TABLET | Refills: 0 | Status: SHIPPED | OUTPATIENT
Start: 2022-01-28 | End: 2022-01-28

## 2022-01-28 ASSESSMENT — MIFFLIN-ST. JEOR: SCORE: 2139.1

## 2022-01-28 ASSESSMENT — ANXIETY QUESTIONNAIRES
2. NOT BEING ABLE TO STOP OR CONTROL WORRYING: SEVERAL DAYS
IF YOU CHECKED OFF ANY PROBLEMS ON THIS QUESTIONNAIRE, HOW DIFFICULT HAVE THESE PROBLEMS MADE IT FOR YOU TO DO YOUR WORK, TAKE CARE OF THINGS AT HOME, OR GET ALONG WITH OTHER PEOPLE: SOMEWHAT DIFFICULT
7. FEELING AFRAID AS IF SOMETHING AWFUL MIGHT HAPPEN: SEVERAL DAYS
5. BEING SO RESTLESS THAT IT IS HARD TO SIT STILL: NOT AT ALL
GAD7 TOTAL SCORE: 5
1. FEELING NERVOUS, ANXIOUS, OR ON EDGE: SEVERAL DAYS
3. WORRYING TOO MUCH ABOUT DIFFERENT THINGS: SEVERAL DAYS
6. BECOMING EASILY ANNOYED OR IRRITABLE: SEVERAL DAYS

## 2022-01-28 ASSESSMENT — PATIENT HEALTH QUESTIONNAIRE - PHQ9
SUM OF ALL RESPONSES TO PHQ QUESTIONS 1-9: 5
5. POOR APPETITE OR OVEREATING: NOT AT ALL

## 2022-01-28 NOTE — PROGRESS NOTES
Assessment & Plan     Concussion with loss of consciousness, subsequent encounter  - letter with new restrictions written today.     Anxiety - stable on wellbutrin, continue current    Attention deficit hyperactivity disorder (ADHD), unspecified ADHD type - advised take stimulant for next 3 months then a trial off. If not needed, can discontinue. If needed, he can contact me for refills.   - methylphenidate (CONCERTA) 36 MG CR tablet; Take 1 tablet (36 mg) by mouth daily  - methylphenidate (CONCERTA) 36 MG CR tablet; Take 1 tablet (36 mg) by mouth daily  - methylphenidate (CONCERTA) 36 MG CR tablet; Take 1 tablet (36 mg) by mouth daily    Prediabetes - surveillance labs today  - Hemoglobin A1c; Future    Benign essential hypertension - in range, continue current, surveillance labs today  - Comprehensive metabolic panel (BMP + Alb, Alk Phos, ALT, AST, Total. Bili, TP); Future  - losartan (COZAAR) 100 MG tablet; Take 1 tablet (100 mg) by mouth daily    Hyperlipidemia LDL goal <160 - surveillance labs today  - Lipid panel reflex to direct LDL Fasting; Future    Morbid obesity (H) - encouraged weight loss to help with BP.     Need for hepatitis C screening test  - Hepatitis C Screen Reflex to HCV RNA Quant and Genotype; Future    No follow-ups on file.    Rut Flanagan MD  [facility] is a 56 year old who presents for the following health issues     HPI     Concussion follow up    Reports he stopped his Concerta, felt he was taking too many medications. Has noticed some trouble with focus since this.     Taking 100 mg losartan daily. No side effects with increased dose.     Taking wellbutrin 300 mg daily, feels this helps him with stress and mood symptoms.       Review of Systems   Constitutional, HEENT, cardiovascular, pulmonary, gi and gu systems are negative, except as otherwise noted.      Objective    /74   Pulse 78   Temp 98.7  F (37.1  C) (Oral)    "Resp 16   Ht 1.822 m (5' 11.75\")   Wt 127 kg (280 lb)   SpO2 100%   BMI 38.24 kg/m    Body mass index is 38.24 kg/m .  Physical Exam   GENERAL: healthy, alert and no distress  PSYCH: mentation appears normal, affect normal/bright        "

## 2022-01-28 NOTE — LETTER
[facility]  [address] [address]  Phone: [phone]    22    Galileo Vieira  [address] [address] [address]      To whom it may concern:     Galileo Vieira was seen and treated today at our clinic.     Patient may return to work 2022 with the followin. Continue work 5 days of the week, 8 hour days - can work overtime as tolerated  2. 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   3. Continue to wear a baseball cap as needed  4. Two 10 minute breaks as needed    These restrictions are set to  on 2022.     Please contact me for questions or concerns.    Sincerely,      Rut Flanagan MD

## 2022-01-29 ASSESSMENT — ANXIETY QUESTIONNAIRES: GAD7 TOTAL SCORE: 5

## 2022-01-31 ENCOUNTER — LAB (OUTPATIENT)
Dept: LAB | Facility: CLINIC | Age: 57
End: 2022-01-31
Payer: COMMERCIAL

## 2022-01-31 DIAGNOSIS — Z11.59 NEED FOR HEPATITIS C SCREENING TEST: ICD-10-CM

## 2022-01-31 DIAGNOSIS — R73.03 PREDIABETES: ICD-10-CM

## 2022-01-31 DIAGNOSIS — E78.5 HYPERLIPIDEMIA LDL GOAL <160: ICD-10-CM

## 2022-01-31 DIAGNOSIS — I10 BENIGN ESSENTIAL HYPERTENSION: ICD-10-CM

## 2022-01-31 LAB
ALBUMIN SERPL-MCNC: 3.9 G/DL (ref 3.4–5)
ALP SERPL-CCNC: 116 U/L (ref 40–150)
ALT SERPL W P-5'-P-CCNC: 84 U/L (ref 0–70)
ANION GAP SERPL CALCULATED.3IONS-SCNC: 6 MMOL/L (ref 3–14)
AST SERPL W P-5'-P-CCNC: 39 U/L (ref 0–45)
BILIRUB SERPL-MCNC: 0.4 MG/DL (ref 0.2–1.3)
BUN SERPL-MCNC: 13 MG/DL (ref 7–30)
CALCIUM SERPL-MCNC: 10 MG/DL (ref 8.5–10.1)
CHLORIDE BLD-SCNC: 104 MMOL/L (ref 94–109)
CHOLEST SERPL-MCNC: 193 MG/DL
CO2 SERPL-SCNC: 26 MMOL/L (ref 20–32)
CREAT SERPL-MCNC: 1.02 MG/DL (ref 0.66–1.25)
FASTING STATUS PATIENT QL REPORTED: NO
GFR SERPL CREATININE-BSD FRML MDRD: 87 ML/MIN/1.73M2
GLUCOSE BLD-MCNC: 145 MG/DL (ref 70–99)
HBA1C MFR BLD: 6.3 % (ref 0–5.6)
HDLC SERPL-MCNC: 40 MG/DL
LDLC SERPL CALC-MCNC: 103 MG/DL
NONHDLC SERPL-MCNC: 153 MG/DL
POTASSIUM BLD-SCNC: 4 MMOL/L (ref 3.4–5.3)
PROT SERPL-MCNC: 7.6 G/DL (ref 6.8–8.8)
SODIUM SERPL-SCNC: 136 MMOL/L (ref 133–144)
TRIGL SERPL-MCNC: 252 MG/DL

## 2022-01-31 PROCEDURE — 80053 COMPREHEN METABOLIC PANEL: CPT

## 2022-01-31 PROCEDURE — 83036 HEMOGLOBIN GLYCOSYLATED A1C: CPT

## 2022-01-31 PROCEDURE — 36415 COLL VENOUS BLD VENIPUNCTURE: CPT

## 2022-01-31 PROCEDURE — 86803 HEPATITIS C AB TEST: CPT

## 2022-01-31 PROCEDURE — 80061 LIPID PANEL: CPT

## 2022-02-01 LAB — HCV AB SERPL QL IA: NONREACTIVE

## 2022-04-04 DIAGNOSIS — E34.9 TESTOSTERONE INSUFFICIENCY: ICD-10-CM

## 2022-04-06 RX ORDER — TESTOSTERONE CYPIONATE 200 MG/ML
1 INJECTION, SOLUTION INTRAMUSCULAR
Qty: 10 ML | Refills: 1 | Status: SHIPPED | OUTPATIENT
Start: 2022-04-06 | End: 2022-10-18

## 2022-04-06 RX ORDER — SYRINGE W-NEEDLE,DISPOSAB,3 ML 25GX5/8"
1 SYRINGE, EMPTY DISPOSABLE MISCELLANEOUS
Qty: 10 EACH | Refills: 1 | Status: SHIPPED | OUTPATIENT
Start: 2022-04-06 | End: 2022-10-21

## 2022-06-28 DIAGNOSIS — E78.5 HYPERLIPIDEMIA LDL GOAL <160: ICD-10-CM

## 2022-06-28 RX ORDER — ATORVASTATIN CALCIUM 40 MG/1
40 TABLET, FILM COATED ORAL DAILY
Qty: 90 TABLET | Refills: 1 | Status: SHIPPED | OUTPATIENT
Start: 2022-06-28 | End: 2022-10-28

## 2022-06-28 NOTE — TELEPHONE ENCOUNTER
Prescription approved per [facility] Refill Protocol.  Cecilia Nathan RN

## 2022-10-18 DIAGNOSIS — E34.9 TESTOSTERONE INSUFFICIENCY: ICD-10-CM

## 2022-10-18 RX ORDER — TESTOSTERONE CYPIONATE 200 MG/ML
1 INJECTION, SOLUTION INTRAMUSCULAR
Qty: 10 ML | Refills: 1 | Status: SHIPPED | OUTPATIENT
Start: 2022-10-18 | End: 2023-01-24

## 2022-10-21 DIAGNOSIS — Z12.5 SCREENING PSA (PROSTATE SPECIFIC ANTIGEN): ICD-10-CM

## 2022-10-21 DIAGNOSIS — Z13.1 SCREENING FOR DIABETES MELLITUS: ICD-10-CM

## 2022-10-21 DIAGNOSIS — I10 BENIGN ESSENTIAL HYPERTENSION: Primary | ICD-10-CM

## 2022-10-21 DIAGNOSIS — E34.9 TESTOSTERONE INSUFFICIENCY: ICD-10-CM

## 2022-10-21 RX ORDER — SYRINGE WITH NEEDLE, 1 ML 25GX5/8"
SYRINGE, EMPTY DISPOSABLE MISCELLANEOUS
Qty: 10 EACH | Refills: 1 | Status: SHIPPED | OUTPATIENT
Start: 2022-10-21 | End: 2023-07-12

## 2022-10-25 ENCOUNTER — LAB (OUTPATIENT)
Dept: LAB | Facility: CLINIC | Age: 57
End: 2022-10-25
Payer: COMMERCIAL

## 2022-10-25 DIAGNOSIS — Z12.5 SCREENING PSA (PROSTATE SPECIFIC ANTIGEN): ICD-10-CM

## 2022-10-25 DIAGNOSIS — I10 BENIGN ESSENTIAL HYPERTENSION: ICD-10-CM

## 2022-10-25 DIAGNOSIS — Z13.1 SCREENING FOR DIABETES MELLITUS: ICD-10-CM

## 2022-10-25 DIAGNOSIS — E34.9 TESTOSTERONE INSUFFICIENCY: ICD-10-CM

## 2022-10-25 LAB
ALBUMIN SERPL-MCNC: 4 G/DL (ref 3.4–5)
ALP SERPL-CCNC: 117 U/L (ref 40–150)
ALT SERPL W P-5'-P-CCNC: 78 U/L (ref 0–70)
ANION GAP SERPL CALCULATED.3IONS-SCNC: 8 MMOL/L (ref 3–14)
AST SERPL W P-5'-P-CCNC: 35 U/L (ref 0–45)
BILIRUB SERPL-MCNC: 0.8 MG/DL (ref 0.2–1.3)
BUN SERPL-MCNC: 10 MG/DL (ref 7–30)
CALCIUM SERPL-MCNC: 9.3 MG/DL (ref 8.5–10.1)
CHLORIDE BLD-SCNC: 106 MMOL/L (ref 94–109)
CHOLEST SERPL-MCNC: 150 MG/DL
CO2 SERPL-SCNC: 26 MMOL/L (ref 20–32)
CREAT SERPL-MCNC: 0.89 MG/DL (ref 0.66–1.25)
ERYTHROCYTE [DISTWIDTH] IN BLOOD BY AUTOMATED COUNT: 13.1 % (ref 10–15)
FASTING STATUS PATIENT QL REPORTED: YES
GFR SERPL CREATININE-BSD FRML MDRD: >90 ML/MIN/1.73M2
GLUCOSE BLD-MCNC: 105 MG/DL (ref 70–99)
HBA1C MFR BLD: 6 % (ref 0–5.6)
HCT VFR BLD AUTO: 44 % (ref 40–53)
HDLC SERPL-MCNC: 40 MG/DL
HGB BLD-MCNC: 15.8 G/DL (ref 13.3–17.7)
LDLC SERPL CALC-MCNC: 79 MG/DL
MCH RBC QN AUTO: 31 PG (ref 26.5–33)
MCHC RBC AUTO-ENTMCNC: 35.9 G/DL (ref 31.5–36.5)
MCV RBC AUTO: 86 FL (ref 78–100)
NONHDLC SERPL-MCNC: 110 MG/DL
PLATELET # BLD AUTO: 226 10E3/UL (ref 150–450)
POTASSIUM BLD-SCNC: 4 MMOL/L (ref 3.4–5.3)
PROT SERPL-MCNC: 7.3 G/DL (ref 6.8–8.8)
RBC # BLD AUTO: 5.09 10E6/UL (ref 4.4–5.9)
SHBG SERPL-SCNC: 19 NMOL/L (ref 11–80)
SODIUM SERPL-SCNC: 140 MMOL/L (ref 133–144)
TRIGL SERPL-MCNC: 157 MG/DL
TSH SERPL DL<=0.005 MIU/L-ACNC: 2.29 MU/L (ref 0.4–4)
WBC # BLD AUTO: 6.6 10E3/UL (ref 4–11)

## 2022-10-25 PROCEDURE — 84270 ASSAY OF SEX HORMONE GLOBUL: CPT

## 2022-10-25 PROCEDURE — G0103 PSA SCREENING: HCPCS

## 2022-10-25 PROCEDURE — 80053 COMPREHEN METABOLIC PANEL: CPT

## 2022-10-25 PROCEDURE — 36415 COLL VENOUS BLD VENIPUNCTURE: CPT

## 2022-10-25 PROCEDURE — 80061 LIPID PANEL: CPT

## 2022-10-25 PROCEDURE — 83036 HEMOGLOBIN GLYCOSYLATED A1C: CPT

## 2022-10-25 PROCEDURE — 85027 COMPLETE CBC AUTOMATED: CPT

## 2022-10-25 PROCEDURE — 84443 ASSAY THYROID STIM HORMONE: CPT

## 2022-10-25 PROCEDURE — 84403 ASSAY OF TOTAL TESTOSTERONE: CPT

## 2022-10-27 LAB
PSA SERPL-MCNC: 3.25 UG/L (ref 0–4)
TESTOST FREE SERPL-MCNC: 13.66 NG/DL
TESTOST SERPL-MCNC: 496 NG/DL (ref 240–950)

## 2022-10-28 ENCOUNTER — OFFICE VISIT (OUTPATIENT)
Dept: FAMILY MEDICINE | Facility: CLINIC | Age: 57
End: 2022-10-28
Payer: COMMERCIAL

## 2022-10-28 VITALS
TEMPERATURE: 97.6 F | SYSTOLIC BLOOD PRESSURE: 136 MMHG | HEIGHT: 71 IN | BODY MASS INDEX: 38.29 KG/M2 | HEART RATE: 96 BPM | RESPIRATION RATE: 18 BRPM | WEIGHT: 273.5 LBS | DIASTOLIC BLOOD PRESSURE: 84 MMHG | OXYGEN SATURATION: 97 %

## 2022-10-28 DIAGNOSIS — F06.30 MOOD DISORDER AS LATE EFFECT OF TRAUMATIC BRAIN INJURY (H): ICD-10-CM

## 2022-10-28 DIAGNOSIS — R97.20 ELEVATED PROSTATE SPECIFIC ANTIGEN (PSA): ICD-10-CM

## 2022-10-28 DIAGNOSIS — R73.03 PREDIABETES: ICD-10-CM

## 2022-10-28 DIAGNOSIS — S06.9XAS MOOD DISORDER AS LATE EFFECT OF TRAUMATIC BRAIN INJURY (H): ICD-10-CM

## 2022-10-28 DIAGNOSIS — I10 BENIGN ESSENTIAL HYPERTENSION: ICD-10-CM

## 2022-10-28 DIAGNOSIS — N52.8 OTHER MALE ERECTILE DYSFUNCTION: ICD-10-CM

## 2022-10-28 DIAGNOSIS — Z00.00 ROUTINE GENERAL MEDICAL EXAMINATION AT A HEALTH CARE FACILITY: Primary | ICD-10-CM

## 2022-10-28 DIAGNOSIS — E78.5 HYPERLIPIDEMIA LDL GOAL <160: ICD-10-CM

## 2022-10-28 DIAGNOSIS — K21.9 GASTROESOPHAGEAL REFLUX DISEASE WITHOUT ESOPHAGITIS: ICD-10-CM

## 2022-10-28 DIAGNOSIS — E34.9 TESTOSTERONE INSUFFICIENCY: ICD-10-CM

## 2022-10-28 PROCEDURE — 99396 PREV VISIT EST AGE 40-64: CPT | Performed by: PHYSICIAN ASSISTANT

## 2022-10-28 RX ORDER — ATORVASTATIN CALCIUM 40 MG/1
40 TABLET, FILM COATED ORAL DAILY
Qty: 90 TABLET | Refills: 3 | Status: SHIPPED | OUTPATIENT
Start: 2022-10-28 | End: 2023-05-30

## 2022-10-28 RX ORDER — SILDENAFIL 50 MG/1
50 TABLET, FILM COATED ORAL DAILY PRN
Qty: 20 TABLET | Refills: 1 | Status: SHIPPED | OUTPATIENT
Start: 2022-10-28 | End: 2023-11-03

## 2022-10-28 RX ORDER — LOSARTAN POTASSIUM 100 MG/1
100 TABLET ORAL DAILY
Qty: 90 TABLET | Refills: 3 | Status: SHIPPED | OUTPATIENT
Start: 2022-10-28 | End: 2023-11-03

## 2022-10-28 RX ORDER — BUPROPION HYDROCHLORIDE 300 MG/1
300 TABLET ORAL EVERY MORNING
Qty: 90 TABLET | Refills: 3 | Status: SHIPPED | OUTPATIENT
Start: 2022-10-28 | End: 2023-04-24

## 2022-10-28 RX ORDER — OMEPRAZOLE 40 MG/1
40 CAPSULE, DELAYED RELEASE ORAL DAILY
Qty: 90 CAPSULE | Refills: 3 | Status: SHIPPED | OUTPATIENT
Start: 2022-10-28 | End: 2023-11-03

## 2022-10-28 SDOH — HEALTH STABILITY: PHYSICAL HEALTH: ON AVERAGE, HOW MANY MINUTES DO YOU ENGAGE IN EXERCISE AT THIS LEVEL?: 30 MIN

## 2022-10-28 SDOH — ECONOMIC STABILITY: FOOD INSECURITY: WITHIN THE PAST 12 MONTHS, THE FOOD YOU BOUGHT JUST DIDN'T LAST AND YOU DIDN'T HAVE MONEY TO GET MORE.: NEVER TRUE

## 2022-10-28 SDOH — ECONOMIC STABILITY: INCOME INSECURITY: IN THE LAST 12 MONTHS, WAS THERE A TIME WHEN YOU WERE NOT ABLE TO PAY THE MORTGAGE OR RENT ON TIME?: NO

## 2022-10-28 SDOH — ECONOMIC STABILITY: FOOD INSECURITY: WITHIN THE PAST 12 MONTHS, YOU WORRIED THAT YOUR FOOD WOULD RUN OUT BEFORE YOU GOT MONEY TO BUY MORE.: NEVER TRUE

## 2022-10-28 SDOH — ECONOMIC STABILITY: TRANSPORTATION INSECURITY
IN THE PAST 12 MONTHS, HAS THE LACK OF TRANSPORTATION KEPT YOU FROM MEDICAL APPOINTMENTS OR FROM GETTING MEDICATIONS?: NO

## 2022-10-28 SDOH — ECONOMIC STABILITY: TRANSPORTATION INSECURITY
IN THE PAST 12 MONTHS, HAS LACK OF TRANSPORTATION KEPT YOU FROM MEETINGS, WORK, OR FROM GETTING THINGS NEEDED FOR DAILY LIVING?: NO

## 2022-10-28 SDOH — HEALTH STABILITY: PHYSICAL HEALTH: ON AVERAGE, HOW MANY DAYS PER WEEK DO YOU ENGAGE IN MODERATE TO STRENUOUS EXERCISE (LIKE A BRISK WALK)?: 3 DAYS

## 2022-10-28 SDOH — ECONOMIC STABILITY: INCOME INSECURITY: HOW HARD IS IT FOR YOU TO PAY FOR THE VERY BASICS LIKE FOOD, HOUSING, MEDICAL CARE, AND HEATING?: NOT HARD AT ALL

## 2022-10-28 ASSESSMENT — LIFESTYLE VARIABLES
SKIP TO QUESTIONS 9-10: 0
AUDIT-C TOTAL SCORE: 6
HOW OFTEN DO YOU HAVE SIX OR MORE DRINKS ON ONE OCCASION: MONTHLY
HOW OFTEN DO YOU HAVE A DRINK CONTAINING ALCOHOL: 4 OR MORE TIMES A WEEK
HOW MANY STANDARD DRINKS CONTAINING ALCOHOL DO YOU HAVE ON A TYPICAL DAY: 1 OR 2

## 2022-10-28 ASSESSMENT — ENCOUNTER SYMPTOMS
FEVER: 0
COUGH: 0
NAUSEA: 0
PARESTHESIAS: 0
DYSURIA: 0
ARTHRALGIAS: 1
WEAKNESS: 0
DIZZINESS: 0
EYE PAIN: 0
SHORTNESS OF BREATH: 0
NERVOUS/ANXIOUS: 1
CHILLS: 0
HEMATURIA: 0
ABDOMINAL PAIN: 0
DIARRHEA: 0
HEMATOCHEZIA: 0
MYALGIAS: 1
CONSTIPATION: 0
SORE THROAT: 0
HEARTBURN: 0
PALPITATIONS: 0
JOINT SWELLING: 0
HEADACHES: 1
FREQUENCY: 0

## 2022-10-28 ASSESSMENT — SOCIAL DETERMINANTS OF HEALTH (SDOH)
DO YOU BELONG TO ANY CLUBS OR ORGANIZATIONS SUCH AS CHURCH GROUPS UNIONS, FRATERNAL OR ATHLETIC GROUPS, OR SCHOOL GROUPS?: NO
HOW OFTEN DO YOU GET TOGETHER WITH FRIENDS OR RELATIVES?: ONCE A WEEK
HOW OFTEN DO YOU ATTEND CHURCH OR RELIGIOUS SERVICES?: 1 TO 4 TIMES PER YEAR
IN A TYPICAL WEEK, HOW MANY TIMES DO YOU TALK ON THE PHONE WITH FAMILY, FRIENDS, OR NEIGHBORS?: ONCE A WEEK

## 2022-10-28 ASSESSMENT — PATIENT HEALTH QUESTIONNAIRE - PHQ9
SUM OF ALL RESPONSES TO PHQ QUESTIONS 1-9: 8
10. IF YOU CHECKED OFF ANY PROBLEMS, HOW DIFFICULT HAVE THESE PROBLEMS MADE IT FOR YOU TO DO YOUR WORK, TAKE CARE OF THINGS AT HOME, OR GET ALONG WITH OTHER PEOPLE: SOMEWHAT DIFFICULT
SUM OF ALL RESPONSES TO PHQ QUESTIONS 1-9: 8

## 2022-10-28 NOTE — PROGRESS NOTES
SUBJECTIVE:   CC: Galileo is an 56 year old who presents for preventative health visit.   Patient has been advised of split billing requirements and indicates understanding: Yes  Healthy Habits:     Getting at least 3 servings of Calcium per day:  NO    Bi-annual eye exam:  Yes    Dental care twice a year:  Yes    Sleep apnea or symptoms of sleep apnea:  None    Diet:  Regular (no restrictions)    Frequency of exercise:  2-3 days/week    Duration of exercise:  15-30 minutes    Taking medications regularly:  Yes    Medication side effects:  None    PHQ-2 Total Score: 2    Additional concerns today:  No          Today's PHQ-2 Score:   PHQ-2 (  [employer]) 10/28/2022   Q1: Little interest or pleasure in doing things 1   Q2: Feeling down, depressed or hopeless 1   PHQ-2 Score 2   PHQ-2 Total Score (12-17 Years)- Positive if 3 or more points; Administer PHQ-A if positive -   Q1: Little interest or pleasure in doing things Several days   Q2: Feeling down, depressed or hopeless Several days   PHQ-2 Score 2       Abuse: Current or Past(Physical, Sexual or Emotional)- No  Do you feel safe in your environment? Yes        Social History     Tobacco Use     Smoking status: Former     Packs/day: 0.00     Types: Cigarettes     Quit date: 10/3/1997     Years since quittin.0     Smokeless tobacco: Never     Tobacco comments:        Substance Use Topics     Alcohol use: Yes     Alcohol/week: 16.7 standard drinks     Types: 20 Standard drinks or equivalent per week     Comment: a few a day     If you drink alcohol do you typically have >3 drinks per day or >7 drinks per week? No    Alcohol Use 10/28/2022   Prescreen: >3 drinks/day or >7 drinks/week? No   Prescreen: >3 drinks/day or >7 drinks/week? -   AUDIT SCORE  -     AUDIT - Alcohol Use Disorders Identification Test - Reproduced from the World Health Organization Audit 2001 (Second Edition) 1/10/2020   1.  How often do you have a drink containing alcohol? 4 or more times a  week   2.  How many drinks containing alcohol do you have on a typical day when you are drinking? 3 or 4   3.  How often do you have five or more drinks on one occasion? Monthly   4.  How often during the last year have you found that you were not able to stop drinking once you had started? Less than monthly   5.  How often during the last year have you failed to do what was normally expected of you because of drinking? Never   6.  How often during the last year have you needed a first drink in the morning to get yourself going after a heavy drinking session? Never   7.  How often during the last year have you had a feeling of guilt or remorse after drinking? Less than monthly   8.  How often during the last year have you been unable to remember what happened the night before because of your drinking? Less than monthly   9.  Have you or someone else been injured because of your drinking? No   10. Has a relative, friend, doctor or other health care worker been concerned about your drinking or suggested you cut down? Yes, during the last year   TOTAL SCORE 14       Last PSA:   PSA   Date Value Ref Range Status   11/18/2016 0.81 0 - 4 ug/L Final     Comment:     Assay Method:  Chemiluminescence using Siemens Vista analyzer     Prostate Specific Antigen Screen   Date Value Ref Range Status   10/25/2022 3.25 0.00 - 4.00 ug/L Final       Reviewed orders with patient. Reviewed health maintenance and updated orders accordingly - No  BP Readings from Last 3 Encounters:   10/28/22 136/84   01/28/22 118/74   12/02/21 138/78    Wt Readings from Last 3 Encounters:   10/28/22 124.1 kg (273 lb 8 oz)   01/28/22 127 kg (280 lb)   12/02/21 125.2 kg (276 lb)                  Recent Labs   Lab Test 10/25/22  0835 01/31/22  1347 08/27/21  0720 08/27/21  0720 03/26/21  1618 01/13/21  0711   A1C 6.0* 6.3*  --  5.8*  --   --    LDL 79 103*  --  144*  --   --    HDL 40 40  --  46  --   --    TRIG 157* 252*  --  188*  --   --    ALT 78* 84*   "--  67 166*  --    CR 0.89 1.02   < > 1.02 0.95 1.04   GFRESTIMATED >90 87   < > 83 >90 81   GFRESTBLACK  --   --   --   --  >90 >90   POTASSIUM 4.0 4.0   < > 3.9 3.9 3.6   TSH 2.29  --   --  3.91  --   --     < > = values in this interval not displayed.        Reviewed and updated as needed this visit by clinical staff   Tobacco  Allergies  Meds   Med Hx  Surg Hx  Fam Hx  Soc Hx        Reviewed and updated as needed this visit by Provider                     Review of Systems   Constitutional: Negative for chills and fever.   HENT: Positive for hearing loss. Negative for congestion, ear pain and sore throat.    Eyes: Negative for pain and visual disturbance.   Respiratory: Negative for cough and shortness of breath.    Cardiovascular: Negative for chest pain, palpitations and peripheral edema.   Gastrointestinal: Negative for abdominal pain, constipation, diarrhea, heartburn, hematochezia and nausea.   Genitourinary: Positive for impotence. Negative for dysuria, frequency, genital sores, hematuria, penile discharge and urgency.   Musculoskeletal: Positive for arthralgias and myalgias. Negative for joint swelling.   Skin: Negative for rash.   Neurological: Positive for headaches. Negative for dizziness, weakness and paresthesias.   Psychiatric/Behavioral: Positive for mood changes. The patient is nervous/anxious.        OBJECTIVE:   Resp 18   Ht 1.791 m (5' 10.5\")   Wt 124.1 kg (273 lb 8 oz)   BMI 38.69 kg/m      Physical Exam  GENERAL: healthy, alert and no distress  EYES: Eyes grossly normal to inspection, PERRL and conjunctivae and sclerae normal  HENT: ear canals and TM's normal, nose and mouth without ulcers or lesions  NECK: no adenopathy, no asymmetry, masses, or scars and thyroid normal to palpation  RESP: lungs clear to auscultation - no rales, rhonchi or wheezes  CV: regular rate and rhythm, normal S1 S2, no S3 or S4, no murmur, click or rub, no peripheral edema and peripheral pulses " strong  ABDOMEN: soft, nontender, no hepatosplenomegaly, no masses and bowel sounds normal  MS: no gross musculoskeletal defects noted, no edema  SKIN: no suspicious lesions or rashes  NEURO: Normal strength and tone, mentation intact and speech normal  PSYCH: mentation appears normal, affect normal/bright  LYMPH: no cervical, supraclavicular, axillary, or inguinal adenopathy    Diagnostic Test Results:  Labs reviewed in Epic    ASSESSMENT/PLAN:   (Z00.00) Routine general medical examination at a health care facility  (primary encounter diagnosis)  Comment:   Plan:     (E34.9) Testosterone insufficiency  Comment: got tested on day 6.  Was advised to do this on day 11 after last injection .  Recommend he retest on day 11.   Plan: Testosterone Free and Total            (N52.8) Other male erectile dysfunction  Comment: Risks, benefits, side effects and intended purposes discussed.      Plan: sildenafil (VIAGRA) 50 MG tablet            (R97.20) Elevated prostate specific antigen (PSA)  Comment: still within normal range but did double over the last year.  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  Comment: stable   Plan: buPROPion (WELLBUTRIN XL) 300 MG 24 hr tablet            (I10) Benign essential hypertension  Comment: stable   Plan: losartan (COZAAR) 100 MG tablet            (K21.9) Gastroesophageal reflux disease without esophagitis  Comment:   Plan: omeprazole (PRILOSEC) 40 MG DR capsule            (R73.03) Prediabetes  Comment: well retest in 3 months as currently dieting and exercising   Plan: Hemoglobin A1c                    COUNSELING:   Reviewed preventive health counseling, as reflected in patient instructions    Estimated body mass index is 38.69 kg/m  as calculated from the  "following:    Height as of this encounter: 1.791 m (5' 10.5\").    Weight as of this encounter: 124.1 kg (273 lb 8 oz).     Weight management plan: Discussed healthy diet and exercise guidelines    He reports that he quit smoking about 25 years ago. His smoking use included cigarettes. He has never used smokeless tobacco.      Counseling Resources:  ATP IV Guidelines  Pooled Cohorts Equation Calculator  FRAX Risk Assessment  ICSI Preventive Guidelines  Dietary Guidelines for Americans, 2010  USDA's MyPlate  ASA Prophylaxis  Lung CA Screening    Ramona Ann Aaseby-Aguilera, PA-C  [facility]  Answers for HPI/ROS submitted by the patient on 10/28/2022  If you checked off any problems, how difficult have these problems made it for you to do your work, take care of things at home, or get along with other people?: Somewhat difficult  PHQ9 TOTAL SCORE: 8      "

## 2022-11-08 DIAGNOSIS — H93.13 TINNITUS, BILATERAL: ICD-10-CM

## 2022-11-08 DIAGNOSIS — H90.3 SENSORINEURAL HEARING LOSS, BILATERAL: Primary | ICD-10-CM

## 2022-11-23 ENCOUNTER — TELEPHONE (OUTPATIENT)
Dept: FAMILY MEDICINE | Facility: CLINIC | Age: 57
End: 2022-11-23

## 2022-11-23 NOTE — TELEPHONE ENCOUNTER
See PHQ and advise as to any changes if needed      PHQ-9 score:    PHQ 11/21/2022   PHQ-9 Total Score 9   Q9: Thoughts of better off dead/self-harm past 2 weeks Several days   F/U: Thoughts of suicide or self-harm No   F/U: Self harm-plan -   F/U: Self-harm action -   F/U: Safety concerns Jami Nathan RN

## 2022-12-26 ENCOUNTER — TELEPHONE (OUTPATIENT)
Dept: SLEEP MEDICINE | Facility: CLINIC | Age: 57
End: 2022-12-26

## 2023-01-23 ENCOUNTER — OFFICE VISIT (OUTPATIENT)
Dept: FAMILY MEDICINE | Facility: CLINIC | Age: 58
End: 2023-01-23
Payer: COMMERCIAL

## 2023-01-23 ENCOUNTER — ANCILLARY PROCEDURE (OUTPATIENT)
Dept: GENERAL RADIOLOGY | Facility: CLINIC | Age: 58
End: 2023-01-23
Attending: FAMILY MEDICINE
Payer: COMMERCIAL

## 2023-01-23 VITALS
HEIGHT: 72 IN | RESPIRATION RATE: 18 BRPM | BODY MASS INDEX: 38.47 KG/M2 | HEART RATE: 106 BPM | WEIGHT: 284 LBS | TEMPERATURE: 98.2 F | DIASTOLIC BLOOD PRESSURE: 76 MMHG | OXYGEN SATURATION: 97 % | SYSTOLIC BLOOD PRESSURE: 136 MMHG

## 2023-01-23 DIAGNOSIS — M75.91 RIGHT SUPRASPINATUS TENDONITIS: Primary | ICD-10-CM

## 2023-01-23 DIAGNOSIS — E66.01 MORBID OBESITY (H): ICD-10-CM

## 2023-01-23 DIAGNOSIS — M25.511 RIGHT SHOULDER PAIN, UNSPECIFIED CHRONICITY: ICD-10-CM

## 2023-01-23 PROCEDURE — 99213 OFFICE O/P EST LOW 20 MIN: CPT | Performed by: FAMILY MEDICINE

## 2023-01-23 PROCEDURE — 73030 X-RAY EXAM OF SHOULDER: CPT | Mod: TC | Performed by: RADIOLOGY

## 2023-01-23 ASSESSMENT — PATIENT HEALTH QUESTIONNAIRE - PHQ9
10. IF YOU CHECKED OFF ANY PROBLEMS, HOW DIFFICULT HAVE THESE PROBLEMS MADE IT FOR YOU TO DO YOUR WORK, TAKE CARE OF THINGS AT HOME, OR GET ALONG WITH OTHER PEOPLE: SOMEWHAT DIFFICULT
SUM OF ALL RESPONSES TO PHQ QUESTIONS 1-9: 1
SUM OF ALL RESPONSES TO PHQ QUESTIONS 1-9: 1

## 2023-01-23 NOTE — PROGRESS NOTES
Assessment & Plan     Right supraspinatus tendonitis - discussed diagnosis. Suggested no overhead motions ideal, try to switch sleeping position, PT to help strengthen rotator cuff muscles. He will reach out if not improving in a couple weeks - will order PT at that time. If still not improving after that, will pursue advanced imaging and orthopedics.     Right shoulder pain, unspecified chronicity  - XR Shoulder Right G/E 3 Views; Future    Morbid obesity (H) - he is working on weight loss through diet and exercise.       Return in about 1 year (around 1/23/2024) for as needed.    Rut Flanagan MD  [facility] TETO Gurrola is a 57 year old, presenting for the following health issues:  Shoulder Pain      Shoulder Pain    History of Present Illness       Reason for visit:  Shoulder pain    He eats 2-3 servings of fruits and vegetables daily.He consumes 1 sweetened beverage(s) daily.He exercises with enough effort to increase his heart rate 20 to 29 minutes per day.  He exercises with enough effort to increase his heart rate 3 or less days per week.   He is taking medications regularly.    Today's PHQ-9         PHQ-9 Total Score: 1    PHQ-9 Q9 Thoughts of better off dead/self-harm past 2 weeks :   Not at all    How difficult have these problems made it for you to do your work, take care of things at home, or get along with other people: Somewhat difficult       Right shoulder pain for the past few months.   Reports painful when he wakes in the AM. Typically sleeping with his arm above his head.   Has pain with overhead and forward weight lifting as well.     No weakness.   No radicular symptoms.   No left sided symptoms.     History of shoulder tendonitis, effected his overhead throwing. No imaging at that time.       Review of Systems   Constitutional, HEENT, cardiovascular, pulmonary, gi and gu systems are negative, except as otherwise noted.      Objective    /76    "Pulse 106   Temp 98.2  F (36.8  C) (Oral)   Resp 18   Ht 1.816 m (5' 11.5\")   Wt 128.8 kg (284 lb)   SpO2 97%   BMI 39.06 kg/m    Body mass index is 39.06 kg/m .  Physical Exam   GENERAL: healthy, alert and no distress  MS: right shoulder - normal ROM, some pain with internal external rotation and abduction. Pain with luo, negative empty can.     Right shoulder XR - small bony spur of the right acromion, otherwise negative by my read            "

## 2023-01-24 DIAGNOSIS — E34.9 TESTOSTERONE INSUFFICIENCY: ICD-10-CM

## 2023-01-24 RX ORDER — TESTOSTERONE CYPIONATE 200 MG/ML
1 INJECTION, SOLUTION INTRAMUSCULAR
Qty: 10 ML | Refills: 1 | Status: SHIPPED | OUTPATIENT
Start: 2023-01-24 | End: 2023-11-03

## 2023-02-03 DIAGNOSIS — K64.4 EXTERNAL HEMORRHOIDS: Primary | ICD-10-CM

## 2023-02-03 RX ORDER — HYDROCORTISONE 25 MG/G
CREAM TOPICAL 2 TIMES DAILY PRN
Qty: 30 G | Refills: 1 | Status: SHIPPED | OUTPATIENT
Start: 2023-02-03 | End: 2023-04-24

## 2023-02-13 ENCOUNTER — LAB (OUTPATIENT)
Dept: LAB | Facility: CLINIC | Age: 58
End: 2023-02-13
Payer: COMMERCIAL

## 2023-02-13 DIAGNOSIS — E34.9 TESTOSTERONE INSUFFICIENCY: ICD-10-CM

## 2023-02-13 DIAGNOSIS — R73.03 PREDIABETES: ICD-10-CM

## 2023-02-13 PROCEDURE — 84270 ASSAY OF SEX HORMONE GLOBUL: CPT

## 2023-02-13 PROCEDURE — 84403 ASSAY OF TOTAL TESTOSTERONE: CPT

## 2023-02-14 LAB — SHBG SERPL-SCNC: 19 NMOL/L (ref 11–80)

## 2023-02-15 DIAGNOSIS — L03.213 PERIORBITAL CELLULITIS OF RIGHT EYE: ICD-10-CM

## 2023-02-15 DIAGNOSIS — H00.012 HORDEOLUM EXTERNUM OF RIGHT LOWER EYELID: Primary | ICD-10-CM

## 2023-02-15 LAB
TESTOST FREE SERPL-MCNC: 6.44 NG/DL
TESTOST SERPL-MCNC: 251 NG/DL (ref 240–950)

## 2023-02-15 RX ORDER — CEPHALEXIN 500 MG/1
500 CAPSULE ORAL 2 TIMES DAILY
Qty: 20 CAPSULE | Refills: 0 | Status: SHIPPED | OUTPATIENT
Start: 2023-02-15 | End: 2023-02-25

## 2023-04-21 ENCOUNTER — OFFICE VISIT (OUTPATIENT)
Dept: FAMILY MEDICINE | Facility: CLINIC | Age: 58
End: 2023-04-21
Payer: COMMERCIAL

## 2023-04-21 VITALS
SYSTOLIC BLOOD PRESSURE: 138 MMHG | OXYGEN SATURATION: 97 % | TEMPERATURE: 98.3 F | HEART RATE: 90 BPM | DIASTOLIC BLOOD PRESSURE: 88 MMHG | BODY MASS INDEX: 37.93 KG/M2 | RESPIRATION RATE: 18 BRPM | HEIGHT: 72 IN | WEIGHT: 280 LBS

## 2023-04-21 DIAGNOSIS — Z82.49 FH: CARDIOVASCULAR DISEASE: Primary | ICD-10-CM

## 2023-04-21 DIAGNOSIS — L91.8 SKIN TAG: ICD-10-CM

## 2023-04-21 DIAGNOSIS — F06.30 MOOD DISORDER AS LATE EFFECT OF TRAUMATIC BRAIN INJURY (H): ICD-10-CM

## 2023-04-21 DIAGNOSIS — M54.6 ACUTE RIGHT-SIDED THORACIC BACK PAIN: ICD-10-CM

## 2023-04-21 DIAGNOSIS — S06.9XAS MOOD DISORDER AS LATE EFFECT OF TRAUMATIC BRAIN INJURY (H): ICD-10-CM

## 2023-04-21 PROCEDURE — 90750 HZV VACC RECOMBINANT IM: CPT | Performed by: FAMILY MEDICINE

## 2023-04-21 PROCEDURE — 90715 TDAP VACCINE 7 YRS/> IM: CPT | Performed by: FAMILY MEDICINE

## 2023-04-21 PROCEDURE — 99213 OFFICE O/P EST LOW 20 MIN: CPT | Mod: 25 | Performed by: FAMILY MEDICINE

## 2023-04-21 PROCEDURE — 90472 IMMUNIZATION ADMIN EACH ADD: CPT | Performed by: FAMILY MEDICINE

## 2023-04-21 PROCEDURE — 90471 IMMUNIZATION ADMIN: CPT | Performed by: FAMILY MEDICINE

## 2023-04-21 PROCEDURE — 11200 RMVL SKIN TAGS UP TO&INC 15: CPT | Performed by: FAMILY MEDICINE

## 2023-04-21 NOTE — PROGRESS NOTES
"  Assessment & Plan     FH: cardiovascular disease - suggested cardiology consult as he has such a strong FH of cardiac disease - risk factors include treated HTN, HLD.   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 If fails to improve, will pursue advanced imaging.       Rut Flanagan MD  [facility] is a 57 year old, presenting for the following health issues:  Back Pain, Derm Problem, and Tachycardia        4/21/2023     9:28 AM   Additional Questions   Roomed by Fanny MA     Patient has had right sided mid back pain that radiates to the right rib cage. It has been there for 8 months or so. It comes and goes. Has been using ice and ibuprofen over the past day.     Patient has some skin tags he would like cut off.    He has had a high heart rate previously, and Is concerned about it. Has a strong FH of cardiac disease - dad with triple bypass, two brothers with MIs.      Review of Systems   Constitutional, HEENT, cardiovascular, pulmonary, gi and gu systems are negative, except as otherwise noted.      Objective    /88 (BP Location: Right arm, Patient Position: Sitting, Cuff Size: Adult Large)   Pulse 90   Temp 98.3  F (36.8  C) (Oral)   Resp 18   Ht 1.816 m (5' 11.5\")   Wt 127 kg (280 lb)   SpO2 97%   BMI 38.51 kg/m    Body mass index is 38.51 kg/m .  Physical Exam   GENERAL: healthy, alert and no distress  SKIN: several skin tags in the bilateral axillae and groin  BACK: T8-9 seem to be out of alignment    Procedure: Using sterile iris scissors, multiple skin tags were snipped off at   their bases after cleansing with Betadine.  Bleeding was controlled   by pressure. Anesthetic was not required.  These " pathognomonic   benign lesions are not sent for pathological exam. The procedure was   well tolerated. The patient will be alert for any signs of cutaneous   infection, and call if there are any problems.    Rut Flanagan MD  [facility]

## 2023-04-21 NOTE — PROGRESS NOTES
Prior to immunization administration, verified patients identity using patient s name and date of birth. Please see Immunization Activity for additional information.     Screening Questionnaire for Adult Immunization    Are you sick today?   No   Do you have allergies to medications, food, a vaccine component or latex?   No   Have you ever had a serious reaction after receiving a vaccination?   No   Do you have a long-term health problem with heart, lung, kidney, or metabolic disease (e.g., diabetes), asthma, a blood disorder, no spleen, complement component deficiency, a cochlear implant, or a spinal fluid leak?  Are you on long-term aspirin therapy?   No   Do you have cancer, leukemia, HIV/AIDS, or any other immune system problem?   No   Do you have a parent, brother, or sister with an immune system problem?   No   In the past 3 months, have you taken medications that affect  your immune system, such as prednisone, other steroids, or anticancer drugs; drugs for the treatment of rheumatoid arthritis, Crohn s disease, or psoriasis; or have you had radiation treatments?   No   Have you had a seizure, or a brain or other nervous system problem?   No   During the past year, have you received a transfusion of blood or blood    products, or been given immune (gamma) globulin or antiviral drug?   No   For women: Are you pregnant or is there a chance you could become       pregnant during the next month?   No   Have you received any vaccinations in the past 4 weeks?   No     Immunization questionnaire answers were all negative.      Injection of Tdap, shinrix given by Reina Ellison CMA. Patient instructed to remain in clinic for 15 minutes afterwards, and to report any adverse reactions.     Screening performed by Reina Ellison CMA on 4/21/2023 at 11:42 AM.

## 2023-04-24 ENCOUNTER — OFFICE VISIT (OUTPATIENT)
Dept: CARDIOLOGY | Facility: CLINIC | Age: 58
End: 2023-04-24
Attending: FAMILY MEDICINE
Payer: COMMERCIAL

## 2023-04-24 VITALS
HEART RATE: 83 BPM | BODY MASS INDEX: 39.44 KG/M2 | DIASTOLIC BLOOD PRESSURE: 90 MMHG | WEIGHT: 291.2 LBS | OXYGEN SATURATION: 95 % | SYSTOLIC BLOOD PRESSURE: 144 MMHG | HEIGHT: 72 IN

## 2023-04-24 DIAGNOSIS — I20.89 OTHER FORMS OF ANGINA PECTORIS (H): ICD-10-CM

## 2023-04-24 DIAGNOSIS — Z82.49 FH: CARDIOVASCULAR DISEASE: ICD-10-CM

## 2023-04-24 DIAGNOSIS — E78.5 HYPERLIPIDEMIA LDL GOAL <70: ICD-10-CM

## 2023-04-24 DIAGNOSIS — R06.09 DYSPNEA ON EXERTION: ICD-10-CM

## 2023-04-24 DIAGNOSIS — I10 BENIGN ESSENTIAL HYPERTENSION: Primary | ICD-10-CM

## 2023-04-24 PROCEDURE — 99204 OFFICE O/P NEW MOD 45 MIN: CPT | Performed by: INTERNAL MEDICINE

## 2023-04-24 PROCEDURE — 93000 ELECTROCARDIOGRAM COMPLETE: CPT | Performed by: INTERNAL MEDICINE

## 2023-04-24 RX ORDER — NEBIVOLOL 5 MG/1
5 TABLET ORAL DAILY
Qty: 90 TABLET | Refills: 3 | Status: SHIPPED | OUTPATIENT
Start: 2023-04-24 | End: 2023-11-03

## 2023-04-24 RX ORDER — ATORVASTATIN CALCIUM 80 MG/1
80 TABLET, FILM COATED ORAL AT BEDTIME
Qty: 90 TABLET | Refills: 3 | Status: SHIPPED | OUTPATIENT
Start: 2023-04-24 | End: 2023-11-03

## 2023-04-24 NOTE — PATIENT INSTRUCTIONS
Your blood pressure was elevated at your appointment today.  Elevated blood pressure can increase your risk of a heart attack, stroke and heart failure.  For this reason, we feel it is important to monitor your blood pressure closely.  If you have access to a home blood pressure monitor or are able to check your blood pressure at a local pharmacy in the next week we would like you to do so and call our clinic with those readings. Please call [phone] ([place]) and leave a message with your name, date of birth, blood pressure reading, date and location it was completed. If your blood pressure remains elevated your care team will be notified.  We appreciate being a part of your healthcare team and look forward to hearing from you soon.

## 2023-04-24 NOTE — PROGRESS NOTES
Service Date: 04/24/2023    CARDIOLOGY OFFICE NOTE    OFFICE NOTE:  Galileo Vieira is a delightful, 57-year-old gentleman referred in by Dr. Flanagan for a cardiovascular evaluation, including multiple cardiac risk factors and exertional dyspnea on exertion.  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  Clinically, he does note shortness of breath with exertion.  He has occasional chest pain, not clearly with exertion, but not clearly explained.  He does not report any PND or orthopnea, simply dyspnea on exertion, and again intermittent chest pain may or may not be connected to exercise.  He had a stress echo a couple years ago, which was negative for ischemia, but of note, the blood pressure was elevated at rest, and it shot up much higher than it should have within stage 1-2 of a Brian protocol.  His EKG was otherwise normal.  He has a very strong family history of early coronary artery disease.  His brothers and his father, basically all the male members of his family, have had heart attacks.  The patient stopped smoking decades ago.  We do note an elevated BMI at 40.05, and we did go through diet.  We do note that according to the hospital scales, his weight is up a minimum of 10 pounds since last year.  He does have 3-4 alcoholic beverages per day, and I told him at one, that is probably quite reasonable, but at this point, the extra alcohol is extra calories, raises his blood sugar and can raise his blood pressure and weight, and so we talked about cutting back down significantly.  He has 3-4 caffeinated products per day, but I am not too bothered by that.  He notes his heart rate runs high, although here it is basically in the 80s-90s.  The extra caffeine could be contributing, so I told him there is a small chance that it could be a partial contribution, but he is not describing an SVT. He can exercise and do most of the things  he wants to, but again it will eventually be dyspnea that stops him.  He does not have peripheral edema.  Dr. Flanagan has done basically all the work, and I thank her for that.  His lipid profile is dramatically better than it was.  The LDL was in the 140s, and now it is down to 79.  I think since he is already on a lipid pill, Lipitor 40, I am going to raise it to 80 to try to get his LDL below 70, because technically at a minimum, he has impaired fasting glucose plus hypertension, plus remote smoking, plus a strong family history.  We will see if he can get the LDL below 70.  I am just going to recommend a nuclear stress test just to make sure that this dyspnea on exertion and the atypical chest pain are not anginal equivalents, and I will get a standard echocardiogram to make sure that he does not have LVH, diastolic dysfunction, pulmonary hypertension, etc., to explain his dyspnea.  We will see him back for that.  I am going to start him on nebivolol 5 mg a day because of his hypertension and perceived tachycardia.  Another option would be amlodipine.  I would try to avoid diuretics at this time since they are not metabolically neutral, and as I mentioned, he has impaired fasting glucose.  We do note his ALT is slightly elevated. I told him that may be fatty liver, which again goes back to blood sugar and calories.  I would recommend Dr. Flanagan probably get a followup lipid profile with another ALT in about 2 months after I have raised the dose of Lipitor to 80.  We talked about if he gets muscle aches to simply drop back down to the 40 mg dose.  We will see the patient back in about a month.  He will check his blood pressure on his own at work.  We will check it here, and we will do a nuclear stress test and an echocardiogram and review all those results at the next visit.    Today's office visit was 45 minutes to discuss diabetes, diet, alcohol, blood pressure and various medications, reviewing the previous  echo and blood work he has had done.  I did mention to him, also, that testosterone replacement can sometimes affect the liver and blood pressure.  He is on a stable dose for that.    cc:  Rut Flanagan MD   [facility]   [address] [place] [address]    Michael Perez MD        D: 2023   T: 2023   MT: fallon    Name:     AJ JOHNS  MRN:      [id number]        Account:      [id number]   :      1965           Service Date: 2023       Document: [id number]

## 2023-04-24 NOTE — LETTER
"4/24/2023    Rut Flanagan MD  [id number]  [facility] [address]    RE: Galileo Vieira       Dear Colleague,     I had the pleasure of seeing Galileo Vieira in the [facility].  HPI and Plan:   See dictation    Orders Placed This Encounter   Procedures    EKG 12-lead complete w/read - Clinics (performed today)     Orders Placed This Encounter   Medications    nebivolol (BYSTOLIC) 5 MG tablet     Sig: Take 1 tablet (5 mg) by mouth daily     Dispense:  90 tablet     Refill:  3    nebivolol (BYSTOLIC) 5 MG tablet     Sig: Take 1 tablet (5 mg) by mouth daily     Dispense:  90 tablet     Refill:  3    atorvastatin (LIPITOR) 80 MG tablet     Sig: Take 1 tablet (80 mg) by mouth At Bedtime     Dispense:  90 tablet     Refill:  3     Medications Discontinued During This Encounter   Medication Reason    hydrocortisone, Perianal, (HYDROCORTISONE) 2.5 % cream     buPROPion (WELLBUTRIN XL) 300 MG 24 hr tablet     Artificial Tear Solution (JUST TEARS EYE DROPS) SOLN          Encounter Diagnoses   Name Primary?    FH: cardiovascular disease     Benign essential hypertension Yes    Hyperlipidemia LDL goal <70        CURRENT MEDICATIONS:  Current Outpatient Medications   Medication Sig Dispense Refill    atorvastatin (LIPITOR) 40 MG tablet Take 1 tablet (40 mg) by mouth daily 90 tablet 3    atorvastatin (LIPITOR) 80 MG tablet Take 1 tablet (80 mg) by mouth At Bedtime 90 tablet 3    B-D LUER-KELLEY SYRINGE 22G X 1-1/2\" 3 ML MISC 1 EACH EVERY 14 DAYS 10 each 1    BD HYPODERMIC NEEDLE 18G X 1\" MISC 1 EACH EVERY 14 DAYS 10 each 1    losartan (COZAAR) 100 MG tablet Take 1 tablet (100 mg) by mouth daily 90 tablet 3    nebivolol (BYSTOLIC) 5 MG tablet Take 1 tablet (5 mg) by mouth daily 90 tablet 3    nebivolol (BYSTOLIC) 5 MG tablet Take 1 tablet (5 mg) by mouth daily 90 tablet 3    omeprazole (PRILOSEC) 40 MG DR capsule Take 1 capsule (40 mg) by mouth daily 90 capsule 3    sildenafil (VIAGRA) 50 MG tablet Take 1 " tablet (50 mg) by mouth daily as needed (1 hour before intercourse) 20 tablet 1    testosterone cypionate (DEPOTESTOSTERONE) 200 MG/ML injection Inject 1 mL (200 mg) into the muscle every 14 days 10 mL 1       ALLERGIES   No Known Allergies    PAST MEDICAL HISTORY:  Past Medical History:   Diagnosis Date    Concussion, unspecified     Hospitalized overnight as a child    Esophageal reflux     Hypercholesterolemia     Hypertension     GIUSEPPE (obstructive sleep apnea)     uses cpap    Testosterone deficiency     ED       PAST SURGICAL HISTORY:  Past Surgical History:   Procedure Laterality Date    COLONOSCOPY  2014    Procedure: COLONOSCOPY;  Surgeon: Syed Walker MD;  Location:  GI    GI SURGERY  2008    Gall bladder    Z NONSPECIFIC PROCEDURE Right     Left knee arthroscopy and replacement    Z NONSPECIFIC PROCEDURE  1997    Bilat inguinal hernia surgery    [facility] NONSPECIFIC PROCEDURE      Vasectomy       FAMILY HISTORY:  Family History   Problem Relation Age of Onset    Family History Negative Mother     Gastrointestinal Disease Father         Crohn's    Cardiovascular Father     Depression Father     Heart Disease Father 66        triple bipass    Neurologic Disorder Father         seizures    Respiratory Father         sleep apnea    C.A.D. Father         Had CABG    Cerebrovascular Disease Father     Seizure Disorder Father     Depression Paternal Grandmother     Lipids Brother     Diabetes Brother     Coronary Artery Disease Brother     Respiratory Brother     Coronary Artery Disease Brother     Cancer - colorectal Maternal Uncle     Depression Other     Cancer - colorectal Other         Maternal Uncle  at 56       SOCIAL HISTORY:  Social History     Socioeconomic History    Marital status:    Occupational History    Occupation: Medical assistant     Employer: [employer]   Tobacco Use    Smoking status: Former     Packs/day: 0.00     Types: Cigarettes     Quit  date: 10/3/1997     Years since quittin.5    Smokeless tobacco: Never    Tobacco comments:        Vaping Use    Vaping status: Never Used   Substance and Sexual Activity    Alcohol use: Yes     Alcohol/week: 16.7 standard drinks of alcohol     Types: 20 Standard drinks or equivalent per week     Comment: 3-4/d    Drug use: No     Comment: caffeine 3-4/d    Sexual activity: Yes     Partners: Female     Birth control/protection: Surgical, Male Surgical   Other Topics Concern    Parent/sibling w/ CABG, MI or angioplasty before 65F 55M? No     Social Determinants of Health     Financial Resource Strain: Low Risk  (10/28/2022)    Overall Financial Resource Strain (CARDIA)     Difficulty of Paying Living Expenses: Not hard at all   Food Insecurity: No Food Insecurity (10/28/2022)    Hunger Vital Sign     Worried About Running Out of Food in the Last Year: Never true     Ran Out of Food in the Last Year: Never true   Transportation Needs: No Transportation Needs (10/28/2022)    PRAPARE - Transportation     Lack of Transportation (Medical): No     Lack of Transportation (Non-Medical): No   Physical Activity: Insufficiently Active (10/28/2022)    Exercise Vital Sign     Days of Exercise per Week: 3 days     Minutes of Exercise per Session: 30 min   Stress: Stress Concern Present (10/28/2022)    [place] [place] [employer] - Occupational Stress Questionnaire     Feeling of Stress : To some extent   Social Connections: Moderately Isolated (10/28/2022)    Social Connection and Isolation Panel [NHANES]     Frequency of Communication with Friends and Family: Once a week     Frequency of Social Gatherings with Friends and Family: Once a week     Attends Sabianist Services: 1 to 4 times per year     Active Member of Clubs or Organizations: No     Marital Status:    Housing Stability: Low Risk  (10/28/2022)    Housing Stability Vital Sign     Unable to Pay for Housing in the Last Year: No     Number of  "Places Lived in the Last Year: 1     Unstable Housing in the Last Year: No       Review of Systems:  Skin:        Eyes:       ENT:       Respiratory:  Positive for shortness of breath;dyspnea on exertion;sleep apnea;CPAP  Cardiovascular:    Positive for;lightheadedness;edema  Gastroenterology:      Genitourinary:       Musculoskeletal:  Positive for back pain  Neurologic:       Psychiatric:       Heme/Lymph/Imm:       Endocrine:         Physical Exam:  Vitals: BP (!) 144/90 (BP Location: Right arm, Patient Position: Sitting, Cuff Size: Adult Large)   Pulse 83   Ht 1.816 m (5' 11.5\")   Wt 132.1 kg (291 lb 3.2 oz)   SpO2 95%   BMI 40.05 kg/m      Constitutional:  cooperative, alert and oriented, well developed, well nourished, in no acute distress        Skin:  warm and dry to the touch, no apparent skin lesions or masses noted          Head:  normocephalic, no masses or lesions        Eyes:  pupils equal and round, conjunctivae and lids unremarkable, sclera white, no xanthalasma, EOMS intact, no nystagmus        Lymph:No Cervical lymphadenopathy present;No thyromegaly     ENT:           Neck:  carotid pulses are full and equal bilaterally, JVP normal, no carotid bruit        Respiratory:  normal breath sounds, clear to auscultation, normal A-P diameter, normal symmetry, normal respiratory excursion, no use of accessory muscles         Cardiac: regular rhythm, normal S1/S2, no S3 or S4, apical impulse not displaced, no murmurs, gallops or rubs                pulses full and equal, no bruits auscultated                                        GI:  abdomen soft, non-tender, BS normoactive, no mass, no HSM, no bruits obese      Extremities and Muscular Skeletal:  no deformities, clubbing, cyanosis, erythema observed;no edema              Neurological:  no gross motor deficits        Psych:  Alert and Oriented x 3      Recent Lab Results:  LIPID RESULTS:  Lab Results   Component Value Date    CHOL 150 10/25/2022    " CHOL 190 01/13/2020    HDL 40 10/25/2022    HDL 41 01/13/2020    LDL 79 10/25/2022     (H) 01/13/2020    TRIG 157 (H) 10/25/2022    TRIG 140 01/13/2020    CHOLHDLRATIO 4.9 09/14/2015       LIVER ENZYME RESULTS:  Lab Results   Component Value Date    AST 35 10/25/2022    AST 57 (H) 03/26/2021    ALT 78 (H) 10/25/2022     (H) 03/26/2021       CBC RESULTS:  Lab Results   Component Value Date    WBC 6.6 10/25/2022    WBC 6.3 01/13/2021    RBC 5.09 10/25/2022    RBC 5.17 01/13/2021    HGB 15.8 10/25/2022    HGB 15.4 01/13/2021    HCT 44.0 10/25/2022    HCT 44.9 01/13/2021    MCV 86 10/25/2022    MCV 87 01/13/2021    MCH 31.0 10/25/2022    MCH 29.8 01/13/2021    MCHC 35.9 10/25/2022    MCHC 34.3 01/13/2021    RDW 13.1 10/25/2022    RDW 13.7 01/13/2021     10/25/2022     01/13/2021       BMP RESULTS:  Lab Results   Component Value Date     10/25/2022     03/26/2021    POTASSIUM 4.0 10/25/2022    POTASSIUM 3.9 03/26/2021    CHLORIDE 106 10/25/2022    CHLORIDE 106 03/26/2021    CO2 26 10/25/2022    CO2 26 03/26/2021    ANIONGAP 8 10/25/2022    ANIONGAP 6 03/26/2021     (H) 10/25/2022     (H) 03/26/2021    BUN 10 10/25/2022    BUN 15 03/26/2021    CR 0.89 10/25/2022    CR 0.95 03/26/2021    GFRESTIMATED >90 10/25/2022    GFRESTIMATED >90 03/26/2021    GFRESTBLACK >90 03/26/2021    VADIM 9.3 10/25/2022    VADIM 9.5 03/26/2021        A1C RESULTS:  Lab Results   Component Value Date    A1C 6.0 (H) 10/25/2022    A1C 5.7 (H) 01/13/2020       INR RESULTS:  No results found for: INR        CC  Rut Flanagan MD  [address] [place] [address]    Thank you for allowing me to participate in the care of your patient.      Sincerely,     Michael Perez MD   [facility]

## 2023-04-24 NOTE — PROGRESS NOTES
"HPI and Plan:   See dictation    Orders Placed This Encounter   Procedures     EKG 12-lead complete w/read - Clinics (performed today)     Orders Placed This Encounter   Medications     nebivolol (BYSTOLIC) 5 MG tablet     Sig: Take 1 tablet (5 mg) by mouth daily     Dispense:  90 tablet     Refill:  3     nebivolol (BYSTOLIC) 5 MG tablet     Sig: Take 1 tablet (5 mg) by mouth daily     Dispense:  90 tablet     Refill:  3     atorvastatin (LIPITOR) 80 MG tablet     Sig: Take 1 tablet (80 mg) by mouth At Bedtime     Dispense:  90 tablet     Refill:  3     Medications Discontinued During This Encounter   Medication Reason     hydrocortisone, Perianal, (HYDROCORTISONE) 2.5 % cream      buPROPion (WELLBUTRIN XL) 300 MG 24 hr tablet      Artificial Tear Solution (JUST TEARS EYE DROPS) SOLN          Encounter Diagnoses   Name Primary?     FH: cardiovascular disease      Benign essential hypertension Yes     Hyperlipidemia LDL goal <70        CURRENT MEDICATIONS:  Current Outpatient Medications   Medication Sig Dispense Refill     atorvastatin (LIPITOR) 40 MG tablet Take 1 tablet (40 mg) by mouth daily 90 tablet 3     atorvastatin (LIPITOR) 80 MG tablet Take 1 tablet (80 mg) by mouth At Bedtime 90 tablet 3     B-D LUER-KELLEY SYRINGE 22G X 1-1/2\" 3 ML MISC 1 EACH EVERY 14 DAYS 10 each 1     BD HYPODERMIC NEEDLE 18G X 1\" MISC 1 EACH EVERY 14 DAYS 10 each 1     losartan (COZAAR) 100 MG tablet Take 1 tablet (100 mg) by mouth daily 90 tablet 3     nebivolol (BYSTOLIC) 5 MG tablet Take 1 tablet (5 mg) by mouth daily 90 tablet 3     nebivolol (BYSTOLIC) 5 MG tablet Take 1 tablet (5 mg) by mouth daily 90 tablet 3     omeprazole (PRILOSEC) 40 MG DR capsule Take 1 capsule (40 mg) by mouth daily 90 capsule 3     sildenafil (VIAGRA) 50 MG tablet Take 1 tablet (50 mg) by mouth daily as needed (1 hour before intercourse) 20 tablet 1     testosterone cypionate (DEPOTESTOSTERONE) 200 MG/ML injection Inject 1 mL (200 mg) into the muscle " every 14 days 10 mL 1       ALLERGIES   No Known Allergies    PAST MEDICAL HISTORY:  Past Medical History:   Diagnosis Date     Concussion, unspecified     Hospitalized overnight as a child     Esophageal reflux      Hypercholesterolemia      Hypertension      GIUSEPPE (obstructive sleep apnea)     uses cpap     Testosterone deficiency     ED       PAST SURGICAL HISTORY:  Past Surgical History:   Procedure Laterality Date     COLONOSCOPY  2014    Procedure: COLONOSCOPY;  Surgeon: Syed Walker MD;  Location:  GI     GI SURGERY  2008    Gall bladder     ZZC NONSPECIFIC PROCEDURE Right     Left knee arthroscopy and replacement     ZZC NONSPECIFIC PROCEDURE  1997    Bilat inguinal hernia surgery     ZZ NONSPECIFIC PROCEDURE      Vasectomy       FAMILY HISTORY:  Family History   Problem Relation Age of Onset     Family History Negative Mother      Gastrointestinal Disease Father         Crohn's     Cardiovascular Father      Depression Father      Heart Disease Father 66        triple bipass     Neurologic Disorder Father         seizures     Respiratory Father         sleep apnea     C.A.D. Father         Had CABG     Cerebrovascular Disease Father      Seizure Disorder Father      Depression Paternal Grandmother      Lipids Brother      Diabetes Brother      Coronary Artery Disease Brother      Respiratory Brother      Coronary Artery Disease Brother      Cancer - colorectal Maternal Uncle      Depression Other      Cancer - colorectal Other         Maternal Uncle  at 56       SOCIAL HISTORY:  Social History     Socioeconomic History     Marital status:    Occupational History     Occupation: Medical assistant     Employer: [employer]   Tobacco Use     Smoking status: Former     Packs/day: 0.00     Types: Cigarettes     Quit date: 10/3/1997     Years since quittin.5     Smokeless tobacco: Never     Tobacco comments:        Vaping Use     Vaping status: Never Used    Substance and Sexual Activity     Alcohol use: Yes     Alcohol/week: 16.7 standard drinks of alcohol     Types: 20 Standard drinks or equivalent per week     Comment: 3-4/d     Drug use: No     Comment: caffeine 3-4/d     Sexual activity: Yes     Partners: Female     Birth control/protection: Surgical, Male Surgical   Other Topics Concern     Parent/sibling w/ CABG, MI or angioplasty before 65F 55M? No     Social Determinants of Health     Financial Resource Strain: Low Risk  (10/28/2022)    Overall Financial Resource Strain (CARDIA)      Difficulty of Paying Living Expenses: Not hard at all   Food Insecurity: No Food Insecurity (10/28/2022)    Hunger Vital Sign      Worried About Running Out of Food in the Last Year: Never true      Ran Out of Food in the Last Year: Never true   Transportation Needs: No Transportation Needs (10/28/2022)    PRAPARE - Transportation      Lack of Transportation (Medical): No      Lack of Transportation (Non-Medical): No   Physical Activity: Insufficiently Active (10/28/2022)    Exercise Vital Sign      Days of Exercise per Week: 3 days      Minutes of Exercise per Session: 30 min   Stress: Stress Concern Present (10/28/2022)    [employer] [place] [employer] - Occupational Stress Questionnaire      Feeling of Stress : To some extent   Social Connections: Moderately Isolated (10/28/2022)    Social Connection and Isolation Panel [NHANES]      Frequency of Communication with Friends and Family: Once a week      Frequency of Social Gatherings with Friends and Family: Once a week      Attends Caodaism Services: 1 to 4 times per year      Active Member of Clubs or Organizations: No      Marital Status:    Housing Stability: Low Risk  (10/28/2022)    Housing Stability Vital Sign      Unable to Pay for Housing in the Last Year: No      Number of Places Lived in the Last Year: 1      Unstable Housing in the Last Year: No       Review of Systems:  Skin:        Eyes:      "  ENT:       Respiratory:  Positive for shortness of breath;dyspnea on exertion;sleep apnea;CPAP  Cardiovascular:    Positive for;lightheadedness;edema  Gastroenterology:      Genitourinary:       Musculoskeletal:  Positive for back pain  Neurologic:       Psychiatric:       Heme/Lymph/Imm:       Endocrine:         Physical Exam:  Vitals: BP (!) 144/90 (BP Location: Right arm, Patient Position: Sitting, Cuff Size: Adult Large)   Pulse 83   Ht 1.816 m (5' 11.5\")   Wt 132.1 kg (291 lb 3.2 oz)   SpO2 95%   BMI 40.05 kg/m      Constitutional:  cooperative, alert and oriented, well developed, well nourished, in no acute distress        Skin:  warm and dry to the touch, no apparent skin lesions or masses noted          Head:  normocephalic, no masses or lesions        Eyes:  pupils equal and round, conjunctivae and lids unremarkable, sclera white, no xanthalasma, EOMS intact, no nystagmus        Lymph:No Cervical lymphadenopathy present;No thyromegaly     ENT:           Neck:  carotid pulses are full and equal bilaterally, JVP normal, no carotid bruit        Respiratory:  normal breath sounds, clear to auscultation, normal A-P diameter, normal symmetry, normal respiratory excursion, no use of accessory muscles         Cardiac: regular rhythm, normal S1/S2, no S3 or S4, apical impulse not displaced, no murmurs, gallops or rubs                pulses full and equal, no bruits auscultated                                        GI:  abdomen soft, non-tender, BS normoactive, no mass, no HSM, no bruits obese      Extremities and Muscular Skeletal:  no deformities, clubbing, cyanosis, erythema observed;no edema              Neurological:  no gross motor deficits        Psych:  Alert and Oriented x 3      Recent Lab Results:  LIPID RESULTS:  Lab Results   Component Value Date    CHOL 150 10/25/2022    CHOL 190 01/13/2020    HDL 40 10/25/2022    HDL 41 01/13/2020    LDL 79 10/25/2022     (H) 01/13/2020    TRIG 157 (H) " 10/25/2022    TRIG 140 01/13/2020    CHOLHDLRATIO 4.9 09/14/2015       LIVER ENZYME RESULTS:  Lab Results   Component Value Date    AST 35 10/25/2022    AST 57 (H) 03/26/2021    ALT 78 (H) 10/25/2022     (H) 03/26/2021       CBC RESULTS:  Lab Results   Component Value Date    WBC 6.6 10/25/2022    WBC 6.3 01/13/2021    RBC 5.09 10/25/2022    RBC 5.17 01/13/2021    HGB 15.8 10/25/2022    HGB 15.4 01/13/2021    HCT 44.0 10/25/2022    HCT 44.9 01/13/2021    MCV 86 10/25/2022    MCV 87 01/13/2021    MCH 31.0 10/25/2022    MCH 29.8 01/13/2021    MCHC 35.9 10/25/2022    MCHC 34.3 01/13/2021    RDW 13.1 10/25/2022    RDW 13.7 01/13/2021     10/25/2022     01/13/2021       BMP RESULTS:  Lab Results   Component Value Date     10/25/2022     03/26/2021    POTASSIUM 4.0 10/25/2022    POTASSIUM 3.9 03/26/2021    CHLORIDE 106 10/25/2022    CHLORIDE 106 03/26/2021    CO2 26 10/25/2022    CO2 26 03/26/2021    ANIONGAP 8 10/25/2022    ANIONGAP 6 03/26/2021     (H) 10/25/2022     (H) 03/26/2021    BUN 10 10/25/2022    BUN 15 03/26/2021    CR 0.89 10/25/2022    CR 0.95 03/26/2021    GFRESTIMATED >90 10/25/2022    GFRESTIMATED >90 03/26/2021    GFRESTBLACK >90 03/26/2021    VADIM 9.3 10/25/2022    VADIM 9.5 03/26/2021        A1C RESULTS:  Lab Results   Component Value Date    A1C 6.0 (H) 10/25/2022    A1C 5.7 (H) 01/13/2020       INR RESULTS:  No results found for: INR        CC  Rut Flanagan MD  [address] [place] [address]

## 2023-05-08 ENCOUNTER — HOSPITAL ENCOUNTER (OUTPATIENT)
Dept: CARDIOLOGY | Facility: CLINIC | Age: 58
Discharge: HOME | End: 2023-05-08
Attending: INTERNAL MEDICINE
Payer: COMMERCIAL

## 2023-05-08 ENCOUNTER — HOSPITAL ENCOUNTER (OUTPATIENT)
Dept: NUCLEAR MEDICINE | Facility: CLINIC | Age: 58
Setting detail: NUCLEAR MEDICINE
Discharge: HOME | End: 2023-05-08
Attending: INTERNAL MEDICINE
Payer: COMMERCIAL

## 2023-05-08 DIAGNOSIS — I20.89 OTHER FORMS OF ANGINA PECTORIS (H): ICD-10-CM

## 2023-05-08 DIAGNOSIS — R06.09 DYSPNEA ON EXERTION: ICD-10-CM

## 2023-05-08 DIAGNOSIS — I10 BENIGN ESSENTIAL HYPERTENSION: ICD-10-CM

## 2023-05-08 LAB — LVEF ECHO: NORMAL

## 2023-05-08 PROCEDURE — 93306 TTE W/DOPPLER COMPLETE: CPT

## 2023-05-08 PROCEDURE — 93018 CV STRESS TEST I&R ONLY: CPT | Performed by: INTERNAL MEDICINE

## 2023-05-08 PROCEDURE — 93017 CV STRESS TEST TRACING ONLY: CPT

## 2023-05-08 PROCEDURE — 93306 TTE W/DOPPLER COMPLETE: CPT | Mod: 26 | Performed by: INTERNAL MEDICINE

## 2023-05-08 PROCEDURE — 78452 HT MUSCLE IMAGE SPECT MULT: CPT | Mod: 26 | Performed by: INTERNAL MEDICINE

## 2023-05-08 PROCEDURE — 343N000001 HC RX 343: Performed by: INTERNAL MEDICINE

## 2023-05-08 PROCEDURE — A9502 TC99M TETROFOSMIN: HCPCS | Performed by: INTERNAL MEDICINE

## 2023-05-08 PROCEDURE — 78452 HT MUSCLE IMAGE SPECT MULT: CPT

## 2023-05-08 RX ADMIN — Medication 34 MILLICURIE: at 08:55

## 2023-05-09 ENCOUNTER — HOSPITAL ENCOUNTER (OUTPATIENT)
Dept: CARDIOLOGY | Facility: CLINIC | Age: 58
Discharge: HOME | End: 2023-05-09
Attending: INTERNAL MEDICINE
Payer: COMMERCIAL

## 2023-05-09 ENCOUNTER — HOSPITAL ENCOUNTER (OUTPATIENT)
Dept: NUCLEAR MEDICINE | Facility: CLINIC | Age: 58
Setting detail: NUCLEAR MEDICINE
Discharge: HOME | End: 2023-05-09
Attending: INTERNAL MEDICINE
Payer: COMMERCIAL

## 2023-05-09 LAB
CV STRESS MAX HR HE: 160
NUC STRESS EJECTION FRACTION: 63 %
RATE PRESSURE PRODUCT: NORMAL
STRESS ANGINA INDEX: 0
STRESS ECHO BASELINE DIASTOLIC HE: 83
STRESS ECHO BASELINE HR: 77 BPM
STRESS ECHO BASELINE SYSTOLIC BP: 145
STRESS ECHO CALCULATED PERCENT HR: 98 %
STRESS ECHO LAST STRESS DIASTOLIC BP: 83
STRESS ECHO LAST STRESS SYSTOLIC BP: 210
STRESS ECHO POST ESTIMATED WORKLOAD: 10 METS
STRESS ECHO POST EXERCISE DUR MIN: 9 MIN
STRESS ECHO POST EXERCISE DUR SEC: 0 SEC
STRESS ECHO TARGET HR: 164

## 2023-05-09 PROCEDURE — 93016 CV STRESS TEST SUPVJ ONLY: CPT | Performed by: INTERNAL MEDICINE

## 2023-05-09 PROCEDURE — A9502 TC99M TETROFOSMIN: HCPCS | Performed by: INTERNAL MEDICINE

## 2023-05-09 PROCEDURE — 343N000001 HC RX 343: Performed by: INTERNAL MEDICINE

## 2023-05-09 RX ORDER — ALBUTEROL SULFATE 90 UG/1
2 AEROSOL, METERED RESPIRATORY (INHALATION) EVERY 5 MIN PRN
Status: DISCONTINUED | OUTPATIENT
Start: 2023-05-09 | End: 2023-05-09 | Stop reason: ALTCHOICE

## 2023-05-09 RX ORDER — CAFFEINE CITRATE 20 MG/ML
60 SOLUTION INTRAVENOUS
Status: DISCONTINUED | OUTPATIENT
Start: 2023-05-09 | End: 2023-05-09 | Stop reason: ALTCHOICE

## 2023-05-09 RX ORDER — REGADENOSON 0.08 MG/ML
0.4 INJECTION, SOLUTION INTRAVENOUS ONCE
Status: DISCONTINUED | OUTPATIENT
Start: 2023-05-09 | End: 2023-05-09 | Stop reason: ALTCHOICE

## 2023-05-09 RX ORDER — AMINOPHYLLINE 25 MG/ML
50-100 INJECTION, SOLUTION INTRAVENOUS
Status: DISCONTINUED | OUTPATIENT
Start: 2023-05-09 | End: 2023-05-09 | Stop reason: ALTCHOICE

## 2023-05-09 RX ORDER — ACYCLOVIR 200 MG/1
0-1 CAPSULE ORAL
Status: DISCONTINUED | OUTPATIENT
Start: 2023-05-09 | End: 2023-05-10 | Stop reason: HOSPADM

## 2023-05-09 RX ADMIN — Medication 34 MILLICURIE: at 11:54

## 2023-05-30 ENCOUNTER — OFFICE VISIT (OUTPATIENT)
Dept: FAMILY MEDICINE | Facility: CLINIC | Age: 58
End: 2023-05-30
Payer: COMMERCIAL

## 2023-05-30 VITALS
BODY MASS INDEX: 40.05 KG/M2 | OXYGEN SATURATION: 98 % | SYSTOLIC BLOOD PRESSURE: 138 MMHG | HEIGHT: 72 IN | DIASTOLIC BLOOD PRESSURE: 84 MMHG | RESPIRATION RATE: 20 BRPM | TEMPERATURE: 98.2 F | HEART RATE: 81 BPM

## 2023-05-30 DIAGNOSIS — S06.0X9D CONCUSSION WITH LOSS OF CONSCIOUSNESS, SUBSEQUENT ENCOUNTER: ICD-10-CM

## 2023-05-30 DIAGNOSIS — M62.838 NECK MUSCLE SPASM: Primary | ICD-10-CM

## 2023-05-30 DIAGNOSIS — I10 BENIGN ESSENTIAL HYPERTENSION: ICD-10-CM

## 2023-05-30 PROCEDURE — 99213 OFFICE O/P EST LOW 20 MIN: CPT | Performed by: FAMILY MEDICINE

## 2023-05-30 RX ORDER — CYCLOBENZAPRINE HCL 10 MG
10 TABLET ORAL 3 TIMES DAILY PRN
Qty: 30 TABLET | Refills: 0 | Status: SHIPPED | OUTPATIENT
Start: 2023-05-30 | End: 2023-05-30

## 2023-05-30 RX ORDER — PREDNISONE 20 MG/1
40 TABLET ORAL DAILY
Qty: 10 TABLET | Refills: 0 | Status: SHIPPED | OUTPATIENT
Start: 2023-05-30 | End: 2023-05-30

## 2023-05-30 RX ORDER — PREDNISONE 20 MG/1
40 TABLET ORAL DAILY
Qty: 10 TABLET | Refills: 0 | Status: SHIPPED | OUTPATIENT
Start: 2023-05-30 | End: 2023-11-03

## 2023-05-30 RX ORDER — CYCLOBENZAPRINE HCL 10 MG
10 TABLET ORAL 3 TIMES DAILY PRN
Qty: 30 TABLET | Refills: 0 | Status: SHIPPED | OUTPATIENT
Start: 2023-05-30 | End: 2023-11-03

## 2023-05-30 NOTE — LETTER
May 30, 2023      Galileo Vieira  [address] [facility] [address]        To Whom It May Concern:    RE: Galileo Gurrola is my patient. He continues to struggle with post-concussive headaches, and I have instructed him to continue wearing a baseball cap at work to block the fluorescent light from overhead.      This restriction should be considered valid until May 31, 2024.      Please contact me for questions or concerns.      Sincerely,        Rut Flanagan MD

## 2023-05-30 NOTE — PROGRESS NOTES
"  Assessment & Plan     Neck muscle spasm - has DDD of his cervical spine, but this seems more like a trap muscle strain. Advised continue with NSAID, heat, TENs, can use flexeril overnight. Prednisone sent in case symptoms fail to improve after a couple of days.   - cyclobenzaprine (FLEXERIL) 10 MG tablet; Take 1 tablet (10 mg) by mouth 3 times daily as needed for muscle spasms  - predniSONE (DELTASONE) 20 MG tablet; Take 2 tablets (40 mg) by mouth daily    Concussion with loss of consciousness, subsequent encounter - he would flores to get re-established with concussion clinic. [facility] paperwork completed today.   - Concussion  Referral; Future    Benign essential hypertension - in range, continue current.     Rut Flanagan MD  [facility] TETO Gurrola is a 57 year old, presenting for the following health issues:    Neck Pain (Follow up and [facility] paperwork. Neck pain has increased over the last 2 days. Trouble sleeping and turning head./)        4/21/2023     9:28 AM   Additional Questions   Roomed by Fanny ALLEN     History of Present Illness       Reason for visit:  Neck pain and follow up concussion issues    He eats 2-3 servings of fruits and vegetables daily.He consumes 1 sweetened beverage(s) daily.He exercises with enough effort to increase his heart rate 9 or less minutes per day.  He exercises with enough effort to increase his heart rate 3 or less days per week.   He is taking medications regularly.           Review of Systems   Constitutional, HEENT, cardiovascular, pulmonary, gi and gu systems are negative, except as otherwise noted.      Objective    /84 (Cuff Size: Adult Large)   Pulse 81   Temp 98.2  F (36.8  C) (Oral)   Resp 20   Ht 1.816 m (5' 11.5\")   SpO2 98%   BMI 40.05 kg/m    Body mass index is 40.05 kg/m .  Physical Exam   GENERAL: healthy, alert and no distress  PSYCH: mentation appears normal, affect normal/bright              "
3

## 2023-06-02 ENCOUNTER — OFFICE VISIT (OUTPATIENT)
Dept: CARDIOLOGY | Facility: CLINIC | Age: 58
End: 2023-06-02
Attending: INTERNAL MEDICINE
Payer: COMMERCIAL

## 2023-06-02 VITALS
HEART RATE: 70 BPM | SYSTOLIC BLOOD PRESSURE: 132 MMHG | BODY MASS INDEX: 39.17 KG/M2 | DIASTOLIC BLOOD PRESSURE: 78 MMHG | HEIGHT: 72 IN | OXYGEN SATURATION: 96 % | WEIGHT: 289.2 LBS

## 2023-06-02 DIAGNOSIS — E78.5 HYPERLIPIDEMIA LDL GOAL <70: ICD-10-CM

## 2023-06-02 DIAGNOSIS — I20.89 OTHER FORMS OF ANGINA PECTORIS (H): ICD-10-CM

## 2023-06-02 DIAGNOSIS — I10 BENIGN ESSENTIAL HYPERTENSION: Primary | ICD-10-CM

## 2023-06-02 DIAGNOSIS — R06.09 DYSPNEA ON EXERTION: ICD-10-CM

## 2023-06-02 DIAGNOSIS — E66.01 MORBID OBESITY (H): ICD-10-CM

## 2023-06-02 PROCEDURE — 99214 OFFICE O/P EST MOD 30 MIN: CPT | Performed by: NURSE PRACTITIONER

## 2023-06-02 NOTE — LETTER
6/2/2023    Rut Flanagan MD  [id number]  [facility] [id number]    RE: Galileo Vieira       Dear Colleague,     I had the pleasure of seeing Galileo Vieira in the [facility].  Cardiology Clinic Progress Note  Galileo Vieira MRN# [id number]   YOB: 1965 Age: 57 year old   Primary Cardiologist: Dr. Perez Reason for visit: Cardiology follow up             Assessment and Plan:   Galileo Vieira is a very pleasant 57 year old male here for cardiology follow up.     1. Hypertension - controlled   2. Hyperlipidemia - LDL 79  3. Obesity  4. Strong family history for premature coronary artery disease      I was patient today for cardiology follow-up. Reviewed recent cardiac testing. Nuclear stress test did not show any significant ischemia or infarction. Echocardiogram showed LVEF 55-60%, RV normal size/function, no significant valvular disease and IVC was normal size. Recommend continuing current medications including losartan, bystolic and atorvastatin. Counseled on healthy lifestyle modifications including regular exercise routine and mediterranean diet.       Changes today: none    Follow up plan:     Follow up with cardiology as needed        History of Presenting Illness:    Galileo Vieira is a very pleasant 57 year old male with a history of hypertension, hyperlipidemia, obesity, strong family history for premature coronary artery disease    Patient met with Dr. Perez the end of April due to complaints of exertional dyspnea, intermittent chest pain with multiple cardiac risk factors. Recommended nuclear stress test, echocardiogram, started on nebivolol.     Nuclear stress test did not show any significant ischemia or infarction.    Echocardiogram showed LVEF 55-60%, RV normal size/function, no significant valvular disease and IVC was normal size.     Patient is here today for cardiology follow up.     Patient reports feeling good. Denies chest pain or chest tightness, notes  hasn't had any pain since OV with Dr. Perez. Denies shortness of breath at rest. Denies orthopnea or PND. Some exertional dyspnea with more strenuous activity. Denies dizziness, lightheadedness or other presyncopal symptoms. Denies tachycardia or palpitations. 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 Notes since this he has had chronic neck/back issues.     Blood pressure 132/78 and HR 70 in clinic today.    Appetite good.  Able to walk easily 30 mins with no difficulty. He also has a bicycle that he enjoys regularly. Notes he used to have a regular exercise but since becoming a grandpa hasn't been as active. Drinking 2-3 alcohol drinks daily. Past history of tobacco use, quit long time a ago.         Social History      Social History     Socioeconomic History    Marital status:      Spouse name: Not on file    Number of children: Not on file    Years of education: Not on file    Highest education level: Not on file   Occupational History    Occupation: Medical assistant     Employer: [employer]   Tobacco Use    Smoking status: Former     Packs/day: 0.00     Types: Cigarettes     Quit date: 10/3/1997     Years since quittin.6    Smokeless tobacco: Never    Tobacco comments:        Vaping Use    Vaping status: Never Used   Substance and Sexual Activity    Alcohol use: Yes     Alcohol/week: 16.7 standard drinks of alcohol     Types: 20 Standard drinks or equivalent per week     Comment: 3-4/d    Drug use: No     Comment: caffeine 3-4/d    Sexual activity: Yes     Partners: Female     Birth control/protection: Surgical, Male Surgical   Other Topics Concern    Parent/sibling w/ CABG, MI or angioplasty before 65F 55M? No   Social History Narrative    Not on file     Social Determinants of Health     Financial Resource Strain: Low Risk  (10/28/2022)    Overall Financial Resource Strain (CARDIA)     Difficulty of Paying Living Expenses: Not  hard at all   Food Insecurity: No Food Insecurity (10/28/2022)    Hunger Vital Sign     Worried About Running Out of Food in the Last Year: Never true     Ran Out of Food in the Last Year: Never true   Transportation Needs: No Transportation Needs (10/28/2022)    PRAPARE - Transportation     Lack of Transportation (Medical): No     Lack of Transportation (Non-Medical): No   Physical Activity: Insufficiently Active (10/28/2022)    Exercise Vital Sign     Days of Exercise per Week: 3 days     Minutes of Exercise per Session: 30 min   Stress: Stress Concern Present (10/28/2022)    [place] [employer] - Occupational Stress Questionnaire     Feeling of Stress : To some extent   Social Connections: Moderately Isolated (10/28/2022)    Social Connection and Isolation Panel [NHANES]     Frequency of Communication with Friends and Family: Once a week     Frequency of Social Gatherings with Friends and Family: Once a week     Attends Jain Services: 1 to 4 times per year     Active Member of Clubs or Organizations: No     Attends Club or Organization Meetings: Not on file     Marital Status:    Intimate Partner Violence: Not on file   Housing Stability: Low Risk  (10/28/2022)    Housing Stability Vital Sign     Unable to Pay for Housing in the Last Year: No     Number of Places Lived in the Last Year: 1     Unstable Housing in the Last Year: No          Review of Systems:   Skin:  Negative     Eyes:  Positive for glasses  ENT:  Negative    Respiratory:  Positive for dyspnea on exertion;sleep apnea;CPAP  Cardiovascular:    lightheadedness;Positive for  Gastroenterology:      Genitourinary:       Musculoskeletal:       Neurologic:       Psychiatric:       Heme/Lymph/Imm:       Endocrine:              Physical Exam:   Vitals: There were no vitals taken for this visit.   Wt Readings from Last 4 Encounters:   04/24/23 132.1 kg (291 lb 3.2 oz)   04/21/23 127 kg (280 lb)   01/23/23 128.8 kg (284 lb)    10/28/22 124.1 kg (273 lb 8 oz)     GEN: well nourished, in no acute distress.  HEENT:  Pupils equal, round. Sclerae nonicteric.   NECK: Supple, no masses appreciated.   C/V:  Regular rate and rhythm, no murmur, rub or gallop.    RESP: Respirations are unlabored. Clear to auscultation bilaterally without wheezing, rales, or rhonchi.  GI: Abdomen soft, nontender.  EXTREM: No LE edema.  NEURO: Alert and oriented, cooperative.  SKIN: Warm and dry.        Data:     LIPID RESULTS:  Lab Results   Component Value Date    CHOL 150 10/25/2022    CHOL 190 01/13/2020    HDL 40 10/25/2022    HDL 41 01/13/2020    LDL 79 10/25/2022     (H) 01/13/2020    TRIG 157 (H) 10/25/2022    TRIG 140 01/13/2020    CHOLHDLRATIO 4.9 09/14/2015     LIVER ENZYME RESULTS:  Lab Results   Component Value Date    AST 35 10/25/2022    AST 57 (H) 03/26/2021    ALT 78 (H) 10/25/2022     (H) 03/26/2021     CBC RESULTS:  Lab Results   Component Value Date    WBC 6.6 10/25/2022    WBC 6.3 01/13/2021    RBC 5.09 10/25/2022    RBC 5.17 01/13/2021    HGB 15.8 10/25/2022    HGB 15.4 01/13/2021    HCT 44.0 10/25/2022    HCT 44.9 01/13/2021    MCV 86 10/25/2022    MCV 87 01/13/2021    MCH 31.0 10/25/2022    MCH 29.8 01/13/2021    MCHC 35.9 10/25/2022    MCHC 34.3 01/13/2021    RDW 13.1 10/25/2022    RDW 13.7 01/13/2021     10/25/2022     01/13/2021     BMP RESULTS:  Lab Results   Component Value Date     10/25/2022     03/26/2021    POTASSIUM 4.0 10/25/2022    POTASSIUM 3.9 03/26/2021    CHLORIDE 106 10/25/2022    CHLORIDE 106 03/26/2021    CO2 26 10/25/2022    CO2 26 03/26/2021    ANIONGAP 8 10/25/2022    ANIONGAP 6 03/26/2021     (H) 10/25/2022     (H) 03/26/2021    BUN 10 10/25/2022    BUN 15 03/26/2021    CR 0.89 10/25/2022    CR 0.95 03/26/2021    GFRESTIMATED >90 10/25/2022    GFRESTIMATED >90 03/26/2021    GFRESTBLACK >90 03/26/2021    VADIM 9.3 10/25/2022    VADIM 9.5 03/26/2021      A1C RESULTS:  Lab  "Results   Component Value Date    A1C 6.0 (H) 10/25/2022    A1C 5.7 (H) 01/13/2020          Medications     Current Outpatient Medications   Medication Sig Dispense Refill    atorvastatin (LIPITOR) 80 MG tablet Take 1 tablet (80 mg) by mouth At Bedtime 90 tablet 3    B-D LUER-KELLEY SYRINGE 22G X 1-1/2\" 3 ML MISC 1 EACH EVERY 14 DAYS 10 each 1    BD HYPODERMIC NEEDLE 18G X 1\" MISC 1 EACH EVERY 14 DAYS 10 each 1    cyclobenzaprine (FLEXERIL) 10 MG tablet Take 1 tablet (10 mg) by mouth 3 times daily as needed for muscle spasms 30 tablet 0    losartan (COZAAR) 100 MG tablet Take 1 tablet (100 mg) by mouth daily 90 tablet 3    nebivolol (BYSTOLIC) 5 MG tablet Take 1 tablet (5 mg) by mouth daily 90 tablet 3    nebivolol (BYSTOLIC) 5 MG tablet Take 1 tablet (5 mg) by mouth daily 90 tablet 3    omeprazole (PRILOSEC) 40 MG DR capsule Take 1 capsule (40 mg) by mouth daily 90 capsule 3    predniSONE (DELTASONE) 20 MG tablet Take 2 tablets (40 mg) by mouth daily 10 tablet 0    sildenafil (VIAGRA) 50 MG tablet Take 1 tablet (50 mg) by mouth daily as needed (1 hour before intercourse) 20 tablet 1    testosterone cypionate (DEPOTESTOSTERONE) 200 MG/ML injection Inject 1 mL (200 mg) into the muscle every 14 days 10 mL 1        Past Medical History     Past Medical History:   Diagnosis Date    Concussion, unspecified     Hospitalized overnight as a child    Esophageal reflux     Hypercholesterolemia     Hypertension     IFG (impaired fasting glucose)     GIUSEPPE (obstructive sleep apnea)     uses cpap    Testosterone deficiency     ED     Past Surgical History:   Procedure Laterality Date    COLONOSCOPY  05/12/2014    Procedure: COLONOSCOPY;  Surgeon: Syed Walker MD;  Location:  GI    GI SURGERY  01/01/2008    Gall bladder    ZZC NONSPECIFIC PROCEDURE Right     Left knee arthroscopy and replacement    Z NONSPECIFIC PROCEDURE  01/01/1997    Bilat inguinal hernia surgery    [facility] NONSPECIFIC PROCEDURE      Vasectomy     Family " History   Problem Relation Age of Onset    Family History Negative Mother     Gastrointestinal Disease Father         Crohn's    Cardiovascular Father     Depression Father     Heart Disease Father 66        triple bipass    Neurologic Disorder Father         seizures    Respiratory Father         sleep apnea    C.A.D. Father         Had CABG    Cerebrovascular Disease Father     Seizure Disorder Father     Depression Paternal Grandmother     Lipids Brother     Diabetes Brother     Coronary Artery Disease Brother     Respiratory Brother     Coronary Artery Disease Brother     Cancer - colorectal Maternal Uncle     Depression Other     Cancer - colorectal Other         Maternal Uncle  at 56            Allergies   Patient has no known allergies.    30 minutes spent on the date of the encounter doing chart review, history and exam, documentation and further activities as noted above      WILLOW Stevenson CNP  [facility]  Pager: [phone]        Thank you for allowing me to participate in the care of your patient.      Sincerely,     WILLOW Stevenson CNP     [facility]  cc:   Michael Perez MD  [address]  [place] [address]

## 2023-06-02 NOTE — PATIENT INSTRUCTIONS
No medication changes  Follow up as needed with cardiology   Please call or [employer] message with any questions, [phone]

## 2023-06-02 NOTE — PROGRESS NOTES
Cardiology Clinic Progress Note  Galileo Vieira MRN# [id number]   YOB: 1965 Age: 57 year old   Primary Cardiologist: Dr. Perez Reason for visit: Cardiology follow up             Assessment and Plan:   Galileo Vieira is a very pleasant 57 year old male here for cardiology follow up.     1. Hypertension - controlled   2. Hyperlipidemia - LDL 79  3. Obesity  4. Strong family history for premature coronary artery disease      I was patient today for cardiology follow-up. Reviewed recent cardiac testing. Nuclear stress test did not show any significant ischemia or infarction. Echocardiogram showed LVEF 55-60%, RV normal size/function, no significant valvular disease and IVC was normal size. Recommend continuing current medications including losartan, bystolic and atorvastatin. Counseled on healthy lifestyle modifications including regular exercise routine and mediterranean diet.       Changes today: none    Follow up plan:     Follow up with cardiology as needed        History of Presenting Illness:    Galileo Vieira is a very pleasant 57 year old male with a history of hypertension, hyperlipidemia, obesity, strong family history for premature coronary artery disease    Patient met with Dr. Perez the end of April due to complaints of exertional dyspnea, intermittent chest pain with multiple cardiac risk factors. Recommended nuclear stress test, echocardiogram, started on nebivolol.     Nuclear stress test did not show any significant ischemia or infarction.    Echocardiogram showed LVEF 55-60%, RV normal size/function, no significant valvular disease and IVC was normal size.     Patient is here today for cardiology follow up.     Patient reports feeling good. Denies chest pain or chest tightness, notes hasn't had any pain since OV with Dr. Perez. Denies shortness of breath at rest. Denies orthopnea or PND. Some exertional dyspnea with more strenuous activity. Denies dizziness, lightheadedness or other  presyncopal symptoms. Denies tachycardia or palpitations. 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 Notes since this he has had chronic neck/back issues.     Blood pressure 132/78 and HR 70 in clinic today.    Appetite good.  Able to walk easily 30 mins with no difficulty. He also has a bicycle that he enjoys regularly. Notes he used to have a regular exercise but since becoming a grandpa hasn't been as active. Drinking 2-3 alcohol drinks daily. Past history of tobacco use, quit long time a ago.         Social History      Social History     Socioeconomic History     Marital status:      Spouse name: Not on file     Number of children: Not on file     Years of education: Not on file     Highest education level: Not on file   Occupational History     Occupation: Medical assistant     Employer: [employer]   Tobacco Use     Smoking status: Former     Packs/day: 0.00     Types: Cigarettes     Quit date: 10/3/1997     Years since quittin.6     Smokeless tobacco: Never     Tobacco comments:        Vaping Use     Vaping status: Never Used   Substance and Sexual Activity     Alcohol use: Yes     Alcohol/week: 16.7 standard drinks of alcohol     Types: 20 Standard drinks or equivalent per week     Comment: 3-4/d     Drug use: No     Comment: caffeine 3-4/d     Sexual activity: Yes     Partners: Female     Birth control/protection: Surgical, Male Surgical   Other Topics Concern     Parent/sibling w/ CABG, MI or angioplasty before 65F 55M? No   Social History Narrative     Not on file     Social Determinants of Health     Financial Resource Strain: Low Risk  (10/28/2022)    Overall Financial Resource Strain (CARDIA)      Difficulty of Paying Living Expenses: Not hard at all   Food Insecurity: No Food Insecurity (10/28/2022)    Hunger Vital Sign      Worried About Running Out of Food in the Last Year: Never true      Ran Out of Food in the Last  Year: Never true   Transportation Needs: No Transportation Needs (10/28/2022)    PRAPARE - Transportation      Lack of Transportation (Medical): No      Lack of Transportation (Non-Medical): No   Physical Activity: Insufficiently Active (10/28/2022)    Exercise Vital Sign      Days of Exercise per Week: 3 days      Minutes of Exercise per Session: 30 min   Stress: Stress Concern Present (10/28/2022)    [place] [employer] - Occupational Stress Questionnaire      Feeling of Stress : To some extent   Social Connections: Moderately Isolated (10/28/2022)    Social Connection and Isolation Panel [NHANES]      Frequency of Communication with Friends and Family: Once a week      Frequency of Social Gatherings with Friends and Family: Once a week      Attends Scientologist Services: 1 to 4 times per year      Active Member of Clubs or Organizations: No      Attends Club or Organization Meetings: Not on file      Marital Status:    Intimate Partner Violence: Not on file   Housing Stability: Low Risk  (10/28/2022)    Housing Stability Vital Sign      Unable to Pay for Housing in the Last Year: No      Number of Places Lived in the Last Year: 1      Unstable Housing in the Last Year: No          Review of Systems:   Skin:  Negative     Eyes:  Positive for glasses  ENT:  Negative    Respiratory:  Positive for dyspnea on exertion;sleep apnea;CPAP  Cardiovascular:    lightheadedness;Positive for  Gastroenterology:      Genitourinary:       Musculoskeletal:       Neurologic:       Psychiatric:       Heme/Lymph/Imm:       Endocrine:              Physical Exam:   Vitals: There were no vitals taken for this visit.   Wt Readings from Last 4 Encounters:   04/24/23 132.1 kg (291 lb 3.2 oz)   04/21/23 127 kg (280 lb)   01/23/23 128.8 kg (284 lb)   10/28/22 124.1 kg (273 lb 8 oz)     GEN: well nourished, in no acute distress.  HEENT:  Pupils equal, round. Sclerae nonicteric.   NECK: Supple, no masses appreciated.    C/V:  Regular rate and rhythm, no murmur, rub or gallop.    RESP: Respirations are unlabored. Clear to auscultation bilaterally without wheezing, rales, or rhonchi.  GI: Abdomen soft, nontender.  EXTREM: No LE edema.  NEURO: Alert and oriented, cooperative.  SKIN: Warm and dry.        Data:     LIPID RESULTS:  Lab Results   Component Value Date    CHOL 150 10/25/2022    CHOL 190 01/13/2020    HDL 40 10/25/2022    HDL 41 01/13/2020    LDL 79 10/25/2022     (H) 01/13/2020    TRIG 157 (H) 10/25/2022    TRIG 140 01/13/2020    CHOLHDLRATIO 4.9 09/14/2015     LIVER ENZYME RESULTS:  Lab Results   Component Value Date    AST 35 10/25/2022    AST 57 (H) 03/26/2021    ALT 78 (H) 10/25/2022     (H) 03/26/2021     CBC RESULTS:  Lab Results   Component Value Date    WBC 6.6 10/25/2022    WBC 6.3 01/13/2021    RBC 5.09 10/25/2022    RBC 5.17 01/13/2021    HGB 15.8 10/25/2022    HGB 15.4 01/13/2021    HCT 44.0 10/25/2022    HCT 44.9 01/13/2021    MCV 86 10/25/2022    MCV 87 01/13/2021    MCH 31.0 10/25/2022    MCH 29.8 01/13/2021    MCHC 35.9 10/25/2022    MCHC 34.3 01/13/2021    RDW 13.1 10/25/2022    RDW 13.7 01/13/2021     10/25/2022     01/13/2021     BMP RESULTS:  Lab Results   Component Value Date     10/25/2022     03/26/2021    POTASSIUM 4.0 10/25/2022    POTASSIUM 3.9 03/26/2021    CHLORIDE 106 10/25/2022    CHLORIDE 106 03/26/2021    CO2 26 10/25/2022    CO2 26 03/26/2021    ANIONGAP 8 10/25/2022    ANIONGAP 6 03/26/2021     (H) 10/25/2022     (H) 03/26/2021    BUN 10 10/25/2022    BUN 15 03/26/2021    CR 0.89 10/25/2022    CR 0.95 03/26/2021    GFRESTIMATED >90 10/25/2022    GFRESTIMATED >90 03/26/2021    GFRESTBLACK >90 03/26/2021    VADIM 9.3 10/25/2022    VADIM 9.5 03/26/2021      A1C RESULTS:  Lab Results   Component Value Date    A1C 6.0 (H) 10/25/2022    A1C 5.7 (H) 01/13/2020          Medications     Current Outpatient Medications   Medication Sig Dispense  "Refill     atorvastatin (LIPITOR) 80 MG tablet Take 1 tablet (80 mg) by mouth At Bedtime 90 tablet 3     B-D LUER-KELLEY SYRINGE 22G X 1-1/2\" 3 ML MISC 1 EACH EVERY 14 DAYS 10 each 1     BD HYPODERMIC NEEDLE 18G X 1\" MISC 1 EACH EVERY 14 DAYS 10 each 1     cyclobenzaprine (FLEXERIL) 10 MG tablet Take 1 tablet (10 mg) by mouth 3 times daily as needed for muscle spasms 30 tablet 0     losartan (COZAAR) 100 MG tablet Take 1 tablet (100 mg) by mouth daily 90 tablet 3     nebivolol (BYSTOLIC) 5 MG tablet Take 1 tablet (5 mg) by mouth daily 90 tablet 3     nebivolol (BYSTOLIC) 5 MG tablet Take 1 tablet (5 mg) by mouth daily 90 tablet 3     omeprazole (PRILOSEC) 40 MG DR capsule Take 1 capsule (40 mg) by mouth daily 90 capsule 3     predniSONE (DELTASONE) 20 MG tablet Take 2 tablets (40 mg) by mouth daily 10 tablet 0     sildenafil (VIAGRA) 50 MG tablet Take 1 tablet (50 mg) by mouth daily as needed (1 hour before intercourse) 20 tablet 1     testosterone cypionate (DEPOTESTOSTERONE) 200 MG/ML injection Inject 1 mL (200 mg) into the muscle every 14 days 10 mL 1        Past Medical History     Past Medical History:   Diagnosis Date     Concussion, unspecified     Hospitalized overnight as a child     Esophageal reflux      Hypercholesterolemia      Hypertension      IFG (impaired fasting glucose)      GIUSEPPE (obstructive sleep apnea)     uses cpap     Testosterone deficiency     ED     Past Surgical History:   Procedure Laterality Date     COLONOSCOPY  05/12/2014    Procedure: COLONOSCOPY;  Surgeon: Syed Walker MD;  Location:  GI     GI SURGERY  01/01/2008    Gall bladder     [facility] NONSPECIFIC PROCEDURE Right     Left knee arthroscopy and replacement     [facility] NONSPECIFIC PROCEDURE  01/01/1997    Bilat inguinal hernia surgery     [facility] NONSPECIFIC PROCEDURE      Vasectomy     Family History   Problem Relation Age of Onset     Family History Negative Mother      Gastrointestinal Disease Father         Crohn's     Cardiovascular " Father      Depression Father      Heart Disease Father 66        triple bipass     Neurologic Disorder Father         seizures     Respiratory Father         sleep apnea     C.A.D. Father         Had CABG     Cerebrovascular Disease Father      Seizure Disorder Father      Depression Paternal Grandmother      Lipids Brother      Diabetes Brother      Coronary Artery Disease Brother      Respiratory Brother      Coronary Artery Disease Brother      Cancer - colorectal Maternal Uncle      Depression Other      Cancer - colorectal Other         Maternal Uncle  at 56            Allergies   Patient has no known allergies.    30 minutes spent on the date of the encounter doing chart review, history and exam, documentation and further activities as noted above      WILLOW Stevenson [facility]  Pager: [phone]

## 2023-06-06 ENCOUNTER — TELEPHONE (OUTPATIENT)
Dept: FAMILY MEDICINE | Facility: CLINIC | Age: 58
End: 2023-06-06
Payer: COMMERCIAL

## 2023-06-06 NOTE — TELEPHONE ENCOUNTER
Reason for Call:  Form, our goal is to have forms completed with 72 hours, however, some forms may require a visit or additional information.    Type of letter, form or note:  disability    Who is the form from?: Law Office /   (if other please explain)    Where did the form come from: form was faxed in    What clinic location was the form placed at?: [facility]     Where the form was placed: Dr Flanagan  Box/Folder    What number is listed as a contact on the form?: NA        Additional comments: please complete and compile all information needed if able and send to [phone]    Call taken on 6/6/2023 at 1:38 PM by Treva Hardy

## 2023-06-14 ENCOUNTER — TELEPHONE (OUTPATIENT)
Dept: FAMILY MEDICINE | Facility: CLINIC | Age: 58
End: 2023-06-14
Payer: COMMERCIAL

## 2023-06-14 DIAGNOSIS — S06.0X9D CONCUSSION WITH LOSS OF CONSCIOUSNESS, SUBSEQUENT ENCOUNTER: Primary | ICD-10-CM

## 2023-06-14 DIAGNOSIS — M62.838 NECK MUSCLE SPASM: ICD-10-CM

## 2023-06-14 NOTE — TELEPHONE ENCOUNTER
"Pt requested RX for ibuprofen 800 mg for pain     He has been taking 800 mg OTC \"but it goes so fast\"    If appropriate please send RX    Cecilia Nathan, LAKISHA     "

## 2023-06-15 RX ORDER — IBUPROFEN 800 MG/1
800 TABLET, FILM COATED ORAL EVERY 8 HOURS PRN
Qty: 90 TABLET | Refills: 3 | Status: SHIPPED | OUTPATIENT
Start: 2023-06-15 | End: 2024-09-17

## 2023-06-15 RX ORDER — IBUPROFEN 800 MG/1
800 TABLET, FILM COATED ORAL EVERY 8 HOURS PRN
Qty: 200 TABLET | Refills: 3 | Status: SHIPPED | OUTPATIENT
Start: 2023-06-15 | End: 2023-06-15

## 2023-06-19 ENCOUNTER — MYC MEDICAL ADVICE (OUTPATIENT)
Dept: FAMILY MEDICINE | Facility: CLINIC | Age: 58
End: 2023-06-19
Payer: COMMERCIAL

## 2023-06-19 DIAGNOSIS — S06.0X9D CONCUSSION WITH LOSS OF CONSCIOUSNESS, SUBSEQUENT ENCOUNTER: Primary | ICD-10-CM

## 2023-06-19 DIAGNOSIS — R42 LIGHTHEADEDNESS: ICD-10-CM

## 2023-07-03 NOTE — TELEPHONE ENCOUNTER
The form states I have until September 1st.     I am aware of the forms and have been collecting information.     JH

## 2023-07-03 NOTE — TELEPHONE ENCOUNTER
[facility] Law office calling for the forms about this below -  Angeles     Has been a month and has not heard anything     Any questions please call [phone]    Please advise     Thank you     Marissa Hebert RN, BSN  [facility]

## 2023-07-07 ENCOUNTER — THERAPY VISIT (OUTPATIENT)
Dept: PHYSICAL THERAPY | Facility: CLINIC | Age: 58
End: 2023-07-07
Attending: FAMILY MEDICINE
Payer: COMMERCIAL

## 2023-07-07 DIAGNOSIS — S06.0X9D CONCUSSION WITH LOSS OF CONSCIOUSNESS, SUBSEQUENT ENCOUNTER: ICD-10-CM

## 2023-07-07 DIAGNOSIS — R42 LIGHTHEADEDNESS: ICD-10-CM

## 2023-07-07 PROCEDURE — 97112 NEUROMUSCULAR REEDUCATION: CPT | Mod: GP | Performed by: PHYSICAL THERAPIST

## 2023-07-07 PROCEDURE — 97161 PT EVAL LOW COMPLEX 20 MIN: CPT | Mod: GP | Performed by: PHYSICAL THERAPIST

## 2023-07-07 NOTE — PROGRESS NOTES
"PHYSICAL THERAPY EVALUATION  Type of Visit: Evaluation    See electronic medical record for Abuse and Falls Screening details.    Subjective      Presenting condition or subjective complaint: Patient sustained a concussion on 1/13/21 after a fall on ice at work, thinks there was LOC. At the time, he had immediate HA, impaired vision in L eye with flashing lights, dizziness, neck and jaw pain. He completed PT from 7/7/21-9/15/21 at this clinic, POC ended prematurely because he was cut off from work comp. Recently patient reports he was at a gymnastics on 6/16 watching his grandchildren, leaned back against a bifolding wall and felt like he was falling backwards, then got dizzy and lightheaded. Later that day, he felt like the floor was moving.  Patient notes that certain movements seems to make him more symptomatic, movement when he doesn't expect it, having something move unexpectedly. He also notes some changes in his vision, has had 3 updated prescriptions since the initial concussion, feels like his vision will be good one day and bad another. Patient notes hearing loss bilaterally, chronic but worse in the right side, associated with tinnitus. Had a hearing assessment years ago, nothing in the last 2 years. Patient notes sometimes hugging the wall for stability when feeling lightheaded, episodic. Feels like his eyes can't keep up with his head since the concussion. He does VOR exercises 1x per week, occasionally makes him dizzy. He recently started chiropractor for his neck, has been helpful. Notes a constant dull headache - points to parietal region bilaterally, feels like a \"squeezy pressure\", worse with concentration.  Date of onset: 01/13/21      Prior therapy history for the same diagnosis, illness or injury: Yes      Prior Level of Function   Transfers: Independent  Ambulation: Independent  ADL: Independent  IADL: Driving, Finances, Housekeeping, Laundry, Meal preparation, Medication management, Work, Yard " work    Living Environment  Social support: With a significant other or spouse   Type of home: [facility]   Stairs to enter the home: Yes 2 Is there a railing: No   Ramp: No   Stairs inside the home: Yes 20 Is there a railing: Yes   Help at home: None  Equipment owned:       Employment: Yes Medical assistant  Hobbies/Interests: Biking, going to [employer] - has not been able to sit in service since concussion - sits in the espinosa and watches on a TV    Patient goals for therapy: Focus and get relief from lightheadedness and vision changes, tinnitus    Pain assessment: Pain present , neck pain 3/10 currently     Objective   Cognitive Status Examination  Completed OT in the past, refer to eval and treatment notes for further assessment    POSTURE: Sitting Posture: Rounded shoulders, Forward head, notes his neck tightens up with VOR exercises and dizziness, possibly related to movement and/or stress of bringing on symptoms       VESTIBULAR EVALUATION  ADDITIONAL HISTORY:  Description of symptoms: Blurry or jumping vision (Feels like my eyes can't catch up to my head, feels like things are moving)  Dizzy attacks:   Start: 1/13/21, was getting better with PT but has stabilized or gotten a little worse recently   Last attack: Last week   Frequency of occurrences: Several times per week, random   Length of attack: minutes to hours  Difficulty hearing: Both ears (Chronic, worse in right ear)  Noise in ears? Yes Tinnitus       Pertinent visual history: No recent changes  Pertinent history of current vestibular problem: Hearing loss, Prior concussion(s)  DHI:      Cervicogenic Screen    Neck ROM WFL, increased lightheadness with vertical movements, slow movement     Oculomotor Screen    Ocular ROM Normal   Smooth Pursuit Normal, appropriate tracking but increased headache   Saccades Abnormal, increased headache   VOR Abnormal, increase in dizziness and headache   VOR Cancellation Abnormal, no saccadic intrusions but increased  dizziness   Head Impulse Test No retinal slip observed but increased dizziness   Convergence Testing Abnormal, increased headache, neck pain,  and dizziness, 19 cm        Dynamic Visual Acuity (DVA)    Static Acuity (LogMar) 20/16   Horizontal Head Movement at 1 Hz (LogMar)    Horizontal Head Movement at 2 Hz (LogMar) 20/50   Abnormal degradation of 5 lines, reproduction of dizziness and headache       Assessment & Plan   CLINICAL IMPRESSIONS   Medical Diagnosis: Concussion with loss of consciousness, subsequent encounter (S06.0X9D, Lightheadedness (R42    Treatment Diagnosis: Sensory selection and weighting deficit   Impression/Assessment: Patient is a 57 year old male with dizziness and visual complaints.  The following significant findings have been identified: Pain, Decreased ROM/flexibility, Impaired balance, Impaired gait, Decreased activity tolerance, Impaired posture, Instability, Dizziness, Disequilibrium  and Impaired vision. These impairments interfere with their ability to perform self care tasks, work tasks, recreational activities, household chores, household mobility and community mobility as compared to previous level of function.     Clinical Decision Making (Complexity):   Clinical Presentation: Stable/Uncomplicated  Clinical Presentation Rationale: based on medical and personal factors listed in PT evaluation  Clinical Decision Making (Complexity): Low complexity    PLAN OF CARE  Treatment Interventions:  Interventions: Gait Training, Manual Therapy, Neuromuscular Re-education, Therapeutic Activity, Therapeutic Exercise, Self-Care/Home Management, Canalith Repositioning, Standardized Testing    Long Term Goals     PT Goal 1  Goal Identifier: HEP  Rationale: to maximize safety and independence with performance of ADLs and functional tasks;to maximize safety and independence within the home;to maximize safety and independence within the community;to maximize safety and independence with  transportation;to maximize safety and independence with self cares  Goal Progress: Patient will demonstrate understanding and compliance to her HEP at least 3x per week for continued wellbeing upon discharge from skilled physical therapy.  Target Date: 10/04/23  PT Goal 2  Goal Identifier: FGA  Goal Description: Patient will complete the FGA with a score of 24/30 to demonstrate improved balance and decreased risk for falls.  Rationale: to maximize safety and independence with performance of ADLs and functional tasks;to maximize safety and independence within the home;to maximize safety and independence within the community;to maximize safety and independence with self cares  Target Date: 10/04/23  PT Goal 3  Goal Identifier: DHI  Goal Description: Patient will complete the DHI with a score reduction of 18 points to demonstrate decreased perception of handicap and improved quality of life.  Rationale: to maximize safety and independence with performance of ADLs and functional tasks;to maximize safety and independence within the home;to maximize safety and independence within the community;to maximize safety and independence with self cares  Target Date: 10/04/23  PT Goal 4  Goal Identifier: DVA  Goal Description: Patient will complete DVA testing with a loss of 3 lines or less between static and 2 Hz head movement to demonstrate improved gaze stability for return to activities without limitation.  Rationale: to maximize safety and independence with performance of ADLs and functional tasks;to maximize safety and independence within the home;to maximize safety and independence within the community;to maximize safety and independence with transportation;to maximize safety and independence with self cares  Target Date: 10/04/23  PT Goal 5  Goal Identifier: Gait speed  Goal Description: Patient will ambulate at a normal speed of >1.0 m/s with the least restrictive AD or no AD to demonstrate improved step length and gait  speed for increased safety and independence with crossing the street.  Rationale: to maximize safety and independence with transportation;to maximize safety and independence within the community;to maximize safety and independence within the home  Target Date: 10/04/23      Frequency of Treatment: 1x per week, decreasing in frequency as indicated  Duration of Treatment: 90 days    Recommended Referrals to Other Professionals:   Education Assessment:   Learner/Method: Patient;Demonstration;Pictures/Video;Reading  Education Comments: Exam findings, POC, HEP    Risks and benefits of evaluation/treatment have been explained.   Patient/Family/caregiver agrees with Plan of Care.     Evaluation Time:     PT Eval, Low Complexity Minutes (76883): 33      Signing Clinician: Ladan Jarvis PT

## 2023-07-11 DIAGNOSIS — E34.9 TESTOSTERONE INSUFFICIENCY: ICD-10-CM

## 2023-07-12 RX ORDER — SYRINGE WITH NEEDLE, 1 ML 25GX5/8"
1 SYRINGE, EMPTY DISPOSABLE MISCELLANEOUS
Qty: 10 EACH | Refills: 1 | OUTPATIENT
Start: 2023-07-12

## 2023-07-12 NOTE — TELEPHONE ENCOUNTER
Routing refill request to provider for review/approval because:  Drug not on the [facility] refill protocol     Marlee ALLEN RN

## 2023-07-13 RX ORDER — SYRINGE WITH NEEDLE, 1 ML 25GX5/8"
SYRINGE, EMPTY DISPOSABLE MISCELLANEOUS
Qty: 10 EACH | Refills: 1 | Status: SHIPPED | OUTPATIENT
Start: 2023-07-13 | End: 2023-11-03

## 2023-07-21 ENCOUNTER — THERAPY VISIT (OUTPATIENT)
Dept: PHYSICAL THERAPY | Facility: CLINIC | Age: 58
End: 2023-07-21
Attending: FAMILY MEDICINE
Payer: COMMERCIAL

## 2023-07-21 DIAGNOSIS — S06.0X9D CONCUSSION WITH LOSS OF CONSCIOUSNESS, SUBSEQUENT ENCOUNTER: Primary | ICD-10-CM

## 2023-07-21 PROCEDURE — 97112 NEUROMUSCULAR REEDUCATION: CPT | Mod: GP | Performed by: PHYSICAL THERAPIST

## 2023-07-31 DIAGNOSIS — E34.9 TESTOSTERONE INSUFFICIENCY: ICD-10-CM

## 2023-08-01 RX ORDER — NEEDLES, DISPOSABLE 25GX5/8"
NEEDLE, DISPOSABLE MISCELLANEOUS
Qty: 10 EACH | Refills: 1 | Status: SHIPPED | OUTPATIENT
Start: 2023-08-01 | End: 2023-11-03

## 2023-08-04 ENCOUNTER — THERAPY VISIT (OUTPATIENT)
Dept: PHYSICAL THERAPY | Facility: CLINIC | Age: 58
End: 2023-08-04
Attending: FAMILY MEDICINE
Payer: COMMERCIAL

## 2023-08-04 DIAGNOSIS — S06.0X9D CONCUSSION WITH LOSS OF CONSCIOUSNESS, SUBSEQUENT ENCOUNTER: Primary | ICD-10-CM

## 2023-08-04 PROCEDURE — 97112 NEUROMUSCULAR REEDUCATION: CPT | Mod: GP | Performed by: PHYSICAL THERAPIST

## 2023-08-04 PROCEDURE — 97140 MANUAL THERAPY 1/> REGIONS: CPT | Mod: GP | Performed by: PHYSICAL THERAPIST

## 2023-08-04 PROCEDURE — 97110 THERAPEUTIC EXERCISES: CPT | Mod: GP | Performed by: PHYSICAL THERAPIST

## 2023-08-18 ENCOUNTER — DOCUMENTATION ONLY (OUTPATIENT)
Dept: FAMILY MEDICINE | Facility: CLINIC | Age: 58
End: 2023-08-18
Payer: COMMERCIAL

## 2023-08-24 ENCOUNTER — DOCUMENTATION ONLY (OUTPATIENT)
Dept: FAMILY MEDICINE | Facility: CLINIC | Age: 58
End: 2023-08-24
Payer: COMMERCIAL

## 2023-09-01 ENCOUNTER — THERAPY VISIT (OUTPATIENT)
Dept: PHYSICAL THERAPY | Facility: CLINIC | Age: 58
End: 2023-09-01
Attending: FAMILY MEDICINE
Payer: COMMERCIAL

## 2023-09-01 DIAGNOSIS — S06.0X9D CONCUSSION WITH LOSS OF CONSCIOUSNESS, SUBSEQUENT ENCOUNTER: Primary | ICD-10-CM

## 2023-09-01 PROCEDURE — 97112 NEUROMUSCULAR REEDUCATION: CPT | Mod: GP | Performed by: PHYSICAL THERAPIST

## 2023-10-16 ENCOUNTER — TELEPHONE (OUTPATIENT)
Dept: FAMILY MEDICINE | Facility: CLINIC | Age: 58
End: 2023-10-16
Payer: COMMERCIAL

## 2023-10-16 DIAGNOSIS — E78.5 HYPERLIPIDEMIA LDL GOAL <160: ICD-10-CM

## 2023-10-16 DIAGNOSIS — E34.9 TESTOSTERONE INSUFFICIENCY: Primary | ICD-10-CM

## 2023-10-16 DIAGNOSIS — R73.03 PREDIABETES: ICD-10-CM

## 2023-10-16 DIAGNOSIS — I10 BENIGN ESSENTIAL HYPERTENSION: ICD-10-CM

## 2023-10-16 NOTE — TELEPHONE ENCOUNTER
Reason for Call:  Other Labs     Detailed comments: patient asking for you to place labs for     TSH  Cholesterol   Testosterone   Liver Function   Kidney Function   A1C     Prior to appointment that is in November, would like to discuss results with you at this appointment, has concerns     Phone Number Patient can be reached at: Cell number on file:    Telephone Information:   Mobile [phone]       Best Time: any     Can we leave a detailed message on this number? YES    Call taken on 10/16/2023 at 10:28 AM by Treva Hardy

## 2023-10-27 ENCOUNTER — LAB (OUTPATIENT)
Dept: LAB | Facility: CLINIC | Age: 58
End: 2023-10-27
Payer: COMMERCIAL

## 2023-10-27 DIAGNOSIS — E78.5 HYPERLIPIDEMIA LDL GOAL <160: ICD-10-CM

## 2023-10-27 DIAGNOSIS — R73.03 PREDIABETES: ICD-10-CM

## 2023-10-27 DIAGNOSIS — E34.9 TESTOSTERONE INSUFFICIENCY: ICD-10-CM

## 2023-10-27 DIAGNOSIS — I10 BENIGN ESSENTIAL HYPERTENSION: ICD-10-CM

## 2023-10-27 LAB
ALBUMIN SERPL BCG-MCNC: 4.3 G/DL (ref 3.5–5.2)
ALP SERPL-CCNC: 119 U/L (ref 40–129)
ALT SERPL W P-5'-P-CCNC: 86 U/L (ref 0–70)
ANION GAP SERPL CALCULATED.3IONS-SCNC: 12 MMOL/L (ref 7–15)
AST SERPL W P-5'-P-CCNC: 45 U/L (ref 0–45)
BILIRUB SERPL-MCNC: 0.5 MG/DL
BUN SERPL-MCNC: 13.6 MG/DL (ref 6–20)
CALCIUM SERPL-MCNC: 9.7 MG/DL (ref 8.6–10)
CHLORIDE SERPL-SCNC: 103 MMOL/L (ref 98–107)
CHOLEST SERPL-MCNC: 184 MG/DL
CREAT SERPL-MCNC: 0.95 MG/DL (ref 0.67–1.17)
DEPRECATED HCO3 PLAS-SCNC: 25 MMOL/L (ref 22–29)
EGFRCR SERPLBLD CKD-EPI 2021: >90 ML/MIN/1.73M2
GLUCOSE SERPL-MCNC: 128 MG/DL (ref 70–99)
HBA1C MFR BLD: 6.4 % (ref 0–5.6)
HDLC SERPL-MCNC: 42 MG/DL
LDLC SERPL CALC-MCNC: 111 MG/DL
NONHDLC SERPL-MCNC: 142 MG/DL
POTASSIUM SERPL-SCNC: 4.3 MMOL/L (ref 3.4–5.3)
PROT SERPL-MCNC: 7 G/DL (ref 6.4–8.3)
SODIUM SERPL-SCNC: 140 MMOL/L (ref 135–145)
TRIGL SERPL-MCNC: 155 MG/DL

## 2023-10-27 PROCEDURE — 36415 COLL VENOUS BLD VENIPUNCTURE: CPT

## 2023-10-27 PROCEDURE — 80061 LIPID PANEL: CPT

## 2023-10-27 PROCEDURE — 83036 HEMOGLOBIN GLYCOSYLATED A1C: CPT

## 2023-10-27 PROCEDURE — 84403 ASSAY OF TOTAL TESTOSTERONE: CPT

## 2023-10-27 PROCEDURE — 80053 COMPREHEN METABOLIC PANEL: CPT

## 2023-10-31 LAB — TESTOST SERPL-MCNC: 79 NG/DL (ref 240–950)

## 2023-11-02 SDOH — HEALTH STABILITY: PHYSICAL HEALTH: ON AVERAGE, HOW MANY MINUTES DO YOU ENGAGE IN EXERCISE AT THIS LEVEL?: 30 MIN

## 2023-11-02 SDOH — HEALTH STABILITY: PHYSICAL HEALTH: ON AVERAGE, HOW MANY DAYS PER WEEK DO YOU ENGAGE IN MODERATE TO STRENUOUS EXERCISE (LIKE A BRISK WALK)?: 2 DAYS

## 2023-11-02 ASSESSMENT — ENCOUNTER SYMPTOMS
DYSURIA: 0
DIARRHEA: 0
DIZZINESS: 1
SHORTNESS OF BREATH: 0
ARTHRALGIAS: 0
HEMATOCHEZIA: 0
COUGH: 0
WEAKNESS: 0
FREQUENCY: 0
MYALGIAS: 1
CONSTIPATION: 0
EYE PAIN: 0
JOINT SWELLING: 0
SORE THROAT: 0
PARESTHESIAS: 0
HEADACHES: 1
HEMATURIA: 0
NERVOUS/ANXIOUS: 1
ABDOMINAL PAIN: 0
CHILLS: 0
FEVER: 0
HEARTBURN: 0
PALPITATIONS: 0
NAUSEA: 0

## 2023-11-02 ASSESSMENT — LIFESTYLE VARIABLES
AUDIT-C TOTAL SCORE: 7
HOW MANY STANDARD DRINKS CONTAINING ALCOHOL DO YOU HAVE ON A TYPICAL DAY: 3 OR 4
SKIP TO QUESTIONS 9-10: 0
HOW OFTEN DO YOU HAVE SIX OR MORE DRINKS ON ONE OCCASION: MONTHLY
HOW OFTEN DO YOU HAVE A DRINK CONTAINING ALCOHOL: 4 OR MORE TIMES A WEEK

## 2023-11-02 ASSESSMENT — SOCIAL DETERMINANTS OF HEALTH (SDOH)
HOW OFTEN DO YOU ATTEND CHURCH OR RELIGIOUS SERVICES?: MORE THAN 4 TIMES PER YEAR
HOW OFTEN DO YOU ATTENT MEETINGS OF THE CLUB OR ORGANIZATION YOU BELONG TO?: NEVER
HOW OFTEN DO YOU GET TOGETHER WITH FRIENDS OR RELATIVES?: ONCE A WEEK
IN A TYPICAL WEEK, HOW MANY TIMES DO YOU TALK ON THE PHONE WITH FAMILY, FRIENDS, OR NEIGHBORS?: MORE THAN THREE TIMES A WEEK
DO YOU BELONG TO ANY CLUBS OR ORGANIZATIONS SUCH AS CHURCH GROUPS UNIONS, FRATERNAL OR ATHLETIC GROUPS, OR SCHOOL GROUPS?: NO

## 2023-11-02 ASSESSMENT — PATIENT HEALTH QUESTIONNAIRE - PHQ9
10. IF YOU CHECKED OFF ANY PROBLEMS, HOW DIFFICULT HAVE THESE PROBLEMS MADE IT FOR YOU TO DO YOUR WORK, TAKE CARE OF THINGS AT HOME, OR GET ALONG WITH OTHER PEOPLE: SOMEWHAT DIFFICULT
SUM OF ALL RESPONSES TO PHQ QUESTIONS 1-9: 6
SUM OF ALL RESPONSES TO PHQ QUESTIONS 1-9: 6

## 2023-11-03 ENCOUNTER — OFFICE VISIT (OUTPATIENT)
Dept: FAMILY MEDICINE | Facility: CLINIC | Age: 58
End: 2023-11-03
Payer: COMMERCIAL

## 2023-11-03 VITALS
DIASTOLIC BLOOD PRESSURE: 82 MMHG | HEIGHT: 71 IN | BODY MASS INDEX: 39.62 KG/M2 | HEART RATE: 80 BPM | SYSTOLIC BLOOD PRESSURE: 132 MMHG | OXYGEN SATURATION: 97 % | WEIGHT: 283 LBS | RESPIRATION RATE: 16 BRPM | TEMPERATURE: 98.9 F

## 2023-11-03 DIAGNOSIS — R73.03 PRE-DIABETES: ICD-10-CM

## 2023-11-03 DIAGNOSIS — L98.9 SKIN LESION: ICD-10-CM

## 2023-11-03 DIAGNOSIS — I10 BENIGN ESSENTIAL HYPERTENSION: ICD-10-CM

## 2023-11-03 DIAGNOSIS — E34.9 TESTOSTERONE INSUFFICIENCY: ICD-10-CM

## 2023-11-03 DIAGNOSIS — L60.0 INGROWN TOENAIL: ICD-10-CM

## 2023-11-03 DIAGNOSIS — E78.5 HYPERLIPIDEMIA LDL GOAL <70: ICD-10-CM

## 2023-11-03 DIAGNOSIS — N52.8 OTHER MALE ERECTILE DYSFUNCTION: ICD-10-CM

## 2023-11-03 DIAGNOSIS — Z23 NEED FOR SHINGLES VACCINE: ICD-10-CM

## 2023-11-03 DIAGNOSIS — K21.9 GASTROESOPHAGEAL REFLUX DISEASE WITHOUT ESOPHAGITIS: ICD-10-CM

## 2023-11-03 DIAGNOSIS — Z00.00 ROUTINE GENERAL MEDICAL EXAMINATION AT A HEALTH CARE FACILITY: Primary | ICD-10-CM

## 2023-11-03 PROCEDURE — 99396 PREV VISIT EST AGE 40-64: CPT | Mod: 25 | Performed by: FAMILY MEDICINE

## 2023-11-03 PROCEDURE — 99214 OFFICE O/P EST MOD 30 MIN: CPT | Mod: 25 | Performed by: FAMILY MEDICINE

## 2023-11-03 PROCEDURE — 90471 IMMUNIZATION ADMIN: CPT | Performed by: FAMILY MEDICINE

## 2023-11-03 PROCEDURE — 90750 HZV VACC RECOMBINANT IM: CPT | Performed by: FAMILY MEDICINE

## 2023-11-03 RX ORDER — NEEDLES, DISPOSABLE 25GX5/8"
NEEDLE, DISPOSABLE MISCELLANEOUS
Qty: 10 EACH | Refills: 1 | Status: SHIPPED | OUTPATIENT
Start: 2023-11-03 | End: 2024-09-17

## 2023-11-03 RX ORDER — TESTOSTERONE CYPIONATE 200 MG/ML
400 INJECTION, SOLUTION INTRAMUSCULAR
Qty: 48 ML | Refills: 0 | Status: SHIPPED | OUTPATIENT
Start: 2023-11-03 | End: 2023-12-15

## 2023-11-03 RX ORDER — NEBIVOLOL 5 MG/1
5 TABLET ORAL DAILY
Qty: 90 TABLET | Refills: 3 | Status: SHIPPED | OUTPATIENT
Start: 2023-11-03

## 2023-11-03 RX ORDER — SYRINGE WITH NEEDLE, 1 ML 25GX5/8"
SYRINGE, EMPTY DISPOSABLE MISCELLANEOUS
Qty: 10 EACH | Refills: 1 | Status: SHIPPED | OUTPATIENT
Start: 2023-11-03 | End: 2024-09-17

## 2023-11-03 RX ORDER — SILDENAFIL 50 MG/1
50 TABLET, FILM COATED ORAL DAILY PRN
Qty: 20 TABLET | Refills: 1 | Status: SHIPPED | OUTPATIENT
Start: 2023-11-03

## 2023-11-03 RX ORDER — ATORVASTATIN CALCIUM 80 MG/1
80 TABLET, FILM COATED ORAL AT BEDTIME
Qty: 90 TABLET | Refills: 3 | Status: SHIPPED | OUTPATIENT
Start: 2023-11-03

## 2023-11-03 RX ORDER — OMEPRAZOLE 40 MG/1
40 CAPSULE, DELAYED RELEASE ORAL DAILY
Qty: 90 CAPSULE | Refills: 3 | Status: SHIPPED | OUTPATIENT
Start: 2023-11-03

## 2023-11-03 RX ORDER — LOSARTAN POTASSIUM 100 MG/1
100 TABLET ORAL DAILY
Qty: 90 TABLET | Refills: 3 | Status: SHIPPED | OUTPATIENT
Start: 2023-11-03 | End: 2024-08-15

## 2023-11-03 ASSESSMENT — ENCOUNTER SYMPTOMS
ARTHRALGIAS: 0
FREQUENCY: 0
COUGH: 0
SORE THROAT: 0
PALPITATIONS: 0
NAUSEA: 0
EYE PAIN: 0
HEARTBURN: 0
HEADACHES: 1
HEMATOCHEZIA: 0
DYSURIA: 0
MYALGIAS: 1
ABDOMINAL PAIN: 0
DIZZINESS: 1
DIARRHEA: 0
NERVOUS/ANXIOUS: 1
WEAKNESS: 0
CONSTIPATION: 0
JOINT SWELLING: 0
FEVER: 0
PARESTHESIAS: 0
SHORTNESS OF BREATH: 0
CHILLS: 0
HEMATURIA: 0

## 2023-11-03 NOTE — PROGRESS NOTES
SUBJECTIVE:   CC: Galileo is an 57 year old who presents for preventative health visit.       11/3/2023    10:22 AM   Additional Questions   Roomed by Gina Asher       Healthy Habits:     Getting at least 3 servings of Calcium per day:  Yes    Bi-annual eye exam:  Yes    Dental care twice a year:  Yes    Sleep apnea or symptoms of sleep apnea:  Sleep apnea    Diet:  Regular (no restrictions)    Frequency of exercise:  1 day/week    Duration of exercise:  15-30 minutes    Taking medications regularly:  Yes    Medication side effects:  None    Additional concerns today:  No      Today's PHQ-9 Score:       2023    12:58 PM   PHQ-9 SCORE   PHQ-9 Total Score MyChart 6 (Mild depression)   PHQ-9 Total Score 6         Social History     Tobacco Use    Smoking status: Former     Packs/day: 0.00     Years: 10.00     Additional pack years: 0.00     Total pack years: 0.00     Types: Cigarettes     Quit date: 10/3/1997     Years since quittin.1     Passive exposure: Past    Smokeless tobacco: Never    Tobacco comments:        Substance Use Topics    Alcohol use: Yes     Alcohol/week: 16.7 standard drinks of alcohol     Types: 20 Standard drinks or equivalent per week     Comment: 3-4/d             2023     1:02 PM   Alcohol Use   Prescreen: >3 drinks/day or >7 drinks/week? Yes   AUDIT SCORE  8         2023     1:02 PM   AUDIT - Alcohol Use Disorders Identification Test - Reproduced from the World Health Organization Audit 2001 (Second Edition)   1.  How often do you have a drink containing alcohol? 4 or more times a week   2.  How many drinks containing alcohol do you have on a typical day when you are drinking? 3 or 4   3.  How often do you have five or more drinks on one occasion? Weekly   4.  How often during the last year have you found that you were not able to stop drinking once you had started? Never   5.  How often during the last year have you failed to do what was normally expected of you  because of drinking? Never   6.  How often during the last year have you needed a first drink in the morning to get yourself going after a heavy drinking session? Never   7.  How often during the last year have you had a feeling of guilt or remorse after drinking? Never   8.  How often during the last year have you been unable to remember what happened the night before because of your drinking? Never   9.  Have you or someone else been injured because of your drinking? No   10. Has a relative, friend, doctor or other health care worker been concerned about your drinking or suggested you cut down? No   TOTAL SCORE 8       Last PSA:   PSA   Date Value Ref Range Status   11/18/2016 0.81 0 - 4 ug/L Final     Comment:     Assay Method:  Chemiluminescence using Siemens Vista analyzer     Prostate Specific Antigen Screen   Date Value Ref Range Status   10/25/2022 3.25 0.00 - 4.00 ug/L Final       Reviewed orders with patient. Reviewed health maintenance and updated orders accordingly - Yes  Patient Active Problem List   Diagnosis    Hyperlipidemia LDL goal <160    GERD (gastroesophageal reflux disease)    Anxiety    Testosterone insufficiency    Fear of flying    Mild obstructive sleep apnea    Obesity (BMI 35.0-39.9) with comorbidity (H)    Concussion with loss of consciousness, subsequent encounter    Attention deficit hyperactivity disorder (ADHD), unspecified ADHD type    Prediabetes    Benign essential hypertension    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)     Past Surgical History:   Procedure Laterality Date    ABDOMEN SURGERY      CHOLECYSTECTOMY      COLONOSCOPY  05/12/2014    Procedure: COLONOSCOPY;  Surgeon: Syed Walker MD;  Location:  GI    GI SURGERY  01/01/2008    Gall bladder    HERNIA REPAIR      [facility] NONSPECIFIC PROCEDURE Right     Left knee arthroscopy and replacement    [facility] NONSPECIFIC PROCEDURE  01/01/1997    Bilat inguinal hernia surgery    [facility] NONSPECIFIC PROCEDURE       Vasectomy       Social History     Tobacco Use    Smoking status: Former     Packs/day: 0.00     Years: 10.00     Additional pack years: 0.00     Total pack years: 0.00     Types: Cigarettes     Quit date: 10/3/1997     Years since quittin.1     Passive exposure: Past    Smokeless tobacco: Never    Tobacco comments:        Substance Use Topics    Alcohol use: Yes     Alcohol/week: 16.7 standard drinks of alcohol     Types: 20 Standard drinks or equivalent per week     Comment: 3-4/d     Family History   Problem Relation Age of Onset    Family History Negative Mother     Gastrointestinal Disease Father         Crohn's    Cardiovascular Father     Depression Father     Heart Disease Father 66        triple bipass    Neurologic Disorder Father         seizures    Respiratory Father         sleep apnea    C.A.D. Father         Had CABG    Cerebrovascular Disease Father     Seizure Disorder Father     Coronary Artery Disease Father     Depression Paternal Grandmother     Lipids Brother     Diabetes Brother     Coronary Artery Disease Brother     Respiratory Brother     Coronary Artery Disease Brother     Cancer - colorectal Maternal Uncle     Depression Other     Cancer - colorectal Other         Maternal Uncle  at 56           Reviewed and updated as needed this visit by clinical staff   Tobacco  Allergies  Meds              Reviewed and updated as needed this visit by Provider                     Review of Systems   Constitutional:  Negative for chills and fever.   HENT:  Positive for hearing loss. Negative for congestion, ear pain and sore throat.    Eyes:  Positive for visual disturbance. Negative for pain.   Respiratory:  Negative for cough and shortness of breath.    Cardiovascular:  Negative for chest pain, palpitations and peripheral edema.   Gastrointestinal:  Negative for abdominal pain, constipation, diarrhea, heartburn, hematochezia and nausea.   Genitourinary:  Positive for impotence.  "Negative for dysuria, frequency, genital sores, hematuria, penile discharge and urgency.   Musculoskeletal:  Positive for myalgias. Negative for arthralgias and joint swelling.   Skin:  Negative for rash.   Neurological:  Positive for dizziness and headaches. Negative for weakness and paresthesias.   Psychiatric/Behavioral:  Positive for mood changes. The patient is nervous/anxious.        OBJECTIVE:   /82 (BP Location: Right arm, Patient Position: Chair, Cuff Size: Adult Large)   Pulse 80   Temp 98.9  F (37.2  C) (Oral)   Resp 16   Ht 1.803 m (5' 11\")   Wt 128.4 kg (283 lb)   SpO2 97%   BMI 39.47 kg/m      Physical Exam  GENERAL: healthy, alert and no distress  EYES: Eyes grossly normal to inspection, PERRL and conjunctivae and sclerae normal  HENT: ear canals and TM's normal, nose and mouth without ulcers or lesions  NECK: no adenopathy, no asymmetry, masses, or scars and thyroid normal to palpation  RESP: lungs clear to auscultation - no rales, rhonchi or wheezes  CV: regular rate and rhythm, normal S1 S2, no S3 or S4, no murmur, click or rub, no peripheral edema and peripheral pulses strong  ABDOMEN: soft, nontender, no hepatosplenomegaly, no masses and bowel sounds normal  MS: no gross musculoskeletal defects noted, no edema  SKIN: raised flesh colored macule on the upper back, capillaries within  NEURO: Normal strength and tone, mentation intact and speech normal  PSYCH: mentation appears normal, affect normal/bright    Diagnostic Test Results:  Labs reviewed in Epic    ASSESSMENT/PLAN:     1. Routine general medical examination at a health care facility    2. Testosterone insufficiency - will increase dose and recheck levels in a month  - testosterone cypionate (DEPOTESTOSTERONE) 200 MG/ML injection; Inject 2 mLs (400 mg) into the muscle every 14 days for 90 days  Dispense: 48 mL; Refill: 0  - Testosterone, total; Future  - needle, disp, (BD HYPODERMIC NEEDLE) 18G X 1\" MISC; 1 EACH EVERY 14 DAYS  " "Dispense: 10 each; Refill: 1  - syringe/needle, disp, (B-D LUER-KELLEY SYRINGE) 22G X 1-1/2\" 3 ML MISC; 1 EACH EVERY 14 DAYS  Dispense: 10 each; Refill: 1    3. Hyperlipidemia LDL goal <70 - continue statin  - atorvastatin (LIPITOR) 80 MG tablet; Take 1 tablet (80 mg) by mouth at bedtime  Dispense: 90 tablet; Refill: 3    4. Benign essential hypertension - BP controlled, continue current  - losartan (COZAAR) 100 MG tablet; Take 1 tablet (100 mg) by mouth daily  Dispense: 90 tablet; Refill: 3  - nebivolol (BYSTOLIC) 5 MG tablet; Take 1 tablet (5 mg) by mouth daily  Dispense: 90 tablet; Refill: 3    5. Gastroesophageal reflux disease without esophagitis - stable, refills  - omeprazole (PRILOSEC) 40 MG DR capsule; Take 1 capsule (40 mg) by mouth daily  Dispense: 90 capsule; Refill: 3    6. Other male erectile dysfunction  - sildenafil (VIAGRA) 50 MG tablet; Take 1 tablet (50 mg) by mouth daily as needed (1 hour before intercourse)  Dispense: 20 tablet; Refill: 1    7. Pre-diabetes  - Hemoglobin A1c; Future    8. Ingrown toenail  - Orthopedic  Referral; Future    9. Skin lesion  - Adult Dermatology  Referral; Future    10. Need for shingles vaccine  - ZOSTER RECOMBINANT ADJUVANTED (SHINGRIX)      COUNSELING:   Reviewed preventive health counseling, as reflected in patient instructions      BMI:   Estimated body mass index is 39.47 kg/m  as calculated from the following:    Height as of this encounter: 1.803 m (5' 11\").    Weight as of this encounter: 128.4 kg (283 lb).       He reports that he quit smoking about 26 years ago. His smoking use included cigarettes. He has been exposed to tobacco smoke. He has never used smokeless tobacco.          Rut Flanagan MD  [facility]  Answers submitted by the patient for this visit:  Patient Health Questionnaire (Submitted on 11/2/2023)  If you checked off any problems, how difficult have these problems made it for you to do your work, " take care of things at home, or get along with other people?: Somewhat difficult  PHQ9 TOTAL SCORE: 6

## 2023-11-29 ENCOUNTER — LAB (OUTPATIENT)
Dept: LAB | Facility: CLINIC | Age: 58
End: 2023-11-29
Payer: COMMERCIAL

## 2023-11-29 DIAGNOSIS — E34.9 TESTOSTERONE INSUFFICIENCY: ICD-10-CM

## 2023-11-29 DIAGNOSIS — R73.03 PRE-DIABETES: ICD-10-CM

## 2023-11-29 LAB — HBA1C MFR BLD: 6.1 % (ref 0–5.6)

## 2023-11-29 PROCEDURE — 36415 COLL VENOUS BLD VENIPUNCTURE: CPT

## 2023-11-29 PROCEDURE — 84403 ASSAY OF TOTAL TESTOSTERONE: CPT

## 2023-11-29 PROCEDURE — 83036 HEMOGLOBIN GLYCOSYLATED A1C: CPT

## 2023-11-30 LAB — TESTOST SERPL-MCNC: 461 NG/DL (ref 240–950)

## 2023-12-14 ASSESSMENT — PATIENT HEALTH QUESTIONNAIRE - PHQ9
10. IF YOU CHECKED OFF ANY PROBLEMS, HOW DIFFICULT HAVE THESE PROBLEMS MADE IT FOR YOU TO DO YOUR WORK, TAKE CARE OF THINGS AT HOME, OR GET ALONG WITH OTHER PEOPLE: SOMEWHAT DIFFICULT
SUM OF ALL RESPONSES TO PHQ QUESTIONS 1-9: 8
SUM OF ALL RESPONSES TO PHQ QUESTIONS 1-9: 8

## 2023-12-15 ENCOUNTER — OFFICE VISIT (OUTPATIENT)
Dept: FAMILY MEDICINE | Facility: CLINIC | Age: 58
End: 2023-12-15
Payer: COMMERCIAL

## 2023-12-15 VITALS
WEIGHT: 283 LBS | OXYGEN SATURATION: 97 % | HEIGHT: 72 IN | BODY MASS INDEX: 38.33 KG/M2 | SYSTOLIC BLOOD PRESSURE: 124 MMHG | RESPIRATION RATE: 16 BRPM | HEART RATE: 85 BPM | DIASTOLIC BLOOD PRESSURE: 82 MMHG

## 2023-12-15 DIAGNOSIS — F06.30 MOOD DISORDER AS LATE EFFECT OF TRAUMATIC BRAIN INJURY (H): ICD-10-CM

## 2023-12-15 DIAGNOSIS — F41.9 ANXIETY: ICD-10-CM

## 2023-12-15 DIAGNOSIS — I10 BENIGN ESSENTIAL HYPERTENSION: ICD-10-CM

## 2023-12-15 DIAGNOSIS — H93.13 TINNITUS, BILATERAL: Primary | ICD-10-CM

## 2023-12-15 DIAGNOSIS — E34.9 TESTOSTERONE INSUFFICIENCY: ICD-10-CM

## 2023-12-15 DIAGNOSIS — H91.93 BILATERAL HEARING LOSS, UNSPECIFIED HEARING LOSS TYPE: ICD-10-CM

## 2023-12-15 DIAGNOSIS — S06.0X9D CONCUSSION WITH LOSS OF CONSCIOUSNESS, SUBSEQUENT ENCOUNTER: ICD-10-CM

## 2023-12-15 DIAGNOSIS — S06.9XAS MOOD DISORDER AS LATE EFFECT OF TRAUMATIC BRAIN INJURY (H): ICD-10-CM

## 2023-12-15 PROCEDURE — 99214 OFFICE O/P EST MOD 30 MIN: CPT | Performed by: FAMILY MEDICINE

## 2023-12-15 RX ORDER — ESCITALOPRAM OXALATE 10 MG/1
10 TABLET ORAL DAILY
Qty: 30 TABLET | Refills: 1 | Status: SHIPPED | OUTPATIENT
Start: 2023-12-15 | End: 2024-01-30

## 2023-12-15 RX ORDER — TESTOSTERONE CYPIONATE 200 MG/ML
300 INJECTION, SOLUTION INTRAMUSCULAR
Qty: 48 ML | Refills: 0 | Status: SHIPPED | OUTPATIENT
Start: 2023-12-15 | End: 2024-09-17

## 2023-12-15 RX ORDER — LOSARTAN POTASSIUM AND HYDROCHLOROTHIAZIDE 12.5; 5 MG/1; MG/1
1 TABLET ORAL DAILY
Qty: 30 TABLET | Refills: 0 | Status: SHIPPED | OUTPATIENT
Start: 2023-12-15 | End: 2024-01-09

## 2023-12-15 ASSESSMENT — PAIN SCALES - GENERAL: PAINLEVEL: NO PAIN (0)

## 2023-12-15 ASSESSMENT — ANXIETY QUESTIONNAIRES
7. FEELING AFRAID AS IF SOMETHING AWFUL MIGHT HAPPEN: SEVERAL DAYS
1. FEELING NERVOUS, ANXIOUS, OR ON EDGE: MORE THAN HALF THE DAYS
IF YOU CHECKED OFF ANY PROBLEMS ON THIS QUESTIONNAIRE, HOW DIFFICULT HAVE THESE PROBLEMS MADE IT FOR YOU TO DO YOUR WORK, TAKE CARE OF THINGS AT HOME, OR GET ALONG WITH OTHER PEOPLE: SOMEWHAT DIFFICULT
2. NOT BEING ABLE TO STOP OR CONTROL WORRYING: SEVERAL DAYS
4. TROUBLE RELAXING: NOT AT ALL
3. WORRYING TOO MUCH ABOUT DIFFERENT THINGS: SEVERAL DAYS
GAD7 TOTAL SCORE: 8
6. BECOMING EASILY ANNOYED OR IRRITABLE: MORE THAN HALF THE DAYS
5. BEING SO RESTLESS THAT IT IS HARD TO SIT STILL: SEVERAL DAYS
GAD7 TOTAL SCORE: 8

## 2023-12-15 NOTE — PROGRESS NOTES
Assessment & Plan     Tinnitus, bilateral - suggested formal evaluation, consider treatment options - new Len[place]?  - Adult Audiology  Referral; Future    Bilateral hearing loss, unspecified hearing loss type  - Adult Audiology  Referral; Future    Testosterone insufficiency - will back down on testosterone dose as he isn't tolerating current dose. May step back up in needed in the future.   - testosterone cypionate (DEPOTESTOSTERONE) 200 MG/ML injection; Inject 1.5 mLs (300 mg) into the muscle every 14 days for 90 days    Anxiety - will start lexapro, no risk for tinnitus.   - escitalopram (LEXAPRO) 10 MG tablet; Take 1 tablet (10 mg) by mouth daily    Benign essential hypertension - will cut back losartan dose to 1/2, combine with hydrochlorothiazide. He can check BP when he's at work.  - losartan-hydrochlorothiazide (HYZAAR) 50-12.5 MG tablet; Take 1 tablet by mouth daily    Concussion with loss of consciousness, subsequent encounter    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) - as above  - escitalopram (LEXAPRO) 10 MG tablet; Take 1 tablet (10 mg) by mouth daily    Rut Flanagan MD  [facility] TETO Gurrola is a 58 year old, presenting for the following health issues:  Medication Request        12/15/2023    11:06 AM   Additional Questions   Roomed by CATHERINE Benson   Accompanied by Self       History of Present Illness       Reason for visit:  Med check    He eats 0-1 servings of fruits and vegetables daily.He consumes 1 sweetened beverage(s) daily.He exercises with enough effort to increase his heart rate 20 to 29 minutes per day.  He exercises with enough effort to increase his heart rate 3 or less days per week.   He is taking medications regularly.       Still struggling with concussion symptoms.     Recent increase in testosterone increased the noise/tension in his head. On 400 mg currently.     Tinnitis began prior to head  "injury but became quite a bit worse following. Believes it may also have worsened with start of losartan.     Following with chiropractic for neck pain.     Notes he struggles with mood at times. Worries about the unknown with his physical health, tired of struggling with current health conditions.       Review of Systems   Constitutional, HEENT, cardiovascular, pulmonary, gi and gu systems are negative, except as otherwise noted.      Objective    /82 (BP Location: Right arm, Patient Position: Sitting, Cuff Size: Adult Large)   Pulse 85   Resp 16   Ht 1.816 m (5' 11.5\")   Wt 128.4 kg (283 lb)   SpO2 97%   BMI 38.92 kg/m    Body mass index is 38.92 kg/m .  Physical Exam   GENERAL: healthy, alert and no distress  PSYCH: mentation appears normal, affect normal/bright              Answers submitted by the patient for this visit:  Patient Health Questionnaire (Submitted on 12/14/2023)  If you checked off any problems, how difficult have these problems made it for you to do your work, take care of things at home, or get along with other people?: Somewhat difficult  PHQ9 TOTAL SCORE: 8  TODD-7 (Submitted on 12/15/2023)  TODD 7 TOTAL SCORE: 8  General Questionnaire (Submitted on 12/14/2023)  Chief Complaint: Chronic problems general questions HPI Form  What is the reason for your visit today? : med check  How many servings of fruits and vegetables do you eat daily?: 0-1  On average, how many sweetened beverages do you drink each day (Examples: soda, juice, sweet tea, etc.  Do NOT count diet or artificially sweetened beverages)?: 1  How many minutes a day do you exercise enough to make your heart beat faster?: 20 to 29  How many days a week do you exercise enough to make your heart beat faster?: 3 or less  How many days per week do you miss taking your medication?: 0    "

## 2023-12-15 NOTE — PATIENT INSTRUCTIONS
For the tinnitus - Audiology consult, replace losartan with combo med, eliminate ibuprofen, naproxen, aspirin.   For blood pressure - see above  For testosterone - cut back to 1.5 mL per dose  For mood - start with 1/2 tablet for 2 weeks, then increase to 1 tablet of the lexapro

## 2024-01-01 ENCOUNTER — TRANSFERRED RECORDS (OUTPATIENT)
Dept: MULTI SPECIALTY CLINIC | Facility: CLINIC | Age: 59
End: 2024-01-01

## 2024-01-01 LAB — RETINOPATHY: NORMAL

## 2024-01-09 DIAGNOSIS — I10 BENIGN ESSENTIAL HYPERTENSION: ICD-10-CM

## 2024-01-09 RX ORDER — LOSARTAN POTASSIUM AND HYDROCHLOROTHIAZIDE 12.5; 5 MG/1; MG/1
1 TABLET ORAL DAILY
Qty: 90 TABLET | Refills: 3 | Status: SHIPPED | OUTPATIENT
Start: 2024-01-09

## 2024-01-30 ENCOUNTER — TELEPHONE (OUTPATIENT)
Dept: FAMILY MEDICINE | Facility: CLINIC | Age: 59
End: 2024-01-30
Payer: COMMERCIAL

## 2024-01-30 DIAGNOSIS — F06.30 MOOD DISORDER AS LATE EFFECT OF TRAUMATIC BRAIN INJURY (H): ICD-10-CM

## 2024-01-30 DIAGNOSIS — S06.9XAS MOOD DISORDER AS LATE EFFECT OF TRAUMATIC BRAIN INJURY (H): ICD-10-CM

## 2024-01-30 DIAGNOSIS — F41.9 ANXIETY: ICD-10-CM

## 2024-01-30 RX ORDER — ESCITALOPRAM OXALATE 10 MG/1
10 TABLET ORAL DAILY
Qty: 90 TABLET | Refills: 3 | Status: SHIPPED | OUTPATIENT
Start: 2024-01-30 | End: 2024-08-15

## 2024-02-13 NOTE — TELEPHONE ENCOUNTER
Note: Due to record-high volumes, our turn-around is time taking longer than normal . We are currently 10 days behind in the pools.   We are working diligently to submit all requests in a timely manner and in the order they are received. Please only flag true urgent requests as high priority to the pool at this time.   If you have questions - please send a note/message in the active PA encounter and send back to the RPPA (Retail Pharmacy Prior Authorization) team [[id number]].    If you have more specific questions about our process please reach out to our supervisor Laurie Key.   Thank you!     Prior Authorization Approval    Authorization Effective Date: 2/13/2024  Authorization Expiration Date: 2/13/2025  Medication: Testosterone 200mg/ml-APPROVED  Reference #:     Insurance Company: Other (see comments)Comment:  [employer]  Which Pharmacy is filling the prescription (Not needed for infusion/clinic administered): [facility] - [place], [place] - [address]  Pharmacy Notified: Yes  Patient Notified: Instructed pharmacy to notify patient when script is ready to /ship.

## 2024-02-13 NOTE — TELEPHONE ENCOUNTER
Retail Pharmacy Prior Authorization Team   Phone: [phone]    PA Initiation    Medication: TESTOSTERONE CYPIONATE 200 MG/ML IJ SOLN  Insurance Company: Other (see comments)Comment:  Luis   Pharmacy Filling the Rx: [facility] - [place], [place] - [facility] [address]  Filling Pharmacy Phone: [phone]  Filling Pharmacy Fax:    Start Date: 2/13/2024

## 2024-02-19 ENCOUNTER — OFFICE VISIT (OUTPATIENT)
Dept: AUDIOLOGY | Facility: CLINIC | Age: 59
End: 2024-02-19
Payer: COMMERCIAL

## 2024-02-19 DIAGNOSIS — H90.3 SENSORINEURAL HEARING LOSS (SNHL) OF BOTH EARS: Primary | ICD-10-CM

## 2024-02-19 DIAGNOSIS — H93.13 TINNITUS, BILATERAL: ICD-10-CM

## 2024-02-19 DIAGNOSIS — H91.93 BILATERAL HEARING LOSS, UNSPECIFIED HEARING LOSS TYPE: ICD-10-CM

## 2024-02-19 DIAGNOSIS — H93.13 TINNITUS OF BOTH EARS: ICD-10-CM

## 2024-02-19 PROCEDURE — 92557 COMPREHENSIVE HEARING TEST: CPT | Performed by: AUDIOLOGIST

## 2024-02-19 PROCEDURE — 92550 TYMPANOMETRY & REFLEX THRESH: CPT | Performed by: AUDIOLOGIST

## 2024-02-19 NOTE — PROGRESS NOTES
AUDIOLOGY REPORT    SUBJECTIVE:  Galileo Vieira is a 58 year old male who was seen in the Audiology Clinic at the [facility] for audiologic evaluation, referred by Rut Flanagan M.D. The patient reported difficulty hearing dialogue in movie theaters, and conversation while in the car. He denied much difficulty using the telephone, but reported he does not use his phone for audio calls very frequently. He has history of concussion in May/June 2023, and still is experiencing some symptoms. He endorsed constant, bilateral tinnitus, which is not pulsatile. He denied current vertigo, otalgia, otorrhea, or recent illness.       OBJECTIVE:  Abuse Screening:  Do you feel unsafe at home or work/school? No  Do you feel threatened by someone? No  Does anyone try to keep you from having contact with others, or doing things outside of your home? No  Physical signs of abuse present? No     Fall Risk Screen:  1. Have you fallen two or more times in the past year? No  2. Have you fallen and had an injury in the past year? No    Is patient a fall risk? No  Referral initiated: No  Fall Risk Assessment Completed by Audiology    Otoscopic exam indicates ears are clear of cerumen, bilaterally.     Pure Tone Thresholds assessed using conventional audiometry with good  reliability from 250-8000 Hz bilaterally using insert earphones and circumaural headphones.     RIGHT:  borderline-normal sloping to moderate-severe sensorineural hearing loss    LEFT:    borderline-normal sloping to moderate-severe sensorineural hearing loss    Tympanogram:    RIGHT: normal/hypermobile eardrum mobility    LEFT:   normal eardrum mobility    Reflexes for 1000 Hz (reported by stimulus ear):  RIGHT: Ipsilateral is present at reduced sensation level  RIGHT: Contralateral is present at reduced sensation level  LEFT:   Ipsilateral is present at reduced sensation level  LEFT:   Contralateral is present at reduced sensation  level      Speech Reception Threshold:    RIGHT: 50 dB HL    LEFT:   55 dB HL  Word Recognition Score:     RIGHT: 96% at 85 dB HL using NU-6 recorded word list.    LEFT:   100% at 90 dB HL using NU-6 recorded word list.      ASSESSMENT:     ICD-10-CM    1. Sensorineural hearing loss (SNHL) of both ears  H90.3       2. Tinnitus, bilateral  H93.13 Adult Audiology  Referral      3. Bilateral hearing loss, unspecified hearing loss type  H91.93 Adult Audiology  Referral      4. Tinnitus of both ears  H93.13           Today s results were discussed with the patient in detail. Appropriate communication strategies were discussed at length, as were reasonable expectations regarding hearing at a distance, in noisy environments, or when attention is diverted elsewhere.    PLAN:  Patient was counseled regarding hearing loss and impact on communication.  Patient is a good candidate for binaural amplification at this time. Mr. Vieira was encouraged to check on his amplification benefits with his health insurance carrier, to determine his financial responsibility as well as where those benefits may be used. Mr. Vieira was provided with written information on good communication strategies, information on the [facility] hearing aid program, and a copy of today's audiogram.     Mr. Vieira should return annually for hearing evaluation to monitor current hearing thresholds. Wear hearing protection consistently in noise to preserve residual hearing sensitivity and to minimize the effects of noise on tinnitus. Mr. Vieira expressed verbal understanding of this information and plan. Please call this clinic with questions regarding these results or recommendations.        Patricia Matson, [facility]  [place] Licensed Audiologist [id number]

## 2024-03-05 ASSESSMENT — SLEEP AND FATIGUE QUESTIONNAIRES
HOW LIKELY ARE YOU TO NOD OFF OR FALL ASLEEP WHILE WATCHING TV: SLIGHT CHANCE OF DOZING
HOW LIKELY ARE YOU TO NOD OFF OR FALL ASLEEP WHILE SITTING AND TALKING TO SOMEONE: WOULD NEVER DOZE
HOW LIKELY ARE YOU TO NOD OFF OR FALL ASLEEP WHILE LYING DOWN TO REST IN THE AFTERNOON WHEN CIRCUMSTANCES PERMIT: SLIGHT CHANCE OF DOZING
HOW LIKELY ARE YOU TO NOD OFF OR FALL ASLEEP IN A CAR, WHILE STOPPED FOR A FEW MINUTES IN TRAFFIC: WOULD NEVER DOZE
HOW LIKELY ARE YOU TO NOD OFF OR FALL ASLEEP WHEN YOU ARE A PASSENGER IN A CAR FOR AN HOUR WITHOUT A BREAK: SLIGHT CHANCE OF DOZING
HOW LIKELY ARE YOU TO NOD OFF OR FALL ASLEEP WHILE SITTING INACTIVE IN A PUBLIC PLACE: SLIGHT CHANCE OF DOZING
HOW LIKELY ARE YOU TO NOD OFF OR FALL ASLEEP WHILE SITTING QUIETLY AFTER LUNCH WITHOUT ALCOHOL: WOULD NEVER DOZE
HOW LIKELY ARE YOU TO NOD OFF OR FALL ASLEEP WHILE SITTING AND READING: MODERATE CHANCE OF DOZING

## 2024-03-11 ENCOUNTER — VIRTUAL VISIT (OUTPATIENT)
Dept: SLEEP MEDICINE | Facility: CLINIC | Age: 59
End: 2024-03-11
Payer: COMMERCIAL

## 2024-03-11 VITALS — WEIGHT: 280 LBS | BODY MASS INDEX: 39.2 KG/M2 | HEIGHT: 71 IN

## 2024-03-11 DIAGNOSIS — G47.33 OSA (OBSTRUCTIVE SLEEP APNEA): Primary | ICD-10-CM

## 2024-03-11 PROCEDURE — 99213 OFFICE O/P EST LOW 20 MIN: CPT | Mod: 95 | Performed by: PHYSICIAN ASSISTANT

## 2024-03-11 ASSESSMENT — PAIN SCALES - GENERAL: PAINLEVEL: NO PAIN (0)

## 2024-03-11 NOTE — NURSING NOTE
Is the patient currently in the state of [place]? YES    Visit mode:VIDEO    If the visit is dropped, the patient can be reconnected by: VIDEO VISIT: Send to e-mail at: [email or web address]    Will anyone else be joining the visit? NO  (If patient encounters technical issues they should call [phone])    How would you like to obtain your AVS? MyChart    Are changes needed to the allergy or medication list? Pt stated no changes to allergies and Pt stated no med changes    Reason for visit: RECHECK  Has patient had flu shot for current/most recent flu season? If so, when? Yes: 10/16/2023    Erlinda ALDANA

## 2024-03-11 NOTE — PROGRESS NOTES
Virtual Visit Details    Type of service:  Video Visit     Originating Location (pt. Location): Home    Distant Location (provider location):  On-site  Platform used for Video Visit: [facility]    Sleep Apnea - Follow-up Visit:    Impression/Plan:  Mild obstructive sleep apnea-  Excellent CPAP compliance and AHI is well controlled on CPAP 6-15 cm/H20. Daytime symptoms are improved.   Continue current treatment.   Comprehensive DME order placed.      Galileo Vieira will follow up in about 1 year or sooner if any concerns    20 minutes spent on day of encounter doing chart review,  history and exam, counseling, coordinating plan of care, documentation and further activities as noted above.         Shashi Trevizo PA-C  Sleep Medicine     History of Present Illness:  Chief Complaint   Patient presents with    RECHECK    CPAP Follow Up       Galileo Vieira presents for follow-up of their mild sleep apnea, managed with CPAP.     A home sleep apnea test was performed on September 2, 2016.  His weight at that time was 253 pounds.  The test showed mild obstructive sleep apnea with an AHI of 14 events per hour of recording time.  His AHI while supine was notably higher at 55/h, with 21% of the night spent in the supine position.  The oxyhemoglobin saturation shane was 83% and there were 12 minutes with saturations at or below 88%.  CPAP was prescribed for obstructive sleep apnea and sleep associated hypoxemia related to the cumulative effect of episodic desaturations.  Cpap dme [facility]    Do you use a CPAP Machine at home: Yes  Overall, on a scale of 0-10 how would you rate your CPAP (0 poor, 10 great): 10    What type of mask do you use: Full Face Mask  Is your mask comfortable: Yes  If not, why:      Is your mask leaking: Yes  If yes, where do you feel it: the strap comes undone at time  How many night per week does the mask leak (0-7): 7    Do you notice snoring with mask on: No  Do you notice gasping arousals with mask on: No  Are  you having significant oral or nasal dryness: No  Is the pressure setting comfortable: No  If not, why: to much pressure middle of the night.    What is your typical bedtime: 9:30  How long does it take you to go to sleep on PAP therapy: minutes  What time do you typically get out of bed for the day: 7:00am  How many hours on average per night are you using PAP therapy: 8 or more  How many hours are you sleeping per night: 8 or more  Do you feel well rested in the morning: Yes      ResMed   Auto-PAP 6.0 - 15.0 cmH2O 30 day usage data:    100% of days with > 4 hours of use. 0/30 days with no use.   Average use 577 minutes per day.   95%ile Leak 89.11 L/min. Mask is too old and he has facial hair  CPAP 95% pressure 10.5 cm.   AHI 0.44 events per hour.       EPWORTH SLEEPINESS SCALE         3/5/2024     2:08 PM    [place] Sleepiness Scale ( JUAN Birch  [id number]<br>ESS - [place]/English - Final version - 21 Nov 07 - [facility].)   Sitting and reading Moderate chance of dozing   Watching TV Slight chance of dozing   Sitting, inactive in a public place (e.g. a theatre or a meeting) Slight chance of dozing   As a passenger in a car for an hour without a break Slight chance of dozing   Lying down to rest in the afternoon when circumstances permit Slight chance of dozing   Sitting and talking to someone Would never doze   Sitting quietly after a lunch without alcohol Would never doze   In a car, while stopped for a few minutes in traffic Would never doze   [place] Score (MC) 6   [place] Score (Sleep) 6       INSOMNIA SEVERITY INDEX (CLAUDETTE)          3/5/2024     2:04 PM   Insomnia Severity Index (CLAUDETTE)   Difficulty falling asleep 0   Difficulty staying asleep 1   Problems waking up too early 1   How SATISFIED/DISSATISFIED are you with your CURRENT sleep pattern? 1   How NOTICEABLE to others do you think your sleep problem is in terms of impairing the quality of your life? 0   How WORRIED/DISTRESSED are you about your  "current sleep problem? 0   To what extent do you consider your sleep problem to INTERFERE with your daily functioning (e.g. daytime fatigue, mood, ability to function at work/daily chores, concentration, memory, mood, etc.) CURRENTLY? 0   CLAUDETTE Total Score 3       Guidelines for Scoring/Interpretation:  Total score categories:  0-7 = No clinically significant insomnia   8-14 = Subthreshold insomnia   15-21 = Clinical insomnia (moderate severity)  22-28 = Clinical insomnia (severe)  Used via courtesy of www.[employer].va.gov with permission from Nelson Villanueva PhD., [facility]        Past medical/surgical history, family history, social history, medications and allergies were reviewed.        Problem List:  Patient Active Problem List    Diagnosis Date Noted    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) 04/21/2023     Priority: Medium    Prediabetes 08/30/2021     Priority: Medium    Benign essential hypertension 08/30/2021     Priority: Medium    Attention deficit hyperactivity disorder (ADHD), unspecified ADHD type 08/24/2021     Priority: Medium    Concussion with loss of consciousness, subsequent encounter 01/25/2021     Priority: Medium    Obesity (BMI 35.0-39.9) with comorbidity (H) 09/28/2018     Priority: Medium    Mild obstructive sleep apnea 09/08/2016     Priority: Medium    Fear of flying 06/05/2014     Priority: Medium    Testosterone insufficiency 03/28/2013     Priority: Medium    Anxiety 01/05/2012     Priority: Medium    GERD (gastroesophageal reflux disease) 06/10/2009     Priority: Medium    Hyperlipidemia LDL goal <160 11/14/2008     Priority: Medium        Ht 1.803 m (5' 11\")   Wt 127 kg (280 lb)   BMI 39.05 kg/m     "

## 2024-03-11 NOTE — PATIENT INSTRUCTIONS
Your Body mass index is 39.05 kg/m .  Weight management is a personal decision.  If you are interested in exploring weight loss strategies, the following discussion covers the approaches that may be successful. Body mass index (BMI) is one way to tell whether you are at a healthy weight, overweight, or obese. It measures your weight in relation to your height.  A BMI of 18.5 to 24.9 is in the healthy range. A person with a BMI of 25 to 29.9 is considered overweight, and someone with a BMI of 30 or greater is considered obese. More than two-thirds of American adults are considered overweight or obese.  Being overweight or obese increases the risk for further weight gain. Excess weight may lead to heart disease and diabetes.  Creating and following plans for healthy eating and physical activity may help you improve your health.  Weight control is part of healthy lifestyle and includes exercise, emotional health, and healthy eating habits. Careful eating habits lifelong are the mainstay of weight control. Though there are significant health benefits from weight loss, long-term weight loss with diet alone may be very difficult to achieve- studies show long-term success with dietary management in less than 10% of people. Attaining a healthy weight may be especially difficult to achieve in those with severe obesity. In some cases, medications, devices and surgical management might be considered.  What can you do?  If you are overweight or obese and are interested in methods for weight loss, you should discuss this with your provider.   Consider reducing daily calorie intake by 500 calories.   Keep a food journal.   Avoiding skipping meals, consider cutting portions instead.    Diet combined with exercise helps maintain muscle while optimizing fat loss. Strength training is particularly important for building and maintaining muscle mass. Exercise helps reduce stress, increase energy, and improves fitness. Increasing  exercise without diet control, however, may not burn enough calories to loose weight.     Start walking three days a week 10-20 minutes at a time  Work towards walking thirty minutes five days a week   Eventually, increase the speed of your walking for 1-2 minutes at time    In addition, we recommend that you review healthy lifestyles and methods for weight loss available through the National Institutes of Health patient information sites:  http://win.niddk.nih.gov/publications/index.htm    And look into health and wellness programs that may be available through your health insurance provider, employer, local community center, or panfilo club.

## 2024-03-14 NOTE — NURSING NOTE
2 year appointment reminder sent via Tic[employer].     Anabela Juan   Clinic Assistant  [facility]

## 2024-04-16 NOTE — PATIENT INSTRUCTIONS
Discontinue meclizine and methocarbamol.     Continue ibuprofen 800 mg up to 3 times daily.    Follow up with concussion clinic next week.     Continue sertraline 50 mg for now, can increase dose in a few weeks if desired.     Check blood pressure a couple times over the next week, call clinic or message with results   spontaneous/unlabored

## 2024-07-26 ENCOUNTER — HOSPITAL ENCOUNTER (EMERGENCY)
Facility: CLINIC | Age: 59
Discharge: HOME | End: 2024-07-26
Attending: PHYSICIAN ASSISTANT | Admitting: PHYSICIAN ASSISTANT
Payer: COMMERCIAL

## 2024-07-26 VITALS
SYSTOLIC BLOOD PRESSURE: 125 MMHG | RESPIRATION RATE: 18 BRPM | TEMPERATURE: 98.4 F | DIASTOLIC BLOOD PRESSURE: 66 MMHG | OXYGEN SATURATION: 96 % | WEIGHT: 296.74 LBS | HEART RATE: 84 BPM | HEIGHT: 66 IN | BODY MASS INDEX: 47.69 KG/M2

## 2024-07-26 DIAGNOSIS — K92.1 HEMATOCHEZIA: ICD-10-CM

## 2024-07-26 DIAGNOSIS — K60.2 ANAL FISSURE: ICD-10-CM

## 2024-07-26 LAB
ABO/RH(D): NORMAL
ALBUMIN SERPL BCG-MCNC: 4.4 G/DL (ref 3.5–5.2)
ALP SERPL-CCNC: 151 U/L (ref 40–150)
ALT SERPL W P-5'-P-CCNC: 142 U/L (ref 0–70)
ANION GAP SERPL CALCULATED.3IONS-SCNC: 16 MMOL/L (ref 7–15)
ANTIBODY SCREEN: NEGATIVE
AST SERPL W P-5'-P-CCNC: 94 U/L (ref 0–45)
BASOPHILS # BLD AUTO: 0.1 10E3/UL (ref 0–0.2)
BASOPHILS NFR BLD AUTO: 1 %
BILIRUB SERPL-MCNC: 0.4 MG/DL
BUN SERPL-MCNC: 11.5 MG/DL (ref 6–20)
CALCIUM SERPL-MCNC: 9.6 MG/DL (ref 8.8–10.4)
CHLORIDE SERPL-SCNC: 98 MMOL/L (ref 98–107)
CREAT SERPL-MCNC: 0.92 MG/DL (ref 0.67–1.17)
EGFRCR SERPLBLD CKD-EPI 2021: >90 ML/MIN/1.73M2
EOSINOPHIL # BLD AUTO: 0.3 10E3/UL (ref 0–0.7)
EOSINOPHIL NFR BLD AUTO: 3 %
ERYTHROCYTE [DISTWIDTH] IN BLOOD BY AUTOMATED COUNT: 12.2 % (ref 10–15)
GLUCOSE SERPL-MCNC: 250 MG/DL (ref 70–99)
HCO3 SERPL-SCNC: 23 MMOL/L (ref 22–29)
HCT VFR BLD AUTO: 42.9 % (ref 40–53)
HGB BLD-MCNC: 14.7 G/DL (ref 13.3–17.7)
IMM GRANULOCYTES # BLD: 0.1 10E3/UL
IMM GRANULOCYTES NFR BLD: 1 %
LYMPHOCYTES # BLD AUTO: 1.5 10E3/UL (ref 0.8–5.3)
LYMPHOCYTES NFR BLD AUTO: 21 %
MCH RBC QN AUTO: 30.4 PG (ref 26.5–33)
MCHC RBC AUTO-ENTMCNC: 34.3 G/DL (ref 31.5–36.5)
MCV RBC AUTO: 89 FL (ref 78–100)
MONOCYTES # BLD AUTO: 0.4 10E3/UL (ref 0–1.3)
MONOCYTES NFR BLD AUTO: 6 %
NEUTROPHILS # BLD AUTO: 5 10E3/UL (ref 1.6–8.3)
NEUTROPHILS NFR BLD AUTO: 69 %
NRBC # BLD AUTO: 0 10E3/UL
NRBC BLD AUTO-RTO: 0 /100
PLATELET # BLD AUTO: 225 10E3/UL (ref 150–450)
POTASSIUM SERPL-SCNC: 4.1 MMOL/L (ref 3.4–5.3)
PROT SERPL-MCNC: 7.3 G/DL (ref 6.4–8.3)
RBC # BLD AUTO: 4.83 10E6/UL (ref 4.4–5.9)
SODIUM SERPL-SCNC: 137 MMOL/L (ref 135–145)
SPECIMEN EXPIRATION DATE: NORMAL
WBC # BLD AUTO: 7.3 10E3/UL (ref 4–11)

## 2024-07-26 PROCEDURE — 99283 EMERGENCY DEPT VISIT LOW MDM: CPT

## 2024-07-26 PROCEDURE — 36415 COLL VENOUS BLD VENIPUNCTURE: CPT | Performed by: PHYSICIAN ASSISTANT

## 2024-07-26 PROCEDURE — 85025 COMPLETE CBC W/AUTO DIFF WBC: CPT | Performed by: PHYSICIAN ASSISTANT

## 2024-07-26 PROCEDURE — 82040 ASSAY OF SERUM ALBUMIN: CPT | Performed by: PHYSICIAN ASSISTANT

## 2024-07-26 PROCEDURE — 86900 BLOOD TYPING SEROLOGIC ABO: CPT | Performed by: PHYSICIAN ASSISTANT

## 2024-07-26 ASSESSMENT — COLUMBIA-SUICIDE SEVERITY RATING SCALE - C-SSRS
1. IN THE PAST MONTH, HAVE YOU WISHED YOU WERE DEAD OR WISHED YOU COULD GO TO SLEEP AND NOT WAKE UP?: NO
2. HAVE YOU ACTUALLY HAD ANY THOUGHTS OF KILLING YOURSELF IN THE PAST MONTH?: NO
6. HAVE YOU EVER DONE ANYTHING, STARTED TO DO ANYTHING, OR PREPARED TO DO ANYTHING TO END YOUR LIFE?: NO

## 2024-07-26 ASSESSMENT — ACTIVITIES OF DAILY LIVING (ADL): ADLS_ACUITY_SCORE: 35

## 2024-07-26 NOTE — ED TRIAGE NOTES
Patient reports 1 episode of bright red stool today at work. Denies dizziness or lightheadedness. No thinners. Recently dealing with external hemorrhoids. Doc at patient's work advised patient to be seen in ED. ABCs intact

## 2024-07-26 NOTE — ED PROVIDER NOTES
"  Emergency Department Note      History of Present Illness     Chief Complaint   Rectal Bleeding    HPI   Galileo Vieira is a 58 year old male with history of hypertenison, hyperlipidemia, and external hemorrhoids who presents for an episode of rectal bleeding. Patient reports that around 1:00pm, he went to use the bathroom at work and no stool came out but he filled the toilet bowl with bright red blood. The toilet paper was saturated with blood when he wiped. Patient has not had any recurrent episodes since. Galileo had some mild rectal discomfort as well.   No abdominal pain.. Patient has history of external hemorrhoids but did not feel them today. No black stool, vomiting, dizziness, fever, lightheadedness, or shortness of breath. Patient is not anticoagulated. He has not had any further episodes.  Denies diarrhea and doesn't believe he's constipated.      Independent Historian   None    Review of External Notes   Reviewed Dr. Calderón GI note from 2014 colonoscopy which was normal.    Past Medical History     Medical History and Problem List   Depressive disorder  Esophageal reflux  Hyperlipidemia   Hypertension  GIUSEPPE  Testosterone deficiency  ADHD  Mood disorder as late effect of TBI   External hemorrhoids     Medications   Atorvastatin  Escitalopram   Losartan   Losartan-hydrochlorothiazide   Nebivolol   Omeprazole   Sildenafil     Surgical History   Cholecystectomy  Hernia repair  Left knee arthroscopy and replacement  Vasectomy     Physical Exam     Patient Vitals for the past 24 hrs:   BP Temp Pulse Resp SpO2 Height Weight   07/26/24 1644 -- -- -- -- 96 % -- --   07/26/24 1634 125/66 -- -- -- 95 % -- --   07/26/24 1614 -- -- -- -- 96 % -- --   07/26/24 1604 135/73 -- -- -- 96 % -- --   07/26/24 1549 -- -- -- -- 97 % -- --   07/26/24 1547 130/87 -- 84 -- -- -- --   07/26/24 1503 (!) 144/88 98.4  F (36.9  C) 85 18 96 % -- --   07/26/24 1459 -- -- -- -- -- 1.676 m (5' 6\") 134.6 kg (296 lb 11.8 oz)     Physical " Exam  General: Awake, alert, non-toxic.  Head:  Scalp is NC/AT  Eyes:  Conjunctiva normal, PERRL  ENT:  The external nose and ears are normal.   Neck:  Normal range of motion without rigidity.  CV:  Regular rate and rhythm    No pathologic murmur, rubs, or gallops.  Resp:  Breath sounds are clear bilaterally    Non-labored, no retractions or accessory muscle use  Abdomen: Abdomen is soft, no distension, no tenderness, no masses. Rectal exam showing small anal fissure w/out active bleeding on right side.  No external hemorrhoids or redness/fluctuance/tenderness.  ERWIN w/small dried hematochezia.  No melena, no active bleeding.  MS:  No lower extremity edema/swelling.   Skin:  Warm and dry, No rash or lesions noted.  Neuro:  Alert and oriented.  GCS 15 Moves all extremities normal.  No facial asymmetry. Gait normal.  Psych:  Awake. Alert. Normal affect. Appropriate interactions.     Diagnostics     Lab Results   Labs Ordered and Resulted from Time of ED Arrival to Time of ED Departure   COMPREHENSIVE METABOLIC PANEL - Abnormal       Result Value    Sodium 137      Potassium 4.1      Carbon Dioxide (CO2) 23      Anion Gap 16 (*)     Urea Nitrogen 11.5      Creatinine 0.92      GFR Estimate >90      Calcium 9.6      Chloride 98      Glucose 250 (*)     Alkaline Phosphatase 151 (*)     AST 94 (*)      (*)     Protein Total 7.3      Albumin 4.4      Bilirubin Total 0.4     CBC WITH PLATELETS AND DIFFERENTIAL    WBC Count 7.3      RBC Count 4.83      Hemoglobin 14.7      Hematocrit 42.9      MCV 89      MCH 30.4      MCHC 34.3      RDW 12.2      Platelet Count 225      % Neutrophils 69      % Lymphocytes 21      % Monocytes 6      % Eosinophils 3      % Basophils 1      % Immature Granulocytes 1      NRBCs per 100 WBC 0      Absolute Neutrophils 5.0      Absolute Lymphocytes 1.5      Absolute Monocytes 0.4      Absolute Eosinophils 0.3      Absolute Basophils 0.1      Absolute Immature Granulocytes 0.1      Absolute  NRBCs 0.0     TYPE AND SCREEN, ADULT    ABO/RH(D) A POS      Antibody Screen Negative      SPECIMEN EXPIRATION DATE [id number]     ABO/RH TYPE AND SCREEN       Imaging   No orders to display     EKG   None     Independent Interpretation   None    ED Course      Medications Administered   Medications - No data to display    Procedures   None      Discussion of Management   None    ED Course   ED Course as of 07/26/24 1826 Fri Jul 26, 2024   1518 I evaluated the patient, obtained history, and performed a physical exam as detailed above.      Optional/Additional Documentation  None    Medical Decision Making / Diagnosis     CMS Diagnoses: None    MIPS   None    MDM   Galileo Vieira is a 58 year old male who presents after single episode of hematochezia with a little bit of rectal discomfort.  Broad differential considered.  No further episodes here.  He is not on blood thinners.  Does have what appears to be a small anal fissure which could be the source of this though suspect he may also have other lower GI source such as internal hemorrhoids less likely diverticulosis malignancy etc. is the source of this given small amount of dried blood on ERWIN but no active bleeding.  He has no abdominal pain or tenderness to palpation to suggest colitis vascular catastrophe etc. and I do not think advanced imaging is indicated.  There is certainly nothing to suggest upper GI bleed.  His platelets are normal and he has no evidence of generalized coagulopathy no liver disease etc.    We discussed options including observation in the hospital versus discharge to home with close return precautions and outpatient follow-up.  Given that he has not had any further episodes of bleeding here his vitals are completely normal/stable, hemoglobin is normal/baseline he is not anticoagulated we will plan for discharge to home with close outpatient colorectal or GI follow-up for further evaluation and likely colonoscopy/flex sig.  He is  otherwise asymptomatic.  I will prescribe him some lidocaine ointment for the anal fissure and also have him take stool softeners as needed to keep bowel movements easy and avoid straining.  Recommended sitz bath's.  I did instruct him that if he is having recurrent episodes of hematochezia if bleeding is increasing or development of dizziness shortness of breath abdominal pain fever, melena, hematemesis or worsening rectal pain he should return to the ER for further evaluation and workup likely admission.  Disposition   The patient was discharged.     Diagnosis     ICD-10-CM    1. Hematochezia  K92.1 Adult Colorectal Surgery  Referral      2. Anal fissure  K60.2 Adult Colorectal Surgery  Referral           Discharge Medications   Discharge Medication List as of 7/26/2024  4:48 PM        START taking these medications    Details   lidocaine (TOPICAINE 5) 5 % anorectal gel Apply to affected areaDisp-10 g, [id number]           Scribe Disclosure:  I, Mariluz Lobo, am serving as a scribe at 3:08 PM on 7/26/2024 to document services personally performed by Toni Spangler PA-C based on my observations and the provider's statements to me.        Toni Spangler PA-C  07/26/24 [id number]

## 2024-08-13 ASSESSMENT — ANXIETY QUESTIONNAIRES
3. WORRYING TOO MUCH ABOUT DIFFERENT THINGS: NOT AT ALL
IF YOU CHECKED OFF ANY PROBLEMS ON THIS QUESTIONNAIRE, HOW DIFFICULT HAVE THESE PROBLEMS MADE IT FOR YOU TO DO YOUR WORK, TAKE CARE OF THINGS AT HOME, OR GET ALONG WITH OTHER PEOPLE: NOT DIFFICULT AT ALL
7. FEELING AFRAID AS IF SOMETHING AWFUL MIGHT HAPPEN: NOT AT ALL
GAD7 TOTAL SCORE: 1
4. TROUBLE RELAXING: NOT AT ALL
2. NOT BEING ABLE TO STOP OR CONTROL WORRYING: NOT AT ALL
5. BEING SO RESTLESS THAT IT IS HARD TO SIT STILL: NOT AT ALL
7. FEELING AFRAID AS IF SOMETHING AWFUL MIGHT HAPPEN: NOT AT ALL
6. BECOMING EASILY ANNOYED OR IRRITABLE: SEVERAL DAYS
8. IF YOU CHECKED OFF ANY PROBLEMS, HOW DIFFICULT HAVE THESE MADE IT FOR YOU TO DO YOUR WORK, TAKE CARE OF THINGS AT HOME, OR GET ALONG WITH OTHER PEOPLE?: NOT DIFFICULT AT ALL
1. FEELING NERVOUS, ANXIOUS, OR ON EDGE: NOT AT ALL
GAD7 TOTAL SCORE: 1

## 2024-08-15 ENCOUNTER — OFFICE VISIT (OUTPATIENT)
Dept: FAMILY MEDICINE | Facility: CLINIC | Age: 59
End: 2024-08-15
Payer: COMMERCIAL

## 2024-08-15 VITALS
BODY MASS INDEX: 41.35 KG/M2 | HEART RATE: 72 BPM | HEIGHT: 71 IN | OXYGEN SATURATION: 96 % | WEIGHT: 295.4 LBS | SYSTOLIC BLOOD PRESSURE: 121 MMHG | DIASTOLIC BLOOD PRESSURE: 83 MMHG | TEMPERATURE: 98.8 F | RESPIRATION RATE: 16 BRPM

## 2024-08-15 DIAGNOSIS — E78.5 HYPERLIPIDEMIA LDL GOAL <160: ICD-10-CM

## 2024-08-15 DIAGNOSIS — R73.03 PREDIABETES: ICD-10-CM

## 2024-08-15 DIAGNOSIS — E66.01 MORBID OBESITY (H): ICD-10-CM

## 2024-08-15 DIAGNOSIS — I20.89 OTHER FORMS OF ANGINA PECTORIS (H): ICD-10-CM

## 2024-08-15 DIAGNOSIS — I10 BENIGN ESSENTIAL HYPERTENSION: ICD-10-CM

## 2024-08-15 DIAGNOSIS — G47.33 MILD OBSTRUCTIVE SLEEP APNEA: ICD-10-CM

## 2024-08-15 DIAGNOSIS — K62.5 PAINLESS RECTAL BLEEDING: Primary | ICD-10-CM

## 2024-08-15 DIAGNOSIS — S06.9XAS MOOD DISORDER AS LATE EFFECT OF TRAUMATIC BRAIN INJURY (H): ICD-10-CM

## 2024-08-15 DIAGNOSIS — Z12.11 SCREEN FOR COLON CANCER: ICD-10-CM

## 2024-08-15 DIAGNOSIS — F06.30 MOOD DISORDER AS LATE EFFECT OF TRAUMATIC BRAIN INJURY (H): ICD-10-CM

## 2024-08-15 LAB — HBA1C MFR BLD: 8.6 % (ref 0–5.6)

## 2024-08-15 PROCEDURE — 99214 OFFICE O/P EST MOD 30 MIN: CPT | Performed by: FAMILY MEDICINE

## 2024-08-15 PROCEDURE — 83036 HEMOGLOBIN GLYCOSYLATED A1C: CPT | Performed by: FAMILY MEDICINE

## 2024-08-15 PROCEDURE — 36415 COLL VENOUS BLD VENIPUNCTURE: CPT | Performed by: FAMILY MEDICINE

## 2024-08-15 ASSESSMENT — PATIENT HEALTH QUESTIONNAIRE - PHQ9
10. IF YOU CHECKED OFF ANY PROBLEMS, HOW DIFFICULT HAVE THESE PROBLEMS MADE IT FOR YOU TO DO YOUR WORK, TAKE CARE OF THINGS AT HOME, OR GET ALONG WITH OTHER PEOPLE: NOT DIFFICULT AT ALL
SUM OF ALL RESPONSES TO PHQ QUESTIONS 1-9: 3
SUM OF ALL RESPONSES TO PHQ QUESTIONS 1-9: 3

## 2024-08-15 NOTE — LETTER
To Whom It May Concern:     RE: Galileo Gurrola is my patient. He continues to struggle with post-concussive headaches, and I have instructed him to continue wearing a baseball cap at work to block the fluorescent light from overhead.      This restriction should be considered valid until August 15, 2025.      Please contact me for questions or concerns.    As always, please let us know if you have any questions. Our clinic number is [phone].    My best,        Rut Flanagan MD  [facility]

## 2024-08-15 NOTE — PROGRESS NOTES
"  Assessment & Plan     Painless rectal bleeding - large amount initially, now with small blood with each stool. Suggested colonoscopy to further investigate. He is due for his screening colonoscopy, so ordered as such.     Prediabetes  - Hemoglobin A1c; Future  - Hemoglobin A1c    Other forms of angina pectoris (H24)    Morbid obesity (H) - encouraged weight loss as his recent labs identified concern for T2DM and FLD.     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) - stable, off lexapro    Screen for colon cancer  - Colonoscopy Screening  Referral; Future        MED REC REQUIRED  Post Medication Reconciliation Status:  Discharge medications reconciled, continue medications without change      Subjective   Galileo is a 58 year old, presenting for the following health issues:  ER F/U (Hematochezia & Anal Fissure) and Forms (FMLA Paperwork)        8/15/2024    11:27 AM   Additional Questions   Roomed by CATHERINE Benson   Accompanied by Self     HPI   ED/UC Followup:    Facility:  [facility]  Date of visit: 07/26/2024  Reason for visit: Rectal Bleeding  Current Status: Moderate Improvement      Still having some blood streaking on the stool, little bit on the toilet tissue.     Anal exam in the ER identified an anal fissure.   Went in ER with profuse rectal bleeding, slowed quickly after it started.       Review of Systems  Constitutional, HEENT, cardiovascular, pulmonary, gi and gu systems are negative, except as otherwise noted.      Objective    /83 (BP Location: Right arm, Patient Position: Sitting, Cuff Size: Adult Large)   Pulse 72   Temp 98.8  F (37.1  C) (Oral)   Resp 16   Ht 1.803 m (5' 11\")   Wt 134 kg (295 lb 6.4 oz)   SpO2 96%   BMI 41.20 kg/m    Body mass index is 41.2 kg/m .  Physical Exam   GENERAL: alert and no distress  PSYCH: mentation appears normal, affect normal/bright          Signed Electronically by: Rut Flanagan MD    Answers " submitted by the patient for this visit:  Patient Health Questionnaire (Submitted on 8/15/2024)  If you checked off any problems, how difficult have these problems made it for you to do your work, take care of things at home, or get along with other people?: Not difficult at all  PHQ9 TOTAL SCORE: 3  TODD-7 (Submitted on 8/13/2024)  TODD 7 TOTAL SCORE: 1

## 2024-08-16 ENCOUNTER — TELEPHONE (OUTPATIENT)
Dept: FAMILY MEDICINE | Facility: CLINIC | Age: 59
End: 2024-08-16
Payer: COMMERCIAL

## 2024-08-20 ENCOUNTER — TELEPHONE (OUTPATIENT)
Dept: GASTROENTEROLOGY | Facility: CLINIC | Age: 59
End: 2024-08-20
Payer: COMMERCIAL

## 2024-08-20 DIAGNOSIS — Z12.11 SPECIAL SCREENING FOR MALIGNANT NEOPLASMS, COLON: Primary | ICD-10-CM

## 2024-08-20 NOTE — TELEPHONE ENCOUNTER
"Endoscopy Scheduling Screen    Have you had a positive Covid test in the last 14 days?  No    What is your communication preference for Instructions and/or Bowel Prep?   MyChart    What insurance is in the chart?  Other:  [facility] [employer]    Ordering/Referring Provider: Rut Flanagan   (If ordering provider performs procedure, schedule with ordering provider unless otherwise instructed. )    BMI: Estimated body mass index is 41.2 kg/m  as calculated from the following:    Height as of 8/15/24: 1.803 m (5' 11\").    Weight as of 8/15/24: 134 kg (295 lb 6.4 oz).     Sedation Ordered  moderate sedation.   If patient BMI > 50 do not schedule in ASC.    If patient BMI > 45 do not schedule at [facility].    Are you taking methadone or Suboxone?  No    Have you had difficulties, pain, or discomfort during past endoscopy procedures?  No    Are you taking any prescription medications for pain 3 or more times per week?   NO, No RN review required.    Do you have a history of malignant hyperthermia?  No     Have you been diagnosed or told you have pulmonary hypertension?   No    Do you have an LVAD?  No    Have you been told you have moderate to severe sleep apnea?  Yes (RN Review required for scheduling unless scheduling in Hospital.)    Have you been told you have COPD, asthma, or any other lung disease?  No    Do you have any heart conditions?  No     Have you ever had or are you waiting for an organ transplant?  No. Continue scheduling, no site restrictions.    Have you had a stroke or transient ischemic attack (TIA aka \"mini stroke\" in the last 6 months?   No    Have you been diagnosed with or been told you have cirrhosis of the liver?   No    Are you currently on dialysis?   No    Do you need assistance transferring?   No    BMI: Estimated body mass index is 41.2 kg/m  as calculated from the following:    Height as of 8/15/24: 1.803 m (5' 11\").    Weight as of 8/15/24: 134 kg (295 lb 6.4 oz).     Is patients BMI > 40 and " scheduling location [facility]?  No    Do you take an injectable medication for weight loss or diabetes (excluding insulin)?  Yes, hold time can be up to 7 days. Please consult with you prescribing provider to discuss endoscopy recommendations.     Do you take the medication Naltrexone?  No    Do you take blood thinners?  No       Prep   Are you currently on dialysis or do you have chronic kidney disease?  No    Do you have a diagnosis of diabetes?  No    Do you have a diagnosis of cystic fibrosis (CF)?  No    On a regular basis do you go 3 -5 days between bowel movements?  No    BMI > 40?  Yes (Extended Prep)    Preferred Pharmacy:  [id number] - [place], [place] - [address]  [address] [place] [address]  Phone: [phone] Fax: [phone]      Final Scheduling Details     Procedure scheduled  Colonoscopy    Surgeon:  ARTIE     Date of procedure:  10/4/2024     Pre-OP / PAC:   No - Not required for this site.    Location  [facility] - Per order.    Sedation   Moderate Sedation - Per order.      Patient Reminders:   You will receive a call from a Nurse to review instructions and health history.  This assessment must be completed prior to your procedure.  Failure to complete the Nurse assessment may result in the procedure being cancelled.      On the day of your procedure, please designate an adult(s) who can drive you home stay with you for the next 24 hours. The medicines used in the exam will make you sleepy. You will not be able to drive.      You cannot take public transportation, ride share services, or non-medical taxi service without a responsible caregiver.  Medical transport services are allowed with the requirement that a responsible caregiver will receive you at your destination.  We require that drivers and caregivers are confirmed prior to your procedure.

## 2024-09-13 RX ORDER — BISACODYL 5 MG/1
TABLET, DELAYED RELEASE ORAL
Qty: 4 TABLET | Refills: 0 | Status: ON HOLD | OUTPATIENT
Start: 2024-09-13 | End: 2024-10-04

## 2024-09-16 DIAGNOSIS — S06.0X9D CONCUSSION WITH LOSS OF CONSCIOUSNESS, SUBSEQUENT ENCOUNTER: ICD-10-CM

## 2024-09-16 DIAGNOSIS — M62.838 NECK MUSCLE SPASM: ICD-10-CM

## 2024-09-16 DIAGNOSIS — E34.9 TESTOSTERONE INSUFFICIENCY: ICD-10-CM

## 2024-09-17 RX ORDER — IBUPROFEN 800 MG/1
800 TABLET, FILM COATED ORAL EVERY 8 HOURS PRN
Qty: 90 TABLET | Refills: 0 | Status: SHIPPED | OUTPATIENT
Start: 2024-09-17

## 2024-09-17 RX ORDER — TESTOSTERONE CYPIONATE 200 MG/ML
300 INJECTION, SOLUTION INTRAMUSCULAR
Qty: 48 ML | Refills: 0 | Status: SHIPPED | OUTPATIENT
Start: 2024-09-17 | End: 2024-12-16

## 2024-09-17 RX ORDER — NEEDLES, SAFETY 22GX1 1/2"
NEEDLE, DISPOSABLE MISCELLANEOUS
Qty: 6 EACH | Refills: 3 | Status: SHIPPED | OUTPATIENT
Start: 2024-09-17

## 2024-09-17 RX ORDER — NEEDLES, DISPOSABLE 25GX5/8"
NEEDLE, DISPOSABLE MISCELLANEOUS
Qty: 6 EACH | Refills: 3 | Status: SHIPPED | OUTPATIENT
Start: 2024-09-17

## 2024-09-24 ENCOUNTER — TELEPHONE (OUTPATIENT)
Dept: FAMILY MEDICINE | Facility: CLINIC | Age: 59
End: 2024-09-24
Payer: COMMERCIAL

## 2024-09-24 DIAGNOSIS — E78.5 HYPERLIPIDEMIA LDL GOAL <160: ICD-10-CM

## 2024-09-24 DIAGNOSIS — R73.03 PREDIABETES: ICD-10-CM

## 2024-09-24 DIAGNOSIS — I10 BENIGN ESSENTIAL HYPERTENSION: ICD-10-CM

## 2024-09-24 DIAGNOSIS — E11.9 TYPE 2 DIABETES MELLITUS WITHOUT COMPLICATION, WITHOUT LONG-TERM CURRENT USE OF INSULIN (H): Primary | ICD-10-CM

## 2024-09-24 DIAGNOSIS — E66.01 MORBID OBESITY (H): ICD-10-CM

## 2024-09-24 DIAGNOSIS — E66.01 MORBID OBESITY (H): Primary | ICD-10-CM

## 2024-09-24 NOTE — TELEPHONE ENCOUNTER
Central Prior Authorization Team  Phone: [phone]    PRIOR AUTHORIZATION DENIED    Medication: MOUNJARO 5 MG/0.5ML SC SOPN  Insurance Company: [employer] - Phone [phone] Fax [phone]  Denial Date: 9/24/2024  Denial Reason(s):         Appeal Information:         Patient Notified: NO- Unfortunately, we cannot call the patient with denials because we do not know what next steps the MD will take nor can we give medical advice, please notify the patient of what they are to expect for the continuation of their therapy from the provider.

## 2024-09-25 ENCOUNTER — TELEPHONE (OUTPATIENT)
Dept: GASTROENTEROLOGY | Facility: CLINIC | Age: 59
End: 2024-09-25
Payer: COMMERCIAL

## 2024-09-25 NOTE — TELEPHONE ENCOUNTER
Pre visit planning completed.      Procedure details:    Patient scheduled for Colonoscopy on 10.04.2024.     Arrival time: 1115. Procedure time 1200    Facility location: [facility]; [address] [place] [address]. Check in location: Main entrance, door #1 on the North side of the building under roundabout awning. DO NOT GO TO SURGERY/ED ENTRANCE.     Sedation type: Conscious sedation     Pre op exam needed? No.    Indication for procedure: Screen for colon cancer       Chart review:     Electronic implanted devices? No    Recent diagnosis of diverticulitis within the last 6 weeks? No      Medication review:    Diabetic? Yes. Diabetic medication HOLDING recommendations: Diabetic injectables: Mounjaro (Tirzepatide).  Weekly dosing of medication.  HOLD 7 days before procedure.  Follow up with managing provider.     Anticoagulants? No    Weight loss medication/injectable? No. Patient is on GLP-1 medication but for DM (see above).    Other medication HOLDING recommendations:  N/A      Prep for procedure:     Bowel prep recommendation: Extended Golytely. Bowel prep prescription sent to    [id number] [place], [place] - [address]     Due to: BMI > 40.  and GLP-1 agonist medication noted in chart.     Prep instructions sent via [employer]         Rut Gaffney RN  Endoscopy Procedure Pre Assessment RN  [phone] option 2

## 2024-09-25 NOTE — TELEPHONE ENCOUNTER
Attempted to contact patient in order to complete pre assessment questions.     No answer. Left message to return call to [phone] option 2    Callback required communication sent via [employer].      Rut Gaffney RN  Endoscopy Procedure Pre Assessment

## 2024-09-25 NOTE — TELEPHONE ENCOUNTER
Pre assessment completed for upcoming procedure.   (Please see previous telephone encounter notes for complete details)    Patient  returned call.       Procedure details:    Arrival time and facility location reviewed.    Pre op exam needed? No.    Designated  policy reviewed. Instructed to have someone stay 6  hours post procedure.       Medication review:    Medications reviewed. Please see supporting documentation below. Holding recommendations discussed (if applicable).   Injectable diabetic medication(s): Mounjaro (Tirzepatide).  Weekly dosing of medication.  HOLD 7 days before procedure.  Follow up with managing provider.       Prep for procedure:     Procedure prep instructions reviewed.        Any additional information needed:  N/A      Patient  verbalized understanding and had no questions or concerns at this time.      Rut Gaffney RN  Endoscopy Procedure Pre Assessment   [phone] option 2

## 2024-10-04 ENCOUNTER — HOSPITAL ENCOUNTER (OUTPATIENT)
Facility: CLINIC | Age: 59
Discharge: HOME | End: 2024-10-04
Attending: STUDENT IN AN ORGANIZED HEALTH CARE EDUCATION/TRAINING PROGRAM | Admitting: STUDENT IN AN ORGANIZED HEALTH CARE EDUCATION/TRAINING PROGRAM
Payer: COMMERCIAL

## 2024-10-04 VITALS
HEIGHT: 71 IN | RESPIRATION RATE: 16 BRPM | HEART RATE: 72 BPM | OXYGEN SATURATION: 96 % | BODY MASS INDEX: 39.9 KG/M2 | SYSTOLIC BLOOD PRESSURE: 120 MMHG | WEIGHT: 285 LBS | DIASTOLIC BLOOD PRESSURE: 45 MMHG | TEMPERATURE: 98.1 F

## 2024-10-04 LAB
COLONOSCOPY: NORMAL
GLUCOSE BLDC GLUCOMTR-MCNC: 103 MG/DL (ref 70–99)

## 2024-10-04 PROCEDURE — 82962 GLUCOSE BLOOD TEST: CPT

## 2024-10-04 PROCEDURE — 250N000011 HC RX IP 250 OP 636: Performed by: STUDENT IN AN ORGANIZED HEALTH CARE EDUCATION/TRAINING PROGRAM

## 2024-10-04 PROCEDURE — G0500 MOD SEDAT ENDO SERVICE >5YRS: HCPCS | Performed by: STUDENT IN AN ORGANIZED HEALTH CARE EDUCATION/TRAINING PROGRAM

## 2024-10-04 PROCEDURE — 45380 COLONOSCOPY AND BIOPSY: CPT | Mod: PT,XU | Performed by: STUDENT IN AN ORGANIZED HEALTH CARE EDUCATION/TRAINING PROGRAM

## 2024-10-04 PROCEDURE — 88305 TISSUE EXAM BY PATHOLOGIST: CPT | Mod: TC | Performed by: STUDENT IN AN ORGANIZED HEALTH CARE EDUCATION/TRAINING PROGRAM

## 2024-10-04 PROCEDURE — 45385 COLONOSCOPY W/LESION REMOVAL: CPT | Performed by: STUDENT IN AN ORGANIZED HEALTH CARE EDUCATION/TRAINING PROGRAM

## 2024-10-04 RX ORDER — LIDOCAINE 40 MG/G
CREAM TOPICAL
Status: DISCONTINUED | OUTPATIENT
Start: 2024-10-04 | End: 2024-10-04 | Stop reason: HOSPADM

## 2024-10-04 RX ORDER — EPINEPHRINE 1 MG/ML
0.1 INJECTION, SOLUTION, CONCENTRATE INTRAVENOUS
Status: DISCONTINUED | OUTPATIENT
Start: 2024-10-04 | End: 2024-10-04 | Stop reason: HOSPADM

## 2024-10-04 RX ORDER — FLUMAZENIL 0.1 MG/ML
0.2 INJECTION, SOLUTION INTRAVENOUS
Status: DISCONTINUED | OUTPATIENT
Start: 2024-10-04 | End: 2024-10-04 | Stop reason: HOSPADM

## 2024-10-04 RX ORDER — ATROPINE SULFATE 0.1 MG/ML
1 INJECTION INTRAVENOUS
Status: DISCONTINUED | OUTPATIENT
Start: 2024-10-04 | End: 2024-10-04 | Stop reason: HOSPADM

## 2024-10-04 RX ORDER — FENTANYL CITRATE 50 UG/ML
50-100 INJECTION, SOLUTION INTRAMUSCULAR; INTRAVENOUS EVERY 5 MIN PRN
Status: DISCONTINUED | OUTPATIENT
Start: 2024-10-04 | End: 2024-10-04 | Stop reason: HOSPADM

## 2024-10-04 RX ORDER — DIPHENHYDRAMINE HYDROCHLORIDE 50 MG/ML
25-50 INJECTION INTRAMUSCULAR; INTRAVENOUS
Status: DISCONTINUED | OUTPATIENT
Start: 2024-10-04 | End: 2024-10-04 | Stop reason: HOSPADM

## 2024-10-04 RX ORDER — SIMETHICONE 40MG/0.6ML
133 SUSPENSION, DROPS(FINAL DOSAGE FORM)(ML) ORAL
Status: DISCONTINUED | OUTPATIENT
Start: 2024-10-04 | End: 2024-10-04 | Stop reason: HOSPADM

## 2024-10-04 RX ORDER — NALOXONE HYDROCHLORIDE 0.4 MG/ML
0.4 INJECTION, SOLUTION INTRAMUSCULAR; INTRAVENOUS; SUBCUTANEOUS
Status: DISCONTINUED | OUTPATIENT
Start: 2024-10-04 | End: 2024-10-04 | Stop reason: HOSPADM

## 2024-10-04 RX ORDER — NALOXONE HYDROCHLORIDE 0.4 MG/ML
0.2 INJECTION, SOLUTION INTRAMUSCULAR; INTRAVENOUS; SUBCUTANEOUS
Status: DISCONTINUED | OUTPATIENT
Start: 2024-10-04 | End: 2024-10-04 | Stop reason: HOSPADM

## 2024-10-04 RX ADMIN — MIDAZOLAM 2 MG: 1 INJECTION INTRAMUSCULAR; INTRAVENOUS at 12:01

## 2024-10-04 RX ADMIN — FENTANYL CITRATE 100 MCG: 50 INJECTION, SOLUTION INTRAMUSCULAR; INTRAVENOUS at 12:01

## 2024-10-04 ASSESSMENT — ACTIVITIES OF DAILY LIVING (ADL)
ADLS_ACUITY_SCORE: 35

## 2024-10-04 NOTE — H&P
Pre-Endoscopy History and Physical     Galileo Vieira MRN# [id number]   YOB: 1965 Age: 58 year old     Date of Procedure: 10/04/24  Primary care provider: Rut Flanagan  Type of Endoscopy: Colonoscopy  Reason for Procedure: Screening  Type of Anesthesia Anticipated: Moderate Sedation    HPI:    Galileo is a 58 year old male who will be undergoing the above procedure.      Prior colonoscopy: 10 years ago    A history and physical has been performed, notable for none. The patient's medications and allergies have been reviewed. The risks and benefits of the procedure and the sedation options and risks were discussed with the patient.  All questions were answered and informed consent was obtained.      Symptoms:  Rectal bleeding: Yes  Irregular bowel habits: No  Abnormal weight loss: No  Changes in stool: No    He denies a personal or family history of anesthesia complications or bleeding disorders. Denies family history of CRC or IBD.    No Known Allergies   Allergy to Latex: NO   Allergy to tape: NO   Intolerances: NO     Current Facility-Administered Medications   Medication Dose Route Frequency Provider Last Rate Last Admin    atropine injection 1 mg  1 mg Intravenous Once PRN Qing Reddy MD        benzocaine 20% (HURRICAINE/TOPEX) 20 % spray 0.5 mL  1 spray Mouth/Throat Once PRN Qing Reddy MD        diphenhydrAMINE (BENADRYL) injection 25-50 mg  25-50 mg Intravenous Once PRN Qing Reddy MD        EPINEPHrine PF (ADRENALIN) injection 0.1 mg  0.1 mg Submucosal Once PRN Qing Reddy MD        fentaNYL (PF) (SUBLIMAZE) injection  mcg   mcg Intravenous Q5 Min PRN Qing Reddy MD        flumazenil (ROMAZICON) injection 0.2 mg  0.2 mg Intravenous q1 min prn Qing Reddy MD        glucagon injection 0.5 mg  0.5 mg Intravenous Once PRN Qing Reddy MD        midazolam (VERSED) injection 0.5-2 mg  0.5-2 mg Intravenous Q4 Min PRN  Qing Reddy MD        naloxone (NARCAN) injection 0.2 mg  0.2 mg Intravenous Q2 Min PRN Qing Reddy MD        Or    naloxone (NARCAN) injection 0.4 mg  0.4 mg Intravenous Q2 Min PRN Qing Reddy MD        Or    naloxone (NARCAN) injection 0.2 mg  0.2 mg Intramuscular Q2 Min PRN Qing Reddy MD        Or    naloxone (NARCAN) injection 0.4 mg  0.4 mg Intramuscular Q2 Min PRN Qing Reddy MD        simethicone (MYLICON) suspension 133 mg  133 mg Oral Once PRN Qing Reddy MD        sodium chloride (PF) 0.9% PF flush 3 mL  3 mL Intravenous q1 min prn Qing Reddy MD        sodium chloride 0.9% BOLUS 500 mL  500 mL Intravenous Once PRN Qing Reddy MD           Patient Active Problem List   Diagnosis    Hyperlipidemia LDL goal <160    GERD (gastroesophageal reflux disease)    Anxiety    Testosterone insufficiency    Fear of flying    Mild obstructive sleep apnea    Obesity (BMI 35.0-39.9) with comorbidity (H)    Concussion with loss of consciousness, subsequent encounter    Attention deficit hyperactivity disorder (ADHD), unspecified ADHD type    Benign essential hypertension    Mood disorder as late effect of traumatic brain injury (H)    Other forms of angina pectoris (H)    Type 2 diabetes mellitus without complication, without long-term current use of insulin (H)        Past Medical History:   Diagnosis Date    Concussion, unspecified     Hospitalized overnight as a child    Depressive disorder     Esophageal reflux     Hypercholesterolemia     Hypertension     IFG (impaired fasting glucose)     GIUSEPPE (obstructive sleep apnea)     uses cpap    Testosterone deficiency     ED    Type 2 diabetes mellitus without complication, without long-term current use of insulin (H) 9/24/2024        Past Surgical History:   Procedure Laterality Date    ABDOMEN SURGERY      CHOLECYSTECTOMY      COLONOSCOPY  05/12/2014    Procedure: COLONOSCOPY;  Surgeon:  "Syed Walker MD;  Location:  GI    GI SURGERY  2008    Gall bladder    HERNIA REPAIR      [facility] NONSPECIFIC PROCEDURE Right     Left knee arthroscopy and replacement    Z NONSPECIFIC PROCEDURE  1997    Bilat inguinal hernia surgery    [facility] NONSPECIFIC PROCEDURE      Vasectomy       Social History     Tobacco Use    Smoking status: Former     Current packs/day: 0.00     Types: Cigarettes     Quit date: 10/3/1987     Years since quittin.0     Passive exposure: Past    Smokeless tobacco: Never    Tobacco comments:        Substance Use Topics    Alcohol use: Yes     Alcohol/week: 16.7 standard drinks of alcohol     Types: 20 Standard drinks or equivalent per week     Comment: 3-4/d       Family History   Problem Relation Age of Onset    Family History Negative Mother     Gastrointestinal Disease Father         Crohn's    Cardiovascular Father     Depression Father     Heart Disease Father 66        triple bipass    Neurologic Disorder Father         seizures    Respiratory Father         sleep apnea    C.A.D. Father         Had CABG    Cerebrovascular Disease Father     Seizure Disorder Father     Coronary Artery Disease Father     Depression Paternal Grandmother     Lipids Brother     Diabetes Brother     Coronary Artery Disease Brother     Respiratory Brother     Coronary Artery Disease Brother     Cancer - colorectal Maternal Uncle     Depression Other     Cancer - colorectal Other         Maternal Uncle  at 56       REVIEW OF SYSTEMS:     5 point ROS negative except as noted above in HPI, including Gen., Resp., CV, GI &  system review.      PHYSICAL EXAM:   Ht 1.803 m (5' 11\")   Wt 129.3 kg (285 lb)   BMI 39.75 kg/m   Estimated body mass index is 39.75 kg/m  as calculated from the following:    Height as of this encounter: 1.803 m (5' 11\").    Weight as of this encounter: 129.3 kg (285 lb).   All Vitals have been reviewed.    GENERAL APPEARANCE: healthy and alert  MENTAL STATUS: " alert  AIRWAY EXAM: Mallampatti Class III (visualization of the soft palate and base of uvula)  RESP: lungs clear to auscultation - no rales, rhonchi or wheezes  CV: regular rates and rhythm      IMPRESSION   ASA Class 2 - Mild systemic disease        PLAN:     Plan for colonoscopy. We discussed the risks, benefits and alternatives and the patient wished to proceed.    The above has been forwarded to the consulting provider.      CLIVE ALVA MD  [facility]  Phone: [phone]  Fax: [phone]  October 4, 2024

## 2024-10-07 LAB
PATH REPORT.COMMENTS IMP SPEC: NORMAL
PATH REPORT.COMMENTS IMP SPEC: NORMAL
PATH REPORT.FINAL DX SPEC: NORMAL
PATH REPORT.GROSS SPEC: NORMAL
PATH REPORT.MICROSCOPIC SPEC OTHER STN: NORMAL
PATH REPORT.RELEVANT HX SPEC: NORMAL
PHOTO IMAGE: NORMAL

## 2024-10-07 PROCEDURE — 88305 TISSUE EXAM BY PATHOLOGIST: CPT | Mod: 26 | Performed by: PATHOLOGY

## 2024-10-08 ENCOUNTER — PATIENT OUTREACH (OUTPATIENT)
Dept: GASTROENTEROLOGY | Facility: CLINIC | Age: 59
End: 2024-10-08
Payer: COMMERCIAL

## 2024-10-23 DIAGNOSIS — I10 BENIGN ESSENTIAL HYPERTENSION: ICD-10-CM

## 2024-10-23 RX ORDER — LOSARTAN POTASSIUM AND HYDROCHLOROTHIAZIDE 12.5; 5 MG/1; MG/1
1 TABLET ORAL DAILY
Qty: 90 TABLET | Refills: 2 | Status: SHIPPED | OUTPATIENT
Start: 2024-10-23

## 2024-10-29 DIAGNOSIS — I10 BENIGN ESSENTIAL HYPERTENSION: ICD-10-CM

## 2024-10-29 DIAGNOSIS — E11.9 TYPE 2 DIABETES MELLITUS WITHOUT COMPLICATION, WITHOUT LONG-TERM CURRENT USE OF INSULIN (H): Primary | ICD-10-CM

## 2024-10-29 DIAGNOSIS — G47.33 MILD OBSTRUCTIVE SLEEP APNEA: ICD-10-CM

## 2024-10-29 DIAGNOSIS — E66.01 MORBID OBESITY (H): ICD-10-CM

## 2024-12-03 ENCOUNTER — LAB (OUTPATIENT)
Dept: LAB | Facility: CLINIC | Age: 59
End: 2024-12-03
Payer: COMMERCIAL

## 2024-12-03 DIAGNOSIS — E11.9 TYPE 2 DIABETES MELLITUS WITHOUT COMPLICATION, WITHOUT LONG-TERM CURRENT USE OF INSULIN (H): Primary | ICD-10-CM

## 2024-12-03 DIAGNOSIS — I10 BENIGN ESSENTIAL HYPERTENSION: ICD-10-CM

## 2024-12-03 DIAGNOSIS — Z12.5 SCREENING FOR PROSTATE CANCER: ICD-10-CM

## 2024-12-03 DIAGNOSIS — E34.9 TESTOSTERONE INSUFFICIENCY: ICD-10-CM

## 2024-12-03 DIAGNOSIS — E78.5 HYPERLIPIDEMIA LDL GOAL <160: ICD-10-CM

## 2024-12-03 LAB
ALBUMIN SERPL BCG-MCNC: 4.5 G/DL (ref 3.5–5.2)
ALP SERPL-CCNC: 123 U/L (ref 40–150)
ALT SERPL W P-5'-P-CCNC: 91 U/L (ref 0–70)
ANION GAP SERPL CALCULATED.3IONS-SCNC: 14 MMOL/L (ref 7–15)
AST SERPL W P-5'-P-CCNC: 47 U/L (ref 0–45)
BILIRUB SERPL-MCNC: 0.6 MG/DL
BUN SERPL-MCNC: 9.5 MG/DL (ref 6–20)
CALCIUM SERPL-MCNC: 9.9 MG/DL (ref 8.8–10.4)
CHLORIDE SERPL-SCNC: 100 MMOL/L (ref 98–107)
CHOLEST SERPL-MCNC: 144 MG/DL
CREAT SERPL-MCNC: 0.99 MG/DL (ref 0.67–1.17)
CREAT UR-MCNC: 203 MG/DL
EGFRCR SERPLBLD CKD-EPI 2021: 88 ML/MIN/1.73M2
ERYTHROCYTE [DISTWIDTH] IN BLOOD BY AUTOMATED COUNT: 12.3 % (ref 10–15)
EST. AVERAGE GLUCOSE BLD GHB EST-MCNC: 123 MG/DL
FASTING STATUS PATIENT QL REPORTED: YES
FASTING STATUS PATIENT QL REPORTED: YES
GLUCOSE SERPL-MCNC: 120 MG/DL (ref 70–99)
HBA1C MFR BLD: 5.9 % (ref 0–5.6)
HCO3 SERPL-SCNC: 26 MMOL/L (ref 22–29)
HCT VFR BLD AUTO: 45.1 % (ref 40–53)
HDLC SERPL-MCNC: 39 MG/DL
HGB BLD-MCNC: 15.8 G/DL (ref 13.3–17.7)
LDLC SERPL CALC-MCNC: 80 MG/DL
MCH RBC QN AUTO: 30.2 PG (ref 26.5–33)
MCHC RBC AUTO-ENTMCNC: 35 G/DL (ref 31.5–36.5)
MCV RBC AUTO: 86 FL (ref 78–100)
MICROALBUMIN UR-MCNC: 16.4 MG/L
MICROALBUMIN/CREAT UR: 8.08 MG/G CR (ref 0–17)
NONHDLC SERPL-MCNC: 105 MG/DL
PLATELET # BLD AUTO: 220 10E3/UL (ref 150–450)
POTASSIUM SERPL-SCNC: 4 MMOL/L (ref 3.4–5.3)
PROT SERPL-MCNC: 7.1 G/DL (ref 6.4–8.3)
PSA SERPL DL<=0.01 NG/ML-MCNC: 7.68 NG/ML (ref 0–3.5)
RBC # BLD AUTO: 5.23 10E6/UL (ref 4.4–5.9)
SODIUM SERPL-SCNC: 140 MMOL/L (ref 135–145)
T4 FREE SERPL-MCNC: 1.32 NG/DL (ref 0.9–1.7)
TRIGL SERPL-MCNC: 124 MG/DL
TSH SERPL DL<=0.005 MIU/L-ACNC: 2.93 UIU/ML (ref 0.3–4.2)
WBC # BLD AUTO: 8.5 10E3/UL (ref 4–11)

## 2024-12-03 PROCEDURE — 84403 ASSAY OF TOTAL TESTOSTERONE: CPT

## 2024-12-03 PROCEDURE — G0103 PSA SCREENING: HCPCS

## 2024-12-03 PROCEDURE — 82043 UR ALBUMIN QUANTITATIVE: CPT

## 2024-12-03 PROCEDURE — 84443 ASSAY THYROID STIM HORMONE: CPT

## 2024-12-03 PROCEDURE — 84439 ASSAY OF FREE THYROXINE: CPT

## 2024-12-03 PROCEDURE — 36415 COLL VENOUS BLD VENIPUNCTURE: CPT

## 2024-12-03 PROCEDURE — 85027 COMPLETE CBC AUTOMATED: CPT

## 2024-12-03 PROCEDURE — 80061 LIPID PANEL: CPT

## 2024-12-03 PROCEDURE — 83036 HEMOGLOBIN GLYCOSYLATED A1C: CPT

## 2024-12-03 PROCEDURE — 80053 COMPREHEN METABOLIC PANEL: CPT

## 2024-12-03 PROCEDURE — 82570 ASSAY OF URINE CREATININE: CPT

## 2024-12-05 LAB — TESTOST SERPL-MCNC: 395 NG/DL (ref 240–950)

## 2024-12-06 SDOH — HEALTH STABILITY: PHYSICAL HEALTH: ON AVERAGE, HOW MANY MINUTES DO YOU ENGAGE IN EXERCISE AT THIS LEVEL?: 0 MIN

## 2024-12-06 SDOH — HEALTH STABILITY: PHYSICAL HEALTH: ON AVERAGE, HOW MANY DAYS PER WEEK DO YOU ENGAGE IN MODERATE TO STRENUOUS EXERCISE (LIKE A BRISK WALK)?: 0 DAYS

## 2024-12-06 ASSESSMENT — SOCIAL DETERMINANTS OF HEALTH (SDOH): HOW OFTEN DO YOU GET TOGETHER WITH FRIENDS OR RELATIVES?: ONCE A WEEK

## 2024-12-09 ENCOUNTER — OFFICE VISIT (OUTPATIENT)
Dept: FAMILY MEDICINE | Facility: CLINIC | Age: 59
End: 2024-12-09
Payer: COMMERCIAL

## 2024-12-09 VITALS
TEMPERATURE: 97.7 F | SYSTOLIC BLOOD PRESSURE: 122 MMHG | OXYGEN SATURATION: 96 % | DIASTOLIC BLOOD PRESSURE: 88 MMHG | RESPIRATION RATE: 16 BRPM | HEART RATE: 92 BPM | HEIGHT: 71 IN | WEIGHT: 268 LBS | BODY MASS INDEX: 37.52 KG/M2

## 2024-12-09 DIAGNOSIS — E11.9 TYPE 2 DIABETES MELLITUS WITHOUT COMPLICATION, WITHOUT LONG-TERM CURRENT USE OF INSULIN (H): ICD-10-CM

## 2024-12-09 DIAGNOSIS — K21.9 GASTROESOPHAGEAL REFLUX DISEASE WITHOUT ESOPHAGITIS: ICD-10-CM

## 2024-12-09 DIAGNOSIS — E78.5 HYPERLIPIDEMIA LDL GOAL <70: ICD-10-CM

## 2024-12-09 DIAGNOSIS — I10 BENIGN ESSENTIAL HYPERTENSION: ICD-10-CM

## 2024-12-09 DIAGNOSIS — L60.0 INGROWN TOENAIL: ICD-10-CM

## 2024-12-09 DIAGNOSIS — Z00.00 ROUTINE GENERAL MEDICAL EXAMINATION AT A HEALTH CARE FACILITY: Primary | ICD-10-CM

## 2024-12-09 DIAGNOSIS — N52.8 OTHER MALE ERECTILE DYSFUNCTION: ICD-10-CM

## 2024-12-09 DIAGNOSIS — R20.0 NUMBNESS OF TOES: ICD-10-CM

## 2024-12-09 DIAGNOSIS — R97.20 ELEVATED PROSTATE SPECIFIC ANTIGEN (PSA): ICD-10-CM

## 2024-12-09 PROBLEM — F32.0 MILD MAJOR DEPRESSION (H): Status: ACTIVE | Noted: 2024-12-09

## 2024-12-09 PROBLEM — F06.30 MOOD DISORDER AS LATE EFFECT OF TRAUMATIC BRAIN INJURY (H): Status: RESOLVED | Noted: 2023-04-21 | Resolved: 2024-12-09

## 2024-12-09 PROBLEM — I20.89 OTHER FORMS OF ANGINA PECTORIS (H): Status: RESOLVED | Noted: 2024-08-15 | Resolved: 2024-12-09

## 2024-12-09 PROBLEM — F32.0 MILD MAJOR DEPRESSION (H): Status: RESOLVED | Noted: 2024-12-09 | Resolved: 2024-12-09

## 2024-12-09 PROBLEM — S06.9XAS MOOD DISORDER AS LATE EFFECT OF TRAUMATIC BRAIN INJURY (H): Status: RESOLVED | Noted: 2023-04-21 | Resolved: 2024-12-09

## 2024-12-09 PROCEDURE — 99396 PREV VISIT EST AGE 40-64: CPT | Performed by: FAMILY MEDICINE

## 2024-12-09 PROCEDURE — 99207 PR FOOT EXAM NO CHARGE: CPT | Performed by: FAMILY MEDICINE

## 2024-12-09 RX ORDER — TIRZEPATIDE 12.5 MG/.5ML
12.5 INJECTION, SOLUTION SUBCUTANEOUS
Qty: 2 ML | Refills: 0 | Status: CANCELLED | OUTPATIENT
Start: 2024-12-09

## 2024-12-09 RX ORDER — SILDENAFIL 50 MG/1
50 TABLET, FILM COATED ORAL DAILY PRN
Qty: 20 TABLET | Refills: 1 | Status: SHIPPED | OUTPATIENT
Start: 2024-12-09

## 2024-12-09 RX ORDER — OMEPRAZOLE 40 MG/1
40 CAPSULE, DELAYED RELEASE ORAL DAILY
Qty: 90 CAPSULE | Refills: 3 | Status: SHIPPED | OUTPATIENT
Start: 2024-12-09

## 2024-12-09 RX ORDER — ATORVASTATIN CALCIUM 80 MG/1
80 TABLET, FILM COATED ORAL AT BEDTIME
Qty: 90 TABLET | Refills: 3 | Status: SHIPPED | OUTPATIENT
Start: 2024-12-09

## 2024-12-09 RX ORDER — LOSARTAN POTASSIUM AND HYDROCHLOROTHIAZIDE 12.5; 5 MG/1; MG/1
1 TABLET ORAL DAILY
Qty: 90 TABLET | Refills: 3 | Status: SHIPPED | OUTPATIENT
Start: 2024-12-09

## 2024-12-09 RX ORDER — NEBIVOLOL 5 MG/1
5 TABLET ORAL DAILY
Qty: 90 TABLET | Refills: 3 | Status: SHIPPED | OUTPATIENT
Start: 2024-12-09

## 2024-12-09 NOTE — PATIENT INSTRUCTIONS
Patient Education   Preventive Care Advice   This is general advice given by our system to help you stay healthy. However, your care team may have specific advice just for you. Please talk to your care team about your preventive care needs.  Nutrition  Eat 5 or more servings of fruits and vegetables each day.  Try wheat bread, brown rice and whole grain pasta (instead of white bread, rice, and pasta).  Get enough calcium and vitamin D. Check the label on foods and aim for 100% of the RDA (recommended daily allowance).  Lifestyle  Exercise at least 150 minutes each week  (30 minutes a day, 5 days a week).  Do muscle strengthening activities 2 days a week. These help control your weight and prevent disease.  No smoking.  Wear sunscreen to prevent skin cancer.  Have a dental exam and cleaning every 6 months.  Yearly exams  See your health care team every year to talk about:  Any changes in your health.  Any medicines your care team has prescribed.  Preventive care, family planning, and ways to prevent chronic diseases.  Shots (vaccines)   HPV shots (up to age 26), if you've never had them before.  Hepatitis B shots (up to age 59), if you've never had them before.  COVID-19 shot: Get this shot when it's due.  Flu shot: Get a flu shot every year.  Tetanus shot: Get a tetanus shot every 10 years.  Pneumococcal, hepatitis A, and RSV shots: Ask your care team if you need these based on your risk.  Shingles shot (for age 50 and up)  General health tests  Diabetes screening:  Starting at age 35, Get screened for diabetes at least every 3 years.  If you are younger than age 35, ask your care team if you should be screened for diabetes.  Cholesterol test: At age 39, start having a cholesterol test every 5 years, or more often if advised.  Bone density scan (DEXA): At age 50, ask your care team if you should have this scan for osteoporosis (brittle bones).  Hepatitis C: Get tested at least once in your life.  STIs (sexually  transmitted infections)  Before age 24: Ask your care team if you should be screened for STIs.  After age 24: Get screened for STIs if you're at risk. You are at risk for STIs (including HIV) if:  You are sexually active with more than one person.  You don't use condoms every time.  You or a partner was diagnosed with a sexually transmitted infection.  If you are at risk for HIV, ask about PrEP medicine to prevent HIV.  Get tested for HIV at least once in your life, whether you are at risk for HIV or not.  Cancer screening tests  Cervical cancer screening: If you have a cervix, begin getting regular cervical cancer screening tests starting at age 21.  Breast cancer scan (mammogram): If you've ever had breasts, begin having regular mammograms starting at age 40. This is a scan to check for breast cancer.  Colon cancer screening: It is important to start screening for colon cancer at age 45.  Have a colonoscopy test every 10 years (or more often if you're at risk) Or, ask your provider about stool tests like a FIT test every year or Cologuard test every 3 years.  To learn more about your testing options, visit:   .  For help making a decision, visit:   https://bit.ly/do98241.  Prostate cancer screening test: If you have a prostate, ask your care team if a prostate cancer screening test (PSA) at age 55 is right for you.  Lung cancer screening: If you are a current or former smoker ages 50 to 80, ask your care team if ongoing lung cancer screenings are right for you.  For informational purposes only. Not to replace the advice of your health care provider. Copyright   2023 [facility]. All rights reserved. Clinically reviewed by the [facility] Transitions Program. [employer] 800437 - REV 01/24.  9 Ways to Cut Back on Drinking  Maybe you've found yourself drinking more alcohol than you'd prefer. If you want to cut back, here are some ideas to try.    Think before you drink.  Do you really want a drink,  "or is it just a habit? If you're used to having a drink at a certain time, try doing something else then.     Look for substitutes.  Find some no-alcohol drinks that you enjoy, like flavored seltzer water, tea with honey, or tonic with a slice of lime. Or try alcohol-free beer or \"virgin\" cocktails (without the alcohol).     Drink more water.  Use water to quench your thirst. Drink a glass of water before you have any alcohol. Have another glass along with every drink or between drinks.     Shrink your drink.  For example, have a bottle of beer instead of a pint. Use a smaller glass for wine. Choose drinks with lower alcohol content (ABV%). Or use less liquor and more mixer in cocktails.     Slow down.  It's easy to drink quickly and without thinking about it. Pay attention, and make each drink last longer.     Do the math.  Total up how much you spend on alcohol each month. How much is that a year? If you cut back, what could you do with the money you save?     Take a break.  Choose a day or two each week when you won't drink at all. Notice how you feel on those days, physically and emotionally. How did you sleep? Do you feel better? Over time, add more break days.     Count calories.  Would you like to lose some weight? For some people that's a good motivator for cutting back. Figure out how many calories are in each drink. How many does that add up to in a day? In a week? In a month?     Practice saying no.  Be ready when someone offers you a drink. Try: \"Thanks, I've had enough.\" Or \"Thanks, but I'm cutting back.\" Or \"No, thanks. I feel better when I drink less.\"   Current as of: November 15, 2023  Content Version: 14.2 2024 [employer].   Care instructions adapted under license by your healthcare professional. If you have questions about a medical condition or this instruction, always ask your healthcare professional. [employer] disclaims any warranty or liability for your use of " this information.

## 2024-12-09 NOTE — PROGRESS NOTES
"Preventive Care Visit  [facility]  Rut Flanagan MD, Family Medicine  Dec 9, 2024      Assessment & Plan     Routine general medical examination at a health care facility    Type 2 diabetes mellitus without complication, without long-term current use of insulin (H) - very well controlled, continue Mounjaro at current dosing for now.   - Tirzepatide 10 MG/0.5ML SOAJ; Inject 0.5 mLs (10 mg) subcutaneously every 7 days.  - FOOT EXAM    Hyperlipidemia LDL goal <70 - on statin. Continue, may consider lowering dose of LDL continues to drop  - atorvastatin (LIPITOR) 80 MG tablet; Take 1 tablet (80 mg) by mouth at bedtime.    Benign essential hypertension - controlled, continue current  - losartan-hydrochlorothiazide (HYZAAR) 50-12.5 MG tablet; Take 1 tablet by mouth daily.  - nebivolol (BYSTOLIC) 5 MG tablet; Take 1 tablet (5 mg) by mouth daily.    Gastroesophageal reflux disease without esophagitis - stable, refills  - omeprazole (PRILOSEC) 40 MG DR capsule; Take 1 capsule (40 mg) by mouth daily.    Other male erectile dysfunction - prn refills sent  - sildenafil (VIAGRA) 50 MG tablet; Take 1 tablet (50 mg) by mouth daily as needed (1 hour before intercourse).    Elevated prostate specific antigen (PSA)- suspect related to testosterone supplementation. Encourage he stop, and we will recheck PSA levels in a month. If they lower back, can slowly add in testosterone.   - PSA, screen; Future    Ingrown toenail - ongoing issues, podiatry referral placed    Numbness of toes - right great toe, no significant deformity, no left sided symptoms. Will have him consult with podiatry.   - Orthopedic  Referral; Future    BMI  Estimated body mass index is 37.12 kg/m  as calculated from the following:    Height as of this encounter: 1.81 m (5' 11.25\").    Weight as of this encounter: 121.6 kg (268 lb).       Counseling  Appropriate preventive services were addressed with this patient via screening, " questionnaire, or discussion as appropriate for fall prevention, nutrition, physical activity, Tobacco-use cessation, social engagement, weight loss and cognition.  Checklist reviewing preventive services available has been given to the patient.  Reviewed patient's diet, addressing concerns and/or questions.   The patient reports drinking more than 3 alcoholic drinks per day and/or more than 7 drhnks per week. The patient was counseled and given information about possible harmful effects of excessive alcohol intake.    Jannie Gurrola is a 59 year old, presenting for the following:  Physical        12/9/2024     8:45 AM   Additional Questions   Roomed by Rita GALLEGOS        Via the Health Maintenance questionnaire, the patient has reported the following services have been completed -Eye Exam: mary alice 2024-01-01, this information has been sent to the abstraction team.    HPI    Health Care Directive  Patient does not have a Health Care Directive: Discussed advance care planning with patient; information given to patient to review.      12/6/2024   General Health   How would you rate your overall physical health? Good   Feel stress (tense, anxious, or unable to sleep) Not at all            12/6/2024   Nutrition   Three or more servings of calcium each day? Yes   Diet: Regular (no restrictions)   How many servings of fruit and vegetables per day? (!) 0-1   How many sweetened beverages each day? 0-1            12/6/2024   Exercise   Days per week of moderate/strenous exercise 0 days   Average minutes spent exercising at this level 0 min      (!) EXERCISE CONCERN      12/6/2024   Social Factors   Frequency of gathering with friends or relatives Once a week   Worry food won't last until get money to buy more No   Food not last or not have enough money for food? No   Do you have housing? (Housing is defined as stable permanent housing and does not include staying ouside in a car, in a tent, in an abandoned building, in an  overnight shelter, or couch-surfing.) Yes   Are you worried about losing your housing? No   Lack of transportation? No   Unable to get utilities (heat,electricity)? No            2024   Fall Risk   Fallen 2 or more times in the past year? No    Trouble with walking or balance? No        Patient-reported          2024   Dental   Dentist two times every year? Yes            2024   TB Screening   Were you born outside of the [place]? No              Today's PHQ-2 Score:       3/11/2024    10:03 AM   PHQ-2 (  [employer])   Q1: Little interest or pleasure in doing things 0   Q2: Feeling down, depressed or hopeless 0   PHQ-2 Score 0         2024   Substance Use   Alcohol more than 3/day or more than 7/wk Yes   How often do you have a drink containing alcohol 4 or more times a week   How many alcohol drinks on typical day 3 or 4   How often do you have 5+ drinks at one occasion Less than monthly   Audit 2/3 Score 2   How often not able to stop drinking once started Never   How often failed to do what normally expected Never   How often needed first drink in am after a heavy drinking session Never   How often feeling of guilt or remorse after drinking Never   How often unable to remember what happened the night before Never   Have you or someone else been injured because of your drinking No   Has anyone been concerned or suggested you cut down on drinking No   TOTAL SCORE - AUDIT 6   Do you use any other substances recreationally? No        Social History     Tobacco Use    Smoking status: Former     Current packs/day: 0.00     Types: Cigarettes     Quit date: 10/3/1987     Years since quittin.2     Passive exposure: Past    Smokeless tobacco: Never    Tobacco comments:        Vaping Use    Vaping status: Never Used   Substance Use Topics    Alcohol use: Yes     Alcohol/week: 16.7 standard drinks of alcohol     Types: 20 Standard drinks or equivalent per week     Comment: 3-4/d    Drug use: No      Comment: caffeine 3-4/d           12/6/2024   STI Screening   New sexual partner(s) since last STI/HIV test? No      Last PSA:   PSA   Date Value Ref Range Status   11/18/2016 0.81 0 - 4 ug/L Final     Comment:     Assay Method:  Chemiluminescence using Siemens Vista analyzer     Prostate Specific Antigen Screen   Date Value Ref Range Status   12/03/2024 7.68 (H) 0.00 - 3.50 ng/mL Final   10/25/2022 3.25 0.00 - 4.00 ug/L Final     ASCVD Risk   The 10-year ASCVD risk score (Jose Roberto VEGA, et al., 2019) is: 12.8%    Values used to calculate the score:      Age: 59 years      Sex: Male      Is Non- : No      Diabetic: Yes      Tobacco smoker: No      Systolic Blood Pressure: 122 mmHg      Is BP treated: Yes      HDL Cholesterol: 39 mg/dL      Total Cholesterol: 144 mg/dL         Reviewed and updated as needed this visit by Provider                    Patient Active Problem List   Diagnosis    Hyperlipidemia LDL goal <160    GERD (gastroesophageal reflux disease)    Anxiety    Testosterone insufficiency    Fear of flying    Mild obstructive sleep apnea    Obesity (BMI 35.0-39.9) with comorbidity (H)    Concussion with loss of consciousness, subsequent encounter    Attention deficit hyperactivity disorder (ADHD), unspecified ADHD type    Benign essential hypertension    Type 2 diabetes mellitus without complication, without long-term current use of insulin (H)     Past Surgical History:   Procedure Laterality Date    ABDOMEN SURGERY      CHOLECYSTECTOMY      COLONOSCOPY  05/12/2014    Procedure: COLONOSCOPY;  Surgeon: Syed Walker MD;  Location: [facility]    COLONOSCOPY N/A 10/4/2024    Procedure: Colonoscopy with polypectomies using cold snare/hot snare/jumbo biopsy forceps;  Surgeon: Qing Reddy MD;  Location:  GI    COLONOSCOPY N/A 10/4/2024    Procedure: COLONOSCOPY, WITH POLYPECTOMY AND BIOPSY;  Surgeon: Qing Reddy MD;  Location:  GI    GI SURGERY  01/01/2008  "   Gall bladder    HERNIA REPAIR      [email or web address] NONSPECIFIC PROCEDURE Right     Left knee arthroscopy and replacement    [email or web address] NONSPECIFIC PROCEDURE  1997    Bilat inguinal hernia surgery    [facility] [[email or web address] NONSPECIFIC PROCEDURE      Vasectomy       Social History     Tobacco Use    Smoking status: Former     Current packs/day: 0.00     Types: Cigarettes     Quit date: 10/3/1987     Years since quittin.2     Passive exposure: Past    Smokeless tobacco: Never    Tobacco comments:        Substance Use Topics    Alcohol use: Yes     Alcohol/week: 16.7 standard drinks of alcohol     Types: 20 Standard drinks or equivalent per week     Comment: 3-4/d     Family History   Problem Relation Age of Onset    Family History Negative Mother     Gastrointestinal Disease Father         Crohn's    Cardiovascular Father     Depression Father     Heart Disease Father 66        triple bipass    Neurologic Disorder Father         seizures    Respiratory Father         sleep apnea    C.A.D. Father         Had CABG    Cerebrovascular Disease Father     Seizure Disorder Father     Coronary Artery Disease Father     Depression Paternal Grandmother     Lipids Brother     Diabetes Brother     Coronary Artery Disease Brother     Respiratory Brother     Coronary Artery Disease Brother     Cancer - colorectal Maternal Uncle     Depression Other     Cancer - colorectal Other         Maternal Uncle  at 56             Review of Systems  Constitutional, neuro, ENT, endocrine, pulmonary, cardiac, gastrointestinal, genitourinary, musculoskeletal, integument and psychiatric systems are negative, except as otherwise noted.     Objective    Exam  /88 (Cuff Size: Adult Large)   Pulse 92   Temp 97.7  F (36.5  C)   Resp 16   Ht 1.81 m (5' .\")   Wt 121.6 kg (268 lb)   SpO2 96%   BMI 37.12 kg/m     Estimated body mass index is 37.12 kg/m  as calculated from the following:    Height as of this encounter: 1.81 m (5' 11.25\").    Weight as of " this encounter: 121.6 kg (268 lb).    Physical Exam  GENERAL: alert and no distress  EYES: Eyes grossly normal to inspection, PERRL and conjunctivae and sclerae normal  HENT: ear canals and TM's normal, nose and mouth without ulcers or lesions  NECK: no adenopathy, no asymmetry, masses, or scars  RESP: lungs clear to auscultation - no rales, rhonchi or wheezes  CV: regular rate and rhythm, normal S1 S2, no S3 or S4, no murmur, click or rub, no peripheral edema  ABDOMEN: soft, nontender, no hepatosplenomegaly, no masses and bowel sounds normal  MS: no gross musculoskeletal defects noted, no edema  SKIN: no suspicious lesions or rashes  NEURO: Normal strength and tone, mentation intact and speech normal  PSYCH: mentation appears normal, affect normal/bright        Signed Electronically by: Rut Flanagan MD

## 2025-01-09 ENCOUNTER — LAB (OUTPATIENT)
Dept: LAB | Facility: CLINIC | Age: 60
End: 2025-01-09
Payer: COMMERCIAL

## 2025-01-09 DIAGNOSIS — R97.20 ELEVATED PROSTATE SPECIFIC ANTIGEN (PSA): ICD-10-CM

## 2025-01-09 LAB
HOLD SPECIMEN: NORMAL
PSA SERPL DL<=0.01 NG/ML-MCNC: 4.5 NG/ML (ref 0–3.5)

## 2025-01-14 ENCOUNTER — MYC REFILL (OUTPATIENT)
Dept: FAMILY MEDICINE | Facility: CLINIC | Age: 60
End: 2025-01-14
Payer: COMMERCIAL

## 2025-01-14 DIAGNOSIS — E34.9 TESTOSTERONE INSUFFICIENCY: Primary | ICD-10-CM

## 2025-01-14 DIAGNOSIS — E11.9 TYPE 2 DIABETES MELLITUS WITHOUT COMPLICATION, WITHOUT LONG-TERM CURRENT USE OF INSULIN (H): ICD-10-CM

## 2025-01-14 LAB — TESTOST SERPL-MCNC: 107 NG/DL (ref 240–950)

## 2025-01-14 RX ORDER — TIRZEPATIDE 12.5 MG/.5ML
12.5 INJECTION, SOLUTION SUBCUTANEOUS
Qty: 2 ML | Refills: 0 | Status: SHIPPED | OUTPATIENT
Start: 2025-01-14

## 2025-01-28 DIAGNOSIS — K64.4 EXTERNAL HEMORRHOIDS: Primary | ICD-10-CM

## 2025-02-06 ENCOUNTER — TELEPHONE (OUTPATIENT)
Dept: FAMILY MEDICINE | Facility: CLINIC | Age: 60
End: 2025-02-06
Payer: COMMERCIAL

## 2025-02-06 DIAGNOSIS — K64.4 EXTERNAL HEMORRHOIDS: Primary | ICD-10-CM

## 2025-02-06 RX ORDER — HYDROCORTISONE 25 MG/G
CREAM TOPICAL 2 TIMES DAILY PRN
Qty: 30 G | Refills: 1 | Status: SHIPPED | OUTPATIENT
Start: 2025-02-06

## 2025-02-06 NOTE — TELEPHONE ENCOUNTER
FYI - Status Update    Who is Calling: Marissa from [facility] pharmacy    Update: Marissa states that the medication that was prescribed (lidocaine 5% gel) ius not made at that pharmacy and was requesting an alternative for the PT    Does caller want a call/response back: Yes     Could we send this information to you in [place] or would you prefer to receive a phone call?:   Patient would prefer a phone call   Okay to leave a detailed message?: Yes at Cell number on file:    Telephone Information:   Mobile [phone]     Or contact Pharmacy [phone]

## 2025-02-07 ENCOUNTER — TELEPHONE (OUTPATIENT)
Dept: FAMILY MEDICINE | Facility: CLINIC | Age: 60
End: 2025-02-07
Payer: COMMERCIAL

## 2025-02-07 NOTE — TELEPHONE ENCOUNTER
Pharmacy states that the medication lidocaine (TOPICAINE 5) 5 % anorectal gel is not available.     Order Information    Ordered Status Priority Ordering User Department   02/06/25 Sent (none) Aaseby-Aguilera, Ramona Ann, PA-C  FAMILY PRACTICE     Order History  Outpatient  Date/Time Action Taken User Additional Information   02/06/25 1511 Sign Aaseby-Aguilera, Ramona Ann, PA-C Reorder from Order:[id number]   02/06/25 1511 Taking Flag Checked Aaseby-Aguilera, Ramona Ann, PA-C [id number]     Outpatient Medication Detail     Disp Refills Start End GABRIEL   lidocaine (TOPICAINE 5) 5 % anorectal gel 10 g 0 2/6/2025 -- No   Sig: Apply to affected area   Sent to pharmacy as: Lidocaine (Anorectal) 5 % External Gel (TOPICAINE 5)   Class: E-Prescribe   Order: [id number]   E-Prescribing Status: Receipt confirmed by pharmacy (2/6/2025  3:11 PM CST)     Printout Tracking    External Result Report     Medication Administration Instructions    Apply to affected area     Possibly Related Medication Notes    LIDOCAINE (ANORECTAL) medications were reorganized on 11/8/2023. The possibly related notes have not been reviewed. Relevant information could exist in the related notes.     Pharmacy    [id number] - [place], [place] - [address]     Associated Diagnoses    External hemorrhoids [K64.4]  - Primary

## 2025-02-20 DIAGNOSIS — E11.9 TYPE 2 DIABETES MELLITUS WITHOUT COMPLICATION, WITHOUT LONG-TERM CURRENT USE OF INSULIN (H): Primary | ICD-10-CM

## 2025-02-20 NOTE — TELEPHONE ENCOUNTER
"Pt in clinic - states recently weight loss has stopped and even gained a pound or 2.   \"I feel hungry more often as well\"      BS continue to be well controlled.     He would like to go up in dosing with the Mounjaro at this time to see if that helps.      If appropriate please sign off for dosing increase and update med list     Cecilia Nathan RN     "

## 2025-02-28 ENCOUNTER — TRANSFERRED RECORDS (OUTPATIENT)
Dept: HEALTH INFORMATION MANAGEMENT | Facility: CLINIC | Age: 60
End: 2025-02-28
Payer: COMMERCIAL

## 2025-02-28 LAB — RETINOPATHY: NEGATIVE

## 2025-03-18 ENCOUNTER — TELEPHONE (OUTPATIENT)
Dept: FAMILY MEDICINE | Facility: CLINIC | Age: 60
End: 2025-03-18
Payer: COMMERCIAL

## 2025-03-26 ENCOUNTER — LAB (OUTPATIENT)
Dept: LAB | Facility: CLINIC | Age: 60
End: 2025-03-26
Payer: COMMERCIAL

## 2025-03-26 DIAGNOSIS — E34.9 TESTOSTERONE INSUFFICIENCY: ICD-10-CM

## 2025-03-26 DIAGNOSIS — E66.01 MORBID OBESITY (H): Primary | ICD-10-CM

## 2025-03-26 DIAGNOSIS — R97.20 BPH WITH ELEVATED PSA: ICD-10-CM

## 2025-03-26 DIAGNOSIS — N40.0 BPH WITH ELEVATED PSA: ICD-10-CM

## 2025-03-26 LAB
EST. AVERAGE GLUCOSE BLD GHB EST-MCNC: 105 MG/DL
HBA1C MFR BLD: 5.3 % (ref 0–5.6)
PSA SERPL DL<=0.01 NG/ML-MCNC: 7.79 NG/ML (ref 0–3.5)

## 2025-03-26 PROCEDURE — 83036 HEMOGLOBIN GLYCOSYLATED A1C: CPT

## 2025-03-26 PROCEDURE — G0103 PSA SCREENING: HCPCS

## 2025-03-26 PROCEDURE — 36415 COLL VENOUS BLD VENIPUNCTURE: CPT

## 2025-03-26 RX ORDER — FINASTERIDE 5 MG/1
5 TABLET, FILM COATED ORAL DAILY
Qty: 90 TABLET | Refills: 3 | Status: SHIPPED | OUTPATIENT
Start: 2025-03-26

## 2025-03-26 RX ORDER — FINASTERIDE 5 MG/1
5 TABLET, FILM COATED ORAL DAILY
Qty: 30 TABLET | Refills: 0 | Status: SHIPPED | OUTPATIENT
Start: 2025-03-26

## 2025-03-31 LAB — TESTOST SERPL-MCNC: 473 NG/DL (ref 240–950)

## 2025-05-02 ENCOUNTER — ANCILLARY PROCEDURE (OUTPATIENT)
Dept: MRI IMAGING | Facility: CLINIC | Age: 60
End: 2025-05-02
Attending: FAMILY MEDICINE
Payer: COMMERCIAL

## 2025-05-02 DIAGNOSIS — R97.20 BPH WITH ELEVATED PSA: ICD-10-CM

## 2025-05-02 DIAGNOSIS — E34.9 TESTOSTERONE INSUFFICIENCY: ICD-10-CM

## 2025-05-02 DIAGNOSIS — N40.0 BPH WITH ELEVATED PSA: ICD-10-CM

## 2025-05-02 PROCEDURE — 72197 MRI PELVIS W/O & W/DYE: CPT

## 2025-05-02 PROCEDURE — 72197 MRI PELVIS W/O & W/DYE: CPT | Mod: 26 | Performed by: STUDENT IN AN ORGANIZED HEALTH CARE EDUCATION/TRAINING PROGRAM

## 2025-05-02 PROCEDURE — A9585 GADOBUTROL INJECTION: HCPCS | Performed by: FAMILY MEDICINE

## 2025-05-02 PROCEDURE — 255N000002 HC RX 255 OP 636: Performed by: FAMILY MEDICINE

## 2025-05-02 RX ORDER — GADOBUTROL 604.72 MG/ML
10 INJECTION INTRAVENOUS ONCE
Status: COMPLETED | OUTPATIENT
Start: 2025-05-02 | End: 2025-05-02

## 2025-05-02 RX ADMIN — GADOBUTROL 10 ML: 604.72 INJECTION INTRAVENOUS at 08:12

## 2025-05-05 ENCOUNTER — PATIENT OUTREACH (OUTPATIENT)
Dept: ONCOLOGY | Facility: CLINIC | Age: 60
End: 2025-05-05
Payer: COMMERCIAL

## 2025-05-05 DIAGNOSIS — Z12.5 SCREENING FOR PROSTATE CANCER: Primary | ICD-10-CM

## 2025-05-05 DIAGNOSIS — R93.89 ABNORMAL FINDING OF DIAGNOSTIC IMAGING: ICD-10-CM

## 2025-05-05 NOTE — PROGRESS NOTES
Referral received to medical oncology for this patient with abnormal prostate MRI, elevated PSA.    Referred By    Provider Department Location Phone   Rut Flanagan MD [facility] [phone]       No diagnosis of prostate cancer has been confirmed.  I sent an IB to Dr. Flanagan, to alert her that I will have this referral re-routed to urology, for work up and diagnosis.    Jael Hansen, RN, BSN  Oncology New Patient Nurse Navigator   [facility]  [phone]

## 2025-05-06 ENCOUNTER — TELEPHONE (OUTPATIENT)
Dept: UROLOGY | Facility: CLINIC | Age: 60
End: 2025-05-06
Payer: COMMERCIAL

## 2025-05-06 NOTE — TELEPHONE ENCOUNTER
This encounter is being sent to inform the clinic that this patient has a referral from Rut Flanagan MD  for the diagnoses of prostate cancer and has requested that this patient be seen within 3-5 days.  Based on the availability of our provider(s), we are unable to accommodate this request.    Were all sites offered this patient?  Yes    Does scheduling algorithm request to schedule next available?  Patient has been scheduled for the first available opening with Dr. Reddy on 5/19/2025.  We have informed the patient that the clinic will review their referral and reach out if a sooner appointment is medically necessary.

## 2025-05-08 ENCOUNTER — OFFICE VISIT (OUTPATIENT)
Dept: UROLOGY | Facility: CLINIC | Age: 60
End: 2025-05-08
Attending: FAMILY MEDICINE
Payer: COMMERCIAL

## 2025-05-08 VITALS
HEART RATE: 89 BPM | SYSTOLIC BLOOD PRESSURE: 113 MMHG | BODY MASS INDEX: 35.84 KG/M2 | HEIGHT: 71 IN | DIASTOLIC BLOOD PRESSURE: 74 MMHG | WEIGHT: 256 LBS

## 2025-05-08 DIAGNOSIS — Z79.2 PROPHYLACTIC ANTIBIOTIC: Primary | ICD-10-CM

## 2025-05-08 DIAGNOSIS — E34.9 TESTOSTERONE DEFICIENCY: ICD-10-CM

## 2025-05-08 DIAGNOSIS — R97.20 ELEVATED PROSTATE SPECIFIC ANTIGEN (PSA): ICD-10-CM

## 2025-05-08 RX ORDER — CIPROFLOXACIN 500 MG/1
500 TABLET, FILM COATED ORAL 2 TIMES DAILY
Qty: 6 TABLET | Refills: 0 | Status: SHIPPED | OUTPATIENT
Start: 2025-05-08

## 2025-05-08 ASSESSMENT — PAIN SCALES - GENERAL: PAINLEVEL_OUTOF10: NO PAIN (0)

## 2025-05-08 NOTE — PROGRESS NOTES
Chief Complaint:    Elevated PSA (Prostate Specific Antigen)           Consult or Referral:     Mr. Galileo Vieira is a 59 year old male seen at the request of Dr. Flanagan.         History of Present Illness:     Galileo Vieira is a 59 year old male being seen for elevated PSA.  Duration of problem: 1 year  Previous treatments: On testosterone supplementation    Reviewed previous notes from Dr. Flanagan      Galileo presents today with concerns of elevated PSA  He does not have any family history of prostate cancer  Denies any lower urinary tract symptoms  On testosterone supplementation for deficiency and initially was felt that his PSA was related to this  However MRI was done which showed a lesion and has been referred to me             Past Medical History:     Past Medical History:   Diagnosis Date    Concussion, unspecified     Hospitalized overnight as a child    Depressive disorder     Esophageal reflux     Hypercholesterolemia     Hypertension     IFG (impaired fasting glucose)     GIUSEPPE (obstructive sleep apnea)     uses cpap    Testosterone deficiency     ED    Type 2 diabetes mellitus without complication, without long-term current use of insulin (H) 9/24/2024            Past Surgical History:     Past Surgical History:   Procedure Laterality Date    ABDOMEN SURGERY      CHOLECYSTECTOMY      COLONOSCOPY  05/12/2014    Procedure: COLONOSCOPY;  Surgeon: Syed Walker MD;  Location:  GI    COLONOSCOPY N/A 10/04/2024    Procedure: Colonoscopy with polypectomies using cold snare/hot snare/jumbo biopsy forceps;  Surgeon: Qing Reddy MD;  Location:  GI    COLONOSCOPY N/A 10/04/2024    Procedure: COLONOSCOPY, WITH POLYPECTOMY AND BIOPSY;  Surgeon: Qing Reddy MD;  Location:  GI    GI SURGERY  01/01/2008    Gall bladder    HERNIA REPAIR      VASECTOMY      [facility] NONSPECIFIC PROCEDURE Right     Left knee arthroscopy and replacement    [facility] NONSPECIFIC PROCEDURE  01/01/1997    Bilat inguinal  "hernia surgery    [facility] NONSPECIFIC PROCEDURE      Vasectomy            Medications     Current Outpatient Medications   Medication Sig Dispense Refill    atorvastatin (LIPITOR) 80 MG tablet Take 1 tablet (80 mg) by mouth at bedtime. 90 tablet 3    finasteride (PROSCAR) 5 MG tablet Take 1 tablet (5 mg) by mouth daily. 30 tablet 0    finasteride (PROSCAR) 5 MG tablet Take 1 tablet (5 mg) by mouth daily. 90 tablet 3    hydrocortisone, Perianal, (HYDROCORTISONE) 2.5 % cream Place rectally 2 times daily as needed for hemorrhoids. 30 g 1    ibuprofen (ADVIL/MOTRIN) 800 MG tablet Take 1 tablet (800 mg) by mouth every 8 hours as needed for moderate pain 90 tablet 0    lidocaine (TOPICAINE 5) 5 % anorectal gel Apply to affected area 10 g 0    losartan-hydrochlorothiazide (HYZAAR) 50-12.5 MG tablet Take 1 tablet by mouth daily. 90 tablet 3    MOUNJARO 12.5 MG/0.5ML SOAJ Inject 0.5 mLs (12.5 mg) subcutaneously every 7 days. 2 mL 0    nebivolol (BYSTOLIC) 5 MG tablet Take 1 tablet (5 mg) by mouth daily. 90 tablet 3    needle, disp, (BD HYPODERMIC NEEDLE) 18G X 1\" MISC 1 EACH EVERY 14 DAYS 6 each 3    omeprazole (PRILOSEC) 40 MG DR capsule Take 1 capsule (40 mg) by mouth daily. 90 capsule 3    sildenafil (VIAGRA) 50 MG tablet Take 1 tablet (50 mg) by mouth daily as needed (1 hour before intercourse). 20 tablet 1    syringe/needle, disp, (B-D SYRINGE/NEEDLE) 22G X 1-1/2\" 3 ML MISC 1 EACH EVERY 14 DAYS 6 each 3    testosterone cypionate (DEPOTESTOSTERONE) 200 MG/ML injection INJECT 1.5 MLS (300 MG) INTO THE MUSCLE EVERY 14 DAYS FOR 90 DAYS 48 mL 0    Tirzepatide 10 MG/0.5ML SOAJ Inject 0.5 mLs (10 mg) subcutaneously every 7 days. 6 mL 0    Tirzepatide 15 MG/0.5ML SOAJ Inject 0.5 mLs (15 mg) subcutaneously every 7 days. 2 mL 5     No current facility-administered medications for this visit.            Family History:     Family History   Problem Relation Age of Onset    Family History Negative Mother     Gastrointestinal Disease " Father         Crohn's    Cardiovascular Father     Depression Father     Heart Disease Father 66        triple bipass    Neurologic Disorder Father         seizures    Respiratory Father         sleep apnea    C.A.D. Father         Had CABG    Cerebrovascular Disease Father     Seizure Disorder Father     Coronary Artery Disease Father     Depression Paternal Grandmother     Lipids Brother     Diabetes Brother     Coronary Artery Disease Brother     Respiratory Brother     Coronary Artery Disease Brother     Cancer - colorectal Maternal Uncle     Depression Other     Cancer - colorectal Other         Maternal Uncle  at 56            Social History:     Social History     Socioeconomic History    Marital status:      Spouse name: Not on file    Number of children: Not on file    Years of education: Not on file    Highest education level: Not on file   Occupational History    Occupation: Medical assistant     Employer: [employer]   Tobacco Use    Smoking status: Former     Current packs/day: 0.00     Types: Cigarettes     Quit date: 10/3/1987     Years since quittin.6     Passive exposure: Past    Smokeless tobacco: Never    Tobacco comments:        Vaping Use    Vaping status: Never Used   Substance and Sexual Activity    Alcohol use: Yes     Alcohol/week: 16.7 standard drinks of alcohol     Types: 20 Standard drinks or equivalent per week     Comment: 3-4/d    Drug use: No     Comment: caffeine 3-4/d    Sexual activity: Yes     Partners: Female     Birth control/protection: Male Surgical   Other Topics Concern    Parent/sibling w/ CABG, MI or angioplasty before 65F 55M? Yes   Social History Narrative    Not on file     Social Drivers of Health     Financial Resource Strain: Low Risk  (2024)    Financial Resource Strain     Within the past 12 months, have you or your family members you live with been unable to get utilities (heat, electricity) when it was really needed?: No    Food Insecurity: Low Risk  (12/6/2024)    Food Insecurity     Within the past 12 months, did you worry that your food would run out before you got money to buy more?: No     Within the past 12 months, did the food you bought just not last and you didn t have money to get more?: No   Transportation Needs: Low Risk  (12/6/2024)    Transportation Needs     Within the past 12 months, has lack of transportation kept you from medical appointments, getting your medicines, non-medical meetings or appointments, work, or from getting things that you need?: No   Physical Activity: Inactive (12/6/2024)    Exercise Vital Sign     Days of Exercise per Week: 0 days     Minutes of Exercise per Session: 0 min   Stress: No Stress Concern Present (12/6/2024)    [employer] - Occupational Stress Questionnaire     Feeling of Stress : Not at all   Social Connections: Unknown (12/6/2024)    Social Connection and Isolation Panel [NHANES]     Frequency of Communication with Friends and Family: Not on file     Frequency of Social Gatherings with Friends and Family: Once a week     Attends Cheondoism Services: Not on file     Active Member of Clubs or Organizations: Not on file     Attends Club or Organization Meetings: Not on file     Marital Status: Not on file   Interpersonal Safety: Low Risk  (10/4/2024)    Interpersonal Safety     Do you feel physically and emotionally safe where you currently live?: Yes     Within the past 12 months, have you been hit, slapped, kicked or otherwise physically hurt by someone?: No     Within the past 12 months, have you been humiliated or emotionally abused in other ways by your partner or ex-partner?: No   Housing Stability: Low Risk  (12/6/2024)    Housing Stability     Do you have housing? : Yes     Are you worried about losing your housing?: No            Allergies:   Patient has no known allergies.         Review of Systems:  From intake questionnaire     Skin:  "negative  Eyes: negative  Ears/Nose/Throat: negative  Respiratory: No shortness of breath, dyspnea on exertion, cough, or hemoptysis  Cardiovascular: No chest pain or palpitations  Gastrointestinal: negative; no nausea/vomiting, constipation or diarrhea  Genitourinary: as per HPI  Musculoskeletal: negative  Neurologic: negative  Psychiatric: negative  Hematologic/Lymphatic/Immunologic: negative  Endocrine: negative         Physical Exam:     Patient is a 59 year old  male   Vitals: Blood pressure 113/74, pulse 89, height 1.803 m (5' 11\"), weight 116.1 kg (256 lb).  Constitutional: Body mass index is 35.7 kg/m .  Alert, no acute distress, oriented, conversant  Eyes: no scleral icterus; extraocular muscles intact, moist conjunctivae  Neck: trachea midline, no thyromegaly  Ears/nose/mouth: throat/mouth:normal, good dentition  Respiratory: no respiratory distress, or pursed lip breathing  Cardiovascular: pulses strong and intact; no obvious jugular venous distension present  Gastrointestinal: soft, nontender, no organomegaly or masses,   Lymphatics: No inguinal adenopathy  Musculoskeletal: extremities normal, no peripheral edema  Skin: no suspicious lesions or rashes  Neuro: Alert, oriented, speech and mentation normal  Psych: affect and mood normal, alert and oriented to person, place and time  Gait: Normal  : Elevated PSA        Labs and Pathology:    The following labs were reviewed by me and discussed with the patient:    Significant for   Lab Results   Component Value Date    CR 0.99 12/03/2024    CR 0.92 07/26/2024    CR 0.95 10/27/2023    CR 0.89 10/25/2022    CR 1.02 01/31/2022    CR 1.02 08/27/2021    CR 0.95 03/26/2021    CR 1.04 01/13/2021    CR 1.01 01/13/2020    CR 0.99 11/04/2019    CR 1.03 07/17/2019    CR 1.01 11/18/2016    CR 0.96 09/14/2015    CR 0.97 09/25/2014    CR 1.01 10/18/2013    CR 1.02 08/09/2012     PSA   Date Value Ref Range Status   11/18/2016 0.81 0 - 4 ug/L Final     Comment:     Assay " Method:  Chemiluminescence using Siemens Vista analyzer     Prostate Specific Antigen Screen   Date Value Ref Range Status   03/26/2025 7.79 (H) 0.00 - 3.50 ng/mL Final   01/09/2025 4.50 (H) 0.00 - 3.50 ng/mL Final   12/03/2024 7.68 (H) 0.00 - 3.50 ng/mL Final   10/25/2022 3.25 0.00 - 4.00 ug/L Final   08/27/2021 1.67 0.00 - 4.00 ug/L Final             Imaging:    The following imaging exams were independently viewed and interpreted by me and discussed with patient:  MRI Abd/Pelvis: MRI PROSTATE: 5/2/2025 9:15 AM     CLINICAL HISTORY: Testosterone insufficiency; BPH with elevated PSA;  BPH with elevated PSA     Most Recent PSA: 7.79 ug/L     Comparison: None.     TECHNIQUE:  The following sequences were obtained: High-resolution axial  T2-weighted, coronal T2-weighted, 3D volumetric T2-weighted, axial  pre-contrast T1, axial diffusion-weighted, axial apparent diffusion  coefficient and axial dynamic contrast-enhanced T1. Postcontrast  images were evaluated on a separate workstation to evaluate dynamic  contrast enhancement. The technique of this exam is PI-RADS v2.1  compliant. Contrast dose: 10mL Gadavist     FINDINGS:     Prostate Image Quality (PI-QUAL)     T2-weighted images: 4/4  Diffusion-weighted images: 4/4  Dynamic contrast enhancement images: Positive     PI-QUAL score: 3 - Optimal quality     Size: 34 grams  Hemorrhage: Absent  Peripheral zone: Heterogeneous on T2-weighted images. Regions of  mildly decreased signal on ADC or DWI which are best characterized as  PI-RADS 2 without highly suspicious lesion.  Transition zone: Enlarged with BPH changes. Suspicious lesions as  detailed below.     Lesion(s) in rank order of severity (highest score- to lowest score,  then by size)      Lesion 1:  Location: Left mid gland transition zone at the 1-2 o'clock position  relative to the urethra. Series 8 image 59.   Additional prostate regions involved: None  Size: 10 mm  T2 description: Moderately hypointense  lenticular lesion  T2 numerical assessment: 4  DWI description: Markedly hyperintense on DWI, markedly hypointense on  ADC  DWI numerical assessment: 4  DCE assessment: Positive    Prostate margin: No capsular abutment  Lesion overall PI-RADS category: 4     Neurovascular bundles: No neurovascular bundle involvement by  malignancy.  Seminal vesicles: No seminal vesicle involvement by malignancy.  Lymph nodes: No lymph node involvement  Bones: No suspicious lesions  Other pelvic organs: Colonic diverticulosis. Small fat-containing  right inguinal hernia. Degenerative changes in the lumbar spine with  grade 1 anterolisthesis of L5 on S1.                                                                      IMPRESSION:  1. Based on the most suspicious abnormality, this exam is  characterized as PIRADS 4 - Clinically significant cancer is likely to  be present.  The most suspicious abnormality is located at the left  mid gland transitional zone and there is no evidence of extraprostatic  extension.  2. No suspicious adenopathy or evidence of pelvic metastases.     PIRADS? v2.1 Assessment Categories   PIRADS 1: Very low (clinically significant cancer is highly unlikely  to be present)   PIRADS 2: Low (clinically significant cancer is unlikely to be  present)   PIRADS 3: Intermediate (the presence of clinically significant cancer  is equivocal)   PIRADS 4: High (clinically significant cancer is likely to be present)     PIRADS 5: Very high (clinically significant cancer is highly likely to  be present)     I have personally reviewed the examination and initial interpretation  and I agree with the findings.     CAMELIA THAYER DO         SYSTEM ID:  [id number]               Assessment and Plan:     Elevated prostate specific antigen (PSA)  Discussed concerns for possible prostate cancer based on the findings of a PI-RADS 4 lesion  Recommend UroNav prostate biopsy  Discussed in detail about biopsy, complications, follow-up  plans  Discussed briefly about prostate cancer and treatment options based on the cancer grade and risk  He is agreeable to proceed ahead with prostate biopsy  - Adult Urology  Referral  - UA Macroscopic with reflex to Microscopic and Culture    Testosterone deficiency  On both finasteride and testosterone supplementation  Based on that the corrected PSA will be significant higher  However I think he was just recently started on the finasteride  - Adult Urology  Referral      Plan:  Schedule for UroNav prostate biopsy    Orders  No orders of the defined types were placed in this encounter.      Miguel Reddy MD  [facility] [place]      ==========================    Additional Billing and Coding Information:  Review of external notes as documented above   Review of the result(s) of each unique test - PSA, creatinine, MRI                20 minutes spent by me on the date of the encounter doing chart review, review of test results, interpretation of tests, patient visit, and documentation     ==========================   Private car

## 2025-05-08 NOTE — LETTER
5/8/2025       RE: Galileo Vieira  [address] [facility] [address]     Dear Colleague,    Thank you for referring your patient, Galileo Vieira, to the [facility] [place] at [facility]. Please see a copy of my visit note below.          Chief Complaint:    Elevated PSA (Prostate Specific Antigen)           Consult or Referral:     Mr. Galileo Vieira is a 59 year old male seen at the request of Dr. Flanagan.         History of Present Illness:     Galileo Vieira is a 59 year old male being seen for elevated PSA.  Duration of problem: 1 year  Previous treatments: On testosterone supplementation    Reviewed previous notes from Dr. Flanagan      Galileo presents today with concerns of elevated PSA  He does not have any family history of prostate cancer  Denies any lower urinary tract symptoms  On testosterone supplementation for deficiency and initially was felt that his PSA was related to this  However MRI was done which showed a lesion and has been referred to me             Past Medical History:     Past Medical History:   Diagnosis Date     Concussion, unspecified     Hospitalized overnight as a child     Depressive disorder      Esophageal reflux      Hypercholesterolemia      Hypertension      IFG (impaired fasting glucose)      GIUSEPPE (obstructive sleep apnea)     uses cpap     Testosterone deficiency     ED     Type 2 diabetes mellitus without complication, without long-term current use of insulin (H) 9/24/2024            Past Surgical History:     Past Surgical History:   Procedure Laterality Date     ABDOMEN SURGERY       CHOLECYSTECTOMY       COLONOSCOPY  05/12/2014    Procedure: COLONOSCOPY;  Surgeon: Syed Walker MD;  Location:  GI     COLONOSCOPY N/A 10/04/2024    Procedure: Colonoscopy with polypectomies using cold snare/hot snare/jumbo biopsy forceps;  Surgeon: Qing Reddy MD;  Location:  GI     COLONOSCOPY N/A 10/04/2024  "   Procedure: COLONOSCOPY, WITH POLYPECTOMY AND BIOPSY;  Surgeon: Qing Reddy MD;  Location:  GI     GI SURGERY  01/01/2008    Gall bladder     HERNIA REPAIR       VASECTOMY       [facility] NONSPECIFIC PROCEDURE Right     Left knee arthroscopy and replacement     [facility] NONSPECIFIC PROCEDURE  01/01/1997    Bilat inguinal hernia surgery     [facility] NONSPECIFIC PROCEDURE      Vasectomy            Medications     Current Outpatient Medications   Medication Sig Dispense Refill     atorvastatin (LIPITOR) 80 MG tablet Take 1 tablet (80 mg) by mouth at bedtime. 90 tablet 3     finasteride (PROSCAR) 5 MG tablet Take 1 tablet (5 mg) by mouth daily. 30 tablet 0     finasteride (PROSCAR) 5 MG tablet Take 1 tablet (5 mg) by mouth daily. 90 tablet 3     hydrocortisone, Perianal, (HYDROCORTISONE) 2.5 % cream Place rectally 2 times daily as needed for hemorrhoids. 30 g 1     ibuprofen (ADVIL/MOTRIN) 800 MG tablet Take 1 tablet (800 mg) by mouth every 8 hours as needed for moderate pain 90 tablet 0     lidocaine (TOPICAINE 5) 5 % anorectal gel Apply to affected area 10 g 0     losartan-hydrochlorothiazide (HYZAAR) 50-12.5 MG tablet Take 1 tablet by mouth daily. 90 tablet 3     MOUNJARO 12.5 MG/0.5ML SOAJ Inject 0.5 mLs (12.5 mg) subcutaneously every 7 days. 2 mL 0     nebivolol (BYSTOLIC) 5 MG tablet Take 1 tablet (5 mg) by mouth daily. 90 tablet 3     needle, disp, (BD HYPODERMIC NEEDLE) 18G X 1\" MISC 1 EACH EVERY 14 DAYS 6 each 3     omeprazole (PRILOSEC) 40 MG DR capsule Take 1 capsule (40 mg) by mouth daily. 90 capsule 3     sildenafil (VIAGRA) 50 MG tablet Take 1 tablet (50 mg) by mouth daily as needed (1 hour before intercourse). 20 tablet 1     syringe/needle, disp, (B-D SYRINGE/NEEDLE) 22G X 1-1/2\" 3 ML MISC 1 EACH EVERY 14 DAYS 6 each 3     testosterone cypionate (DEPOTESTOSTERONE) 200 MG/ML injection INJECT 1.5 MLS (300 MG) INTO THE MUSCLE EVERY 14 DAYS FOR 90 DAYS 48 mL 0     Tirzepatide 10 MG/0.5ML SOAJ Inject 0.5 mLs " (10 mg) subcutaneously every 7 days. 6 mL 0     Tirzepatide 15 MG/0.5ML SOAJ Inject 0.5 mLs (15 mg) subcutaneously every 7 days. 2 mL 5     No current facility-administered medications for this visit.            Family History:     Family History   Problem Relation Age of Onset     Family History Negative Mother      Gastrointestinal Disease Father         Crohn's     Cardiovascular Father      Depression Father      Heart Disease Father 66        triple bipass     Neurologic Disorder Father         seizures     Respiratory Father         sleep apnea     C.A.D. Father         Had CABG     Cerebrovascular Disease Father      Seizure Disorder Father      Coronary Artery Disease Father      Depression Paternal Grandmother      Lipids Brother      Diabetes Brother      Coronary Artery Disease Brother      Respiratory Brother      Coronary Artery Disease Brother      Cancer - colorectal Maternal Uncle      Depression Other      Cancer - colorectal Other         Maternal Uncle  at 56            Social History:     Social History     Socioeconomic History     Marital status:      Spouse name: Not on file     Number of children: Not on file     Years of education: Not on file     Highest education level: Not on file   Occupational History     Occupation: Medical assistant     Employer: [employer]   Tobacco Use     Smoking status: Former     Current packs/day: 0.00     Types: Cigarettes     Quit date: 10/3/1987     Years since quittin.6     Passive exposure: Past     Smokeless tobacco: Never     Tobacco comments:        Vaping Use     Vaping status: Never Used   Substance and Sexual Activity     Alcohol use: Yes     Alcohol/week: 16.7 standard drinks of alcohol     Types: 20 Standard drinks or equivalent per week     Comment: 3-4/d     Drug use: No     Comment: caffeine 3-4/d     Sexual activity: Yes     Partners: Female     Birth control/protection: Male Surgical   Other Topics Concern      Parent/sibling w/ CABG, MI or angioplasty before 65F 55M? Yes   Social History Narrative     Not on file     Social Drivers of Health     Financial Resource Strain: Low Risk  (12/6/2024)    Financial Resource Strain      Within the past 12 months, have you or your family members you live with been unable to get utilities (heat, electricity) when it was really needed?: No   Food Insecurity: Low Risk  (12/6/2024)    Food Insecurity      Within the past 12 months, did you worry that your food would run out before you got money to buy more?: No      Within the past 12 months, did the food you bought just not last and you didn t have money to get more?: No   Transportation Needs: Low Risk  (12/6/2024)    Transportation Needs      Within the past 12 months, has lack of transportation kept you from medical appointments, getting your medicines, non-medical meetings or appointments, work, or from getting things that you need?: No   Physical Activity: Inactive (12/6/2024)    Exercise Vital Sign      Days of Exercise per Week: 0 days      Minutes of Exercise per Session: 0 min   Stress: No Stress Concern Present (12/6/2024)    [employer] [place] [employer] - Occupational Stress Questionnaire      Feeling of Stress : Not at all   Social Connections: Unknown (12/6/2024)    Social Connection and Isolation Panel [NHANES]      Frequency of Communication with Friends and Family: Not on file      Frequency of Social Gatherings with Friends and Family: Once a week      Attends Religion Services: Not on file      Active Member of Clubs or Organizations: Not on file      Attends Club or Organization Meetings: Not on file      Marital Status: Not on file   Interpersonal Safety: Low Risk  (10/4/2024)    Interpersonal Safety      Do you feel physically and emotionally safe where you currently live?: Yes      Within the past 12 months, have you been hit, slapped, kicked or otherwise physically hurt by someone?: No      Within  "the past 12 months, have you been humiliated or emotionally abused in other ways by your partner or ex-partner?: No   Housing Stability: Low Risk  (12/6/2024)    Housing Stability      Do you have housing? : Yes      Are you worried about losing your housing?: No            Allergies:   Patient has no known allergies.         Review of Systems:  From intake questionnaire     Skin: negative  Eyes: negative  Ears/Nose/Throat: negative  Respiratory: No shortness of breath, dyspnea on exertion, cough, or hemoptysis  Cardiovascular: No chest pain or palpitations  Gastrointestinal: negative; no nausea/vomiting, constipation or diarrhea  Genitourinary: as per HPI  Musculoskeletal: negative  Neurologic: negative  Psychiatric: negative  Hematologic/Lymphatic/Immunologic: negative  Endocrine: negative         Physical Exam:     Patient is a 59 year old  male   Vitals: Blood pressure 113/74, pulse 89, height 1.803 m (5' 11\"), weight 116.1 kg (256 lb).  Constitutional: Body mass index is 35.7 kg/m .  Alert, no acute distress, oriented, conversant  Eyes: no scleral icterus; extraocular muscles intact, moist conjunctivae  Neck: trachea midline, no thyromegaly  Ears/nose/mouth: throat/mouth:normal, good dentition  Respiratory: no respiratory distress, or pursed lip breathing  Cardiovascular: pulses strong and intact; no obvious jugular venous distension present  Gastrointestinal: soft, nontender, no organomegaly or masses,   Lymphatics: No inguinal adenopathy  Musculoskeletal: extremities normal, no peripheral edema  Skin: no suspicious lesions or rashes  Neuro: Alert, oriented, speech and mentation normal  Psych: affect and mood normal, alert and oriented to person, place and time  Gait: Normal  : Elevated PSA        Labs and Pathology:    The following labs were reviewed by me and discussed with the patient:    Significant for   Lab Results   Component Value Date    CR 0.99 12/03/2024    CR 0.92 07/26/2024    CR 0.95 " 10/27/2023    CR 0.89 10/25/2022    CR 1.02 01/31/2022    CR 1.02 08/27/2021    CR 0.95 03/26/2021    CR 1.04 01/13/2021    CR 1.01 01/13/2020    CR 0.99 11/04/2019    CR 1.03 07/17/2019    CR 1.01 11/18/2016    CR 0.96 09/14/2015    CR 0.97 09/25/2014    CR 1.01 10/18/2013    CR 1.02 08/09/2012     PSA   Date Value Ref Range Status   11/18/2016 0.81 0 - 4 ug/L Final     Comment:     Assay Method:  Chemiluminescence using Siemens Vista analyzer     Prostate Specific Antigen Screen   Date Value Ref Range Status   03/26/2025 7.79 (H) 0.00 - 3.50 ng/mL Final   01/09/2025 4.50 (H) 0.00 - 3.50 ng/mL Final   12/03/2024 7.68 (H) 0.00 - 3.50 ng/mL Final   10/25/2022 3.25 0.00 - 4.00 ug/L Final   08/27/2021 1.67 0.00 - 4.00 ug/L Final             Imaging:    The following imaging exams were independently viewed and interpreted by me and discussed with patient:  MRI Abd/Pelvis: MRI PROSTATE: 5/2/2025 9:15 AM     CLINICAL HISTORY: Testosterone insufficiency; BPH with elevated PSA;  BPH with elevated PSA     Most Recent PSA: 7.79 ug/L     Comparison: None.     TECHNIQUE:  The following sequences were obtained: High-resolution axial  T2-weighted, coronal T2-weighted, 3D volumetric T2-weighted, axial  pre-contrast T1, axial diffusion-weighted, axial apparent diffusion  coefficient and axial dynamic contrast-enhanced T1. Postcontrast  images were evaluated on a separate workstation to evaluate dynamic  contrast enhancement. The technique of this exam is PI-RADS v2.1  compliant. Contrast dose: 10mL Gadavist     FINDINGS:     Prostate Image Quality (PI-QUAL)     T2-weighted images: 4/4  Diffusion-weighted images: 4/4  Dynamic contrast enhancement images: Positive     PI-QUAL score: 3 - Optimal quality     Size: 34 grams  Hemorrhage: Absent  Peripheral zone: Heterogeneous on T2-weighted images. Regions of  mildly decreased signal on ADC or DWI which are best characterized as  PI-RADS 2 without highly suspicious lesion.  Transition  zone: Enlarged with BPH changes. Suspicious lesions as  detailed below.     Lesion(s) in rank order of severity (highest score- to lowest score,  then by size)      Lesion 1:  Location: Left mid gland transition zone at the 1-2 o'clock position  relative to the urethra. Series 8 image 59.   Additional prostate regions involved: None  Size: 10 mm  T2 description: Moderately hypointense lenticular lesion  T2 numerical assessment: 4  DWI description: Markedly hyperintense on DWI, markedly hypointense on  ADC  DWI numerical assessment: 4  DCE assessment: Positive    Prostate margin: No capsular abutment  Lesion overall PI-RADS category: 4     Neurovascular bundles: No neurovascular bundle involvement by  malignancy.  Seminal vesicles: No seminal vesicle involvement by malignancy.  Lymph nodes: No lymph node involvement  Bones: No suspicious lesions  Other pelvic organs: Colonic diverticulosis. Small fat-containing  right inguinal hernia. Degenerative changes in the lumbar spine with  grade 1 anterolisthesis of L5 on S1.                                                                      IMPRESSION:  1. Based on the most suspicious abnormality, this exam is  characterized as PIRADS 4 - Clinically significant cancer is likely to  be present.  The most suspicious abnormality is located at the left  mid gland transitional zone and there is no evidence of extraprostatic  extension.  2. No suspicious adenopathy or evidence of pelvic metastases.     PIRADS? v2.1 Assessment Categories   PIRADS 1: Very low (clinically significant cancer is highly unlikely  to be present)   PIRADS 2: Low (clinically significant cancer is unlikely to be  present)   PIRADS 3: Intermediate (the presence of clinically significant cancer  is equivocal)   PIRADS 4: High (clinically significant cancer is likely to be present)     PIRADS 5: Very high (clinically significant cancer is highly likely to  be present)     I have personally reviewed the  examination and initial interpretation  and I agree with the findings.     CAMELIA THAYER DO         SYSTEM ID:  [id number]               Assessment and Plan:     Elevated prostate specific antigen (PSA)  Discussed concerns for possible prostate cancer based on the findings of a PI-RADS 4 lesion  Recommend UroNav prostate biopsy  Discussed in detail about biopsy, complications, follow-up plans  Discussed briefly about prostate cancer and treatment options based on the cancer grade and risk  He is agreeable to proceed ahead with prostate biopsy  - Adult Urology  Referral  - UA Macroscopic with reflex to Microscopic and Culture    Testosterone deficiency  On both finasteride and testosterone supplementation  Based on that the corrected PSA will be significant higher  However I think he was just recently started on the finasteride  - Adult Urology  Referral      Plan:  Schedule for UroNav prostate biopsy    Orders  No orders of the defined types were placed in this encounter.      Miguel Reddy MD  [facility] [place]      ==========================    Additional Billing and Coding Information:  Review of external notes as documented above   Review of the result(s) of each unique test - PSA, creatinine, MRI                20 minutes spent by me on the date of the encounter doing chart review, review of test results, interpretation of tests, patient visit, and documentation     ==========================    Again, thank you for allowing me to participate in the care of your patient.      Sincerely,    Miguel Reddy MD

## 2025-05-08 NOTE — NURSING NOTE
Chief Complaint   Patient presents with    Prostate Cancer     Establish care, discuss MRI      Bri Albright CMA

## 2025-05-13 DIAGNOSIS — Z79.2 NEED FOR ANTIBIOTIC PROPHYLAXIS FOR SURGICAL PROCEDURE: Primary | ICD-10-CM

## 2025-05-13 RX ORDER — CIPROFLOXACIN 500 MG/1
500 TABLET, FILM COATED ORAL 2 TIMES DAILY
Qty: 6 TABLET | Refills: 0 | Status: SHIPPED | OUTPATIENT
Start: 2025-06-19 | End: 2025-06-22

## 2025-05-19 DIAGNOSIS — E34.9 TESTOSTERONE INSUFFICIENCY: Primary | ICD-10-CM

## 2025-05-19 RX ORDER — NEEDLES, SAFETY 22GX1 1/2"
1 NEEDLE, DISPOSABLE MISCELLANEOUS
Qty: 50 EACH | Refills: 0 | Status: SHIPPED | OUTPATIENT
Start: 2025-05-19

## 2025-05-19 NOTE — TELEPHONE ENCOUNTER
"Pt requesting 23 G x 1\" needle for Testosterone injections due to giving in thigh instead of buttock.    Please review and advise.    Marlee HAYSN, RN, PHN    "

## 2025-06-02 ENCOUNTER — DOCUMENTATION ONLY (OUTPATIENT)
Dept: LAB | Facility: CLINIC | Age: 60
End: 2025-06-02
Payer: COMMERCIAL

## 2025-06-02 NOTE — PROGRESS NOTES
Galileo Vieira has an upcoming lab appointment:    Future Appointments   Date Time Provider Department Center   6/9/2025  7:30 AM LV LAB [facility] LV   6/20/2025  9:00 AM Miguel Reddy MD UAURO UA [facility]UY JASMYN   6/30/2025 11:00 AM Miguel Reddy MD UBMADIHA UB VUY BURNS     Patient is scheduled for the following lab(s): A1C and Cholesterol     There is no order available.   Please review and place either future orders or HMPO (Review of Health Maintenance Protocol Orders), as appropriate.    Health Maintenance Due   Topic    ANNUAL REVIEW OF HM ORDERS      Harmony PRATT

## 2025-06-19 ENCOUNTER — DOCUMENTATION ONLY (OUTPATIENT)
Dept: SLEEP MEDICINE | Facility: CLINIC | Age: 60
End: 2025-06-19
Payer: COMMERCIAL

## 2025-06-19 ENCOUNTER — RESULTS FOLLOW-UP (OUTPATIENT)
Dept: FAMILY MEDICINE | Facility: CLINIC | Age: 60
End: 2025-06-19
Payer: COMMERCIAL

## 2025-06-19 DIAGNOSIS — Z79.2 PROPHYLACTIC ANTIBIOTIC: ICD-10-CM

## 2025-06-19 DIAGNOSIS — N40.0 BPH WITH ELEVATED PSA: ICD-10-CM

## 2025-06-19 DIAGNOSIS — R97.20 BPH WITH ELEVATED PSA: ICD-10-CM

## 2025-06-19 DIAGNOSIS — E34.9 TESTOSTERONE INSUFFICIENCY: ICD-10-CM

## 2025-06-19 DIAGNOSIS — Z12.5 SCREENING FOR PROSTATE CANCER: Primary | ICD-10-CM

## 2025-06-19 DIAGNOSIS — G47.33 OBSTRUCTIVE SLEEP APNEA (ADULT) (PEDIATRIC): Primary | ICD-10-CM

## 2025-06-19 DIAGNOSIS — Z79.2 NEED FOR ANTIBIOTIC PROPHYLAXIS FOR SURGICAL PROCEDURE: ICD-10-CM

## 2025-06-23 ENCOUNTER — TELEPHONE (OUTPATIENT)
Dept: FAMILY MEDICINE | Facility: CLINIC | Age: 60
End: 2025-06-23
Payer: COMMERCIAL

## 2025-06-23 DIAGNOSIS — E11.9 TYPE 2 DIABETES MELLITUS WITHOUT COMPLICATION, WITHOUT LONG-TERM CURRENT USE OF INSULIN (H): Primary | ICD-10-CM

## 2025-06-23 NOTE — TELEPHONE ENCOUNTER
Pt due for A1c please place lab ad coming in for labs on 7/2 for lab only     Cecilia Nathan RN

## 2025-06-24 NOTE — CONFIDENTIAL NOTE
Supply Order- Current CPAP status    Subjective  This patient has used has used CPAP for 4 hours Compliance  100 % [minimum acceptable 70%]  of the last 30 days and is benefiting in terms of improvement of sleep, wakefulness or health risk reduction? Yes/no    Data download on this date  Objective (30 days to this date)  AHI 0.53   last  upload [normal <10]  Compliance  100 % [minimum acceptable 70%]   Average use 533 minutes [minimum > 4 hours]      PAP settings:   CPAP min 6.0  cm  H20  CPAP max 15.0 cm  H20    PAP deployed:    Resmed 95th% pressure 12.1 cm

## 2025-06-30 ENCOUNTER — VIRTUAL VISIT (OUTPATIENT)
Dept: UROLOGY | Facility: CLINIC | Age: 60
End: 2025-06-30
Payer: COMMERCIAL

## 2025-06-30 DIAGNOSIS — E34.9 TESTOSTERONE DEFICIENCY: ICD-10-CM

## 2025-06-30 DIAGNOSIS — R97.20 ELEVATED PROSTATE SPECIFIC ANTIGEN (PSA): Primary | ICD-10-CM

## 2025-06-30 PROCEDURE — 98006 SYNCH AUDIO-VIDEO EST MOD 30: CPT | Performed by: STUDENT IN AN ORGANIZED HEALTH CARE EDUCATION/TRAINING PROGRAM

## 2025-06-30 NOTE — LETTER
6/30/2025       RE: Galileo Vieira  [address] [facility] [address]     Dear Colleague,    Thank you for referring your patient, Galileo Vieira, to the [facility] [place] at [facility]. Please see a copy of my visit note below.    Galileo is a 59 year old who is being evaluated via a billable video visit.    How would you like to obtain your AVS? MyChart  If the video visit is dropped, the invitation should be resent by: Text to cell phone: [phone]  Will anyone else be joining your video visit? No    Video-Visit Details    Type of service:  Video Visit  Video start time: 9:33 AM  Video end time: 9:38 AM  Originating Location (pt. Location): Home    Distant Location (provider location):  On-site  Platform used for Video Visit: [employer]   HPI:  Galileo Vieira is a 59 year old male being seen for discussion of biopsy results.  Duration of problem: Few years  Previous treatments: On testosterone replacement      Reviewed previous notes  Doing well after the biopsy no issues to report  Has some concerns regarding testosterone replacement         Review of Systems:  From intake questionnaire     Skin: negative  Eyes: negative  Ears/Nose/Throat: negative  Respiratory: No shortness of breath, dyspnea on exertion, cough, or hemoptysis  Cardiovascular: No chest pain or palpitations  Gastrointestinal: negative; no nausea/vomiting, constipation or diarrhea  Genitourinary: as per HPI  Musculoskeletal: negative  Neurologic: negative  Psychiatric: negative  Hematologic/Lymphatic/Immunologic: negative  Endocrine: negative         Physical Exam:   This is a virtual visit    Alert, no acute distress, oriented, conversant    Ears/nose/mouth: mouth:normal, good dentition  Respiratory: no respiratory distress, or pursed lip breathing  Cardiovascular:no obvious jugular venous distension present  Skin: no suspicious lesions or rashes on Visible body parts on the  Screen  Neuro: Alert, oriented, speech and mentation normal  Psych: affect and mood normal, alert and oriented to person, place and time  Review of Imaging:  The following imaging exams were independently viewed and interpreted by me and discussed with patient:  MRI Abd/Pelvis: MRI PROSTATE: 5/2/2025 9:15 AM     CLINICAL HISTORY: Testosterone insufficiency; BPH with elevated PSA;  BPH with elevated PSA     Most Recent PSA: 7.79 ug/L     Comparison: None.     TECHNIQUE:  The following sequences were obtained: High-resolution axial  T2-weighted, coronal T2-weighted, 3D volumetric T2-weighted, axial  pre-contrast T1, axial diffusion-weighted, axial apparent diffusion  coefficient and axial dynamic contrast-enhanced T1. Postcontrast  images were evaluated on a separate workstation to evaluate dynamic  contrast enhancement. The technique of this exam is PI-RADS v2.1  compliant. Contrast dose: 10mL Gadavist     FINDINGS:     Prostate Image Quality (PI-QUAL)     T2-weighted images: 4/4  Diffusion-weighted images: 4/4  Dynamic contrast enhancement images: Positive     PI-QUAL score: 3 - Optimal quality     Size: 34 grams  Hemorrhage: Absent  Peripheral zone: Heterogeneous on T2-weighted images. Regions of  mildly decreased signal on ADC or DWI which are best characterized as  PI-RADS 2 without highly suspicious lesion.  Transition zone: Enlarged with BPH changes. Suspicious lesions as  detailed below.     Lesion(s) in rank order of severity (highest score- to lowest score,  then by size)      Lesion 1:  Location: Left mid gland transition zone at the 1-2 o'clock position  relative to the urethra. Series 8 image 59.   Additional prostate regions involved: None  Size: 10 mm  T2 description: Moderately hypointense lenticular lesion  T2 numerical assessment: 4  DWI description: Markedly hyperintense on DWI, markedly hypointense on  ADC  DWI numerical assessment: 4  DCE assessment: Positive    Prostate margin: No capsular  abutment  Lesion overall PI-RADS category: 4     Neurovascular bundles: No neurovascular bundle involvement by  malignancy.  Seminal vesicles: No seminal vesicle involvement by malignancy.  Lymph nodes: No lymph node involvement  Bones: No suspicious lesions  Other pelvic organs: Colonic diverticulosis. Small fat-containing  right inguinal hernia. Degenerative changes in the lumbar spine with  grade 1 anterolisthesis of L5 on S1.                                                                      IMPRESSION:  1. Based on the most suspicious abnormality, this exam is  characterized as PIRADS 4 - Clinically significant cancer is likely to  be present.  The most suspicious abnormality is located at the left  mid gland transitional zone and there is no evidence of extraprostatic  extension.  2. No suspicious adenopathy or evidence of pelvic metastases.     PIRADS? v2.1 Assessment Categories   PIRADS 1: Very low (clinically significant cancer is highly unlikely  to be present)   PIRADS 2: Low (clinically significant cancer is unlikely to be  present)   PIRADS 3: Intermediate (the presence of clinically significant cancer  is equivocal)   PIRADS 4: High (clinically significant cancer is likely to be present)     PIRADS 5: Very high (clinically significant cancer is highly likely to  be present)     I have personally reviewed the examination and initial interpretation  and I agree with the findings.     CAMELIA THAYER DO     Review of Labs:  The following labs were reviewed by me and discussed with the patient:  Component      Latest Ref Rng 12/3/2024  8:11 AM 1/9/2025  8:16 AM 3/26/2025  7:54 AM   Prostate Specific Antigen Screen      0.00 - 3.50 ng/mL 7.68 (H)  4.50 (H)  7.79 (H)       Final Diagnosis   A: Prostate, left base x2, needle core biopsy:  - Negative for malignancy.     B: Prostate, left mid x2, needle core biopsy:  - Negative for malignancy.     C: Prostate, left apex x2, needle core biopsy:  - Negative for  malignancy.     D: Prostate, right base x2, needle core biopsy:  - Negative for malignancy.     E: Prostate, right mid x2, needle core biopsy:  - Negative for malignancy.     F: Prostate, right apex x2, needle core biopsy:  - Negative for malignancy.     G: Prostate, MRI target left anterior x3, needle core biopsy:  - Negative for malignancy.       Assessment & Plan    Elevated prostate specific antigen (PSA)  No evidence of malignancy on current biopsy  He did have a PI-RADS 4 lesion which was pretty small and localized  I think it is reasonable to continue with PSA monitoring for now and maybe repeat a biopsy if PSA tends to be trending higher    Testosterone deficiency  Continue testosterone supplementation with current dosing  Will update his primary care      Miguel Reddy MD  [facility] [place]      ==========================    Additional Billing and Coding Information:  Review of external notes as documented above   Review of the result(s) of each unique test - PSA, surgical path, MRI              15 minutes spent by me on the date of the encounter doing chart review, review of test results, interpretation of tests, patient visit, and documentation         Again, thank you for allowing me to participate in the care of your patient.      Sincerely,    Miguel Reddy MD

## 2025-06-30 NOTE — PATIENT INSTRUCTIONS
Discussed negative biopsy results  Plan to follow-up in 1 year with repeat PSA  Will update his primary care doctor Dr orellana about testosterone titration

## 2025-06-30 NOTE — PROGRESS NOTES
Galiloe is a 59 year old who is being evaluated via a billable video visit.    How would you like to obtain your AVS? MyChart  If the video visit is dropped, the invitation should be resent by: Text to cell phone: [phone]  Will anyone else be joining your video visit? No    Video-Visit Details    Type of service:  Video Visit  Video start time: 9:33 AM  Video end time: 9:38 AM  Originating Location (pt. Location): Home    Distant Location (provider location):  On-site  Platform used for Video Visit: [facility]   HPI:  Galileo Vieira is a 59 year old male being seen for discussion of biopsy results.  Duration of problem: Few years  Previous treatments: On testosterone replacement      Reviewed previous notes  Doing well after the biopsy no issues to report  Has some concerns regarding testosterone replacement         Review of Systems:  From intake questionnaire     Skin: negative  Eyes: negative  Ears/Nose/Throat: negative  Respiratory: No shortness of breath, dyspnea on exertion, cough, or hemoptysis  Cardiovascular: No chest pain or palpitations  Gastrointestinal: negative; no nausea/vomiting, constipation or diarrhea  Genitourinary: as per HPI  Musculoskeletal: negative  Neurologic: negative  Psychiatric: negative  Hematologic/Lymphatic/Immunologic: negative  Endocrine: negative         Physical Exam:   This is a virtual visit    Alert, no acute distress, oriented, conversant    Ears/nose/mouth: mouth:normal, good dentition  Respiratory: no respiratory distress, or pursed lip breathing  Cardiovascular:no obvious jugular venous distension present  Skin: no suspicious lesions or rashes on Visible body parts on the Screen  Neuro: Alert, oriented, speech and mentation normal  Psych: affect and mood normal, alert and oriented to person, place and time  Review of Imaging:  The following imaging exams were independently viewed and interpreted by me and discussed with patient:  MRI Abd/Pelvis: MRI PROSTATE: 5/2/2025 9:15  AM     CLINICAL HISTORY: Testosterone insufficiency; BPH with elevated PSA;  BPH with elevated PSA     Most Recent PSA: 7.79 ug/L     Comparison: None.     TECHNIQUE:  The following sequences were obtained: High-resolution axial  T2-weighted, coronal T2-weighted, 3D volumetric T2-weighted, axial  pre-contrast T1, axial diffusion-weighted, axial apparent diffusion  coefficient and axial dynamic contrast-enhanced T1. Postcontrast  images were evaluated on a separate workstation to evaluate dynamic  contrast enhancement. The technique of this exam is PI-RADS v2.1  compliant. Contrast dose: 10mL Gadavist     FINDINGS:     Prostate Image Quality (PI-QUAL)     T2-weighted images: 4/4  Diffusion-weighted images: 4/4  Dynamic contrast enhancement images: Positive     PI-QUAL score: 3 - Optimal quality     Size: 34 grams  Hemorrhage: Absent  Peripheral zone: Heterogeneous on T2-weighted images. Regions of  mildly decreased signal on ADC or DWI which are best characterized as  PI-RADS 2 without highly suspicious lesion.  Transition zone: Enlarged with BPH changes. Suspicious lesions as  detailed below.     Lesion(s) in rank order of severity (highest score- to lowest score,  then by size)      Lesion 1:  Location: Left mid gland transition zone at the 1-2 o'clock position  relative to the urethra. Series 8 image 59.   Additional prostate regions involved: None  Size: 10 mm  T2 description: Moderately hypointense lenticular lesion  T2 numerical assessment: 4  DWI description: Markedly hyperintense on DWI, markedly hypointense on  ADC  DWI numerical assessment: 4  DCE assessment: Positive    Prostate margin: No capsular abutment  Lesion overall PI-RADS category: 4     Neurovascular bundles: No neurovascular bundle involvement by  malignancy.  Seminal vesicles: No seminal vesicle involvement by malignancy.  Lymph nodes: No lymph node involvement  Bones: No suspicious lesions  Other pelvic organs: Colonic diverticulosis. Small  fat-containing  right inguinal hernia. Degenerative changes in the lumbar spine with  grade 1 anterolisthesis of L5 on S1.                                                                      IMPRESSION:  1. Based on the most suspicious abnormality, this exam is  characterized as PIRADS 4 - Clinically significant cancer is likely to  be present.  The most suspicious abnormality is located at the left  mid gland transitional zone and there is no evidence of extraprostatic  extension.  2. No suspicious adenopathy or evidence of pelvic metastases.     PIRADS? v2.1 Assessment Categories   PIRADS 1: Very low (clinically significant cancer is highly unlikely  to be present)   PIRADS 2: Low (clinically significant cancer is unlikely to be  present)   PIRADS 3: Intermediate (the presence of clinically significant cancer  is equivocal)   PIRADS 4: High (clinically significant cancer is likely to be present)     PIRADS 5: Very high (clinically significant cancer is highly likely to  be present)     I have personally reviewed the examination and initial interpretation  and I agree with the findings.     CAMELIA THAYER DO     Review of Labs:  The following labs were reviewed by me and discussed with the patient:  Component      Latest Ref Rng 12/3/2024  8:11 AM 1/9/2025  8:16 AM 3/26/2025  7:54 AM   Prostate Specific Antigen Screen      0.00 - 3.50 ng/mL 7.68 (H)  4.50 (H)  7.79 (H)       Final Diagnosis   A: Prostate, left base x2, needle core biopsy:  - Negative for malignancy.     B: Prostate, left mid x2, needle core biopsy:  - Negative for malignancy.     C: Prostate, left apex x2, needle core biopsy:  - Negative for malignancy.     D: Prostate, right base x2, needle core biopsy:  - Negative for malignancy.     E: Prostate, right mid x2, needle core biopsy:  - Negative for malignancy.     F: Prostate, right apex x2, needle core biopsy:  - Negative for malignancy.     G: Prostate, MRI target left anterior x3, needle core  biopsy:  - Negative for malignancy.       Assessment & Plan     Elevated prostate specific antigen (PSA)  No evidence of malignancy on current biopsy  He did have a PI-RADS 4 lesion which was pretty small and localized  I think it is reasonable to continue with PSA monitoring for now and maybe repeat a biopsy if PSA tends to be trending higher    Testosterone deficiency  Continue testosterone supplementation with current dosing  Will update his primary care      Miguel Reddy MD  [facility] [place]      ==========================    Additional Billing and Coding Information:  Review of external notes as documented above   Review of the result(s) of each unique test - PSA, surgical path, MRI              15 minutes spent by me on the date of the encounter doing chart review, review of test results, interpretation of tests, patient visit, and documentation

## 2025-07-02 ENCOUNTER — LAB (OUTPATIENT)
Dept: LAB | Facility: CLINIC | Age: 60
End: 2025-07-02
Payer: COMMERCIAL

## 2025-07-02 DIAGNOSIS — N40.0 BPH WITH ELEVATED PSA: ICD-10-CM

## 2025-07-02 DIAGNOSIS — E34.9 TESTOSTERONE INSUFFICIENCY: ICD-10-CM

## 2025-07-02 DIAGNOSIS — R97.20 BPH WITH ELEVATED PSA: ICD-10-CM

## 2025-07-02 DIAGNOSIS — E11.9 TYPE 2 DIABETES MELLITUS WITHOUT COMPLICATION, WITHOUT LONG-TERM CURRENT USE OF INSULIN (H): ICD-10-CM

## 2025-07-02 LAB
EST. AVERAGE GLUCOSE BLD GHB EST-MCNC: 114 MG/DL
HBA1C MFR BLD: 5.6 % (ref 0–5.6)

## 2025-07-02 PROCEDURE — 36415 COLL VENOUS BLD VENIPUNCTURE: CPT

## 2025-07-02 PROCEDURE — 83036 HEMOGLOBIN GLYCOSYLATED A1C: CPT

## 2025-07-02 PROCEDURE — 84154 ASSAY OF PSA FREE: CPT

## 2025-07-02 PROCEDURE — 3044F HG A1C LEVEL LT 7.0%: CPT

## 2025-07-02 PROCEDURE — 84153 ASSAY OF PSA TOTAL: CPT

## 2025-07-03 LAB
PSA FREE MFR SERPL: 5.52 %
PSA FREE SERPL-MCNC: 0.4 NG/ML
PSA SERPL DL<=0.01 NG/ML-MCNC: 7.24 NG/ML (ref 0–3.5)

## 2025-07-07 RX ORDER — TESTOSTERONE CYPIONATE 200 MG/ML
300 INJECTION, SOLUTION INTRAMUSCULAR
Qty: 48 ML | Refills: 0 | Status: SHIPPED | OUTPATIENT
Start: 2025-07-07 | End: 2025-10-05

## 2025-08-11 DIAGNOSIS — R97.20 BPH WITH ELEVATED PSA: ICD-10-CM

## 2025-08-11 DIAGNOSIS — E34.9 TESTOSTERONE INSUFFICIENCY: ICD-10-CM

## 2025-08-11 DIAGNOSIS — N40.0 BPH WITH ELEVATED PSA: ICD-10-CM

## 2025-08-11 RX ORDER — FINASTERIDE 5 MG/1
5 TABLET, FILM COATED ORAL DAILY
Qty: 90 TABLET | Refills: 3 | Status: SHIPPED | OUTPATIENT
Start: 2025-08-11

## 2025-08-12 DIAGNOSIS — E11.9 TYPE 2 DIABETES MELLITUS WITHOUT COMPLICATION, WITHOUT LONG-TERM CURRENT USE OF INSULIN (H): ICD-10-CM

## 2025-08-12 RX ORDER — TIRZEPATIDE 15 MG/.5ML
15 INJECTION, SOLUTION SUBCUTANEOUS WEEKLY
Qty: 2 ML | Refills: 11 | Status: SHIPPED | OUTPATIENT
Start: 2025-08-12

## 2025-08-12 RX ORDER — TIRZEPATIDE 15 MG/.5ML
INJECTION, SOLUTION SUBCUTANEOUS
Qty: 2 ML | Refills: 0 | Status: SHIPPED | OUTPATIENT
Start: 2025-08-12 | End: 2025-08-12

## 2025-08-13 DIAGNOSIS — S06.0X9D CONCUSSION WITH LOSS OF CONSCIOUSNESS, SUBSEQUENT ENCOUNTER: ICD-10-CM

## 2025-08-13 DIAGNOSIS — M62.838 NECK MUSCLE SPASM: ICD-10-CM

## 2025-08-14 RX ORDER — IBUPROFEN 800 MG/1
800 TABLET, FILM COATED ORAL EVERY 8 HOURS PRN
Qty: 90 TABLET | Refills: 3 | Status: SHIPPED | OUTPATIENT
Start: 2025-08-14

## 2025-08-27 DIAGNOSIS — R11.0 NAUSEA: Primary | ICD-10-CM

## 2025-08-28 RX ORDER — ONDANSETRON 8 MG/1
8 TABLET, ORALLY DISINTEGRATING ORAL EVERY 8 HOURS PRN
Qty: 30 TABLET | Refills: 11 | Status: SHIPPED | OUTPATIENT
Start: 2025-08-28

## 2025-09-04 DIAGNOSIS — E11.9 TYPE 2 DIABETES MELLITUS WITHOUT COMPLICATION, WITHOUT LONG-TERM CURRENT USE OF INSULIN (H): Primary | ICD-10-CM

## (undated) DEVICE — ESU GROUND PAD ADULT W/CORD E7507

## (undated) DEVICE — ENDO FORCEP SPIKED SERRATED SHAFT JUMBO 239CM G56998

## (undated) DEVICE — ENDO SNARE POLYPECTOMY OVAL 15MM LOOP SD-240U-15

## (undated) DEVICE — ENDO SNARE EXACTO COLD 9MM LOOP 2.4MMX230CM 00711115

## (undated) RX ORDER — FENTANYL CITRATE 50 UG/ML
INJECTION, SOLUTION INTRAMUSCULAR; INTRAVENOUS
Status: DISPENSED
Start: 2024-10-04